# Patient Record
Sex: FEMALE | Race: WHITE | NOT HISPANIC OR LATINO | Employment: OTHER | URBAN - METROPOLITAN AREA
[De-identification: names, ages, dates, MRNs, and addresses within clinical notes are randomized per-mention and may not be internally consistent; named-entity substitution may affect disease eponyms.]

---

## 2017-03-21 RX ORDER — VALSARTAN AND HYDROCHLOROTHIAZIDE 160; 25 MG/1; MG/1
TABLET ORAL
Qty: 90 TABLET | Refills: 0 | Status: SHIPPED | OUTPATIENT
Start: 2017-03-21 | End: 2017-09-01 | Stop reason: SDUPTHER

## 2017-04-11 ENCOUNTER — APPOINTMENT (OUTPATIENT)
Dept: WOMENS IMAGING | Facility: HOSPITAL | Age: 77
End: 2017-04-11

## 2017-04-11 PROCEDURE — 77063 BREAST TOMOSYNTHESIS BI: CPT | Performed by: RADIOLOGY

## 2017-04-11 PROCEDURE — G0202 SCR MAMMO BI INCL CAD: HCPCS | Performed by: RADIOLOGY

## 2017-04-18 RX ORDER — SIMVASTATIN 40 MG
TABLET ORAL
Qty: 90 TABLET | Refills: 1 | Status: SHIPPED | OUTPATIENT
Start: 2017-04-18 | End: 2017-09-01 | Stop reason: SDUPTHER

## 2017-06-29 RX ORDER — VALSARTAN AND HYDROCHLOROTHIAZIDE 160; 25 MG/1; MG/1
TABLET ORAL
Qty: 90 TABLET | Refills: 0 | OUTPATIENT
Start: 2017-06-29

## 2017-07-05 RX ORDER — VALSARTAN AND HYDROCHLOROTHIAZIDE 160; 25 MG/1; MG/1
TABLET ORAL
Qty: 90 TABLET | Refills: 0 | OUTPATIENT
Start: 2017-07-05

## 2017-07-07 ENCOUNTER — TELEPHONE (OUTPATIENT)
Dept: INTERNAL MEDICINE | Facility: CLINIC | Age: 77
End: 2017-07-07

## 2017-07-07 NOTE — TELEPHONE ENCOUNTER
Patient called regarding her Valsartan medication. She will be out of town until 08/14/2017. She had an appointment for the week of 08/21/2017. She said that her Valsartan was denied. She would like to know if she can have this refilled until she gets back. She would like prescription sent to [facility].

## 2017-08-15 LAB
25(OH)D3+25(OH)D2 SERPL-MCNC: 34.8 NG/ML (ref 30–100)
ALBUMIN SERPL-MCNC: 4.1 G/DL (ref 3.5–5.2)
ALBUMIN/GLOB SERPL: 2.1 G/DL
ALP SERPL-CCNC: 57 U/L (ref 39–117)
ALT SERPL-CCNC: 29 U/L (ref 1–33)
AST SERPL-CCNC: 21 U/L (ref 1–32)
BILIRUB SERPL-MCNC: 0.3 MG/DL (ref 0.1–1.2)
BUN SERPL-MCNC: 17 MG/DL (ref 8–23)
BUN/CREAT SERPL: 27.9 (ref 7–25)
CALCIUM SERPL-MCNC: 10.3 MG/DL (ref 8.6–10.5)
CHLORIDE SERPL-SCNC: 108 MMOL/L (ref 98–107)
CHOLEST SERPL-MCNC: 116 MG/DL (ref 100–199)
CK SERPL-CCNC: 264 U/L (ref 20–180)
CO2 SERPL-SCNC: 27 MMOL/L (ref 22–29)
CREAT SERPL-MCNC: 0.61 MG/DL (ref 0.57–1)
ERYTHROCYTE [DISTWIDTH] IN BLOOD BY AUTOMATED COUNT: 12.8 % (ref 11.7–13)
GLOBULIN SER CALC-MCNC: 2 GM/DL
GLUCOSE SERPL-MCNC: 101 MG/DL (ref 65–99)
HCT VFR BLD AUTO: 41.4 % (ref 35.6–45.5)
HDL SERPL-SCNC: 33.7 UMOL/L
HDLC SERPL-MCNC: 44 MG/DL
HGB BLD-MCNC: 12.7 G/DL (ref 11.9–15.5)
LDL SERPL QN: 20.6 NM
LDL SERPL-SCNC: 681 NMOL/L
LDL SMALL SERPL-SCNC: 455 NMOL/L
LDLC SERPL CALC-MCNC: 46 MG/DL (ref 0–99)
LP-IR SCORE SERPL: 59
MCH RBC QN AUTO: 28.7 PG (ref 26.9–32)
MCHC RBC AUTO-ENTMCNC: 30.7 G/DL (ref 32.4–36.3)
MCV RBC AUTO: 93.7 FL (ref 80.5–98.2)
PLATELET # BLD AUTO: 171 10*3/MM3 (ref 140–500)
POTASSIUM SERPL-SCNC: 4.9 MMOL/L (ref 3.5–5.2)
PROT SERPL-MCNC: 6.1 G/DL (ref 6–8.5)
RBC # BLD AUTO: 4.42 10*6/MM3 (ref 3.9–5.2)
SODIUM SERPL-SCNC: 145 MMOL/L (ref 136–145)
TRIGL SERPL-MCNC: 130 MG/DL (ref 0–149)
WBC # BLD AUTO: 3.88 10*3/MM3 (ref 4.5–10.7)

## 2017-09-01 ENCOUNTER — OFFICE VISIT (OUTPATIENT)
Dept: INTERNAL MEDICINE | Facility: CLINIC | Age: 77
End: 2017-09-01

## 2017-09-01 VITALS
DIASTOLIC BLOOD PRESSURE: 72 MMHG | OXYGEN SATURATION: 93 % | WEIGHT: 166 LBS | BODY MASS INDEX: 28.34 KG/M2 | HEART RATE: 82 BPM | SYSTOLIC BLOOD PRESSURE: 122 MMHG | HEIGHT: 64 IN

## 2017-09-01 DIAGNOSIS — G89.29 CHRONIC RIGHT HIP PAIN: ICD-10-CM

## 2017-09-01 DIAGNOSIS — Z00.00 ROUTINE PHYSICAL EXAMINATION: Primary | ICD-10-CM

## 2017-09-01 DIAGNOSIS — E55.9 VITAMIN D DEFICIENCY: Chronic | ICD-10-CM

## 2017-09-01 DIAGNOSIS — Z51.81 THERAPEUTIC DRUG MONITORING: ICD-10-CM

## 2017-09-01 DIAGNOSIS — Z85.820 HISTORY OF MALIGNANT MELANOMA: Chronic | ICD-10-CM

## 2017-09-01 DIAGNOSIS — R31.29 MICROSCOPIC HEMATURIA: Chronic | ICD-10-CM

## 2017-09-01 DIAGNOSIS — I10 BENIGN ESSENTIAL HYPERTENSION: Chronic | ICD-10-CM

## 2017-09-01 DIAGNOSIS — R73.01 IMPAIRED FASTING GLUCOSE: ICD-10-CM

## 2017-09-01 DIAGNOSIS — K21.9 GASTROESOPHAGEAL REFLUX DISEASE WITHOUT ESOPHAGITIS: Chronic | ICD-10-CM

## 2017-09-01 DIAGNOSIS — M81.0 OSTEOPOROSIS: Chronic | ICD-10-CM

## 2017-09-01 DIAGNOSIS — I65.23 ATHEROSCLEROSIS OF BOTH CAROTID ARTERIES: Chronic | ICD-10-CM

## 2017-09-01 DIAGNOSIS — M25.551 CHRONIC RIGHT HIP PAIN: ICD-10-CM

## 2017-09-01 DIAGNOSIS — M51.9 LUMBAR DISC DISEASE: Chronic | ICD-10-CM

## 2017-09-01 DIAGNOSIS — N95.1 MENOPAUSAL STATE: ICD-10-CM

## 2017-09-01 DIAGNOSIS — Z00.00 INITIAL MEDICARE ANNUAL WELLNESS VISIT: ICD-10-CM

## 2017-09-01 DIAGNOSIS — E78.5 HYPERLIPIDEMIA, UNSPECIFIED HYPERLIPIDEMIA TYPE: Chronic | ICD-10-CM

## 2017-09-01 DIAGNOSIS — Z23 NEED FOR PNEUMOCOCCAL VACCINATION: ICD-10-CM

## 2017-09-01 PROCEDURE — G0438 PPPS, INITIAL VISIT: HCPCS | Performed by: INTERNAL MEDICINE

## 2017-09-01 PROCEDURE — 99397 PER PM REEVAL EST PAT 65+ YR: CPT | Performed by: INTERNAL MEDICINE

## 2017-09-01 PROCEDURE — 96160 PT-FOCUSED HLTH RISK ASSMT: CPT | Performed by: INTERNAL MEDICINE

## 2017-09-01 PROCEDURE — 90732 PPSV23 VACC 2 YRS+ SUBQ/IM: CPT | Performed by: INTERNAL MEDICINE

## 2017-09-01 PROCEDURE — G0009 ADMIN PNEUMOCOCCAL VACCINE: HCPCS | Performed by: INTERNAL MEDICINE

## 2017-09-01 RX ORDER — VALSARTAN AND HYDROCHLOROTHIAZIDE 160; 25 MG/1; MG/1
TABLET ORAL
Qty: 90 TABLET | Refills: 3 | Status: SHIPPED | OUTPATIENT
Start: 2017-09-01 | End: 2018-08-16 | Stop reason: SDUPTHER

## 2017-09-01 RX ORDER — SIMVASTATIN 40 MG
TABLET ORAL
Qty: 90 TABLET | Refills: 3
Start: 2017-09-01 | End: 2017-09-06 | Stop reason: SDUPTHER

## 2017-09-01 NOTE — PROGRESS NOTES
QUICK REFERENCE INFORMATION:  The ABCs of the Annual Wellness Visit    Initial Medicare Wellness Visit    HEALTH RISK ASSESSMENT    1940    Recent Hospitalizations:  No hospitalization(s) within the last year..        Current Medical Providers:  No care team member to display        Smoking Status:  History   Smoking Status   • Never Smoker   Smokeless Tobacco   • Never Used       Alcohol Consumption:  History   Alcohol Use No       Depression Screen:   PHQ-2/PHQ-9 Depression Screening 9/1/2017   Little interest or pleasure in doing things 0   Feeling down, depressed, or hopeless 0   Total Score 0       Health Habits and Functional and Cognitive Screening:  Functional & Cognitive Status 9/1/2017   Do you have difficulty preparing food and eating? No   Do you have difficulty bathing yourself? No   Do you have difficulty getting dressed? No   Do you have difficulty using the toilet? No   Do you have difficulty moving around from place to place? No   In the past year have you fallen or experienced a near fall? No   Do you need help using the phone?  No   Are you deaf or do you have serious difficulty hearing?  No   Do you need help with transportation? No   Do you need help shopping? No   Do you need help preparing meals?  No   Do you need help with housework?  No   Do you need help with laundry? No   Do you need help taking your medications? No   Do you need help managing money? No   Do you have difficulty concentrating, remembering or making decisions? No       Health Habits  Current Diet: Well Balanced Diet  Dental Exam: Up to date  Eye Exam: Up to date  Exercise (times per week): 0 times per week  Current Exercise Activities Include: None          Does the patient have evidence of cognitive impairment? No    Asiprin use counseling: Taking ASA appropriately as indicated      Recent Lab Results:    Visual Acuity:  No exam data present    Age-appropriate Screening Schedule:  Refer to the list below for future  screening recommendations based on patient's age, sex and/or medical conditions. Orders for these recommended tests are listed in the plan section. The patient has been provided with a written plan.    Health Maintenance   Topic Date Due   • DXA SCAN  03/26/2017   • INFLUENZA VACCINE  09/01/2017   • LIPID PANEL  08/14/2018   • MAMMOGRAM  04/11/2019   • COLONOSCOPY  07/12/2022   • TDAP/TD VACCINES (2 - Td) 09/01/2027   • PNEUMOCOCCAL VACCINES (65+ LOW/MEDIUM RISK)  Completed   • ZOSTER VACCINE  Completed        Subjective   History of Present Illness    Lachelle Mcclure is a 77 y.o. female who presents for an Annual Wellness Visit.    The following portions of the patient's history were reviewed and updated as appropriate: allergies, current medications, past family history, past medical history, past social history, past surgical history and problem list.    Outpatient Medications Prior to Visit   Medication Sig Dispense Refill   • aspirin (ASPIRIN LOW DOSE) 81 MG tablet Take 1 tablet by mouth daily     • Calcium Carbonate-Vit D-Min (CALCIUM 1200) 2991-3941 MG-UNIT chewable tablet Chew 1 tablet daily     • Cholecalciferol (VITAMIN D) 2000 UNITS capsule Take 1 capsule by mouth daily     • Coenzyme Q10 (COQ10) 400 MG capsule Take 1 capsule by mouth daily  Take with a meal.     • Multiple Vitamin (MULTIVITAMIN) capsule Take 1 capsule by mouth daily     • simvastatin (ZOCOR) 40 MG tablet TAKE 1 TABLET EVERY DAY  FOR  CHOLESTEROL (NEED LABS AND FOLLOW UP APPOINTMENT ASAP) 90 tablet 1   • valsartan-hydrochlorothiazide (DIOVAN-HCT) 160-25 MG per tablet TAKE 1 TABLET EVERY MORNING 90 tablet 0   • alendronate (FOSAMAX) 70 MG tablet 1 by mouth every week as directed 12 tablet 0     No facility-administered medications prior to visit.        Patient Active Problem List   Diagnosis   • Benign essential hypertension   • Carotid artery plaque, 10/06/2016--vascular screen is now normal without carotid disease.  Improved.   •  Gastroesophageal reflux disease without esophagitis   • Hyperlipidemia   • Microscopic hematuria   • Osteoporosis, 03/26/2015--lumbar spine -1.3.  Left femoral neck -2.6.  Right femoral neck -2.5.   • Tinnitus of right ear   • History of malignant melanoma, 2008--right side of face.  1999--lower back.   • Therapeutic drug monitoring   • Vitamin D deficiency   • Lumbar disc disease   • Routine physical examination   • Chronic right hip pain   • Impaired fasting glucose   • Menopausal state       Advance Care Planning:  has an advance directive - a copy has been provided and is in file    Identification of Risk Factors:  Risk factors include: weight , cardiovascular risk and chronic pain.    Review of Systems   Musculoskeletal:        Right hip pain   All other systems reviewed and are negative.      Compared to one year ago, the patient feels her physical health is the same.  Compared to one year ago, the patient feels her mental health is the same.    Objective       Physical Exam    General: Alert and oriented x 3, with appropriate affect; no acute distress.  HEENT: pupils equal, round, and reactive to light; extraocular movements intact; sclera nonicteric; nasal mucosa normal; pharynx normal; tympanic membranes and ear canals normal.  Neck: without JVD, thyromegaly, bruit, or adenopathy.  Lungs: clear to auscultation in all fields.  Heart: auscultation reveals regular rate and rhythm without murmur, rub, gallop, or click.  Abdomen: is soft and nontender, without hepatosplenomegaly, mass or hernia. Normal bowel sounds.  GYN, Digital rectal exam, Breast; deferred to gynecologist.  Extremities: are without clubbing, cyanosis, or edema.  Vascular: no signs of peripheral arterial disease or venous insufficiency/varicosities.  Neurological: intact without focal deficit, including cranial and peripheral nerves.  Station and gait observed to be normal during ingress and egress from the examination area.  Sensation and  "deep tendon reflexes tested if clinically indicated and are normal.  Musculoskeletal: exam is normal, without signs of synovitis, significant degeneration or deformity. Skin examination: without rash or significant lesions.    Vitals:    09/01/17 0813   BP: 122/72   BP Location: Right arm   Patient Position: Sitting   Cuff Size: Adult   Pulse: 82   SpO2: 93%   Weight: 166 lb (75.3 kg)   Height: 63.5\" (161.3 cm)   PainSc: 0-No pain       Body mass index is 28.94 kg/(m^2).  Discussed the patient's BMI with her. The BMI is above average; BMI management plan is completed.    Assessment/Plan   Patient Self-Management and Personalized Health Advice  The patient has been provided with information about: diet, exercise, weight management, prevention of cardiac or vascular disease and the relationship between weight and GERD and preventive services including:   · Bone densitometry screening, Diabetes screening, see lab orders, Exercise counseling provided, Fall Risk assessment done, Glaucoma screening recommended, Pneumococcal vaccine .    Visit Diagnoses:    ICD-10-CM ICD-9-CM   1. Routine physical examination Z00.00 V70.0   2. Initial Medicare annual wellness visit Z00.00 V70.0   3. Hyperlipidemia, unspecified hyperlipidemia type E78.5 272.4   4. Benign essential hypertension I10 401.1   5. Carotid artery plaque, 10/06/2016--vascular screen is now normal without carotid disease.  Improved. I65.23 433.10     433.30   6. Gastroesophageal reflux disease without esophagitis K21.9 530.81   7. Microscopic hematuria R31.29 599.72   8. Osteoporosis, 03/26/2015--lumbar spine -1.3.  Left femoral neck -2.6.  Right femoral neck -2.5. M81.0 733.00   9. History of malignant melanoma, 2008--right side of face.  1999--lower back. Z85.820 V10.82   10. Vitamin D deficiency E55.9 268.9   11. Lumbar disc disease M51.9 722.93   12. Therapeutic drug monitoring Z51.81 V58.83   13. Chronic right hip pain M25.551 719.45    G89.29 338.29   14. " Impaired fasting glucose R73.01 790.21   15. Need for pneumococcal vaccination Z23 V03.82   16. Menopausal state N95.1 627.2       Orders Placed This Encounter   Procedures   • DEXA Bone Density Axial     Order Specific Question:   Reason for Exam:     Answer:   Follow-up osteoporosis   • CBC (No Diff)     Standing Status:   Future     Standing Expiration Date:   9/2/2019   • CK     Standing Status:   Future     Standing Expiration Date:   9/2/2019   • Comprehensive Metabolic Panel     Standing Status:   Future     Standing Expiration Date:   9/2/2019   • Hemoglobin A1c     Standing Status:   Future     Standing Expiration Date:   9/2/2019   • NMR LipoProfile     Standing Status:   Future     Standing Expiration Date:   9/2/2019   • Vitamin D 25 Hydroxy     Standing Status:   Future     Standing Expiration Date:   9/2/2019   • TSH     Standing Status:   Future     Standing Expiration Date:   9/2/2019   • T4, Free     Standing Status:   Future     Standing Expiration Date:   9/2/2019   • T3, Free     Standing Status:   Future     Standing Expiration Date:   9/2/2019   • Urinalysis With / Microscopic If Indicated     Standing Status:   Future     Standing Expiration Date:   9/2/2019       Outpatient Encounter Prescriptions as of 9/1/2017   Medication Sig Dispense Refill   • aspirin (ASPIRIN LOW DOSE) 81 MG tablet Take 1 tablet by mouth daily     • Calcium Carbonate-Vit D-Min (CALCIUM 1200) 7165-4801 MG-UNIT chewable tablet Chew 1 tablet daily     • Cholecalciferol (VITAMIN D) 2000 UNITS capsule Take 1 capsule by mouth daily     • Coenzyme Q10 (COQ10) 400 MG capsule Take 1 capsule by mouth daily  Take with a meal.     • Multiple Vitamin (MULTIVITAMIN) capsule Take 1 capsule by mouth daily     • simvastatin (ZOCOR) 40 MG tablet 1 by mouth daily for cholesterol 90 tablet 3   • valsartan-hydrochlorothiazide (DIOVAN-HCT) 160-25 MG per tablet 1 by mouth every morning for blood pressure 90 tablet 3   • [DISCONTINUED]  simvastatin (ZOCOR) 40 MG tablet TAKE 1 TABLET EVERY DAY  FOR  CHOLESTEROL (NEED LABS AND FOLLOW UP APPOINTMENT ASAP) 90 tablet 1   • [DISCONTINUED] valsartan-hydrochlorothiazide (DIOVAN-HCT) 160-25 MG per tablet TAKE 1 TABLET EVERY MORNING 90 tablet 0   • [DISCONTINUED] alendronate (FOSAMAX) 70 MG tablet 1 by mouth every week as directed 12 tablet 0     Facility-Administered Encounter Medications as of 9/1/2017   Medication Dose Route Frequency Provider Last Rate Last Dose   • pneumococcal polysaccharide 23-valent (PNEUMOVAX-23) vaccine 0.5 mL  0.5 mL Intramuscular During Hospitalization Juan Vega MD           Reviewed use of high risk medication in the elderly: not applicable  Reviewed for potential of harmful drug interactions in the elderly: yes    Follow Up:  No Follow-up on file.     An After Visit Summary and PPPS with all of these plans were given to the patient.

## 2017-09-01 NOTE — PROGRESS NOTES
09/01/2017    Patient Information  Lachelle Mcclure                                                                                          [address]  [facility] [address]      1940  [phone]      Chief Complaint:     Routine physical examination and follow-up lab work.  Initial Medicare wellness visit.  No new acute complaints but continues to have intermittent chronic right hip pain.    History of Present Illness:    Patient with a history of hypertension, carotid artery plaque, esophageal reflux, hyperlipidemia, microscopic hematuria, osteoporosis, history of malignant melanoma, vitamin D deficiency, lumbar disc disease, chronic right hip pain.  She presents today for routine annual physical as well as her initial Medicare wellness visit.  She had lab work prior to this visit in order to monitor her chronic medical issues.  Past medical history reviewed and updated where necessary including health maintenance parameters.  This reveals she needs a DEXA scan, her final pneumococcal vaccination, and the computer indicates she needs an influenza vaccine but it is too early.      The history regarding chronic right hip pain:    09/01/2017--patient has had a one-year history of intermittent episodes of lateral right hip pain that at times is very severe and debilitating.  She also has tenderness over this area that limits her from sleeping on her right side.  Last year I gave her a cortisone injection for trochanteric bursitis and this provided immediate relief but not long lasting.  X-rays were negative for fracture or even degenerative arthritis.  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  Ordered.        Review of Systems   Constitution: Negative.   HENT: Negative.    Eyes: Negative.    Cardiovascular: Negative.    Respiratory: Negative.    Endocrine: Negative.    Hematologic/Lymphatic: Negative.    Skin: Negative.     Musculoskeletal: Negative.         Right hip pain   Gastrointestinal: Negative.    Genitourinary: Negative.    Neurological: Negative.    Psychiatric/Behavioral: Negative.    Allergic/Immunologic: Negative.        Active Problems:    Patient Active Problem List   Diagnosis   • Benign essential hypertension   • Carotid artery plaque, 10/06/2016--vascular screen is now normal without carotid disease.  Improved.   • Gastroesophageal reflux disease without esophagitis   • Hyperlipidemia   • Microscopic hematuria   • Osteoporosis, 03/26/2015--lumbar spine -1.3.  Left femoral neck -2.6.  Right femoral neck -2.5.   • Tinnitus of right ear   • History of malignant melanoma, 2008--right side of face.  1999--lower back.   • Therapeutic drug monitoring   • Vitamin D deficiency   • Lumbar disc disease   • Routine physical examination   • Chronic right hip pain   • Impaired fasting glucose         Past Medical History:   Diagnosis Date   • Benign essential hypertension 4/18/2006 04/18/2006--treatment for hypertension begun.   • Carotid artery plaque, 10/06/2016--vascular screen is now normal without carotid disease.  Improved. 7/30/2010    10/06/2016--vascular screen reveals no evidence of carotid plaque, negative for AAA, negative for PAD.  04/23/2014--vascular screen reveals mild bilateral carotid plaque, negative for AAA, negative for PAD.   12/07/2012--vascular screen reveals mild left-sided carotid plaque, normal right carotid, negative for AAA, negative for PAD.   07/30/2010--vascular screen reveals mild left-sided carotid plaque, normal right carotid, negative for AAA, negative for PAD.   • Chronic right hip pain 9/1/2017 09/01/2017--patient has had a one-year history of intermittent episodes of lateral right hip pain that at times is very severe and debilitating.  She also has tenderness over this area that limits her from sleeping on her right side.  Last year I gave her a cortisone injection for trochanteric  bursitis and this provided immediate relief but not long lasting.  X-rays were negative for fracture or even degenerative arthritis.  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  Ordered.   • Gastroesophageal reflux disease without esophagitis 1/14/2013    04/10/2014--patient presented with right upper quadrant/epigastric burning pain and discomfort that was postprandial. H pylori breath test was negative. Empiric trial of Dexilant 60 mg per day was initiated for esophageal reflux/dyspepsia.   01/14/2013--EGD performed for epigastric and right upper quadrant pain. There was some edematous appearing mucosa in the antrum and body of the stomach which was biopsied. No significant erythema. No ulcerations. Normal appearing duodenum and esophagus. Pathology revealed moderate chronic active gastritis. No intestinal metaplasia. Helicobacter pylori positive. Patient was treated with appropriate antibiotic and PPI therapy.   • History of bone density study 03/26/2015 03/26/2015--DEXA scan reveals average lumbar spine T score -1.3. Left proximal femoral T score -1.6. Left femoral neck T score -2.6. Right proximal femoral T score -1.0. Right femoral neck T score -2.5. Osteoporosis with 5% decrease in bone density since previous study.   12/07/2012--lumbar spine T score -1.4. Total right hip T score of -1.0. Right femoral neck T score -2.5. Total left hip T score   • History of carotid Doppler/vascular screen 10/6/2016    10/06/2016--vascular screen reveals no evidence of carotid plaque, negative for AAA, negative for PAD.  04/23/2014--vascular screen reveals mild bilateral carotid plaque, negative for AAA, negative for PAD.   12/07/2012--vascular screen reveals mild left-sided carotid plaque, normal right carotid, negative for AAA, negative for PAD.   07/30/2010--vascular screen reveals mild left-sided carotid plaque, normal right carotid, negative for AAA,  negative for PAD.   • History of Helicobacter pylori gastritis 01/14/2013    04/10/2014--patient presented with right upper quadrant/epigastric burning pain and discomfort that was postprandial. H pylori breath test was negative. Empiric trial of Dexilant 60 mg per day was initiated for esophageal reflux/dyspepsia.  01/14/2013--EGD performed for epigastric and right upper quadrant pain. There was some edematous appearing mucosa in the antrum and body of the stomach which w   • History of Impacted cerumen 12/18/2015 12/18/2015--patient presents with a four-month history of ringing in her right ear. No hearing loss. Examination reveals a cerumen impaction of the right ear canal. This was removed with irrigation and curettage   • History of malignant melanoma, 2008--right side of face.  1999--lower back. 1/1/2008 2008--melanoma resected from right face.   1999--malignant melanoma resected from lower back.   • History of mammogram 7/22/2015 07/22/2015--negative mammogram.   01/09/2012--mammogram reveals stable areas of asymmetric breast tissue in both breasts that are benign negative. Nothing to suggest malignancy at this time.   • History of pneumococcal vaccination given 12/18/2015 12/18/2015--Prevnar 13 given. Patient will need PPSV 23 in 6-12 months.   2008--patient reports she received PPSV 23. This was just prior to the age of 67.   • History of right lateral epicondylitis 12/7/2009 12/07/2009--patient was evaluated by the orthopedist with a three-month history of right elbow pain. This was related to a fall. Assessment was lateral epicondylitis. She was treated conservatively with ice, oral anti-inflammatories and stretching. Resolved.   • History of Right-sided chest wall pain 05/29/2014 05/29/2014--patient reports that she fell in December of 2013 and struck her right lower anterior chest wall. Since that time she has been having intermittent pain as well as tenderness over the ribs. Chest  x-ray with rib details ordered and revealed fine linear opacities along the periphery of the left lower lobe suggesting pleural or parenchymal scarring with perhaps some retraction. No effusion   • History of routine gynecologic exam yearly    Patient sees a gynecologist.   • History Trochanteric bursitis of right hip 8/12/2016    10/19/2016--patient seen in follow-up and the results of the lumbar spine x-ray reviewed.  Her hip pain is much better and is very tolerable.  I do not feel we need to proceed with any further evaluation at the present time unless her symptoms return.  09/13/2016--x-ray lumbar spine reveals disc space narrowing at L2-L3, L3-L4, L4-L5, and possibly L5-S1.  There is mild anterior listhesis of L4 on L5 measuring 4.5 mm.  No compression deformity, nor other evidence of acute process.  09/13/2016--patient seen in follow-up and reports her symptoms are better but they certainly have not resolved.  She feels the prednisone may have helped.  Review of the symptoms reveals that patient has right lower back/right buttock pain and occasionally this will radiate down into the right thigh anterior laterally.  It is described as a burning, throbbing, and stinging sensation.  It seems to be relieved by standing up.  Prolonged sitting definitely aggravates it.  She also still has tenderness over the right trochanteric bu   • Hyperlipidemia 8/13/2010 08/13/2010--treatment for hyperlipidemia begun.   • Impaired fasting glucose 9/1/2017 09/01/2017--routine physical.  Serum glucose elevated at 101.  Recommend weight loss and decrease carbohydrate intake.   • Lumbar disc disease 10/19/2016    09/13/2016--x-ray lumbar spine reveals disc space narrowing at L2-L3, L3-L4, L4-L5, and possibly L5-S1.  There is mild anterior listhesis of L4 on L5 measuring 4.5 mm.  No compression deformity, nor other evidence of acute process.   • Microscopic hematuria 12/18/2015 12/22/2015--urine cytology negative for  malignant cells.  Red blood cells noted.  Transitional epithelial cells noted.  Urine culture revealed less than 10,000 colony forming units of mixed urogenital laurie.  Repeat urinalysis was negative.  Recommend observation.  12/18/2015--routine urinalysis reveals 3-5 WBCs and 3-5 RBCs. 0 epithelial cells. 0 bacteria. Patient is asymptomatic. Repeat urinalysis, urine cytology, and urine culture sent.   • Osteoporosis, 03/26/2015--lumbar spine -1.3.  Left femoral neck -2.6.  Right femoral neck -2.5. 7/24/2009 04/30/2015--generic Fosamax 70 mg per day initiated.  03/26/2015--DEXA scan reveals average lumbar spine T score -1.3. Left proximal femoral T score -1.6. Left femoral neck T score -2.6. Right proximal femoral T score -1.0. Right femoral neck T score -2.5. Osteoporosis with 5% decrease in bone density since previous study.   12/07/2012--lumbar spine T score -1.4. Total right hip T score of -1.0. Right femoral neck T score -2.5. Total left hip T score -1.3. Left femoral neck T score -2.5. Osteoporosis bilateral femoral necks, osteopenia lumbar spine.   07/24/2009--DEXA scan reveals lumbar spine T score -1.4. Left hip T score -1.2. Osteopenia.   • Tinnitus of right ear 12/18/2015 12/18/2015--patient presents with a four-month history of ringing in her right ear. No hearing loss. Examination reveals a cerumen impaction of the right ear canal. This was removed with irrigation and curettage.   • Vitamin D deficiency 8/12/2016         Past Surgical History:   Procedure Laterality Date   • CHOLECYSTECTOMY  05/17/2013 05/17/2013--laparoscopic cholecystectomy.   • COLONOSCOPY  07/12/2012 07/12/2012--normal colonoscopy to the cecum with an excellent prep.    • COLONOSCOPY  06/20/2003 06/20/2003--normal colonoscopy to the cecum.   • ESOPHAGOSCOPY / EGD  01/14/2013 01/14/2013--EGD performed for epigastric and right upper quadrant pain. There was some edematous appearing mucosa in the antrum and body of the  stomach which was biopsied. No significant erythema. No ulcerations. Normal appearing duodenum and esophagus. Pathology revealed moderate chronic active gastritis. No intestinal metaplasia. Helicobacter pylori positive. Patient was treated with appropriate   • FOOT SURGERY  1992 1992--orthopedic 10 placed in right great toe.   • HYSTERECTOMY  1986 1986--total abdominal hysterectomy.   • SKIN SURGERY  09/14/2009 09/14/2009--excision 1 cm mid scalp cyst. Pathology benign. 2008--melanoma resected from right face. 1999--malignant melanoma resected from lower back.   • TONSILLECTOMY AND ADENOIDECTOMY  1946 1946.         Allergies   Allergen Reactions   • Propoxyphene            Current Outpatient Prescriptions:   •  aspirin (ASPIRIN LOW DOSE) 81 MG tablet, Take 1 tablet by mouth daily, Disp: , Rfl:   •  Calcium Carbonate-Vit D-Min (CALCIUM 1200) 3109-7239 MG-UNIT chewable tablet, Chew 1 tablet daily, Disp: , Rfl:   •  Cholecalciferol (VITAMIN D) 2000 UNITS capsule, Take 1 capsule by mouth daily, Disp: , Rfl:   •  Coenzyme Q10 (COQ10) 400 MG capsule, Take 1 capsule by mouth daily Take with a meal., Disp: , Rfl:   •  Multiple Vitamin (MULTIVITAMIN) capsule, Take 1 capsule by mouth daily, Disp: , Rfl:   •  simvastatin (ZOCOR) 40 MG tablet, 1 by mouth daily for cholesterol, Disp: 90 tablet, Rfl: 3  •  valsartan-hydrochlorothiazide (DIOVAN-HCT) 160-25 MG per tablet, 1 by mouth every morning for blood pressure, Disp: 90 tablet, Rfl: 3      Family History   Problem Relation Age of Onset   • COPD Mother    • Hypertension Mother      Essential   • COPD Father          Social History     Social History   • Marital status:      Spouse name: N/A   • Number of children: N/A   • Years of education: N/A     Occupational History   • Registered Nurse      Social History Main Topics   • Smoking status: Never Smoker   • Smokeless tobacco: Never Used   • Alcohol use No   • Drug use: No   • Sexual activity: Yes      "Partners: Male     Other Topics Concern   • Not on file     Social History Narrative         Vitals:    09/01/17 0813   BP: 122/72   BP Location: Right arm   Patient Position: Sitting   Cuff Size: Adult   Pulse: 82   SpO2: 93%   Weight: 166 lb (75.3 kg)   Height: 63.5\" (161.3 cm)          Physical Exam:    General: Alert and oriented x 3, with appropriate affect; no acute distress.  HEENT: pupils equal, round, and reactive to light; extraocular movements intact; sclera nonicteric; nasal mucosa normal; pharynx normal; tympanic membranes and ear canals normal.  Neck: without JVD, thyromegaly, bruit, or adenopathy.  Lungs: clear to auscultation in all fields.  Heart: auscultation reveals regular rate and rhythm without murmur, rub, gallop, or click.  Abdomen: is soft and nontender, without hepatosplenomegaly, mass or hernia. Normal bowel sounds.  GYN, Digital rectal exam, Breast; deferred to gynecologist.  Extremities: are without clubbing, cyanosis, or edema.  Vascular: no signs of peripheral arterial disease or venous insufficiency/varicosities.  Neurological: intact without focal deficit, including cranial and peripheral nerves.  Station and gait observed to be normal during ingress and egress from the examination area.  Sensation and deep tendon reflexes tested if clinically indicated and are normal.  Musculoskeletal: exam is normal, without signs of synovitis, significant degeneration or deformity. Skin examination: without rash or significant lesions.    Lab/other results:    I reviewed the results of her right hip x-ray and her lumbar spine x-ray taken last year.      NMR is absolutely perfect.  CMP normal except blood sugar slightly elevated at 101.  CBC normal except white count slightly low at 3.88.  Vitamin D normal.  .    Assessment/Plan:     Diagnosis Plan   1. Routine physical examination     2. Initial Medicare annual wellness visit     3. Hyperlipidemia, unspecified hyperlipidemia type  " simvastatin (ZOCOR) 40 MG tablet   4. Benign essential hypertension  valsartan-hydrochlorothiazide (DIOVAN-HCT) 160-25 MG per tablet   5. Carotid artery plaque, 10/06/2016--vascular screen is now normal without carotid disease.  Improved.     6. Gastroesophageal reflux disease without esophagitis     7. Microscopic hematuria     8. Osteoporosis, 03/26/2015--lumbar spine -1.3.  Left femoral neck -2.6.  Right femoral neck -2.5.     9. History of malignant melanoma, 2008--right side of face.  1999--lower back.     10. Vitamin D deficiency     11. Lumbar disc disease     12. Therapeutic drug monitoring     13. Chronic right hip pain     14. Impaired fasting glucose       The initial Medicare wellness visit is documented on a separate note.    Patient presents with essentially normal annual exam except for the following issues: She has hyperlipidemia that is under perfect control with simvastatin.  Her blood pressure is under good control as well.  Carotid artery plaque actually resolved.  We will continue to monitor every 2-3 years.  Reflux has not been much of an issue.  She has osteoporosis but does not tolerate Fosamax.  She needs a repeat DEXA scan that I can review that and make a decision regarding possible other therapeutic.  She has a history of malignant melanoma ×2 and is followed closely by the dermatologist.  Vitamin D is therapeutic.  She has lumbar disc disease but I do not think that is causing her chronic right hip pain.  She has a new diagnosis of very mild impaired fasting glucose.    Plan is as follows: PPSV 23 given.  Prescription for physical therapy for her right hip pain given.  DEXA scan ordered.  Patient can follow-up on the phone for the results unless there is a reason we need to me face-to-face.  Otherwise, patient will follow-up in one year for her annual exam and Medicare wellness visit.          Procedures

## 2017-09-06 DIAGNOSIS — E78.5 HYPERLIPIDEMIA, UNSPECIFIED HYPERLIPIDEMIA TYPE: Chronic | ICD-10-CM

## 2017-09-06 RX ORDER — SIMVASTATIN 40 MG
TABLET ORAL
Qty: 90 TABLET | Refills: 3 | Status: SHIPPED | OUTPATIENT
Start: 2017-09-06 | End: 2018-07-13 | Stop reason: SDUPTHER

## 2017-10-18 ENCOUNTER — HOSPITAL ENCOUNTER (OUTPATIENT)
Dept: BONE DENSITY | Facility: HOSPITAL | Age: 77
Discharge: HOME | End: 2017-10-18
Admitting: INTERNAL MEDICINE

## 2017-10-18 PROCEDURE — 77080 DXA BONE DENSITY AXIAL: CPT

## 2018-07-05 DIAGNOSIS — E78.5 HYPERLIPIDEMIA, UNSPECIFIED HYPERLIPIDEMIA TYPE: Chronic | ICD-10-CM

## 2018-07-05 DIAGNOSIS — R73.01 IMPAIRED FASTING GLUCOSE: ICD-10-CM

## 2018-07-05 DIAGNOSIS — Z00.00 ROUTINE PHYSICAL EXAMINATION: ICD-10-CM

## 2018-07-05 DIAGNOSIS — E55.9 VITAMIN D DEFICIENCY: Chronic | ICD-10-CM

## 2018-07-08 LAB
25(OH)D3+25(OH)D2 SERPL-MCNC: 45 NG/ML (ref 30–100)
ALBUMIN SERPL-MCNC: 4.5 G/DL (ref 3.5–5.2)
ALBUMIN/GLOB SERPL: 2.4 G/DL
ALP SERPL-CCNC: 62 U/L (ref 39–117)
ALT SERPL-CCNC: 21 U/L (ref 1–33)
APPEARANCE UR: CLEAR
AST SERPL-CCNC: 17 U/L (ref 1–32)
BACTERIA #/AREA URNS HPF: NORMAL /HPF
BILIRUB SERPL-MCNC: 0.4 MG/DL (ref 0.1–1.2)
BILIRUB UR QL STRIP: NEGATIVE
BUN SERPL-MCNC: 19 MG/DL (ref 8–23)
BUN/CREAT SERPL: 26 (ref 7–25)
CALCIUM SERPL-MCNC: 9.9 MG/DL (ref 8.6–10.5)
CASTS URNS MICRO: NORMAL
CHLORIDE SERPL-SCNC: 107 MMOL/L (ref 98–107)
CHOLEST SERPL-MCNC: 101 MG/DL (ref 100–199)
CK SERPL-CCNC: 340 U/L (ref 20–180)
CO2 SERPL-SCNC: 31.1 MMOL/L (ref 22–29)
COLOR UR: YELLOW
CREAT SERPL-MCNC: 0.73 MG/DL (ref 0.57–1)
EPI CELLS #/AREA URNS HPF: NORMAL /HPF
ERYTHROCYTE [DISTWIDTH] IN BLOOD BY AUTOMATED COUNT: 12.7 % (ref 11.7–13)
GLOBULIN SER CALC-MCNC: 1.9 GM/DL
GLUCOSE SERPL-MCNC: 94 MG/DL (ref 65–99)
GLUCOSE UR QL: NEGATIVE
HBA1C MFR BLD: 5.3 % (ref 4.8–5.6)
HCT VFR BLD AUTO: 43.6 % (ref 35.6–45.5)
HDL SERPL-SCNC: 36.2 UMOL/L
HDLC SERPL-MCNC: 46 MG/DL
HGB BLD-MCNC: 13.5 G/DL (ref 11.9–15.5)
HGB UR QL STRIP: ABNORMAL
KETONES UR QL STRIP: NEGATIVE
LDL SERPL QN: 20 NM
LDL SERPL-SCNC: 590 NMOL/L
LDL SMALL SERPL-SCNC: 336 NMOL/L
LDLC SERPL CALC-MCNC: 39 MG/DL (ref 0–99)
LEUKOCYTE ESTERASE UR QL STRIP: ABNORMAL
MCH RBC QN AUTO: 28.9 PG (ref 26.9–32)
MCHC RBC AUTO-ENTMCNC: 31 G/DL (ref 32.4–36.3)
MCV RBC AUTO: 93.4 FL (ref 80.5–98.2)
NITRITE UR QL STRIP: NEGATIVE
PH UR STRIP: 6.5 [PH] (ref 5–8)
PLATELET # BLD AUTO: 193 10*3/MM3 (ref 140–500)
POTASSIUM SERPL-SCNC: 4.3 MMOL/L (ref 3.5–5.2)
PROT SERPL-MCNC: 6.4 G/DL (ref 6–8.5)
PROT UR QL STRIP: NEGATIVE
RBC # BLD AUTO: 4.67 10*6/MM3 (ref 3.9–5.2)
RBC #/AREA URNS HPF: NORMAL /HPF
SODIUM SERPL-SCNC: 145 MMOL/L (ref 136–145)
SP GR UR: 1.02 (ref 1–1.03)
T3FREE SERPL-MCNC: 3.3 PG/ML (ref 2–4.4)
T4 FREE SERPL-MCNC: 1.13 NG/DL (ref 0.93–1.7)
TRIGL SERPL-MCNC: 78 MG/DL (ref 0–149)
TSH SERPL DL<=0.005 MIU/L-ACNC: 2.34 MIU/ML (ref 0.27–4.2)
UROBILINOGEN UR STRIP-MCNC: ABNORMAL MG/DL
WBC # BLD AUTO: 5.54 10*3/MM3 (ref 4.5–10.7)
WBC #/AREA URNS HPF: NORMAL /HPF

## 2018-07-12 ENCOUNTER — APPOINTMENT (OUTPATIENT)
Dept: WOMENS IMAGING | Facility: HOSPITAL | Age: 78
End: 2018-07-12

## 2018-07-12 PROCEDURE — 77063 BREAST TOMOSYNTHESIS BI: CPT | Performed by: RADIOLOGY

## 2018-07-12 PROCEDURE — MDREVIEWSP: Performed by: RADIOLOGY

## 2018-07-12 PROCEDURE — 77067 SCR MAMMO BI INCL CAD: CPT | Performed by: RADIOLOGY

## 2018-07-13 ENCOUNTER — OFFICE VISIT (OUTPATIENT)
Dept: INTERNAL MEDICINE | Facility: CLINIC | Age: 78
End: 2018-07-13

## 2018-07-13 VITALS
SYSTOLIC BLOOD PRESSURE: 130 MMHG | OXYGEN SATURATION: 99 % | BODY MASS INDEX: 27.66 KG/M2 | HEART RATE: 84 BPM | DIASTOLIC BLOOD PRESSURE: 70 MMHG | WEIGHT: 162 LBS | HEIGHT: 64 IN

## 2018-07-13 DIAGNOSIS — I10 BENIGN ESSENTIAL HYPERTENSION: Chronic | ICD-10-CM

## 2018-07-13 DIAGNOSIS — K21.9 GASTROESOPHAGEAL REFLUX DISEASE WITHOUT ESOPHAGITIS: Chronic | ICD-10-CM

## 2018-07-13 DIAGNOSIS — M81.0 AGE-RELATED OSTEOPOROSIS WITHOUT CURRENT PATHOLOGICAL FRACTURE: Chronic | ICD-10-CM

## 2018-07-13 DIAGNOSIS — I65.23 ATHEROSCLEROSIS OF BOTH CAROTID ARTERIES: Chronic | ICD-10-CM

## 2018-07-13 DIAGNOSIS — Z51.81 THERAPEUTIC DRUG MONITORING: ICD-10-CM

## 2018-07-13 DIAGNOSIS — E78.5 HYPERLIPIDEMIA, UNSPECIFIED HYPERLIPIDEMIA TYPE: Chronic | ICD-10-CM

## 2018-07-13 DIAGNOSIS — Z00.00 ROUTINE PHYSICAL EXAMINATION: ICD-10-CM

## 2018-07-13 DIAGNOSIS — E55.9 VITAMIN D DEFICIENCY: Chronic | ICD-10-CM

## 2018-07-13 DIAGNOSIS — Z85.820 HISTORY OF MALIGNANT MELANOMA: Chronic | ICD-10-CM

## 2018-07-13 DIAGNOSIS — R73.01 IMPAIRED FASTING GLUCOSE: Primary | Chronic | ICD-10-CM

## 2018-07-13 PROCEDURE — 99214 OFFICE O/P EST MOD 30 MIN: CPT | Performed by: INTERNAL MEDICINE

## 2018-07-13 RX ORDER — SIMVASTATIN 40 MG
TABLET ORAL
Qty: 90 TABLET | Refills: 3
Start: 2018-07-13 | End: 2018-08-16 | Stop reason: SDUPTHER

## 2018-07-13 NOTE — PROGRESS NOTES
07/13/2018    Patient Information  Lachelle Mcclure                                                                                          [address]  [place] [address]      1940  [phone]      Chief Complaint:     Follow-up impaired fasting glucose, hyperlipidemia, hypertension, carotid artery plaque, esophageal reflux, osteoporosis, history of malignant melanoma, new diagnosis of basal cell carcinoma nose, vitamin D deficiency.  No new acute complaints.    History of Present Illness:    Patient with a history of medical problems as outlined in the chief complaint presents today for a follow-up with lab prior.  She currently feels fairly well and has no new acute complaints.  Her life is been somewhat complicated because of the fairly recent passing of her mother in all responsibilities surrounding this.  Her past medical history reviewed and updated where necessary including health maintenance parameters.  This reveals she is up-to-date with the exception of the new shingles vaccination.  I instructed her to check with the local drug store to see if it would be available at a reasonable cost.  If so, I do recommended it.     Review of Systems   Constitution: Negative.   HENT: Negative.    Eyes: Negative.    Cardiovascular: Negative.    Respiratory: Negative.    Endocrine: Negative.    Hematologic/Lymphatic: Negative.    Skin: Negative.    Musculoskeletal: Negative.    Gastrointestinal: Negative.    Genitourinary: Negative.    Neurological: Negative.    Psychiatric/Behavioral: Negative.    Allergic/Immunologic: Negative.        Active Problems:    Patient Active Problem List   Diagnosis   • Benign essential hypertension   • Carotid artery plaque, 10/06/2016--vascular screen is now normal without carotid disease.  Improved.   • Gastroesophageal reflux disease without esophagitis   • Hyperlipidemia   • Microscopic hematuria   • Osteoporosis, 03/26/2015--lumbar spine -1.3.  Left femoral  neck -2.6.  Right femoral neck -2.5.   • Tinnitus of right ear   • History of malignant melanoma, 2008--right side of face.  1999--lower back.   • Therapeutic drug monitoring   • Vitamin D deficiency   • Lumbar disc disease   • Routine physical examination   • Chronic right hip pain   • Impaired fasting glucose   • Menopausal state         Past Medical History:   Diagnosis Date   • Benign essential hypertension 4/18/2006 04/18/2006--treatment for hypertension begun.   • Carotid artery plaque, 10/06/2016--vascular screen is now normal without carotid disease.  Improved. 7/30/2010    10/06/2016--vascular screen reveals no evidence of carotid plaque, negative for AAA, negative for PAD.  04/23/2014--vascular screen reveals mild bilateral carotid plaque, negative for AAA, negative for PAD.   12/07/2012--vascular screen reveals mild left-sided carotid plaque, normal right carotid, negative for AAA, negative for PAD.   07/30/2010--vascular screen reveals mild left-sided carotid plaque, normal right carotid, negative for AAA, negative for PAD.   • Chronic right hip pain 9/1/2017 09/01/2017--patient has had a one-year history of intermittent episodes of lateral right hip pain that at times is very severe and debilitating.  She also has tenderness over this area that limits her from sleeping on her right side.  Last year I gave her a cortisone injection for trochanteric bursitis and this provided immediate relief but not long lasting.  X-rays were negative for fracture or even degenerative arthritis.  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  Ordered.   • Gastroesophageal reflux disease without esophagitis 1/14/2013    04/10/2014--patient presented with right upper quadrant/epigastric burning pain and discomfort that was postprandial. H pylori breath test was negative. Empiric trial of Dexilant 60 mg per day was initiated for esophageal reflux/dyspepsia.    01/14/2013--EGD performed for epigastric and right upper quadrant pain. There was some edematous appearing mucosa in the antrum and body of the stomach which was biopsied. No significant erythema. No ulcerations. Normal appearing duodenum and esophagus. Pathology revealed moderate chronic active gastritis. No intestinal metaplasia. Helicobacter pylori positive. Patient was treated with appropriate antibiotic and PPI therapy.   • History of Helicobacter pylori gastritis 01/14/2013    04/10/2014--patient presented with right upper quadrant/epigastric burning pain and discomfort that was postprandial. H pylori breath test was negative. Empiric trial of Dexilant 60 mg per day was initiated for esophageal reflux/dyspepsia.  01/14/2013--EGD performed for epigastric and right upper quadrant pain. There was some edematous appearing mucosa in the antrum and body of the stomach which w   • History of malignant melanoma, 2008--right side of face.  1999--lower back. 1/1/2008 2008--melanoma resected from right face.   1999--malignant melanoma resected from lower back.   • History of routine gynecologic exam yearly    Patient sees a gynecologist.   • Hyperlipidemia 8/13/2010 08/13/2010--treatment for hyperlipidemia begun.   • Impaired fasting glucose 9/1/2017 09/01/2017--routine physical.  Serum glucose elevated at 101.  Recommend weight loss and decrease carbohydrate intake.   • Lumbar disc disease 10/19/2016    09/13/2016--x-ray lumbar spine reveals disc space narrowing at L2-L3, L3-L4, L4-L5, and possibly L5-S1.  There is mild anterior listhesis of L4 on L5 measuring 4.5 mm.  No compression deformity, nor other evidence of acute process.   • Menopausal state 9/1/2017   • Microscopic hematuria 12/18/2015 12/22/2015--urine cytology negative for malignant cells.  Red blood cells noted.  Transitional epithelial cells noted.  Urine culture revealed less than 10,000 colony forming units of mixed urogenital laurie.  Repeat  urinalysis was negative.  Recommend observation.  12/18/2015--routine urinalysis reveals 3-5 WBCs and 3-5 RBCs. 0 epithelial cells. 0 bacteria. Patient is asymptomatic. Repeat urinalysis, urine cytology, and urine culture sent.   • Osteoporosis, 03/26/2015--lumbar spine -1.3.  Left femoral neck -2.6.  Right femoral neck -2.5. 7/24/2009 04/30/2015--generic Fosamax 70 mg per day initiated.  03/26/2015--DEXA scan reveals average lumbar spine T score -1.3. Left proximal femoral T score -1.6. Left femoral neck T score -2.6. Right proximal femoral T score -1.0. Right femoral neck T score -2.5. Osteoporosis with 5% decrease in bone density since previous study.   12/07/2012--lumbar spine T score -1.4. Total right hip T score of -1.0. Right femoral neck T score -2.5. Total left hip T score -1.3. Left femoral neck T score -2.5. Osteoporosis bilateral femoral necks, osteopenia lumbar spine.   07/24/2009--DEXA scan reveals lumbar spine T score -1.4. Left hip T score -1.2. Osteopenia.   • Tinnitus of right ear 12/18/2015 12/18/2015--patient presents with a four-month history of ringing in her right ear. No hearing loss. Examination reveals a cerumen impaction of the right ear canal. This was removed with irrigation and curettage.   • Vitamin D deficiency 8/12/2016         Past Surgical History:   Procedure Laterality Date   • CHOLECYSTECTOMY  05/17/2013 05/17/2013--laparoscopic cholecystectomy.   • COLONOSCOPY  07/12/2012 07/12/2012--normal colonoscopy to the cecum with an excellent prep.    • COLONOSCOPY  06/20/2003 06/20/2003--normal colonoscopy to the cecum.   • ESOPHAGOSCOPY / EGD  01/14/2013 01/14/2013--EGD performed for epigastric and right upper quadrant pain. There was some edematous appearing mucosa in the antrum and body of the stomach which was biopsied. No significant erythema. No ulcerations. Normal appearing duodenum and esophagus. Pathology revealed moderate chronic active gastritis. No  intestinal metaplasia. Helicobacter pylori positive. Patient was treated with appropriate   • FOOT SURGERY  1992 1992--orthopedic 10 placed in right great toe.   • HYSTERECTOMY  1986 1986--total abdominal hysterectomy.   • SKIN SURGERY  09/14/2009 09/14/2009--excision 1 cm mid scalp cyst. Pathology benign. 2008--melanoma resected from right face. 1999--malignant melanoma resected from lower back.   • TONSILLECTOMY AND ADENOIDECTOMY  1946    1946.         Allergies   Allergen Reactions   • Propoxyphene            Current Outpatient Prescriptions:   •  aspirin (ASPIRIN LOW DOSE) 81 MG tablet, Take 1 tablet by mouth daily, Disp: , Rfl:   •  Calcium Carbonate-Vit D-Min (CALCIUM 1200) 0014-2351 MG-UNIT chewable tablet, Chew 1 tablet daily, Disp: , Rfl:   •  Cholecalciferol (VITAMIN D) 2000 UNITS capsule, Take 1 capsule by mouth daily, Disp: , Rfl:   •  Coenzyme Q10 (COQ10) 400 MG capsule, Take 1 capsule by mouth daily Take with a meal., Disp: , Rfl:   •  simvastatin (ZOCOR) 40 MG tablet, TAKE 1 TABLET EVERY DAY  FOR  CHOLESTEROL (NEED LABS AND FOLLOW UP APPOINTMENT ASAP), Disp: 90 tablet, Rfl: 3  •  valsartan-hydrochlorothiazide (DIOVAN-HCT) 160-25 MG per tablet, 1 by mouth every morning for blood pressure, Disp: 90 tablet, Rfl: 3      Family History   Problem Relation Age of Onset   • COPD Mother    • Hypertension Mother         Essential   • COPD Father          Social History     Social History   • Marital status:      Spouse name: N/A   • Number of children: N/A   • Years of education: N/A     Occupational History   • Registered Nurse      Social History Main Topics   • Smoking status: Never Smoker   • Smokeless tobacco: Never Used   • Alcohol use No   • Drug use: No   • Sexual activity: Yes     Partners: Male     Other Topics Concern   • Not on file     Social History Narrative   • No narrative on file         Vitals:    07/13/18 0926   BP: 130/70   Pulse: 84   SpO2: 99%   Weight: 73.5 kg (162 lb)  "  Height: 161.3 cm (63.5\")          Physical Exam:    General: Alert and oriented x 3.  No acute distress.  Normal affect.  HEENT: Pupils equal, round, reactive to light; extraocular movements intact; sclerae nonicteric; pharynx, ear canals and TMs normal.  Neck: Without JVD, thyromegaly, bruit, or adenopathy.  Lungs: Clear to auscultation in all fields.  Heart: Regular rate and rhythm without murmur, rub, gallop, or click.  Abdomen: Soft, nontender, without hepatosplenomegaly or hernia.  Bowel sounds normal.  : Deferred.  Rectal: Deferred.  Extremities: Without clubbing, cyanosis, edema, or pulse deficit.  Neurologic: Intact without focal deficit.  Normal station and gait observed during ingress and egress from the examination room.  Skin: Without significant lesion.  Musculoskeletal: Unremarkable.      Lab/other results:    NMR is absolutely perfect.  ENT normal.  Urinalysis normal.  CBC normal.  Hemoglobin A1c 5.3.  Thyroid function tests normal.  Vitamin D normal.  CPK slightly elevated at 340.    Assessment/Plan:     Diagnosis Plan   1. Impaired fasting glucose     2. Hyperlipidemia, unspecified hyperlipidemia type     3. Benign essential hypertension     4. Carotid artery plaque, 10/06/2016--vascular screen is now normal without carotid disease.  Improved.     5. Gastroesophageal reflux disease without esophagitis     6. Age-related osteoporosis without current pathological fracture     7. History of malignant melanoma, 2008--right side of face.  1999--lower back.     8. Vitamin D deficiency     9. Therapeutic drug monitoring     10. Routine physical examination       It appears the patient's impaired fasting glucose may have actually resolved.  We will continue to monitor.  Hyperlipidemia is under perfect control which is important given her carotid artery plaque.  Blood pressures well controlled.  Reflux has not been an issue.  Patient does have osteoporosis.  Treated with vitamin D and calcium.  Patient " has a history of malignant melanoma as well as a history of basal cell carcinoma of skin and she is followed closely by her dermatologist.    Plan is as follows: No change in current medical regimen.  I will have patient follow-up in November for her [employer] replacement physical, lab work, and subsequent Medicare wellness visit.        Procedures

## 2018-08-16 DIAGNOSIS — I10 BENIGN ESSENTIAL HYPERTENSION: Chronic | ICD-10-CM

## 2018-08-16 DIAGNOSIS — E78.5 HYPERLIPIDEMIA, UNSPECIFIED HYPERLIPIDEMIA TYPE: Chronic | ICD-10-CM

## 2018-08-17 RX ORDER — VALSARTAN AND HYDROCHLOROTHIAZIDE 160; 25 MG/1; MG/1
TABLET ORAL
Qty: 90 TABLET | Refills: 0 | Status: SHIPPED | OUTPATIENT
Start: 2018-08-17 | End: 2018-08-21

## 2018-08-17 RX ORDER — SIMVASTATIN 40 MG
TABLET ORAL
Qty: 90 TABLET | Refills: 0 | Status: SHIPPED | OUTPATIENT
Start: 2018-08-17 | End: 2019-01-07 | Stop reason: SDUPTHER

## 2018-08-21 DIAGNOSIS — I10 BENIGN ESSENTIAL HYPERTENSION: Primary | Chronic | ICD-10-CM

## 2018-08-21 RX ORDER — IRBESARTAN AND HYDROCHLOROTHIAZIDE 150; 12.5 MG/1; MG/1
TABLET, FILM COATED ORAL
Qty: 30 TABLET | Refills: 3 | Status: SHIPPED | OUTPATIENT
Start: 2018-08-21 | End: 2019-01-07 | Stop reason: HOSPADM

## 2018-12-10 DIAGNOSIS — E78.5 HYPERLIPIDEMIA, UNSPECIFIED HYPERLIPIDEMIA TYPE: Chronic | ICD-10-CM

## 2018-12-10 RX ORDER — SIMVASTATIN 40 MG
TABLET ORAL
Qty: 90 TABLET | Refills: 0 | OUTPATIENT
Start: 2018-12-10

## 2018-12-10 RX ORDER — VALSARTAN AND HYDROCHLOROTHIAZIDE 160; 25 MG/1; MG/1
TABLET ORAL
Qty: 90 TABLET | Refills: 0 | OUTPATIENT
Start: 2018-12-10

## 2019-01-07 DIAGNOSIS — E78.5 HYPERLIPIDEMIA, UNSPECIFIED HYPERLIPIDEMIA TYPE: Chronic | ICD-10-CM

## 2019-01-07 RX ORDER — VALSARTAN AND HYDROCHLOROTHIAZIDE 160; 25 MG/1; MG/1
TABLET ORAL
Qty: 90 TABLET | Refills: 0 | Status: SHIPPED | OUTPATIENT
Start: 2019-01-07 | End: 2019-03-11 | Stop reason: SDUPTHER

## 2019-01-07 RX ORDER — SIMVASTATIN 40 MG
TABLET ORAL
Qty: 90 TABLET | Refills: 0 | Status: SHIPPED | OUTPATIENT
Start: 2019-01-07 | End: 2019-02-12

## 2019-02-01 DIAGNOSIS — M81.0 AGE RELATED OSTEOPOROSIS, UNSPECIFIED PATHOLOGICAL FRACTURE PRESENCE: Chronic | ICD-10-CM

## 2019-02-01 DIAGNOSIS — Z51.81 THERAPEUTIC DRUG MONITORING: ICD-10-CM

## 2019-02-01 DIAGNOSIS — E55.9 VITAMIN D DEFICIENCY: Chronic | ICD-10-CM

## 2019-02-01 DIAGNOSIS — I10 BENIGN ESSENTIAL HYPERTENSION: Primary | Chronic | ICD-10-CM

## 2019-02-01 DIAGNOSIS — E78.5 HYPERLIPIDEMIA, UNSPECIFIED HYPERLIPIDEMIA TYPE: Chronic | ICD-10-CM

## 2019-02-01 DIAGNOSIS — R73.01 IMPAIRED FASTING GLUCOSE: Chronic | ICD-10-CM

## 2019-02-05 LAB
25(OH)D3+25(OH)D2 SERPL-MCNC: 39.9 NG/ML (ref 30–100)
ALBUMIN SERPL-MCNC: 4.3 G/DL (ref 3.5–5.2)
ALBUMIN/GLOB SERPL: 2.4 G/DL
ALP SERPL-CCNC: 66 U/L (ref 39–117)
ALT SERPL-CCNC: 21 U/L (ref 1–33)
AST SERPL-CCNC: 20 U/L (ref 1–32)
BASOPHILS # BLD AUTO: 0.03 10*3/MM3 (ref 0–0.2)
BASOPHILS NFR BLD AUTO: 0.4 % (ref 0–1.5)
BILIRUB SERPL-MCNC: 0.5 MG/DL (ref 0.1–1.2)
BUN SERPL-MCNC: 14 MG/DL (ref 8–23)
BUN/CREAT SERPL: 19.7 (ref 7–25)
CALCIUM SERPL-MCNC: 10.3 MG/DL (ref 8.6–10.5)
CHLORIDE SERPL-SCNC: 102 MMOL/L (ref 98–107)
CHOLEST SERPL-MCNC: 120 MG/DL (ref 100–199)
CK SERPL-CCNC: 242 U/L (ref 20–180)
CO2 SERPL-SCNC: 27.4 MMOL/L (ref 22–29)
CREAT SERPL-MCNC: 0.71 MG/DL (ref 0.57–1)
EOSINOPHIL # BLD AUTO: 0.13 10*3/MM3 (ref 0–0.7)
EOSINOPHIL NFR BLD AUTO: 1.9 % (ref 0.3–6.2)
ERYTHROCYTE [DISTWIDTH] IN BLOOD BY AUTOMATED COUNT: 12.7 % (ref 11.7–13)
GLOBULIN SER CALC-MCNC: 1.8 GM/DL
GLUCOSE SERPL-MCNC: 97 MG/DL (ref 65–99)
HBA1C MFR BLD: 5.39 % (ref 4.8–5.6)
HCT VFR BLD AUTO: 40.9 % (ref 35.6–45.5)
HDL SERPL-SCNC: 36.6 UMOL/L
HDLC SERPL-MCNC: 50 MG/DL
HGB BLD-MCNC: 13 G/DL (ref 11.9–15.5)
IMM GRANULOCYTES # BLD AUTO: 0.01 10*3/MM3 (ref 0–0.03)
IMM GRANULOCYTES NFR BLD AUTO: 0.1 % (ref 0–0.5)
LDL SERPL QN: 20.5 NM
LDL SERPL-SCNC: 757 NMOL/L
LDL SMALL SERPL-SCNC: 401 NMOL/L
LDLC SERPL CALC-MCNC: 57 MG/DL (ref 0–99)
LYMPHOCYTES # BLD AUTO: 1.14 10*3/MM3 (ref 0.9–4.8)
LYMPHOCYTES NFR BLD AUTO: 16.5 % (ref 19.6–45.3)
MCH RBC QN AUTO: 29.5 PG (ref 26.9–32)
MCHC RBC AUTO-ENTMCNC: 31.8 G/DL (ref 32.4–36.3)
MCV RBC AUTO: 93 FL (ref 80.5–98.2)
MONOCYTES # BLD AUTO: 0.77 10*3/MM3 (ref 0.2–1.2)
MONOCYTES NFR BLD AUTO: 11.1 % (ref 5–12)
NEUTROPHILS # BLD AUTO: 4.86 10*3/MM3 (ref 1.9–8.1)
NEUTROPHILS NFR BLD AUTO: 70.1 % (ref 42.7–76)
PLATELET # BLD AUTO: 183 10*3/MM3 (ref 140–500)
POTASSIUM SERPL-SCNC: 4.1 MMOL/L (ref 3.5–5.2)
PROT SERPL-MCNC: 6.1 G/DL (ref 6–8.5)
RBC # BLD AUTO: 4.4 10*6/MM3 (ref 3.9–5.2)
SODIUM SERPL-SCNC: 144 MMOL/L (ref 136–145)
T3FREE SERPL-MCNC: 3.9 PG/ML (ref 2–4.4)
T4 FREE SERPL-MCNC: 1.14 NG/DL (ref 0.93–1.7)
TRIGL SERPL-MCNC: 65 MG/DL (ref 0–149)
TSH SERPL DL<=0.005 MIU/L-ACNC: 3.54 MIU/ML (ref 0.27–4.2)
WBC # BLD AUTO: 6.93 10*3/MM3 (ref 4.5–10.7)

## 2019-02-12 ENCOUNTER — OFFICE VISIT (OUTPATIENT)
Dept: INTERNAL MEDICINE | Facility: CLINIC | Age: 79
End: 2019-02-12

## 2019-02-12 VITALS
SYSTOLIC BLOOD PRESSURE: 124 MMHG | DIASTOLIC BLOOD PRESSURE: 64 MMHG | HEIGHT: 64 IN | WEIGHT: 162.4 LBS | OXYGEN SATURATION: 99 % | HEART RATE: 69 BPM | BODY MASS INDEX: 27.72 KG/M2

## 2019-02-12 DIAGNOSIS — R25.2 NOCTURNAL MUSCLE CRAMPS: ICD-10-CM

## 2019-02-12 DIAGNOSIS — I10 BENIGN ESSENTIAL HYPERTENSION: Chronic | ICD-10-CM

## 2019-02-12 DIAGNOSIS — K21.9 GASTROESOPHAGEAL REFLUX DISEASE WITHOUT ESOPHAGITIS: Chronic | ICD-10-CM

## 2019-02-12 DIAGNOSIS — E55.9 VITAMIN D DEFICIENCY: Chronic | ICD-10-CM

## 2019-02-12 DIAGNOSIS — I65.23 ATHEROSCLEROSIS OF BOTH CAROTID ARTERIES: Chronic | ICD-10-CM

## 2019-02-12 DIAGNOSIS — Z85.820 HISTORY OF MALIGNANT MELANOMA: Chronic | ICD-10-CM

## 2019-02-12 DIAGNOSIS — Z51.81 THERAPEUTIC DRUG MONITORING: ICD-10-CM

## 2019-02-12 DIAGNOSIS — G89.29 CHRONIC RIGHT HIP PAIN: Chronic | ICD-10-CM

## 2019-02-12 DIAGNOSIS — R31.29 MICROSCOPIC HEMATURIA: Chronic | ICD-10-CM

## 2019-02-12 DIAGNOSIS — M25.551 CHRONIC RIGHT HIP PAIN: Chronic | ICD-10-CM

## 2019-02-12 DIAGNOSIS — E78.5 HYPERLIPIDEMIA, UNSPECIFIED HYPERLIPIDEMIA TYPE: Chronic | ICD-10-CM

## 2019-02-12 DIAGNOSIS — R73.01 IMPAIRED FASTING GLUCOSE: Chronic | ICD-10-CM

## 2019-02-12 DIAGNOSIS — M81.0 AGE RELATED OSTEOPOROSIS, UNSPECIFIED PATHOLOGICAL FRACTURE PRESENCE: Chronic | ICD-10-CM

## 2019-02-12 DIAGNOSIS — M51.9 LUMBAR DISC DISEASE: Chronic | ICD-10-CM

## 2019-02-12 DIAGNOSIS — Z00.00 MEDICARE ANNUAL WELLNESS VISIT, SUBSEQUENT: Primary | ICD-10-CM

## 2019-02-12 DIAGNOSIS — Z85.828 HISTORY OF BASAL CELL CARCINOMA OF SKIN: Chronic | ICD-10-CM

## 2019-02-12 DIAGNOSIS — Z00.00 ROUTINE PHYSICAL EXAMINATION: ICD-10-CM

## 2019-02-12 PROCEDURE — 99397 PER PM REEVAL EST PAT 65+ YR: CPT | Performed by: INTERNAL MEDICINE

## 2019-02-12 PROCEDURE — G0439 PPPS, SUBSEQ VISIT: HCPCS | Performed by: INTERNAL MEDICINE

## 2019-02-12 PROCEDURE — 96160 PT-FOCUSED HLTH RISK ASSMT: CPT | Performed by: INTERNAL MEDICINE

## 2019-02-12 RX ORDER — SIMVASTATIN 40 MG
TABLET ORAL
Qty: 90 TABLET | Refills: 3
Start: 2019-02-12 | End: 2019-03-11 | Stop reason: SDUPTHER

## 2019-02-12 NOTE — PROGRESS NOTES
02/12/2019    Patient Information  Lachelle Mcclure                                                                                          [address]  [facility] [address]      1940  [unfilled]  There is no work phone number on file.    Chief Complaint:     Subsequent Medicare wellness visit.  [employer] Medicare replacement annual physical.  Follow-up lab work.  Complaining of nocturnal leg cramps.    History of Present Illness:    Patient with a history of impaired fasting glucose, hyperlipidemia, hypertension, carotid artery plaque, esophageal reflux, microscopic hematuria, osteoporosis, history of melanoma, vitamin D deficiency, lumbar disc disease and chronic right hip pain.  She presents today for her subsequent Medicare wellness visit as well as her [employer] Medicare replacement annual physical.  She also had lab work in order to monitor her chronic medical issues.  She has complaints of nocturnal leg cramps as described below.  Her past medical history reviewed and updated were necessary including health maintenance parameters.  This reveals she is up-to-date or else accounted for.  She is aware of the new shingles vaccine but it is currently on back order.    The history regarding nocturnal leg cramps:    February 12, 2019--patient with history of hyperlipidemia who is on 40 mg of simvastatin presents today with worsening leg cramps.  These cramps are different from her previous leg cramps that she has had ever since a child.  It involves the posterior aspect of the thighs and is quite severe to the point she has to get up and start walking around.  She is taking coenzyme Q 10 but only had a 200 mg dose.  I suggested she increase that to a total of 400 mg/day.  If she still has problems I recommended that she add some magnesium supplementation which she can get over-the-counter.  If that does not work she can try Harjit's Leg Cramp Pills with quinine and if the symptoms totally persist after  that then we should probably sit down and discuss options.    Review of Systems   Constitution: Negative.   HENT: Negative.    Eyes: Negative.    Cardiovascular: Negative.    Respiratory: Negative.    Endocrine: Negative.    Hematologic/Lymphatic: Negative.    Skin: Negative.    Musculoskeletal: Positive for arthritis and back pain.        Nocturnal muscle cramps   Gastrointestinal: Negative.    Genitourinary: Negative.    Neurological: Negative.    Psychiatric/Behavioral: Negative.    Allergic/Immunologic: Negative.        Active Problems:    Patient Active Problem List   Diagnosis   • Benign essential hypertension   • Carotid artery plaque, 10/06/2016--vascular screen is now normal without carotid disease.  Improved.   • Gastroesophageal reflux disease without esophagitis   • Hyperlipidemia   • Microscopic hematuria   • Osteoporosis, 10/18/2017--lumbar spine -0.8.  Left femoral neck -2.4.  Right femoral neck -2.4.  03/26/2015--lumbar spine -1.3.  Left femoral neck -2.6.  Right femoral neck -2.5.   • Tinnitus of right ear   • History of malignant melanoma, 2008--right side of face.  1999--lower back.   • Therapeutic drug monitoring   • Vitamin D deficiency   • Lumbar disc disease   • Routine physical examination   • Chronic right hip pain   • Impaired fasting glucose   • Menopausal state   • Nocturnal muscle cramps   • History of basal cell carcinoma of skin         Past Medical History:   Diagnosis Date   • Benign essential hypertension 4/18/2006 04/18/2006--treatment for hypertension begun.   • Carotid artery plaque, 10/06/2016--vascular screen is now normal without carotid disease.  Improved. 7/30/2010    10/06/2016--vascular screen reveals no evidence of carotid plaque, negative for AAA, negative for PAD.  04/23/2014--vascular screen reveals mild bilateral carotid plaque, negative for AAA, negative for PAD.   12/07/2012--vascular screen reveals mild left-sided carotid plaque, normal right carotid, negative  for AAA, negative for PAD.   07/30/2010--vascular screen reveals mild left-sided carotid plaque, normal right carotid, negative for AAA, negative for PAD.   • Chronic right hip pain 9/1/2017 09/01/2017--patient has had a one-year history of intermittent episodes of lateral right hip pain that at times is very severe and debilitating.  She also has tenderness over this area that limits her from sleeping on her right side.  Last year I gave her a cortisone injection for trochanteric bursitis and this provided immediate relief but not long lasting.  X-rays were negative for fracture or even degenerative arthritis.  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  Ordered.   • Gastroesophageal reflux disease without esophagitis 1/14/2013    04/10/2014--patient presented with right upper quadrant/epigastric burning pain and discomfort that was postprandial. H pylori breath test was negative. Empiric trial of Dexilant 60 mg per day was initiated for esophageal reflux/dyspepsia.   01/14/2013--EGD performed for epigastric and right upper quadrant pain. There was some edematous appearing mucosa in the antrum and body of the stomach which was biopsied. No significant erythema. No ulcerations. Normal appearing duodenum and esophagus. Pathology revealed moderate chronic active gastritis. No intestinal metaplasia. Helicobacter pylori positive. Patient was treated with appropriate antibiotic and PPI therapy.   • History of basal cell carcinoma of skin 2/12/2019 June 2018--Mohs micrographic surgery of basal cell carcinoma on the tip of the nose.   • History of Helicobacter pylori gastritis 01/14/2013    04/10/2014--patient presented with right upper quadrant/epigastric burning pain and discomfort that was postprandial. H pylori breath test was negative. Empiric trial of Dexilant 60 mg per day was initiated for esophageal reflux/dyspepsia.  01/14/2013--EGD performed for  epigastric and right upper quadrant pain. There was some edematous appearing mucosa in the antrum and body of the stomach which w   • History of malignant melanoma, 2008--right side of face.  1999--lower back. 1/1/2008 2008--melanoma resected from right face.   1999--malignant melanoma resected from lower back.   • History of routine gynecologic exam yearly    Patient sees a gynecologist.   • Hyperlipidemia 8/13/2010 08/13/2010--treatment for hyperlipidemia begun.   • Impaired fasting glucose 9/1/2017 09/01/2017--routine physical.  Serum glucose elevated at 101.  Recommend weight loss and decrease carbohydrate intake.   • Lumbar disc disease 10/19/2016    09/13/2016--x-ray lumbar spine reveals disc space narrowing at L2-L3, L3-L4, L4-L5, and possibly L5-S1.  There is mild anterior listhesis of L4 on L5 measuring 4.5 mm.  No compression deformity, nor other evidence of acute process.   • Menopausal state 9/1/2017   • Microscopic hematuria 12/18/2015 12/22/2015--urine cytology negative for malignant cells.  Red blood cells noted.  Transitional epithelial cells noted.  Urine culture revealed less than 10,000 colony forming units of mixed urogenital laurie.  Repeat urinalysis was negative.  Recommend observation.  12/18/2015--routine urinalysis reveals 3-5 WBCs and 3-5 RBCs. 0 epithelial cells. 0 bacteria. Patient is asymptomatic. Repeat urinalysis, urine cytology, and urine culture sent.   • Osteoporosis, 10/18/2017--lumbar spine -0.8.  Left femoral neck -2.4.  Right femoral neck -2.4.  03/26/2015--lumbar spine -1.3.  Left femoral neck -2.6.  Right femoral neck -2.5. 7/24/2009 February 12, 2019--patient seen in follow-up and reports she really did not feel well on Fosamax.  She has been using calcium and vitamin D supplementation.  October 18, 2017--DEXA scan reveals lumbar spine -0.8.  Left femoral neck -2.4.  Right femoral neck -2.4.  04/30/2015--generic Fosamax 70 mg per day initiated.   03/26/2015--DEXA scan reveals average lumbar spine T score -1.3. Left proximal fe   • Tinnitus of right ear 12/18/2015 12/18/2015--patient presents with a four-month history of ringing in her right ear. No hearing loss. Examination reveals a cerumen impaction of the right ear canal. This was removed with irrigation and curettage.   • Vitamin D deficiency 8/12/2016         Past Surgical History:   Procedure Laterality Date   • BASAL CELL CARCINOMA EXCISION  06/2018 June 2018--Mohs micrographic surgery of basal cell carcinoma on the tip of the nose.   • CHOLECYSTECTOMY  05/17/2013 05/17/2013--laparoscopic cholecystectomy.   • COLONOSCOPY  07/12/2012 07/12/2012--normal colonoscopy to the cecum with an excellent prep.    • COLONOSCOPY  06/20/2003 06/20/2003--normal colonoscopy to the cecum.   • ESOPHAGOSCOPY / EGD  01/14/2013 01/14/2013--EGD performed for epigastric and right upper quadrant pain. There was some edematous appearing mucosa in the antrum and body of the stomach which was biopsied. No significant erythema. No ulcerations. Normal appearing duodenum and esophagus. Pathology revealed moderate chronic active gastritis. No intestinal metaplasia. Helicobacter pylori positive. Patient was treated with appropriate   • FOOT SURGERY  1992 1992--orthopedic 10 placed in right great toe.   • HYSTERECTOMY  1986 1986--total abdominal hysterectomy.   • SKIN CANCER EXCISION  09/14/2009 09/14/2009--excision 1 cm mid scalp cyst. Pathology benign. 2008--melanoma resected from right face. 1999--malignant melanoma resected from lower back.   • TONSILLECTOMY AND ADENOIDECTOMY  1946 1946.         Allergies   Allergen Reactions   • Propoxyphene            Current Outpatient Medications:   •  aspirin (ASPIRIN LOW DOSE) 81 MG tablet, Take 1 tablet by mouth daily, Disp: , Rfl:   •  Calcium Carbonate-Vit D-Min (CALCIUM 1200) 3095-0691 MG-UNIT chewable tablet, Chew 1 tablet daily, Disp: , Rfl:   •   "Cholecalciferol (VITAMIN D) 2000 UNITS capsule, Take 1 capsule by mouth daily, Disp: , Rfl:   •  Coenzyme Q10 (COQ10) 400 MG capsule, Take 1 capsule by mouth daily Take with a meal., Disp: , Rfl:   •  simvastatin (ZOCOR) 40 MG tablet, Take one half p.o. daily for high cholesterol, Disp: 90 tablet, Rfl: 3  •  valsartan-hydrochlorothiazide (DIOVAN-HCT) 160-25 MG per tablet, TAKE 1 TABLET EVERY MORNING FOR BLOOD PRESSURE, Disp: 90 tablet, Rfl: 0      Family History   Problem Relation Age of Onset   • COPD Mother    • Hypertension Mother         Essential   • COPD Father          Social History     Socioeconomic History   • Marital status:      Spouse name: Not on file   • Number of children: Not on file   • Years of education: Not on file   • Highest education level: Not on file   Social Needs   • Financial resource strain: Not hard at all   • Food insecurity - worry: Never true   • Food insecurity - inability: Never true   • Transportation needs - medical: No   • Transportation needs - non-medical: No   Occupational History   • Occupation: Registered Nurse   Tobacco Use   • Smoking status: Never Smoker   • Smokeless tobacco: Never Used   Substance and Sexual Activity   • Alcohol use: No   • Drug use: No   • Sexual activity: Yes     Partners: Male   Other Topics Concern   • Not on file   Social History Narrative   • Not on file         Vitals:    02/12/19 0755   BP: 124/64   BP Location: Right arm   Pulse: 69   SpO2: 99%   Weight: 73.7 kg (162 lb 6.4 oz)   Height: 161.3 cm (63.5\")          Physical Exam:    General: Alert and oriented x 3.  No acute distress.  Normal affect.  HEENT: Pupils equal, round, reactive to light; extraocular movements intact; sclerae nonicteric; pharynx, ear canals and TMs normal.  Neck: Without JVD, thyromegaly, bruit, or adenopathy.  Lungs: Clear to auscultation in all fields.  Heart: Regular rate and rhythm without murmur, rub, gallop, or click.  Abdomen: Soft, nontender, without " hepatosplenomegaly or hernia.  Bowel sounds normal.  : Deferred.  Rectal: Deferred.  Extremities: Without clubbing, cyanosis, edema, or pulse deficit.  Neurologic: Intact without focal deficit.  Normal station and gait observed during ingress and egress from the examination room.  Skin: Without significant lesion.  Musculoskeletal: Unremarkable.    Lab/other results:    NMR is absolutely perfect except LDL size is a little low at 20.5.  CMP normal.  CBC essentially normal.  Hemoglobin A1c 5 0.39.  Thyroid function tests are normal.  Vitamin D normal.  CPK slightly elevated at 242.    Assessment/Plan:     Diagnosis Plan   1. Medicare annual wellness visit, subsequent     2. Routine physical examination     3. Impaired fasting glucose     4. Hyperlipidemia, unspecified hyperlipidemia type  simvastatin (ZOCOR) 40 MG tablet   5. Benign essential hypertension     6. Carotid artery plaque, 10/06/2016--vascular screen is now normal without carotid disease.  Improved.     7. Gastroesophageal reflux disease without esophagitis     8. Microscopic hematuria     9. Age related osteoporosis, unspecified pathological fracture presence     10. History of malignant melanoma, 2008--right side of face.  1999--lower back.     11. Vitamin D deficiency     12. Lumbar disc disease     13. Chronic right hip pain     14. Therapeutic drug monitoring     15. Nocturnal muscle cramps     16. History of basal cell carcinoma of skin       The subsequent Medicare wellness visit is documented on a separate note.    Patient presents with essentially normal annual physical except for the following issues: She has extremely mild impaired fasting glucose it does not require medication.  Hyperlipidemia is under excellent control but patient is having worsening nocturnal muscle cramps which may be related.  Her cholesterol profile is quite good and she may get by with a smaller dose.  Blood pressure seems well controlled on valsartan.  Patient has  carotid artery plaque and needs a carotid Doppler study.  Reflux has not been an issue.  Microscopic hematuria appears resolved.  Patient does have osteoporosis that has improved to the point of osteopenia.  She will be due for DEXA scan later this year and at that time we can make a decision regarding Reclast or Prolia based upon that study.  She has a history of malignant melanoma and is followed closely by the dermatologist.  Also last year she had a basal cell removed from the tip of her nose.  Vitamin D is therapeutic.  Lumbar disc disease and chronic right hip pain seems to be fairly stable.  New complaints of nocturnal muscle cramps as described above.  New history of basal cell carcinoma of skin as described above.    Plan is as follows: Patient instructed to start cutting simvastatin in half each day.  I also instructed her to make sure she is taking 400 mg of coenzyme Q 10.  Other measures regarding leg cramps as discussed above in the history of present illness.  I will have patient obtain fasting lab work in about 6-8 weeks and then follow-up after to review the results and her symptoms.  We will also obtain a carotid Doppler study in the meantime and review that as well.        Procedures

## 2019-02-12 NOTE — PROGRESS NOTES
QUICK REFERENCE INFORMATION:  The ABCs of the Annual Wellness Visit    Subsequent Medicare Wellness Visit    HEALTH RISK ASSESSMENT    1940    Recent Hospitalizations:  No hospitalization(s) within the last year..        Current Medical Providers:  Patient Care Team:  Juan Vega MD as PCP - General (Internal Medicine)        Smoking Status:  Social History     Tobacco Use   Smoking Status Never Smoker   Smokeless Tobacco Never Used       Alcohol Consumption:  Social History     Substance and Sexual Activity   Alcohol Use No       Depression Screen:   PHQ-2/PHQ-9 Depression Screening 2/12/2019   Little interest or pleasure in doing things 0   Feeling down, depressed, or hopeless 0   Trouble falling or staying asleep, or sleeping too much 0   Feeling tired or having little energy 0   Poor appetite or overeating 0   Feeling bad about yourself - or that you are a failure or have let yourself or your family down 0   Trouble concentrating on things, such as reading the newspaper or watching television 0   Moving or speaking so slowly that other people could have noticed. Or the opposite - being so fidgety or restless that you have been moving around a lot more than usual 0   Thoughts that you would be better off dead, or of hurting yourself in some way 0   Total Score 0       Health Habits and Functional and Cognitive Screening:  Functional & Cognitive Status 2/12/2019   Do you have difficulty preparing food and eating? No   Do you have difficulty bathing yourself, getting dressed or grooming yourself? No   Do you have difficulty using the toilet? No   Do you have difficulty moving around from place to place? No   Do you have trouble with steps or getting out of a bed or a chair? No   In the past year have you fallen or experienced a near fall? No   Current Diet Well Balanced Diet   Dental Exam Up to date   Eye Exam Up to date   Exercise (times per week) 2 times per week   Current Exercise Activities Include  Cardiovasular Workout on Exercise Equipment   Do you need help using the phone?  No   Are you deaf or do you have serious difficulty hearing?  No   Do you need help with transportation? No   Do you need help shopping? No   Do you need help preparing meals?  No   Do you need help with housework?  No   Do you need help with laundry? No   Do you need help taking your medications? No   Do you need help managing money? No   Do you ever drive or ride in a car without wearing a seat belt? No   Have you felt unusual stress, anger or loneliness in the last month? No   Who do you live with? Spouse   If you need help, do you have trouble finding someone available to you? No   Have you been bothered in the last four weeks by sexual problems? No   Do you have difficulty concentrating, remembering or making decisions? No           Does the patient have evidence of cognitive impairment? No    Aspirin use counseling: Taking ASA appropriately as indicated      Recent Lab Results:  CMP:  Lab Results   Component Value Date    GLU 97 02/04/2019    BUN 14 02/04/2019    CREATININE 0.71 02/04/2019    EGFRIFNONA 80 02/04/2019    EGFRIFAFRI 97 02/04/2019    BCR 19.7 02/04/2019     02/04/2019    K 4.1 02/04/2019    CO2 27.4 02/04/2019    CALCIUM 10.3 02/04/2019    PROTENTOTREF 6.1 02/04/2019    ALBUMIN 4.30 02/04/2019    LABGLOBREF 1.8 02/04/2019    LABIL2 2.4 02/04/2019    BILITOT 0.5 02/04/2019    ALKPHOS 66 02/04/2019    AST 20 02/04/2019    ALT 21 02/04/2019     Lipid Panel:  Lab Results   Component Value Date    TRIG 65 02/04/2019    HDL 45 12/11/2015    VLDL 25 12/11/2015    LDLHDL 1.3 04/30/2015     HbA1c:  Lab Results   Component Value Date    HGBA1C 5.39 02/04/2019       Visual Acuity:  No exam data present    Age-appropriate Screening Schedule:  Refer to the list below for future screening recommendations based on patient's age, sex and/or medical conditions. Orders for these recommended tests are listed in the plan section.  The patient has been provided with a written plan.    Health Maintenance   Topic Date Due   • DXA SCAN  10/18/2019   • LIPID PANEL  02/04/2020   • MAMMOGRAM  07/12/2020   • COLONOSCOPY  07/12/2022   • TDAP/TD VACCINES (2 - Td) 09/01/2027   • INFLUENZA VACCINE  Completed   • PNEUMOCOCCAL VACCINES (65+ LOW/MEDIUM RISK)  Completed   • ZOSTER VACCINE  Discontinued        Subjective   History of Present Illness    Lachelle Mcclure is a 78 y.o. female who presents for an Subsequent Wellness Visit.    The following portions of the patient's history were reviewed and updated as appropriate: allergies, current medications, past family history, past medical history, past social history, past surgical history and problem list.    Outpatient Medications Prior to Visit   Medication Sig Dispense Refill   • aspirin (ASPIRIN LOW DOSE) 81 MG tablet Take 1 tablet by mouth daily     • Calcium Carbonate-Vit D-Min (CALCIUM 1200) 1933-1819 MG-UNIT chewable tablet Chew 1 tablet daily     • Cholecalciferol (VITAMIN D) 2000 UNITS capsule Take 1 capsule by mouth daily     • Coenzyme Q10 (COQ10) 400 MG capsule Take 1 capsule by mouth daily  Take with a meal.     • valsartan-hydrochlorothiazide (DIOVAN-HCT) 160-25 MG per tablet TAKE 1 TABLET EVERY MORNING FOR BLOOD PRESSURE 90 tablet 0   • simvastatin (ZOCOR) 40 MG tablet TAKE 1 TABLET EVERY DAY  FOR  CHOLESTEROL (NEED LABS AND FOLLOW UP APPOINTMENT ASAP) 90 tablet 0     No facility-administered medications prior to visit.        Patient Active Problem List   Diagnosis   • Benign essential hypertension   • Carotid artery plaque, 10/06/2016--vascular screen is now normal without carotid disease.  Improved.   • Gastroesophageal reflux disease without esophagitis   • Hyperlipidemia   • Microscopic hematuria   • Osteoporosis, 10/18/2017--lumbar spine -0.8.  Left femoral neck -2.4.  Right femoral neck -2.4.  03/26/2015--lumbar spine -1.3.  Left femoral neck -2.6.  Right femoral neck -2.5.   • Tinnitus  "of right ear   • History of malignant melanoma, 2008--right side of face.  1999--lower back.   • Therapeutic drug monitoring   • Vitamin D deficiency   • Lumbar disc disease   • Routine physical examination   • Chronic right hip pain   • Impaired fasting glucose   • Menopausal state   • Nocturnal muscle cramps   • History of basal cell carcinoma of skin       Advance Care Planning:  has an advance directive - a copy has been provided and is in file    Identification of Risk Factors:  Risk factors include: weight , cardiovascular risk and increased fall risk.    Review of Systems   Musculoskeletal: Positive for arthralgias and back pain.        Muscle cramps   All other systems reviewed and are negative.      Compared to one year ago, the patient feels her physical health is better.  Compared to one year ago, the patient feels her mental health is the same.    Objective       Physical Exam    General: Alert and oriented x 3.  No acute distress.  Normal affect.  HEENT: Pupils equal, round, reactive to light; extraocular movements intact; sclerae nonicteric; pharynx, ear canals and TMs normal.  Neck: Without JVD, thyromegaly, bruit, or adenopathy.  Lungs: Clear to auscultation in all fields.  Heart: Regular rate and rhythm without murmur, rub, gallop, or click.  Abdomen: Soft, nontender, without hepatosplenomegaly or hernia.  Bowel sounds normal.  : Deferred.  Rectal: Deferred.  Extremities: Without clubbing, cyanosis, edema, or pulse deficit.  Neurologic: Intact without focal deficit.  Normal station and gait observed during ingress and egress from the examination room.  Skin: Without significant lesion.  Musculoskeletal: Unremarkable.    Vitals:    02/12/19 0755   BP: 124/64   BP Location: Right arm   Pulse: 69   SpO2: 99%   Weight: 73.7 kg (162 lb 6.4 oz)   Height: 161.3 cm (63.5\")       Patient's Body mass index is 28.31 kg/m². BMI is above normal parameters. Recommendations include: exercise counseling, " nutrition counseling and referral to primary care.      Assessment/Plan   Patient Self-Management and Personalized Health Advice  The patient has been provided with information about: diet, exercise, weight management, prevention of cardiac or vascular disease and fall prevention and preventive services including:   · Counseling for cardiovascular disease risk reduction, Diabetes screening, see lab orders, Exercise counseling provided, Fall Risk assessment done, Glaucoma screening recommended, Nutrition counseling provided, Zostavax vaccine (Herpes Zoster).    Visit Diagnoses:    ICD-10-CM ICD-9-CM   1. Medicare annual wellness visit, subsequent Z00.00 V70.0   2. Routine physical examination Z00.00 V70.0   3. Impaired fasting glucose R73.01 790.21   4. Hyperlipidemia, unspecified hyperlipidemia type E78.5 272.4   5. Benign essential hypertension I10 401.1   6. Carotid artery plaque, 10/06/2016--vascular screen is now normal without carotid disease.  Improved. I65.23 433.10     433.30   7. Gastroesophageal reflux disease without esophagitis K21.9 530.81   8. Microscopic hematuria R31.29 599.72   9. Age related osteoporosis, unspecified pathological fracture presence M81.0 733.01   10. History of malignant melanoma, 2008--right side of face.  1999--lower back. Z85.820 V10.82   11. Vitamin D deficiency E55.9 268.9   12. Lumbar disc disease M51.9 722.93   13. Chronic right hip pain M25.551 719.45    G89.29 338.29   14. Therapeutic drug monitoring Z51.81 V58.83   15. Nocturnal muscle cramps R25.2 729.82   16. History of basal cell carcinoma of skin Z85.828 V10.83       Orders Placed This Encounter   Procedures   • Comprehensive Metabolic Panel     Standing Status:   Future     Standing Expiration Date:   2/12/2021   • CK     Standing Status:   Future     Standing Expiration Date:   2/12/2021   • NMR LipoProfile     Standing Status:   Future     Standing Expiration Date:   2/12/2021       Outpatient Encounter Medications  as of 2/12/2019   Medication Sig Dispense Refill   • aspirin (ASPIRIN LOW DOSE) 81 MG tablet Take 1 tablet by mouth daily     • Calcium Carbonate-Vit D-Min (CALCIUM 1200) 6028-6512 MG-UNIT chewable tablet Chew 1 tablet daily     • Cholecalciferol (VITAMIN D) 2000 UNITS capsule Take 1 capsule by mouth daily     • Coenzyme Q10 (COQ10) 400 MG capsule Take 1 capsule by mouth daily  Take with a meal.     • simvastatin (ZOCOR) 40 MG tablet Take one half p.o. daily for high cholesterol 90 tablet 3   • valsartan-hydrochlorothiazide (DIOVAN-HCT) 160-25 MG per tablet TAKE 1 TABLET EVERY MORNING FOR BLOOD PRESSURE 90 tablet 0   • [DISCONTINUED] simvastatin (ZOCOR) 40 MG tablet TAKE 1 TABLET EVERY DAY  FOR  CHOLESTEROL (NEED LABS AND FOLLOW UP APPOINTMENT ASAP) 90 tablet 0     No facility-administered encounter medications on file as of 2/12/2019.        Reviewed use of high risk medication in the elderly: yes  Reviewed for potential of harmful drug interactions in the elderly: yes    Follow Up:  Return in about 6 weeks (around 3/26/2019) for Next scheduled follow up with lab prior.     An After Visit Summary and PPPS with all of these plans were given to the patient.

## 2019-03-01 ENCOUNTER — TRANSCRIBE ORDERS (OUTPATIENT)
Dept: ADMINISTRATIVE | Facility: HOSPITAL | Age: 79
End: 2019-03-01

## 2019-03-01 DIAGNOSIS — Z13.9 VISIT FOR SCREENING: Primary | ICD-10-CM

## 2019-03-11 DIAGNOSIS — E78.5 HYPERLIPIDEMIA, UNSPECIFIED HYPERLIPIDEMIA TYPE: Chronic | ICD-10-CM

## 2019-03-11 RX ORDER — SIMVASTATIN 40 MG
TABLET ORAL
Qty: 90 TABLET | Refills: 0 | Status: SHIPPED | OUTPATIENT
Start: 2019-03-11 | End: 2019-07-18 | Stop reason: SDUPTHER

## 2019-03-11 RX ORDER — VALSARTAN AND HYDROCHLOROTHIAZIDE 160; 25 MG/1; MG/1
TABLET ORAL
Qty: 90 TABLET | Refills: 0 | Status: SHIPPED | OUTPATIENT
Start: 2019-03-11 | End: 2019-07-18 | Stop reason: SDUPTHER

## 2019-04-03 ENCOUNTER — HOSPITAL ENCOUNTER (OUTPATIENT)
Dept: CARDIOLOGY | Facility: HOSPITAL | Age: 79
Discharge: HOME | End: 2019-04-03
Admitting: INTERNAL MEDICINE

## 2019-04-03 VITALS
HEART RATE: 66 BPM | WEIGHT: 161 LBS | HEIGHT: 64 IN | SYSTOLIC BLOOD PRESSURE: 128 MMHG | DIASTOLIC BLOOD PRESSURE: 70 MMHG | BODY MASS INDEX: 27.49 KG/M2

## 2019-04-03 DIAGNOSIS — Z13.9 VISIT FOR SCREENING: ICD-10-CM

## 2019-04-03 LAB
BH CV ECHO MEAS - DIST AO DIAM: 1.32 CM
BH CV VAS BP LEFT ARM: NORMAL MMHG
BH CV VAS BP RIGHT ARM: NORMAL MMHG
BH CV XLRA MEAS - MID AO DIAM: 1.46 CM
BH CV XLRA MEAS - PAD LEFT ABI DP: 1.21
BH CV XLRA MEAS - PAD LEFT ABI PT: 1.25
BH CV XLRA MEAS - PAD LEFT ARM: 128 MMHG
BH CV XLRA MEAS - PAD LEFT LEG DP: 156 MMHG
BH CV XLRA MEAS - PAD LEFT LEG PT: 160 MMHG
BH CV XLRA MEAS - PAD RIGHT ABI DP: 1.2
BH CV XLRA MEAS - PAD RIGHT ABI PT: 1.21
BH CV XLRA MEAS - PAD RIGHT ARM: 128 MMHG
BH CV XLRA MEAS - PAD RIGHT LEG DP: 154 MMHG
BH CV XLRA MEAS - PAD RIGHT LEG PT: 156 MMHG
BH CV XLRA MEAS - PROX AO DIAM: 1.7 CM
BH CV XLRA MEAS LEFT ICA/CCA RATIO: 1.44
BH CV XLRA MEAS LEFT MID CCA PSV: NORMAL CM/SEC
BH CV XLRA MEAS LEFT MID ICA PSV: NORMAL CM/SEC
BH CV XLRA MEAS LEFT PROX ECA PSV: NORMAL CM/SEC
BH CV XLRA MEAS RIGHT ICA/CCA RATIO: 1.36
BH CV XLRA MEAS RIGHT MID CCA PSV: NORMAL CM/SEC
BH CV XLRA MEAS RIGHT MID ICA PSV: NORMAL CM/SEC
BH CV XLRA MEAS RIGHT PROX ECA PSV: NORMAL CM/SEC

## 2019-04-03 PROCEDURE — 93799 UNLISTED CV SVC/PROCEDURE: CPT

## 2019-04-09 ENCOUNTER — OFFICE VISIT (OUTPATIENT)
Dept: INTERNAL MEDICINE | Facility: CLINIC | Age: 79
End: 2019-04-09

## 2019-04-09 VITALS
BODY MASS INDEX: 27.86 KG/M2 | DIASTOLIC BLOOD PRESSURE: 64 MMHG | WEIGHT: 163.2 LBS | HEART RATE: 68 BPM | SYSTOLIC BLOOD PRESSURE: 118 MMHG | OXYGEN SATURATION: 98 % | HEIGHT: 64 IN

## 2019-04-09 DIAGNOSIS — I10 BENIGN ESSENTIAL HYPERTENSION: Chronic | ICD-10-CM

## 2019-04-09 DIAGNOSIS — I65.23 ATHEROSCLEROSIS OF BOTH CAROTID ARTERIES: Chronic | ICD-10-CM

## 2019-04-09 DIAGNOSIS — R25.2 NOCTURNAL MUSCLE CRAMPS: Primary | ICD-10-CM

## 2019-04-09 DIAGNOSIS — M81.0 AGE RELATED OSTEOPOROSIS, UNSPECIFIED PATHOLOGICAL FRACTURE PRESENCE: Chronic | ICD-10-CM

## 2019-04-09 DIAGNOSIS — M67.431 GANGLION CYST OF VOLAR ASPECT OF RIGHT WRIST: ICD-10-CM

## 2019-04-09 DIAGNOSIS — R73.01 IMPAIRED FASTING GLUCOSE: Chronic | ICD-10-CM

## 2019-04-09 DIAGNOSIS — Z51.81 THERAPEUTIC DRUG MONITORING: ICD-10-CM

## 2019-04-09 DIAGNOSIS — E78.5 HYPERLIPIDEMIA, UNSPECIFIED HYPERLIPIDEMIA TYPE: Chronic | ICD-10-CM

## 2019-04-09 DIAGNOSIS — E55.9 VITAMIN D DEFICIENCY: Chronic | ICD-10-CM

## 2019-04-09 PROCEDURE — 99214 OFFICE O/P EST MOD 30 MIN: CPT | Performed by: INTERNAL MEDICINE

## 2019-04-09 NOTE — PROGRESS NOTES
04/09/2019    Patient Information  Lachelle ESTES Kami                                                                                          [address]  [facility] [address]      1940  [unfilled]  There is no work phone number on file.    Chief Complaint:     Follow-up nocturnal muscle cramps and possible statin intolerance, follow-up hyperlipidemia and recent medication adjustment, follow-up carotid artery plaque and recent vascular screening, follow-up impaired fasting glucose, hypertension, osteoporosis, vitamin D deficiency.  No new acute complaints.    History of Present Illness:    Patient with a history of medical problems as outlined in the chief complaint presents to follow-up on several issues as noted above.  She has had problems with nocturnal leg cramps and this will be described below.  Patient also has mild bilateral carotid artery plaque and had a recent vascular screen that we will review.  We also reduced her simvastatin due to the muscle cramps and we need to reassess her lab work on the lower dose of 20 mg/day.  She also has hypertension which we will assess today along with her impaired fasting glucose.    The history regarding nocturnal muscle cramps and possible statin intolerance:    April 9, 2019--patient seen in follow-up but she did not have any blood work.  However, she did not reduce the simvastatin down to half a pill per day.  Instead, she increased the coenzyme Q  mg/day and she reports her nocturnal muscle cramps have resolved.  Therefore, we will keep her at the 40 mg a day of simvastatin.    February 12, 2019--patient with history of hyperlipidemia who is on 40 mg of simvastatin presents today with worsening leg cramps.  These cramps are different from her previous leg cramps that she has had ever since a child.  It involves the posterior aspect of the thighs and is quite severe to the point she has to get up and start walking around.  She is taking  coenzyme Q 10 but only had a 200 mg dose.  I suggested she increase that to a total of 400 mg/day.  If she still has problems I recommended that she add some magnesium supplementation which she can get over-the-counter.  If that does not work she can try Harjit's Leg Cramp Pills with quinine and if the symptoms totally persist after that then we should probably sit down and discuss options.    Review of Systems   Constitution: Negative.   HENT: Negative.    Eyes: Negative.    Cardiovascular: Negative.    Respiratory: Negative.    Endocrine: Negative.    Hematologic/Lymphatic: Negative.    Skin: Negative.    Musculoskeletal: Negative.    Gastrointestinal: Negative.    Genitourinary: Negative.    Neurological: Negative.    Psychiatric/Behavioral: Negative.    Allergic/Immunologic: Negative.        Active Problems:    Patient Active Problem List   Diagnosis   • Benign essential hypertension   • Carotid artery plaque, 4/3/2019--mild bilateral.  10/06/2016--vascular screen is now normal without carotid disease.  Improved.   • Gastroesophageal reflux disease without esophagitis   • Hyperlipidemia   • Microscopic hematuria   • Osteoporosis, 10/18/2017--lumbar spine -0.8.  Left femoral neck -2.4.  Right femoral neck -2.4.  03/26/2015--lumbar spine -1.3.  Left femoral neck -2.6.  Right femoral neck -2.5.   • Tinnitus of right ear   • History of malignant melanoma, 2008--right side of face.  1999--lower back.   • Therapeutic drug monitoring   • Vitamin D deficiency   • Lumbar disc disease   • Routine physical examination   • Chronic right hip pain   • Impaired fasting glucose   • Menopausal state   • Nocturnal muscle cramps   • History of basal cell carcinoma of skin         Past Medical History:   Diagnosis Date   • Benign essential hypertension 4/18/2006 04/18/2006--treatment for hypertension begun.   • Carotid artery plaque, 10/06/2016--vascular screen is now normal without carotid disease.  Improved. 7/30/2010     10/06/2016--vascular screen reveals no evidence of carotid plaque, negative for AAA, negative for PAD.  04/23/2014--vascular screen reveals mild bilateral carotid plaque, negative for AAA, negative for PAD.   12/07/2012--vascular screen reveals mild left-sided carotid plaque, normal right carotid, negative for AAA, negative for PAD.   07/30/2010--vascular screen reveals mild left-sided carotid plaque, normal right carotid, negative for AAA, negative for PAD.   • Chronic right hip pain 9/1/2017 09/01/2017--patient has had a one-year history of intermittent episodes of lateral right hip pain that at times is very severe and debilitating.  She also has tenderness over this area that limits her from sleeping on her right side.  Last year I gave her a cortisone injection for trochanteric bursitis and this provided immediate relief but not long lasting.  X-rays were negative for fracture or even degenerative arthritis.  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  Ordered.   • Gastroesophageal reflux disease without esophagitis 1/14/2013    04/10/2014--patient presented with right upper quadrant/epigastric burning pain and discomfort that was postprandial. H pylori breath test was negative. Empiric trial of Dexilant 60 mg per day was initiated for esophageal reflux/dyspepsia.   01/14/2013--EGD performed for epigastric and right upper quadrant pain. There was some edematous appearing mucosa in the antrum and body of the stomach which was biopsied. No significant erythema. No ulcerations. Normal appearing duodenum and esophagus. Pathology revealed moderate chronic active gastritis. No intestinal metaplasia. Helicobacter pylori positive. Patient was treated with appropriate antibiotic and PPI therapy.   • History of basal cell carcinoma of skin 2/12/2019 June 2018--Mohs micrographic surgery of basal cell carcinoma on the tip of the nose.   • History of  Helicobacter pylori gastritis 01/14/2013    04/10/2014--patient presented with right upper quadrant/epigastric burning pain and discomfort that was postprandial. H pylori breath test was negative. Empiric trial of Dexilant 60 mg per day was initiated for esophageal reflux/dyspepsia.  01/14/2013--EGD performed for epigastric and right upper quadrant pain. There was some edematous appearing mucosa in the antrum and body of the stomach which w   • History of malignant melanoma, 2008--right side of face.  1999--lower back. 1/1/2008 2008--melanoma resected from right face.   1999--malignant melanoma resected from lower back.   • History of routine gynecologic exam yearly    Patient sees a gynecologist.   • Hyperlipidemia 8/13/2010 08/13/2010--treatment for hyperlipidemia begun.   • Impaired fasting glucose 9/1/2017 09/01/2017--routine physical.  Serum glucose elevated at 101.  Recommend weight loss and decrease carbohydrate intake.   • Lumbar disc disease 10/19/2016    09/13/2016--x-ray lumbar spine reveals disc space narrowing at L2-L3, L3-L4, L4-L5, and possibly L5-S1.  There is mild anterior listhesis of L4 on L5 measuring 4.5 mm.  No compression deformity, nor other evidence of acute process.   • Menopausal state 9/1/2017   • Microscopic hematuria 12/18/2015 12/22/2015--urine cytology negative for malignant cells.  Red blood cells noted.  Transitional epithelial cells noted.  Urine culture revealed less than 10,000 colony forming units of mixed urogenital laurie.  Repeat urinalysis was negative.  Recommend observation.  12/18/2015--routine urinalysis reveals 3-5 WBCs and 3-5 RBCs. 0 epithelial cells. 0 bacteria. Patient is asymptomatic. Repeat urinalysis, urine cytology, and urine culture sent.   • Osteoporosis, 10/18/2017--lumbar spine -0.8.  Left femoral neck -2.4.  Right femoral neck -2.4.  03/26/2015--lumbar spine -1.3.  Left femoral neck -2.6.  Right femoral neck -2.5. 7/24/2009 February 12,  2019--patient seen in follow-up and reports she really did not feel well on Fosamax.  She has been using calcium and vitamin D supplementation.  October 18, 2017--DEXA scan reveals lumbar spine -0.8.  Left femoral neck -2.4.  Right femoral neck -2.4.  04/30/2015--generic Fosamax 70 mg per day initiated.  03/26/2015--DEXA scan reveals average lumbar spine T score -1.3. Left proximal fe   • Tinnitus of right ear 12/18/2015 12/18/2015--patient presents with a four-month history of ringing in her right ear. No hearing loss. Examination reveals a cerumen impaction of the right ear canal. This was removed with irrigation and curettage.   • Vitamin D deficiency 8/12/2016         Past Surgical History:   Procedure Laterality Date   • BASAL CELL CARCINOMA EXCISION  06/2018 June 2018--Mohs micrographic surgery of basal cell carcinoma on the tip of the nose.   • CHOLECYSTECTOMY  05/17/2013 05/17/2013--laparoscopic cholecystectomy.   • COLONOSCOPY  07/12/2012 07/12/2012--normal colonoscopy to the cecum with an excellent prep.    • COLONOSCOPY  06/20/2003 06/20/2003--normal colonoscopy to the cecum.   • ESOPHAGOSCOPY / EGD  01/14/2013 01/14/2013--EGD performed for epigastric and right upper quadrant pain. There was some edematous appearing mucosa in the antrum and body of the stomach which was biopsied. No significant erythema. No ulcerations. Normal appearing duodenum and esophagus. Pathology revealed moderate chronic active gastritis. No intestinal metaplasia. Helicobacter pylori positive. Patient was treated with appropriate   • FOOT SURGERY  1992 1992--orthopedic 10 placed in right great toe.   • HYSTERECTOMY  1986 1986--total abdominal hysterectomy.   • SKIN CANCER EXCISION  09/14/2009 09/14/2009--excision 1 cm mid scalp cyst. Pathology benign. 2008--melanoma resected from right face. 1999--malignant melanoma resected from lower back.   • TONSILLECTOMY AND ADENOIDECTOMY  1946 1946.         No  Known Allergies        Current Outpatient Medications:   •  aspirin (ASPIRIN LOW DOSE) 81 MG tablet, Take 1 tablet by mouth daily, Disp: , Rfl:   •  Calcium Carbonate-Vit D-Min (CALCIUM 1200) 5777-5259 MG-UNIT chewable tablet, Chew 1 tablet daily, Disp: , Rfl:   •  Cholecalciferol (VITAMIN D) 2000 UNITS capsule, Take 1 capsule by mouth daily, Disp: , Rfl:   •  Coenzyme Q10 (COQ10) 400 MG capsule, Take 1 capsule by mouth daily Take with a meal., Disp: , Rfl:   •  simvastatin (ZOCOR) 40 MG tablet, TAKE 1 TABLET EVERY DAY  FOR  CHOLESTEROL (NEED LABS AND FOLLOW UP APPOINTMENT ASAP), Disp: 90 tablet, Rfl: 0  •  valsartan-hydrochlorothiazide (DIOVAN-HCT) 160-25 MG per tablet, TAKE 1 TABLET EVERY MORNING FOR BLOOD PRESSURE, Disp: 90 tablet, Rfl: 0      Family History   Problem Relation Age of Onset   • COPD Mother    • Hypertension Mother         Essential   • COPD Father    • Hypertension Father          Social History     Socioeconomic History   • Marital status:      Spouse name: Not on file   • Number of children: Not on file   • Years of education: Not on file   • Highest education level: Bachelor's degree (e.g., BA, AB, BS)   Occupational History   • Occupation: Registered Nurse   Social Needs   • Financial resource strain: Not hard at all   • Food insecurity:     Worry: Never true     Inability: Never true   • Transportation needs:     Medical: No     Non-medical: No   Tobacco Use   • Smoking status: Never Smoker   • Smokeless tobacco: Never Used   Substance and Sexual Activity   • Alcohol use: No   • Drug use: No   • Sexual activity: Yes     Partners: Male   Lifestyle   • Physical activity:     Days per week: 2 days     Minutes per session: 60 min   • Stress: Not at all   Relationships   • Social connections:     Talks on phone: Patient refused     Gets together: Patient refused     Attends Orthodox service: Patient refused     Active member of club or organization: Patient refused     Attends meetings of  "clubs or organizations: Patient refused     Relationship status: Patient refused         Vitals:    04/09/19 0728   BP: 118/64   BP Location: Right arm   Pulse: 68   SpO2: 98%   Weight: 74 kg (163 lb 3.2 oz)   Height: 161.3 cm (63.5\")          Physical Exam:    General: Alert and oriented x 3.  No acute distress.  Normal affect.  HEENT: Pupils equal, round, reactive to light; extraocular movements intact; sclerae nonicteric; pharynx, ear canals and TMs normal.  Neck: Without JVD, thyromegaly, bruit, or adenopathy.  Lungs: Clear to auscultation in all fields.  Heart: Regular rate and rhythm without murmur, rub, gallop, or click.  Abdomen: Soft, nontender, without hepatosplenomegaly or hernia.  Bowel sounds normal.  : Deferred.  Rectal: Deferred.  Extremities: Without clubbing, cyanosis, edema, or pulse deficit.  Neurologic: Intact without focal deficit.  Normal station and gait observed during ingress and egress from the examination room.  Skin: Without significant lesion.  Musculoskeletal: Unremarkable.    Lab/other results:    I reviewed the vascular screen which revealed mild bilateral carotid plaque, negative for AAA, negative for PAD.    Assessment/Plan:     Diagnosis Plan   1. Nocturnal muscle cramps     2. Hyperlipidemia, unspecified hyperlipidemia type     3. Carotid artery plaque, 4/3/2019--mild bilateral.  10/06/2016--vascular screen is now normal without carotid disease.  Improved.     4. Impaired fasting glucose     5. Benign essential hypertension     6. Age related osteoporosis, unspecified pathological fracture presence     7. Vitamin D deficiency     8. Therapeutic drug monitoring       Patient's nocturnal muscle cramps have resolved after increasing coenzyme Q 10 without decreasing her simvastatin.  Her cholesterol previously was under excellent control and I see no reason to repeat lab work.  She has very mild carotid artery plaque that shows no significant change from previous.  Her impaired " fasting glucose has been mild and stable.  Her blood pressure seems well controlled.  Patient does have osteoporosis and needs a repeat DEXA scan after October 18, 2019.  Her vitamin D has been therapeutic.    Plan is as follows: No change in current medical regimen.  Patient will stay on 40 mg of simvastatin.  She will also continue her valsartan /25, 1 p.o. daily as well as stay on the coenzyme Q 10 400 mg/day with food.  She is on low-dose aspirin.  I will schedule a DEXA scan to be performed after October 18, 2019.  We will schedule lab, follow-up, subsequent Medicare wellness visit and [employer] Medicare replacement physical after February 12, 2020.    Addendum: April 9, 2019--patient presents with a 3-month history of an enlarging nodule on her volar surface of the right wrist.  It is becoming symptomatic.  Examination confirms an obvious ganglion cyst.  Hand referral given.      Procedures

## 2019-07-18 DIAGNOSIS — E78.5 HYPERLIPIDEMIA, UNSPECIFIED HYPERLIPIDEMIA TYPE: Chronic | ICD-10-CM

## 2019-07-18 RX ORDER — SIMVASTATIN 40 MG
TABLET ORAL
Qty: 90 TABLET | Refills: 0 | Status: SHIPPED | OUTPATIENT
Start: 2019-07-18 | End: 2019-09-24 | Stop reason: SDUPTHER

## 2019-07-18 RX ORDER — VALSARTAN AND HYDROCHLOROTHIAZIDE 160; 25 MG/1; MG/1
TABLET ORAL
Qty: 90 TABLET | Refills: 0 | Status: SHIPPED | OUTPATIENT
Start: 2019-07-18 | End: 2019-09-24 | Stop reason: SDUPTHER

## 2019-08-21 ENCOUNTER — TELEPHONE (OUTPATIENT)
Dept: INTERNAL MEDICINE | Facility: CLINIC | Age: 79
End: 2019-08-21

## 2019-08-21 ENCOUNTER — APPOINTMENT (OUTPATIENT)
Dept: WOMENS IMAGING | Facility: HOSPITAL | Age: 79
End: 2019-08-21

## 2019-08-21 DIAGNOSIS — R92.8 ABNORMAL MAMMOGRAM OF RIGHT BREAST: Primary | ICD-10-CM

## 2019-08-21 PROCEDURE — 77067 SCR MAMMO BI INCL CAD: CPT | Performed by: RADIOLOGY

## 2019-08-21 PROCEDURE — 77063 BREAST TOMOSYNTHESIS BI: CPT | Performed by: RADIOLOGY

## 2019-08-21 PROCEDURE — MDREVIEWSP: Performed by: RADIOLOGY

## 2019-08-21 PROCEDURE — 76641 ULTRASOUND BREAST COMPLETE: CPT | Performed by: RADIOLOGY

## 2019-08-21 PROCEDURE — G0279 TOMOSYNTHESIS, MAMMO: HCPCS | Performed by: RADIOLOGY

## 2019-08-21 PROCEDURE — 77065 DX MAMMO INCL CAD UNI: CPT | Performed by: RADIOLOGY

## 2019-09-24 DIAGNOSIS — E78.5 HYPERLIPIDEMIA, UNSPECIFIED HYPERLIPIDEMIA TYPE: Chronic | ICD-10-CM

## 2019-09-25 RX ORDER — VALSARTAN AND HYDROCHLOROTHIAZIDE 160; 25 MG/1; MG/1
TABLET ORAL
Qty: 90 TABLET | Refills: 0 | Status: SHIPPED | OUTPATIENT
Start: 2019-09-25 | End: 2020-03-13

## 2019-09-25 RX ORDER — SIMVASTATIN 40 MG
TABLET ORAL
Qty: 90 TABLET | Refills: 0 | Status: SHIPPED | OUTPATIENT
Start: 2019-09-25 | End: 2020-03-13

## 2019-11-05 ENCOUNTER — HOSPITAL ENCOUNTER (OUTPATIENT)
Dept: BONE DENSITY | Facility: HOSPITAL | Age: 79
Discharge: HOME | End: 2019-11-05
Admitting: INTERNAL MEDICINE

## 2019-11-05 PROCEDURE — 77080 DXA BONE DENSITY AXIAL: CPT

## 2020-02-12 ENCOUNTER — RESULTS ENCOUNTER (OUTPATIENT)
Dept: INTERNAL MEDICINE | Facility: CLINIC | Age: 80
End: 2020-02-12

## 2020-02-12 DIAGNOSIS — E78.5 HYPERLIPIDEMIA, UNSPECIFIED HYPERLIPIDEMIA TYPE: Chronic | ICD-10-CM

## 2020-02-12 DIAGNOSIS — E55.9 VITAMIN D DEFICIENCY: Chronic | ICD-10-CM

## 2020-02-12 DIAGNOSIS — R73.01 IMPAIRED FASTING GLUCOSE: Chronic | ICD-10-CM

## 2020-02-12 DIAGNOSIS — Z51.81 THERAPEUTIC DRUG MONITORING: ICD-10-CM

## 2020-02-14 LAB
25(OH)D3+25(OH)D2 SERPL-MCNC: 39.4 NG/ML (ref 30–100)
ALBUMIN SERPL-MCNC: 4.3 G/DL (ref 3.5–5.2)
ALBUMIN/GLOB SERPL: 2 G/DL
ALP SERPL-CCNC: 57 U/L (ref 39–117)
ALT SERPL-CCNC: 17 U/L (ref 1–33)
AST SERPL-CCNC: 20 U/L (ref 1–32)
BILIRUB SERPL-MCNC: 0.5 MG/DL (ref 0.2–1.2)
BUN SERPL-MCNC: 18 MG/DL (ref 8–23)
BUN/CREAT SERPL: 23.7 (ref 7–25)
CALCIUM SERPL-MCNC: 10.4 MG/DL (ref 8.6–10.5)
CHLORIDE SERPL-SCNC: 105 MMOL/L (ref 98–107)
CHOLEST SERPL-MCNC: 124 MG/DL (ref 100–199)
CK SERPL-CCNC: 154 U/L (ref 20–180)
CO2 SERPL-SCNC: 28.9 MMOL/L (ref 22–29)
CREAT SERPL-MCNC: 0.76 MG/DL (ref 0.57–1)
ERYTHROCYTE [DISTWIDTH] IN BLOOD BY AUTOMATED COUNT: 12.3 % (ref 12.3–15.4)
GLOBULIN SER CALC-MCNC: 2.1 GM/DL
GLUCOSE SERPL-MCNC: 90 MG/DL (ref 65–99)
HBA1C MFR BLD: 5.7 % (ref 4.8–5.6)
HCT VFR BLD AUTO: 41.7 % (ref 34–46.6)
HDL SERPL-SCNC: 40 UMOL/L
HDLC SERPL-MCNC: 52 MG/DL
HGB BLD-MCNC: 13.5 G/DL (ref 12–15.9)
LDL SERPL QN: 20.1 NM
LDL SERPL-SCNC: 717 NMOL/L
LDL SMALL SERPL-SCNC: 387 NMOL/L
LDLC SERPL CALC-MCNC: 55 MG/DL (ref 0–99)
MCH RBC QN AUTO: 29.1 PG (ref 26.6–33)
MCHC RBC AUTO-ENTMCNC: 32.4 G/DL (ref 31.5–35.7)
MCV RBC AUTO: 89.9 FL (ref 79–97)
PLATELET # BLD AUTO: 172 10*3/MM3 (ref 140–450)
POTASSIUM SERPL-SCNC: 4.2 MMOL/L (ref 3.5–5.2)
PROT SERPL-MCNC: 6.4 G/DL (ref 6–8.5)
RBC # BLD AUTO: 4.64 10*6/MM3 (ref 3.77–5.28)
SODIUM SERPL-SCNC: 144 MMOL/L (ref 136–145)
T3FREE SERPL-MCNC: 3.7 PG/ML (ref 2–4.4)
T4 FREE SERPL-MCNC: 1.13 NG/DL (ref 0.93–1.7)
TRIGL SERPL-MCNC: 87 MG/DL (ref 0–149)
TSH SERPL DL<=0.005 MIU/L-ACNC: 4.41 UIU/ML (ref 0.27–4.2)
WBC # BLD AUTO: 4 10*3/MM3 (ref 3.4–10.8)

## 2020-02-21 ENCOUNTER — OFFICE VISIT (OUTPATIENT)
Dept: INTERNAL MEDICINE | Facility: CLINIC | Age: 80
End: 2020-02-21

## 2020-02-21 VITALS
SYSTOLIC BLOOD PRESSURE: 132 MMHG | HEIGHT: 64 IN | BODY MASS INDEX: 27.14 KG/M2 | WEIGHT: 159 LBS | OXYGEN SATURATION: 98 % | DIASTOLIC BLOOD PRESSURE: 62 MMHG | HEART RATE: 71 BPM

## 2020-02-21 DIAGNOSIS — E55.9 VITAMIN D DEFICIENCY: Chronic | ICD-10-CM

## 2020-02-21 DIAGNOSIS — Z78.9 INTOLERANCE OF ORAL BISPHOSPHONATE THERAPY: ICD-10-CM

## 2020-02-21 DIAGNOSIS — Z85.828 HISTORY OF BASAL CELL CARCINOMA OF SKIN: Chronic | ICD-10-CM

## 2020-02-21 DIAGNOSIS — Z85.820 HISTORY OF MALIGNANT MELANOMA: Chronic | ICD-10-CM

## 2020-02-21 DIAGNOSIS — N95.1 MENOPAUSAL STATE: Chronic | ICD-10-CM

## 2020-02-21 DIAGNOSIS — I65.23 ATHEROSCLEROSIS OF BOTH CAROTID ARTERIES: Chronic | ICD-10-CM

## 2020-02-21 DIAGNOSIS — Z00.00 MEDICARE ANNUAL WELLNESS VISIT, SUBSEQUENT: Primary | ICD-10-CM

## 2020-02-21 DIAGNOSIS — R31.29 MICROSCOPIC HEMATURIA: Chronic | ICD-10-CM

## 2020-02-21 DIAGNOSIS — K21.9 GASTROESOPHAGEAL REFLUX DISEASE WITHOUT ESOPHAGITIS: Chronic | ICD-10-CM

## 2020-02-21 DIAGNOSIS — I10 BENIGN ESSENTIAL HYPERTENSION: Chronic | ICD-10-CM

## 2020-02-21 DIAGNOSIS — E78.2 MIXED HYPERLIPIDEMIA: Chronic | ICD-10-CM

## 2020-02-21 DIAGNOSIS — R92.8 ABNORMAL MAMMOGRAM OF RIGHT BREAST: ICD-10-CM

## 2020-02-21 DIAGNOSIS — R73.01 IMPAIRED FASTING GLUCOSE: Chronic | ICD-10-CM

## 2020-02-21 DIAGNOSIS — M81.0 AGE-RELATED OSTEOPOROSIS WITHOUT CURRENT PATHOLOGICAL FRACTURE: Chronic | ICD-10-CM

## 2020-02-21 DIAGNOSIS — E03.8 SUBCLINICAL HYPOTHYROIDISM: ICD-10-CM

## 2020-02-21 DIAGNOSIS — Z51.81 THERAPEUTIC DRUG MONITORING: ICD-10-CM

## 2020-02-21 PROBLEM — R25.2 NOCTURNAL MUSCLE CRAMPS: Status: RESOLVED | Noted: 2019-02-12 | Resolved: 2020-02-21

## 2020-02-21 PROBLEM — M67.431 GANGLION CYST OF VOLAR ASPECT OF RIGHT WRIST: Status: RESOLVED | Noted: 2019-04-09 | Resolved: 2020-02-21

## 2020-02-21 PROCEDURE — 99214 OFFICE O/P EST MOD 30 MIN: CPT | Performed by: INTERNAL MEDICINE

## 2020-02-21 PROCEDURE — G0439 PPPS, SUBSEQ VISIT: HCPCS | Performed by: INTERNAL MEDICINE

## 2020-02-21 PROCEDURE — 96160 PT-FOCUSED HLTH RISK ASSMT: CPT | Performed by: INTERNAL MEDICINE

## 2020-02-21 NOTE — PROGRESS NOTES
The ABCs of the Annual Wellness Visit  Subsequent Medicare Wellness Visit    No chief complaint on file.      Subjective   History of Present Illness:  Lachelle Mcclure is a 79 y.o. female who presents for a Subsequent Medicare Wellness Visit.    HEALTH RISK ASSESSMENT    Recent Hospitalizations:  No hospitalization(s) within the last year.    Current Medical Providers:  Patient Care Team:  Juan Vega MD as PCP - General (Internal Medicine)    Smoking Status:  Social History     Tobacco Use   Smoking Status Never Smoker   Smokeless Tobacco Never Used       Alcohol Consumption:  Social History     Substance and Sexual Activity   Alcohol Use No       Depression Screen:   PHQ-2/PHQ-9 Depression Screening 2/21/2020   Little interest or pleasure in doing things 0   Feeling down, depressed, or hopeless 0   Trouble falling or staying asleep, or sleeping too much -   Feeling tired or having little energy -   Poor appetite or overeating -   Feeling bad about yourself - or that you are a failure or have let yourself or your family down -   Trouble concentrating on things, such as reading the newspaper or watching television -   Moving or speaking so slowly that other people could have noticed. Or the opposite - being so fidgety or restless that you have been moving around a lot more than usual -   Thoughts that you would be better off dead, or of hurting yourself in some way -   Total Score 0       Fall Risk Screen:  STEADI Fall Risk Assessment was completed, and patient is at LOW risk for falls.Assessment completed on:2/21/2020    Health Habits and Functional and Cognitive Screening:  Functional & Cognitive Status 2/21/2020   Do you have difficulty preparing food and eating? No   Do you have difficulty bathing yourself, getting dressed or grooming yourself? No   Do you have difficulty using the toilet? No   Do you have difficulty moving around from place to place? No   Do you have trouble with steps or getting out of a  bed or a chair? No   Current Diet Well Balanced Diet   Dental Exam Up to date   Eye Exam Up to date   Exercise (times per week) 2 times per week   Current Exercise Activities Include Cardiovasular Workout on Exercise Equipment   Do you need help using the phone?  No   Are you deaf or do you have serious difficulty hearing?  No   Do you need help with transportation? No   Do you need help shopping? No   Do you need help preparing meals?  No   Do you need help with housework?  No   Do you need help with laundry? No   Do you need help taking your medications? No   Do you need help managing money? No   Do you ever drive or ride in a car without wearing a seat belt? No   Have you felt unusual stress, anger or loneliness in the last month? No   Who do you live with? Spouse   If you need help, do you have trouble finding someone available to you? No   Have you been bothered in the last four weeks by sexual problems? No   Do you have difficulty concentrating, remembering or making decisions? No         Does the patient have evidence of cognitive impairment? No    Asprin use counseling:Taking ASA appropriately as indicated    Age-appropriate Screening Schedule:  Refer to the list below for future screening recommendations based on patient's age, sex and/or medical conditions. Orders for these recommended tests are listed in the plan section. The patient has been provided with a written plan.    Health Maintenance   Topic Date Due   • LIPID PANEL  02/13/2021   • MAMMOGRAM  08/21/2021   • DXA SCAN  11/05/2021   • COLONOSCOPY  07/12/2022   • TDAP/TD VACCINES (2 - Td) 09/01/2027   • INFLUENZA VACCINE  Completed   • ZOSTER VACCINE  Discontinued          The following portions of the patient's history were reviewed and updated as appropriate: allergies, current medications, past family history, past medical history, past social history, past surgical history and problem list.    Outpatient Medications Prior to Visit   Medication  Sig Dispense Refill   • aspirin (ASPIRIN LOW DOSE) 81 MG tablet Take 1 tablet by mouth daily     • Calcium Carbonate-Vit D-Min (CALCIUM 1200) 9196-5335 MG-UNIT chewable tablet Chew 1 tablet daily     • Cholecalciferol (VITAMIN D) 2000 UNITS capsule Take 1 capsule by mouth daily     • Coenzyme Q10 (COQ10) 400 MG capsule Take 1 capsule by mouth daily  Take with a meal.     • simvastatin (ZOCOR) 40 MG tablet TAKE 1 TABLET EVERY DAY  FOR  CHOLESTEROL (NEED LABS AND FOLLOW UP APPOINTMENT ASAP) 90 tablet 0   • valsartan-hydrochlorothiazide (DIOVAN-HCT) 160-25 MG per tablet TAKE 1 TABLET EVERY MORNING FOR BLOOD PRESSURE 90 tablet 0     No facility-administered medications prior to visit.        Patient Active Problem List   Diagnosis   • Benign essential hypertension   • Carotid artery plaque, 4/3/2019--mild bilateral.  10/06/2016--vascular screen is now normal without carotid disease.  Improved.   • Gastroesophageal reflux disease without esophagitis   • Hyperlipidemia   • Microscopic hematuria   • Osteoporosis, 11/5/2019--lumbar spine -0.3.  Left femoral neck -2.7.  Right femoral neck -2.4.   • Tinnitus of right ear   • History of malignant melanoma, 2008--right side of face.  1999--lower back.   • Therapeutic drug monitoring   • Vitamin D deficiency   • Lumbar disc disease   • Routine physical examination   • Chronic right hip pain   • Impaired fasting glucose   • Menopausal state   • History of basal cell carcinoma of skin   • Abnormal mammogram of right breast   • Subclinical hypothyroidism   • Intolerance of oral bisphosphonate therapy       Advanced Care Planning:  Patient has an advance directive, copy requested.    Review of Systems   Constitutional: Negative.    HENT: Negative.    Eyes: Negative.    Respiratory: Negative.    Cardiovascular: Negative.    Gastrointestinal: Negative.    Endocrine: Negative.    Genitourinary: Negative.    Musculoskeletal: Negative.    Skin: Negative.    Allergic/Immunologic:  "Negative.    Neurological:        Complaining of numbness/paresthesias involving the right lateral thigh consistent with meralgia paresthetica.  Very intermittent.   Hematological: Negative.    Psychiatric/Behavioral: Negative.        Compared to one year ago, the patient feels her physical health is the same.  Compared to one year ago, the patient feels her mental health is the same.    Reviewed chart for potential of high risk medication in the elderly: yes  Reviewed chart for potential of harmful drug interactions in the elderly:yes    Objective         Vitals:    02/21/20 0905   BP: 132/62   BP Location: Left arm   Pulse: 71   SpO2: 98%   Weight: 72.1 kg (159 lb)   Height: 161.3 cm (63.5\")       Body mass index is 27.72 kg/m².  Discussed the patient's BMI with her. The BMI is below average; no BMI management plan is appropriate.    Physical Exam  General: Alert and oriented x 3.  No acute distress.  Normal affect.  Slightly overweight.  HEENT: Pupils equal, round, reactive to light; extraocular movements intact; sclerae nonicteric; pharynx, ear canals and TMs normal.  Neck: Without JVD, thyromegaly, bruit, or adenopathy.  Lungs: Clear to auscultation in all fields.  Heart: Regular rate and rhythm without murmur, rub, gallop, or click.  Abdomen: Soft, nontender, without hepatosplenomegaly or hernia.  Bowel sounds normal.  : Deferred.  Rectal: Deferred.  Extremities: Without clubbing, cyanosis, edema, or pulse deficit.  Neurologic: Intact without focal deficit.  Normal station and gait observed during ingress and egress from the examination room.  Skin: Without significant lesion.  Musculoskeletal: Unremarkable.    Lab Results   Component Value Date    GLU 90 02/13/2020    CHLPL 124 02/13/2020    TRIG 87 02/13/2020    HGBA1C 5.70 (H) 02/13/2020        Assessment/Plan   Medicare Risks and Personalized Health Plan  CMS Preventative Services Quick Reference  Breast Cancer/Mammogram Screening  Cardiovascular " risk  Diabetic Lab Screening   Osteoprorosis Risk    The above risks/problems have been discussed with the patient.  Pertinent information has been shared with the patient in the After Visit Summary.  Follow up plans and orders are seen below in the Assessment/Plan Section.    Diagnoses and all orders for this visit:    1. Medicare annual wellness visit, subsequent (Primary)    2. Impaired fasting glucose  -     Comprehensive Metabolic Panel; Future  -     Hemoglobin A1c; Future    3. Hyperlipidemia  -     CK; Future  -     Comprehensive Metabolic Panel; Future  -     NMR LipoProfile; Future    4. Benign essential hypertension    5. Carotid artery plaque, 4/3/2019--mild bilateral.  10/06/2016--vascular screen is now normal without carotid disease.  Improved.    6. Gastroesophageal reflux disease without esophagitis    7. Microscopic hematuria  -     CBC (No Diff); Future    8. History of malignant melanoma, 2008--right side of face.  1999--lower back.    9. Vitamin D deficiency  -     Vitamin D 25 Hydroxy; Future    10. History of basal cell carcinoma of skin    11. Abnormal mammogram of right breast    12. Osteoporosis, 11/5/2019--lumbar spine -0.3.  Left femoral neck -2.7.  Right femoral neck -2.4.  -     denosumab (PROLIA) 60 MG/ML solution prefilled syringe syringe; Inject 1 mL, 60 mg, subcutaneously every 6 months until further notice.  Dispense: 1.8 mL; Refill: 6    13. Menopausal state  -     denosumab (PROLIA) 60 MG/ML solution prefilled syringe syringe; Inject 1 mL, 60 mg, subcutaneously every 6 months until further notice.  Dispense: 1.8 mL; Refill: 6    14. Subclinical hypothyroidism  -     TSH  -     T4, Free  -     T3, Free  -     Thyroid Antibodies  -     US Thyroid  -     TSH; Future  -     T4, Free; Future  -     T3, Free; Future    15. Intolerance of oral bisphosphonate therapy  -     denosumab (PROLIA) 60 MG/ML solution prefilled syringe syringe; Inject 1 mL, 60 mg, subcutaneously every 6 months  until further notice.  Dispense: 1.8 mL; Refill: 6    16. Therapeutic drug monitoring  -     CBC (No Diff); Future      Follow Up:  Return in about 6 months (around 8/21/2020) for Next scheduled follow up with lab prior.     An After Visit Summary and PPPS were given to the patient.

## 2020-02-21 NOTE — PROGRESS NOTES
02/21/2020    Patient Information  Lachelle Mcclure                                                                                          [address]  [facility] [address]      1940  [unfilled]  There is no work phone number on file.    Chief Complaint:     Subsequent Medicare wellness visit.  Follow-up lab work in order to monitor chronic medical issues.    History of Present Illness:    Patient with a history of impaired fasting glucose, hyperlipidemia, hypertension, carotid artery plaque, esophageal reflux, microscopic hematuria, history of malignant melanoma, vitamin D deficiency, history of basal cell carcinoma of skin, recent abnormal right breast mammogram, osteoporosis and menopausal state.  She presents today for subsequent Medicare wellness visit.  She also had lab work in order to monitor her chronic medical issues.  Her past medical history reviewed and updated were necessary including health maintenance parameters.  This reveals she is up-to-date or else accounted for after today's visit.    Review of Systems   Constitution: Negative.   HENT: Negative.    Eyes: Negative.    Cardiovascular: Negative.    Respiratory: Negative.    Endocrine: Negative.    Hematologic/Lymphatic: Negative.    Skin: Negative.    Musculoskeletal: Negative.    Gastrointestinal: Negative.    Genitourinary: Negative.    Neurological: Negative.    Psychiatric/Behavioral: Negative.    Allergic/Immunologic: Negative.        Active Problems:    Patient Active Problem List   Diagnosis   • Benign essential hypertension   • Carotid artery plaque, 4/3/2019--mild bilateral.  10/06/2016--vascular screen is now normal without carotid disease.  Improved.   • Gastroesophageal reflux disease without esophagitis   • Hyperlipidemia   • Microscopic hematuria   • Osteoporosis, 11/5/2019--lumbar spine -0.3.  Left femoral neck -2.7.  Right femoral neck -2.4.   • Tinnitus of right ear   • History of malignant melanoma,  2008--right side of face.  1999--lower back.   • Therapeutic drug monitoring   • Vitamin D deficiency   • Lumbar disc disease   • Routine physical examination   • Chronic right hip pain   • Impaired fasting glucose   • Menopausal state   • History of basal cell carcinoma of skin   • Abnormal mammogram of right breast   • Subclinical hypothyroidism   • Intolerance of oral bisphosphonate therapy         Past Medical History:   Diagnosis Date   • Benign essential hypertension 4/18/2006 04/18/2006--treatment for hypertension begun.   • Carotid artery plaque, 10/06/2016--vascular screen is now normal without carotid disease.  Improved. 7/30/2010    10/06/2016--vascular screen reveals no evidence of carotid plaque, negative for AAA, negative for PAD.  04/23/2014--vascular screen reveals mild bilateral carotid plaque, negative for AAA, negative for PAD.   12/07/2012--vascular screen reveals mild left-sided carotid plaque, normal right carotid, negative for AAA, negative for PAD.   07/30/2010--vascular screen reveals mild left-sided carotid plaque, normal right carotid, negative for AAA, negative for PAD.   • Chronic right hip pain 9/1/2017 09/01/2017--patient has had a one-year history of intermittent episodes of lateral right hip pain that at times is very severe and debilitating.  She also has tenderness over this area that limits her from sleeping on her right side.  Last year I gave her a cortisone injection for trochanteric bursitis and this provided immediate relief but not long lasting.  X-rays were negative for fracture or even degenerative arthritis.  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  Ordered.   • Gastroesophageal reflux disease without esophagitis 1/14/2013    04/10/2014--patient presented with right upper quadrant/epigastric burning pain and discomfort that was postprandial. H pylori breath test was negative. Empiric trial of Dexilant  60 mg per day was initiated for esophageal reflux/dyspepsia.   01/14/2013--EGD performed for epigastric and right upper quadrant pain. There was some edematous appearing mucosa in the antrum and body of the stomach which was biopsied. No significant erythema. No ulcerations. Normal appearing duodenum and esophagus. Pathology revealed moderate chronic active gastritis. No intestinal metaplasia. Helicobacter pylori positive. Patient was treated with appropriate antibiotic and PPI therapy.   • History of basal cell carcinoma of skin 2/12/2019 June 2018--Mohs micrographic surgery of basal cell carcinoma on the tip of the nose.   • History of Helicobacter pylori gastritis 01/14/2013    04/10/2014--patient presented with right upper quadrant/epigastric burning pain and discomfort that was postprandial. H pylori breath test was negative. Empiric trial of Dexilant 60 mg per day was initiated for esophageal reflux/dyspepsia.  01/14/2013--EGD performed for epigastric and right upper quadrant pain. There was some edematous appearing mucosa in the antrum and body of the stomach which w   • History of malignant melanoma, 2008--right side of face.  1999--lower back. 1/1/2008 2008--melanoma resected from right face.   1999--malignant melanoma resected from lower back.   • History of routine gynecologic exam yearly    Patient sees a gynecologist.   • Hyperlipidemia 8/13/2010 08/13/2010--treatment for hyperlipidemia begun.   • Impaired fasting glucose 9/1/2017 09/01/2017--routine physical.  Serum glucose elevated at 101.  Recommend weight loss and decrease carbohydrate intake.   • Lumbar disc disease 10/19/2016    09/13/2016--x-ray lumbar spine reveals disc space narrowing at L2-L3, L3-L4, L4-L5, and possibly L5-S1.  There is mild anterior listhesis of L4 on L5 measuring 4.5 mm.  No compression deformity, nor other evidence of acute process.   • Menopausal state 9/1/2017   • Microscopic hematuria 12/18/2015     12/22/2015--urine cytology negative for malignant cells.  Red blood cells noted.  Transitional epithelial cells noted.  Urine culture revealed less than 10,000 colony forming units of mixed urogenital laurie.  Repeat urinalysis was negative.  Recommend observation.  12/18/2015--routine urinalysis reveals 3-5 WBCs and 3-5 RBCs. 0 epithelial cells. 0 bacteria. Patient is asymptomatic. Repeat urinalysis, urine cytology, and urine culture sent.   • Osteoporosis, 11/5/2019--lumbar spine -0.3.  Left femoral neck -2.7.  Right femoral neck -2.4. 7/24/2009 November 5, 2019--DEXA scan reveals lumbar spine T score -0.3 representing a 5% increase.  Left femoral neck T score -2.7 representing a 5% decrease.  Right femoral neck T score -2.4 which is unchanged.  February 12, 2019--patient seen in follow-up and reports she really did not feel well on Fosamax.  She has been using calcium and vitamin D supplementation.  October 18, 2017--DEXA scan reveals jesse   • Tinnitus of right ear 12/18/2015 12/18/2015--patient presents with a four-month history of ringing in her right ear. No hearing loss. Examination reveals a cerumen impaction of the right ear canal. This was removed with irrigation and curettage.   • Vitamin D deficiency 8/12/2016         Past Surgical History:   Procedure Laterality Date   • BASAL CELL CARCINOMA EXCISION  06/2018 June 2018--Mohs micrographic surgery of basal cell carcinoma on the tip of the nose.   • CHOLECYSTECTOMY  05/17/2013 05/17/2013--laparoscopic cholecystectomy.   • COLONOSCOPY  07/12/2012 07/12/2012--normal colonoscopy to the cecum with an excellent prep.    • COLONOSCOPY  06/20/2003 06/20/2003--normal colonoscopy to the cecum.   • ESOPHAGOSCOPY / EGD  01/14/2013 01/14/2013--EGD performed for epigastric and right upper quadrant pain. There was some edematous appearing mucosa in the antrum and body of the stomach which was biopsied. No significant erythema. No ulcerations. Normal  appearing duodenum and esophagus. Pathology revealed moderate chronic active gastritis. No intestinal metaplasia. Helicobacter pylori positive. Patient was treated with appropriate   • FOOT SURGERY  1992 1992--orthopedic 10 placed in right great toe.   • HYSTERECTOMY  1986 1986--total abdominal hysterectomy.   • SKIN CANCER EXCISION  09/14/2009 09/14/2009--excision 1 cm mid scalp cyst. Pathology benign. 2008--melanoma resected from right face. 1999--malignant melanoma resected from lower back.   • TONSILLECTOMY AND ADENOIDECTOMY  1946 1946.         No Known Allergies        Current Outpatient Medications:   •  aspirin (ASPIRIN LOW DOSE) 81 MG tablet, Take 1 tablet by mouth daily, Disp: , Rfl:   •  Calcium Carbonate-Vit D-Min (CALCIUM 1200) 0698-1837 MG-UNIT chewable tablet, Chew 1 tablet daily, Disp: , Rfl:   •  Cholecalciferol (VITAMIN D) 2000 UNITS capsule, Take 1 capsule by mouth daily, Disp: , Rfl:   •  Coenzyme Q10 (COQ10) 400 MG capsule, Take 1 capsule by mouth daily Take with a meal., Disp: , Rfl:   •  simvastatin (ZOCOR) 40 MG tablet, TAKE 1 TABLET EVERY DAY  FOR  CHOLESTEROL (NEED LABS AND FOLLOW UP APPOINTMENT ASAP), Disp: 90 tablet, Rfl: 0  •  valsartan-hydrochlorothiazide (DIOVAN-HCT) 160-25 MG per tablet, TAKE 1 TABLET EVERY MORNING FOR BLOOD PRESSURE, Disp: 90 tablet, Rfl: 0  •  denosumab (PROLIA) 60 MG/ML solution prefilled syringe syringe, Inject 1 mL, 60 mg, subcutaneously every 6 months until further notice., Disp: 1.8 mL, Rfl: 6      Family History   Problem Relation Age of Onset   • COPD Mother    • Hypertension Mother         Essential   • COPD Father    • Hypertension Father          Social History     Socioeconomic History   • Marital status:      Spouse name: Not on file   • Number of children: Not on file   • Years of education: Not on file   • Highest education level: Bachelor's degree (e.g., BA, AB, BS)   Occupational History   • Occupation: Registered Nurse   Social  "Needs   • Financial resource strain: Not hard at all   • Food insecurity:     Worry: Never true     Inability: Never true   • Transportation needs:     Medical: No     Non-medical: No   Tobacco Use   • Smoking status: Never Smoker   • Smokeless tobacco: Never Used   Substance and Sexual Activity   • Alcohol use: No   • Drug use: No   • Sexual activity: Yes     Partners: Male   Lifestyle   • Physical activity:     Days per week: 2 days     Minutes per session: 60 min   • Stress: Not at all   Relationships   • Social connections:     Talks on phone: Patient refused     Gets together: Patient refused     Attends Shinto service: Patient refused     Active member of club or organization: Patient refused     Attends meetings of clubs or organizations: Patient refused     Relationship status: Patient refused         Vitals:    02/21/20 0905   BP: 132/62   BP Location: Left arm   Pulse: 71   SpO2: 98%   Weight: 72.1 kg (159 lb)   Height: 161.3 cm (63.5\")        Body mass index is 27.72 kg/m².      Physical Exam:    General: Alert and oriented x 3.  No acute distress.  Normal affect.  Slightly overweight.  HEENT: Pupils equal, round, reactive to light; extraocular movements intact; sclerae nonicteric; pharynx, ear canals and TMs normal.  Neck: Without JVD, thyromegaly, bruit, or adenopathy.  Lungs: Clear to auscultation in all fields.  Heart: Regular rate and rhythm without murmur, rub, gallop, or click.  Abdomen: Soft, nontender, without hepatosplenomegaly or hernia.  Bowel sounds normal.  : Deferred.  Rectal: Deferred.  Extremities: Without clubbing, cyanosis, edema, or pulse deficit.  Neurologic: Intact without focal deficit.  Normal station and gait observed during ingress and egress from the examination room.  Skin: Without significant lesion.  Musculoskeletal: Unremarkable.    Lab/other results:    I reviewed the results of her DEXA scan that was obtained in November of last year.    NMR is absolutely perfect.  " CMP normal.  CBC normal.  Hemoglobin A1c 5.7.  TSH slightly elevated at 4.41.  Vitamin D normal.  CPK normal.    Assessment/Plan:     Diagnosis Plan   1. Medicare annual wellness visit, subsequent     2. Impaired fasting glucose  Comprehensive Metabolic Panel    Hemoglobin A1c   3. Hyperlipidemia  CK    Comprehensive Metabolic Panel    NMR LipoProfile   4. Benign essential hypertension     5. Carotid artery plaque, 4/3/2019--mild bilateral.  10/06/2016--vascular screen is now normal without carotid disease.  Improved.     6. Gastroesophageal reflux disease without esophagitis     7. Microscopic hematuria  CBC (No Diff)   8. History of malignant melanoma, 2008--right side of face.  1999--lower back.     9. Vitamin D deficiency  Vitamin D 25 Hydroxy   10. History of basal cell carcinoma of skin     11. Abnormal mammogram of right breast     12. Osteoporosis, 11/5/2019--lumbar spine -0.3.  Left femoral neck -2.7.  Right femoral neck -2.4.  denosumab (PROLIA) 60 MG/ML solution prefilled syringe syringe   13. Menopausal state  denosumab (PROLIA) 60 MG/ML solution prefilled syringe syringe   14. Subclinical hypothyroidism  TSH    T4, Free    T3, Free    Thyroid Antibodies    US Thyroid    TSH    T4, Free    T3, Free   15. Intolerance of oral bisphosphonate therapy  denosumab (PROLIA) 60 MG/ML solution prefilled syringe syringe   16. Therapeutic drug monitoring  CBC (No Diff)     The subsequent Medicare wellness visit is documented on separate note.    Patient has mild impaired fasting glucose that does not require medication.  Hyperlipidemia is under excellent control.  Blood pressure is well controlled.  Patient has carotid artery plaque which is mild and she is up-to-date on her carotid Doppler study.  Reflux has not been an issue.  Her microscopic hematuria has been thoroughly evaluated in the past.  Patient has a history of skin cancers including malignant melanoma and basal cell carcinoma and she is followed by the  dermatologist.  Vitamin D is normal which is particularly important given her osteoporosis.  She is up-to-date on her DEXA scan.  Patient had to discontinue Reclast because she did not feel right on it.  There appears to be possible subclinical hypothyroidism that needs further evaluation in the future.  Also patient is intolerant of oral bisphosphonate therapy and I think she would be a good candidate for Prolia.  We will order this.    Plan is as follows: Repeat thyroid function test.  Thyroid antibodies.  Thyroid ultrasound ordered.  Patient will follow-up on the phone for the results and possible further instructions.  Prolia ordered.  Otherwise, I will have her follow-up in 6 months with lab prior or follow-up as needed.  Also encourage patient to get the repeat diagnostic mammogram and ultrasound that was previously ordered.  Patient indicates that the studies are ordered for next Wednesday.    Procedures

## 2020-02-24 LAB
T3FREE SERPL-MCNC: 3.6 PG/ML (ref 2–4.4)
T4 FREE SERPL-MCNC: 1.29 NG/DL (ref 0.93–1.7)
THYROGLOB AB SERPL-ACNC: <1 IU/ML (ref 0–0.9)
THYROPEROXIDASE AB SERPL-ACNC: <6 IU/ML (ref 0–34)
TSH SERPL DL<=0.005 MIU/L-ACNC: 2.89 UIU/ML (ref 0.27–4.2)

## 2020-02-25 ENCOUNTER — HOSPITAL ENCOUNTER (OUTPATIENT)
Dept: ULTRASOUND IMAGING | Facility: HOSPITAL | Age: 80
Discharge: HOME | End: 2020-02-25
Admitting: INTERNAL MEDICINE

## 2020-02-25 PROCEDURE — 76536 US EXAM OF HEAD AND NECK: CPT

## 2020-02-26 ENCOUNTER — APPOINTMENT (OUTPATIENT)
Dept: WOMENS IMAGING | Facility: HOSPITAL | Age: 80
End: 2020-02-26

## 2020-02-26 PROCEDURE — G0279 TOMOSYNTHESIS, MAMMO: HCPCS | Performed by: RADIOLOGY

## 2020-02-26 PROCEDURE — 76641 ULTRASOUND BREAST COMPLETE: CPT | Performed by: RADIOLOGY

## 2020-02-26 PROCEDURE — 77065 DX MAMMO INCL CAD UNI: CPT | Performed by: RADIOLOGY

## 2020-03-12 DIAGNOSIS — E78.5 HYPERLIPIDEMIA, UNSPECIFIED HYPERLIPIDEMIA TYPE: Chronic | ICD-10-CM

## 2020-03-13 RX ORDER — SIMVASTATIN 40 MG
TABLET ORAL
Qty: 90 TABLET | Refills: 1 | Status: SHIPPED | OUTPATIENT
Start: 2020-03-13 | End: 2020-07-20

## 2020-03-13 RX ORDER — VALSARTAN AND HYDROCHLOROTHIAZIDE 160; 25 MG/1; MG/1
TABLET ORAL
Qty: 90 TABLET | Refills: 1 | Status: SHIPPED | OUTPATIENT
Start: 2020-03-13 | End: 2020-07-20

## 2020-06-30 ENCOUNTER — TELEPHONE (OUTPATIENT)
Dept: INTERNAL MEDICINE | Facility: CLINIC | Age: 80
End: 2020-06-30

## 2020-06-30 NOTE — TELEPHONE ENCOUNTER
PT CALLED STATING DR. KO HAD REFERRED HER TO THE HOSPITAL FOR AN INJECTION FOR HER OSTEOPOROSIS. THE HOSPITAL WAS SUPPOSED TO CALL AND SCHEDULE, BUT THEY NEVER DID. THIS VISIT WAS BACK IN February.   PT HAS BEEN OUT OF TOWN, BUT WOULD LIKE TO HAVE THAT SCHEDULED NOW.    PLEASE ADVISE.    CALLBACK NUMBER: [phone]

## 2020-07-15 ENCOUNTER — TELEPHONE (OUTPATIENT)
Dept: INTERNAL MEDICINE | Facility: CLINIC | Age: 80
End: 2020-07-15

## 2020-07-15 NOTE — TELEPHONE ENCOUNTER
PATIENT CAME IN 2/21.    PATIENT WAS TOLD SHE WOULD BE GETTING A PROLIA SHOT AT THE HOSPITAL.    PATIENT WAS TOLD THIS WAS ORDERED AND PATIENT STILL HAS NOT BEEN CALLED TO SCHEDULE THIS SHOT AND IS UNSURE WHAT SHE SHOULD BE DOING    PLEASE CALL AND ADVISE     [phone]

## 2020-07-16 NOTE — TELEPHONE ENCOUNTER
Spoke with pt, prolia order was not put in. Per note it was going to be ordered. Will check with Dr. Vega

## 2020-07-17 DIAGNOSIS — E78.5 HYPERLIPIDEMIA, UNSPECIFIED HYPERLIPIDEMIA TYPE: Chronic | ICD-10-CM

## 2020-07-20 DIAGNOSIS — M81.0 AGE-RELATED OSTEOPOROSIS WITHOUT CURRENT PATHOLOGICAL FRACTURE: Primary | Chronic | ICD-10-CM

## 2020-07-20 DIAGNOSIS — N95.1 MENOPAUSAL STATE: Chronic | ICD-10-CM

## 2020-07-20 DIAGNOSIS — Z78.9 INTOLERANCE OF ORAL BISPHOSPHONATE THERAPY: ICD-10-CM

## 2020-07-20 RX ORDER — SIMVASTATIN 40 MG
TABLET ORAL
Qty: 90 TABLET | Refills: 1 | Status: SHIPPED | OUTPATIENT
Start: 2020-07-20 | End: 2021-05-19

## 2020-07-20 RX ORDER — VALSARTAN AND HYDROCHLOROTHIAZIDE 160; 25 MG/1; MG/1
TABLET ORAL
Qty: 90 TABLET | Refills: 1 | Status: SHIPPED | OUTPATIENT
Start: 2020-07-20 | End: 2021-05-19

## 2020-07-22 DIAGNOSIS — M81.0 AGE-RELATED OSTEOPOROSIS WITHOUT CURRENT PATHOLOGICAL FRACTURE: Primary | Chronic | ICD-10-CM

## 2020-07-23 ENCOUNTER — TELEPHONE (OUTPATIENT)
Dept: INTERNAL MEDICINE | Facility: CLINIC | Age: 80
End: 2020-07-23

## 2020-07-23 DIAGNOSIS — M81.0 AGE-RELATED OSTEOPOROSIS WITHOUT CURRENT PATHOLOGICAL FRACTURE: Primary | Chronic | ICD-10-CM

## 2020-07-23 DIAGNOSIS — I10 BENIGN ESSENTIAL HYPERTENSION: Primary | Chronic | ICD-10-CM

## 2020-07-23 DIAGNOSIS — Z51.81 THERAPEUTIC DRUG MONITORING: ICD-10-CM

## 2020-07-23 DIAGNOSIS — M81.0 AGE-RELATED OSTEOPOROSIS WITHOUT CURRENT PATHOLOGICAL FRACTURE: Chronic | ICD-10-CM

## 2020-07-23 DIAGNOSIS — E03.8 SUBCLINICAL HYPOTHYROIDISM: ICD-10-CM

## 2020-07-23 NOTE — TELEPHONE ENCOUNTER
PATIENT WAS REFERRED TO GET THE PROLIA INJECTION, PATIENT IS NEEDING A BMP LAB WORK PRIOR TO THE INJECTION.    PLEASE CALL PATIENT WHEN THE ORDERS ARE SENT IN [phone]

## 2020-07-27 DIAGNOSIS — Z51.81 THERAPEUTIC DRUG MONITORING: Primary | ICD-10-CM

## 2020-07-30 ENCOUNTER — INFUSION (OUTPATIENT)
Dept: ONCOLOGY | Facility: HOSPITAL | Age: 80
End: 2020-07-30

## 2020-07-30 ENCOUNTER — LAB (OUTPATIENT)
Dept: OTHER | Facility: HOSPITAL | Age: 80
End: 2020-07-30

## 2020-07-30 VITALS
BODY MASS INDEX: 25.78 KG/M2 | SYSTOLIC BLOOD PRESSURE: 110 MMHG | HEART RATE: 73 BPM | WEIGHT: 151 LBS | RESPIRATION RATE: 18 BRPM | HEIGHT: 64 IN | TEMPERATURE: 98.3 F | DIASTOLIC BLOOD PRESSURE: 73 MMHG | OXYGEN SATURATION: 99 %

## 2020-07-30 DIAGNOSIS — M81.0 AGE-RELATED OSTEOPOROSIS WITHOUT CURRENT PATHOLOGICAL FRACTURE: Primary | ICD-10-CM

## 2020-07-30 LAB
ALBUMIN SERPL-MCNC: 4.7 G/DL (ref 3.5–5.2)
ALBUMIN/GLOB SERPL: 2.1 G/DL
ALP SERPL-CCNC: 62 U/L (ref 39–117)
ALT SERPL W P-5'-P-CCNC: 20 U/L (ref 1–33)
ANION GAP SERPL CALCULATED.3IONS-SCNC: 10.8 MMOL/L (ref 5–15)
AST SERPL-CCNC: 22 U/L (ref 1–32)
BILIRUB SERPL-MCNC: 0.4 MG/DL (ref 0–1.2)
BUN SERPL-MCNC: 20 MG/DL (ref 8–23)
BUN/CREAT SERPL: 27.4 (ref 7–25)
CALCIUM SPEC-SCNC: 10.8 MG/DL (ref 8.6–10.5)
CHLORIDE SERPL-SCNC: 104 MMOL/L (ref 98–107)
CO2 SERPL-SCNC: 28.2 MMOL/L (ref 22–29)
CREAT SERPL-MCNC: 0.73 MG/DL (ref 0.57–1)
GFR SERPL CREATININE-BSD FRML MDRD: 77 ML/MIN/1.73
GLOBULIN UR ELPH-MCNC: 2.2 GM/DL
GLUCOSE SERPL-MCNC: 96 MG/DL (ref 65–99)
MAGNESIUM SERPL-MCNC: 2.3 MG/DL (ref 1.6–2.4)
POTASSIUM SERPL-SCNC: 4 MMOL/L (ref 3.5–5.2)
PROT SERPL-MCNC: 6.9 G/DL (ref 6–8.5)
SODIUM SERPL-SCNC: 143 MMOL/L (ref 136–145)

## 2020-07-30 PROCEDURE — 80053 COMPREHEN METABOLIC PANEL: CPT | Performed by: INTERNAL MEDICINE

## 2020-07-30 PROCEDURE — 36415 COLL VENOUS BLD VENIPUNCTURE: CPT | Performed by: INTERNAL MEDICINE

## 2020-07-30 PROCEDURE — 25010000002 DENOSUMAB 60 MG/ML SOLUTION PREFILLED SYRINGE: Performed by: INTERNAL MEDICINE

## 2020-07-30 PROCEDURE — 83735 ASSAY OF MAGNESIUM: CPT | Performed by: INTERNAL MEDICINE

## 2020-07-30 PROCEDURE — 96372 THER/PROPH/DIAG INJ SC/IM: CPT

## 2020-07-30 RX ADMIN — DENOSUMAB 60 MG: 60 INJECTION SUBCUTANEOUS at 15:48

## 2020-07-30 NOTE — PROGRESS NOTES
Arrived  for prolia injection. Indication and side effects reviewed. Denies recent dental work. Labs and medications verified. Prolia administered in right arm without incidence. Instructed to call prescribing MD for any concerns or questions and instructed on how to schedule future appts.  Pt vu and discharged ambulatory.

## 2020-08-31 ENCOUNTER — TELEPHONE (OUTPATIENT)
Dept: INTERNAL MEDICINE | Facility: CLINIC | Age: 80
End: 2020-08-31

## 2020-09-01 ENCOUNTER — APPOINTMENT (OUTPATIENT)
Dept: WOMENS IMAGING | Facility: HOSPITAL | Age: 80
End: 2020-09-01

## 2020-09-01 PROCEDURE — 77063 BREAST TOMOSYNTHESIS BI: CPT | Performed by: RADIOLOGY

## 2020-09-01 PROCEDURE — 77067 SCR MAMMO BI INCL CAD: CPT | Performed by: RADIOLOGY

## 2020-09-14 ENCOUNTER — OFFICE VISIT (OUTPATIENT)
Dept: INTERNAL MEDICINE | Facility: CLINIC | Age: 80
End: 2020-09-14

## 2020-09-14 ENCOUNTER — LAB (OUTPATIENT)
Dept: LAB | Facility: HOSPITAL | Age: 80
End: 2020-09-14

## 2020-09-14 VITALS
BODY MASS INDEX: 26.46 KG/M2 | HEART RATE: 67 BPM | HEIGHT: 64 IN | DIASTOLIC BLOOD PRESSURE: 72 MMHG | OXYGEN SATURATION: 99 % | SYSTOLIC BLOOD PRESSURE: 134 MMHG | WEIGHT: 155 LBS

## 2020-09-14 DIAGNOSIS — R73.01 IMPAIRED FASTING GLUCOSE: Chronic | ICD-10-CM

## 2020-09-14 DIAGNOSIS — E55.9 VITAMIN D DEFICIENCY: Chronic | ICD-10-CM

## 2020-09-14 DIAGNOSIS — R31.29 MICROSCOPIC HEMATURIA: Chronic | ICD-10-CM

## 2020-09-14 DIAGNOSIS — E03.8 SUBCLINICAL HYPOTHYROIDISM: ICD-10-CM

## 2020-09-14 DIAGNOSIS — E03.8 SUBCLINICAL HYPOTHYROIDISM: Primary | ICD-10-CM

## 2020-09-14 DIAGNOSIS — E78.2 MIXED HYPERLIPIDEMIA: Chronic | ICD-10-CM

## 2020-09-14 DIAGNOSIS — Z51.81 THERAPEUTIC DRUG MONITORING: ICD-10-CM

## 2020-09-14 DIAGNOSIS — I10 BENIGN ESSENTIAL HYPERTENSION: Chronic | ICD-10-CM

## 2020-09-14 PROBLEM — Z78.9 INTOLERANCE OF ORAL BISPHOSPHONATE THERAPY: Chronic | Status: ACTIVE | Noted: 2020-02-21

## 2020-09-14 PROBLEM — R92.8 ABNORMAL MAMMOGRAM OF RIGHT BREAST: Status: RESOLVED | Noted: 2019-08-21 | Resolved: 2020-09-14

## 2020-09-14 LAB
T3FREE SERPL-MCNC: 2.92 PG/ML (ref 2–4.4)
T4 FREE SERPL-MCNC: 1.08 NG/DL (ref 0.93–1.7)
TSH SERPL DL<=0.05 MIU/L-ACNC: 1.93 UIU/ML (ref 0.27–4.2)

## 2020-09-14 PROCEDURE — 99213 OFFICE O/P EST LOW 20 MIN: CPT | Performed by: INTERNAL MEDICINE

## 2020-09-14 PROCEDURE — 84443 ASSAY THYROID STIM HORMONE: CPT | Performed by: INTERNAL MEDICINE

## 2020-09-14 PROCEDURE — 84481 FREE ASSAY (FT-3): CPT | Performed by: INTERNAL MEDICINE

## 2020-09-14 PROCEDURE — 36415 COLL VENOUS BLD VENIPUNCTURE: CPT | Performed by: INTERNAL MEDICINE

## 2020-09-14 PROCEDURE — 84439 ASSAY OF FREE THYROXINE: CPT | Performed by: INTERNAL MEDICINE

## 2020-09-14 NOTE — PROGRESS NOTES
09/14/2020    Patient Information  Lachelle ESTES Kami                                                                                          [address]  [facility] [address]      1940  [unfilled]  There is no work phone number on file.    Chief Complaint:     Follow-up possible subclinical hypothyroidism and blood pressure.    History of Present Illness:    Patient with a possible history of subclinical hypothyroidism, history of hypertension, impaired fasting glucose, hyperlipidemia, vitamin D deficiency, microscopic hematuria.  She presents today to follow-up on possible subclinical hypothyroidism and we will also assess her blood pressure.  She is feeling fairly well.  Her past medical history reviewed and updated were necessary including health maintenance parameters.  This reveals she is currently up-to-date or else accounted for except for influenza vaccine which she and her  gets at an outside facility.    Review of Systems   Constitution: Negative.   HENT: Negative.    Eyes: Negative.    Cardiovascular: Negative.    Respiratory: Negative.    Endocrine: Negative.    Hematologic/Lymphatic: Negative.    Skin: Negative.    Musculoskeletal: Negative.    Gastrointestinal: Negative.    Genitourinary: Negative.    Neurological: Negative.    Psychiatric/Behavioral: Negative.    Allergic/Immunologic: Negative.        Active Problems:    Patient Active Problem List   Diagnosis   • Benign essential hypertension   • Carotid artery plaque, 4/3/2019--mild bilateral.  10/06/2016--vascular screen is now normal without carotid disease.  Improved.   • Gastroesophageal reflux disease without esophagitis   • Hyperlipidemia   • Microscopic hematuria   • Osteoporosis, 11/5/2019--lumbar spine -0.3.  Left femoral neck -2.7.  Right femoral neck -2.4.   • Tinnitus of right ear   • History of malignant melanoma, 2008--right side of face.  1999--lower back.   • Therapeutic drug monitoring   • Vitamin D  deficiency   • Lumbar disc disease   • Routine physical examination   • Chronic right hip pain   • Impaired fasting glucose   • Menopausal state   • History of basal cell carcinoma of skin   • Subclinical hypothyroidism   • Intolerance of oral bisphosphonate therapy         Past Medical History:   Diagnosis Date   • Benign essential hypertension 4/18/2006 04/18/2006--treatment for hypertension begun.   • Carotid artery plaque, 10/06/2016--vascular screen is now normal without carotid disease.  Improved. 7/30/2010    10/06/2016--vascular screen reveals no evidence of carotid plaque, negative for AAA, negative for PAD.  04/23/2014--vascular screen reveals mild bilateral carotid plaque, negative for AAA, negative for PAD.   12/07/2012--vascular screen reveals mild left-sided carotid plaque, normal right carotid, negative for AAA, negative for PAD.   07/30/2010--vascular screen reveals mild left-sided carotid plaque, normal right carotid, negative for AAA, negative for PAD.   • Chronic right hip pain 9/1/2017 09/01/2017--patient has had a one-year history of intermittent episodes of lateral right hip pain that at times is very severe and debilitating.  She also has tenderness over this area that limits her from sleeping on her right side.  Last year I gave her a cortisone injection for trochanteric bursitis and this provided immediate relief but not long lasting.  X-rays were negative for fracture or even degenerative arthritis.  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  Ordered.   • Gastroesophageal reflux disease without esophagitis 1/14/2013    04/10/2014--patient presented with right upper quadrant/epigastric burning pain and discomfort that was postprandial. H pylori breath test was negative. Empiric trial of Dexilant 60 mg per day was initiated for esophageal reflux/dyspepsia.   01/14/2013--EGD performed for epigastric and right upper quadrant  pain. There was some edematous appearing mucosa in the antrum and body of the stomach which was biopsied. No significant erythema. No ulcerations. Normal appearing duodenum and esophagus. Pathology revealed moderate chronic active gastritis. No intestinal metaplasia. Helicobacter pylori positive. Patient was treated with appropriate antibiotic and PPI therapy.   • History of basal cell carcinoma of skin 2/12/2019 June 2018--Mohs micrographic surgery of basal cell carcinoma on the tip of the nose.   • History of Helicobacter pylori gastritis 01/14/2013    04/10/2014--patient presented with right upper quadrant/epigastric burning pain and discomfort that was postprandial. H pylori breath test was negative. Empiric trial of Dexilant 60 mg per day was initiated for esophageal reflux/dyspepsia.  01/14/2013--EGD performed for epigastric and right upper quadrant pain. There was some edematous appearing mucosa in the antrum and body of the stomach which w   • History of malignant melanoma, 2008--right side of face.  1999--lower back. 1/1/2008 2008--melanoma resected from right face.   1999--malignant melanoma resected from lower back.   • History of routine gynecologic exam yearly    Patient sees a gynecologist.   • Hyperlipidemia 8/13/2010 08/13/2010--treatment for hyperlipidemia begun.   • Impaired fasting glucose 9/1/2017 09/01/2017--routine physical.  Serum glucose elevated at 101.  Recommend weight loss and decrease carbohydrate intake.   • Intolerance of oral bisphosphonate therapy 2/21/2020   • Lumbar disc disease 10/19/2016    09/13/2016--x-ray lumbar spine reveals disc space narrowing at L2-L3, L3-L4, L4-L5, and possibly L5-S1.  There is mild anterior listhesis of L4 on L5 measuring 4.5 mm.  No compression deformity, nor other evidence of acute process.   • Menopausal state 9/1/2017   • Microscopic hematuria 12/18/2015 12/22/2015--urine cytology negative for malignant cells.  Red blood cells noted.   Transitional epithelial cells noted.  Urine culture revealed less than 10,000 colony forming units of mixed urogenital laurie.  Repeat urinalysis was negative.  Recommend observation.  12/18/2015--routine urinalysis reveals 3-5 WBCs and 3-5 RBCs. 0 epithelial cells. 0 bacteria. Patient is asymptomatic. Repeat urinalysis, urine cytology, and urine culture sent.   • Osteoporosis, 11/5/2019--lumbar spine -0.3.  Left femoral neck -2.7.  Right femoral neck -2.4. 7/24/2009 November 5, 2019--DEXA scan reveals lumbar spine T score -0.3 representing a 5% increase.  Left femoral neck T score -2.7 representing a 5% decrease.  Right femoral neck T score -2.4 which is unchanged.  February 12, 2019--patient seen in follow-up and reports she really did not feel well on Fosamax.  She has been using calcium and vitamin D supplementation.  October 18, 2017--DEXA scan reveals jesse   • Tinnitus of right ear 12/18/2015 12/18/2015--patient presents with a four-month history of ringing in her right ear. No hearing loss. Examination reveals a cerumen impaction of the right ear canal. This was removed with irrigation and curettage.   • Vitamin D deficiency 8/12/2016         Past Surgical History:   Procedure Laterality Date   • BASAL CELL CARCINOMA EXCISION  06/2018 June 2018--Mohs micrographic surgery of basal cell carcinoma on the tip of the nose.   • CHOLECYSTECTOMY  05/17/2013 05/17/2013--laparoscopic cholecystectomy.   • COLONOSCOPY  07/12/2012 07/12/2012--normal colonoscopy to the cecum with an excellent prep.    • COLONOSCOPY  06/20/2003 06/20/2003--normal colonoscopy to the cecum.   • ESOPHAGOSCOPY / EGD  01/14/2013 01/14/2013--EGD performed for epigastric and right upper quadrant pain. There was some edematous appearing mucosa in the antrum and body of the stomach which was biopsied. No significant erythema. No ulcerations. Normal appearing duodenum and esophagus. Pathology revealed moderate chronic active  gastritis. No intestinal metaplasia. Helicobacter pylori positive. Patient was treated with appropriate   • FOOT SURGERY  1992 1992--orthopedic 10 placed in right great toe.   • HYSTERECTOMY  1986 1986--total abdominal hysterectomy.   • SKIN CANCER EXCISION  09/14/2009 09/14/2009--excision 1 cm mid scalp cyst. Pathology benign. 2008--melanoma resected from right face. 1999--malignant melanoma resected from lower back.   • TONSILLECTOMY AND ADENOIDECTOMY  1946 1946.         No Known Allergies        Current Outpatient Medications:   •  aspirin (ASPIRIN LOW DOSE) 81 MG tablet, Take 1 tablet by mouth daily, Disp: , Rfl:   •  Calcium Carbonate-Vit D-Min (CALCIUM 1200) 5998-3725 MG-UNIT chewable tablet, Chew 1 tablet daily, Disp: , Rfl:   •  Cholecalciferol (VITAMIN D) 2000 UNITS capsule, Take 1 capsule by mouth daily, Disp: , Rfl:   •  Coenzyme Q10 (COQ10) 400 MG capsule, Take 1 capsule by mouth daily Take with a meal., Disp: , Rfl:   •  simvastatin (ZOCOR) 40 MG tablet, TAKE 1 TABLET EVERY DAY FOR CHOLESTEROL (NEED LABS AND FOLLOW UP APPOINTMENT ASAP), Disp: 90 tablet, Rfl: 1  •  valsartan-hydrochlorothiazide (DIOVAN-HCT) 160-25 MG per tablet, TAKE 1 TABLET EVERY MORNING FOR BLOOD PRESSURE, Disp: 90 tablet, Rfl: 1      Family History   Problem Relation Age of Onset   • COPD Mother    • Hypertension Mother         Essential   • COPD Father    • Hypertension Father          Social History     Socioeconomic History   • Marital status:      Spouse name: Not on file   • Number of children: Not on file   • Years of education: Not on file   • Highest education level: Bachelor's degree (e.g., BA, AB, BS)   Occupational History   • Occupation: Registered Nurse   Social Needs   • Financial resource strain: Not hard at all   • Food insecurity     Worry: Never true     Inability: Never true   • Transportation needs     Medical: No     Non-medical: No   Tobacco Use   • Smoking status: Never Smoker   • Smokeless  "tobacco: Never Used   Substance and Sexual Activity   • Alcohol use: No   • Drug use: No   • Sexual activity: Yes     Partners: Male   Lifestyle   • Physical activity     Days per week: 2 days     Minutes per session: 60 min   • Stress: Not at all   Relationships   • Social connections     Talks on phone: Patient refused     Gets together: Patient refused     Attends Alevism service: Patient refused     Active member of club or organization: Patient refused     Attends meetings of clubs or organizations: Patient refused     Relationship status: Patient refused         Vitals:    09/14/20 1344   BP: 134/72   BP Location: Left arm   Pulse: 67   SpO2: 99%   Weight: 70.3 kg (155 lb)   Height: 161.3 cm (63.5\")        Body mass index is 27.02 kg/m².      Physical Exam:    General: Alert and oriented x 3.  No acute distress.  Normal affect.  HEENT: Pupils equal, round, reactive to light; extraocular movements intact; sclerae nonicteric; pharynx, ear canals and TMs normal.  Neck: Without JVD, thyromegaly, bruit, or adenopathy.  Lungs: Clear to auscultation in all fields.  Heart: Regular rate and rhythm without murmur, rub, gallop, or click.  Abdomen: Soft, nontender, without hepatosplenomegaly or hernia.  Bowel sounds normal.  : Deferred.  Rectal: Deferred.  Extremities: Without clubbing, cyanosis, edema, or pulse deficit.  Neurologic: Intact without focal deficit.  Normal station and gait observed during ingress and egress from the examination room.  Skin: Without significant lesion.  Musculoskeletal: Unremarkable.    Lab/other results:      Assessment/Plan:     Diagnosis Plan   1. Subclinical hypothyroidism  TSH    T4, Free    T3, Free    TSH    T4, Free    T3, Free   2. Benign essential hypertension     3. Impaired fasting glucose  Comprehensive Metabolic Panel    Hemoglobin A1c   4. Vitamin D deficiency  Vitamin D 25 Hydroxy   5. Hyperlipidemia  CK    Comprehensive Metabolic Panel    NMR LipoProfile   6. Microscopic " hematuria     7. Therapeutic drug monitoring  CBC (No Diff)     I am not really sure patient has hypothyroidism or not.  She had 1 repeat thyroid function tests which was normal.  We will repeat again today.  Her blood pressure seems to be well controlled and we do not need to make any changes.    Plan is as follows: Recheck thyroid function test today.  Patient will follow-up on the phone for the results and possible further instructions.  Otherwise, I will have her follow-up after February 21, 2021 with lab prior and this will also be her subsequent Medicare wellness visit.        Procedures

## 2020-12-04 ENCOUNTER — OFFICE VISIT (OUTPATIENT)
Dept: INTERNAL MEDICINE | Facility: CLINIC | Age: 80
End: 2020-12-04

## 2020-12-04 ENCOUNTER — HOSPITAL ENCOUNTER (OUTPATIENT)
Dept: GENERAL RADIOLOGY | Facility: HOSPITAL | Age: 80
Discharge: HOME | End: 2020-12-04
Admitting: INTERNAL MEDICINE

## 2020-12-04 VITALS
SYSTOLIC BLOOD PRESSURE: 154 MMHG | HEART RATE: 72 BPM | BODY MASS INDEX: 25.57 KG/M2 | OXYGEN SATURATION: 98 % | HEIGHT: 64 IN | WEIGHT: 149.8 LBS | DIASTOLIC BLOOD PRESSURE: 70 MMHG

## 2020-12-04 DIAGNOSIS — F43.21 GRIEF REACTION: ICD-10-CM

## 2020-12-04 DIAGNOSIS — M54.41 ACUTE RIGHT-SIDED LOW BACK PAIN WITH RIGHT-SIDED SCIATICA: Primary | ICD-10-CM

## 2020-12-04 DIAGNOSIS — M51.9 LUMBAR DISC DISEASE: Chronic | ICD-10-CM

## 2020-12-04 DIAGNOSIS — Z11.59 NEED FOR HEPATITIS C SCREENING TEST: ICD-10-CM

## 2020-12-04 PROBLEM — F43.20 GRIEF REACTION: Status: ACTIVE | Noted: 2020-12-04

## 2020-12-04 PROCEDURE — 72110 X-RAY EXAM L-2 SPINE 4/>VWS: CPT

## 2020-12-04 PROCEDURE — 99214 OFFICE O/P EST MOD 30 MIN: CPT | Performed by: INTERNAL MEDICINE

## 2020-12-04 RX ORDER — PREDNISONE 10 MG/1
TABLET ORAL
Qty: 56 TABLET | Refills: 0 | Status: SHIPPED | OUTPATIENT
Start: 2020-12-04 | End: 2021-03-22

## 2020-12-04 NOTE — PROGRESS NOTES
12/04/2020    Patient Information  Lachelle Mcclure                                                                                          [address] [facility] [address]      1940  [unfilled]  There is no work phone number on file.    Chief Complaint:     Complaining of new right lower back pain with radiation into the buttock and down the right leg posteriorly.    History of Present Illness:    Patient with a history of chronic right hip pain, hypertension, carotid artery plaque, esophageal reflux, hyperlipidemia, osteoporosis, history of malignant melanoma, lumbar disc disease, impaired fasting glucose.  She presents today with complaints of right lower back pain with radiation in the right buttock and down the right leg posterior laterally to the knee.    Review of Systems   Constitution: Negative.   HENT: Negative.    Eyes: Negative.    Cardiovascular: Negative.    Respiratory: Negative.    Endocrine: Negative.    Hematologic/Lymphatic: Negative.    Skin: Negative.    Musculoskeletal: Positive for arthritis and back pain.   Gastrointestinal: Negative.    Genitourinary: Negative.    Neurological: Negative.  Negative for numbness and paresthesias.   Psychiatric/Behavioral: Negative.    Allergic/Immunologic: Negative.        Active Problems:    Patient Active Problem List   Diagnosis   • Benign essential hypertension   • Carotid artery plaque, 4/3/2019--mild bilateral.  10/06/2016--vascular screen is now normal without carotid disease.  Improved.   • Gastroesophageal reflux disease without esophagitis   • Hyperlipidemia   • Microscopic hematuria   • Osteoporosis, 11/5/2019--lumbar spine -0.3.  Left femoral neck -2.7.  Right femoral neck -2.4.   • Tinnitus of right ear   • History of malignant melanoma, 2008--right side of face.  1999--lower back.   • Therapeutic drug monitoring   • Vitamin D deficiency   • Lumbar disc disease   • Routine physical examination   • Chronic right hip pain   •  Impaired fasting glucose   • Menopausal state   • History of basal cell carcinoma of skin   • Subclinical hypothyroidism   • Intolerance of oral bisphosphonate therapy   • Acute right-sided low back pain with right-sided sciatica   • Grief reaction         Past Medical History:   Diagnosis Date   • Benign essential hypertension 4/18/2006 04/18/2006--treatment for hypertension begun.   • Carotid artery plaque, 10/06/2016--vascular screen is now normal without carotid disease.  Improved. 7/30/2010    10/06/2016--vascular screen reveals no evidence of carotid plaque, negative for AAA, negative for PAD.  04/23/2014--vascular screen reveals mild bilateral carotid plaque, negative for AAA, negative for PAD.   12/07/2012--vascular screen reveals mild left-sided carotid plaque, normal right carotid, negative for AAA, negative for PAD.   07/30/2010--vascular screen reveals mild left-sided carotid plaque, normal right carotid, negative for AAA, negative for PAD.   • Chronic right hip pain 9/1/2017 09/01/2017--patient has had a one-year history of intermittent episodes of lateral right hip pain that at times is very severe and debilitating.  She also has tenderness over this area that limits her from sleeping on her right side.  Last year I gave her a cortisone injection for trochanteric bursitis and this provided immediate relief but not long lasting.  X-rays were negative for fracture or even degenerative arthritis.  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  Ordered.   • Gastroesophageal reflux disease without esophagitis 1/14/2013    04/10/2014--patient presented with right upper quadrant/epigastric burning pain and discomfort that was postprandial. H pylori breath test was negative. Empiric trial of Dexilant 60 mg per day was initiated for esophageal reflux/dyspepsia.   01/14/2013--EGD performed for epigastric and right upper quadrant pain. There was  some edematous appearing mucosa in the antrum and body of the stomach which was biopsied. No significant erythema. No ulcerations. Normal appearing duodenum and esophagus. Pathology revealed moderate chronic active gastritis. No intestinal metaplasia. Helicobacter pylori positive. Patient was treated with appropriate antibiotic and PPI therapy.   • History of basal cell carcinoma of skin 2/12/2019 June 2018--Mohs micrographic surgery of basal cell carcinoma on the tip of the nose.   • History of Helicobacter pylori gastritis 01/14/2013    04/10/2014--patient presented with right upper quadrant/epigastric burning pain and discomfort that was postprandial. H pylori breath test was negative. Empiric trial of Dexilant 60 mg per day was initiated for esophageal reflux/dyspepsia.  01/14/2013--EGD performed for epigastric and right upper quadrant pain. There was some edematous appearing mucosa in the antrum and body of the stomach which w   • History of malignant melanoma, 2008--right side of face.  1999--lower back. 1/1/2008 2008--melanoma resected from right face.   1999--malignant melanoma resected from lower back.   • History of routine gynecologic exam yearly    Patient sees a gynecologist.   • Hyperlipidemia 8/13/2010 08/13/2010--treatment for hyperlipidemia begun.   • Impaired fasting glucose 9/1/2017 09/01/2017--routine physical.  Serum glucose elevated at 101.  Recommend weight loss and decrease carbohydrate intake.   • Intolerance of oral bisphosphonate therapy 2/21/2020   • Lumbar disc disease 10/19/2016    09/13/2016--x-ray lumbar spine reveals disc space narrowing at L2-L3, L3-L4, L4-L5, and possibly L5-S1.  There is mild anterior listhesis of L4 on L5 measuring 4.5 mm.  No compression deformity, nor other evidence of acute process.   • Menopausal state 9/1/2017   • Microscopic hematuria 12/18/2015 12/22/2015--urine cytology negative for malignant cells.  Red blood cells noted.  Transitional  epithelial cells noted.  Urine culture revealed less than 10,000 colony forming units of mixed urogenital laurie.  Repeat urinalysis was negative.  Recommend observation.  12/18/2015--routine urinalysis reveals 3-5 WBCs and 3-5 RBCs. 0 epithelial cells. 0 bacteria. Patient is asymptomatic. Repeat urinalysis, urine cytology, and urine culture sent.   • Osteoporosis, 11/5/2019--lumbar spine -0.3.  Left femoral neck -2.7.  Right femoral neck -2.4. 7/24/2009 November 5, 2019--DEXA scan reveals lumbar spine T score -0.3 representing a 5% increase.  Left femoral neck T score -2.7 representing a 5% decrease.  Right femoral neck T score -2.4 which is unchanged.  February 12, 2019--patient seen in follow-up and reports she really did not feel well on Fosamax.  She has been using calcium and vitamin D supplementation.  October 18, 2017--DEXA scan reveals jesse   • Tinnitus of right ear 12/18/2015 12/18/2015--patient presents with a four-month history of ringing in her right ear. No hearing loss. Examination reveals a cerumen impaction of the right ear canal. This was removed with irrigation and curettage.   • Vitamin D deficiency 8/12/2016         Past Surgical History:   Procedure Laterality Date   • BASAL CELL CARCINOMA EXCISION  06/2018 June 2018--Mohs micrographic surgery of basal cell carcinoma on the tip of the nose.   • CHOLECYSTECTOMY  05/17/2013 05/17/2013--laparoscopic cholecystectomy.   • COLONOSCOPY  07/12/2012 07/12/2012--normal colonoscopy to the cecum with an excellent prep.    • COLONOSCOPY  06/20/2003 06/20/2003--normal colonoscopy to the cecum.   • ESOPHAGOSCOPY / EGD  01/14/2013 01/14/2013--EGD performed for epigastric and right upper quadrant pain. There was some edematous appearing mucosa in the antrum and body of the stomach which was biopsied. No significant erythema. No ulcerations. Normal appearing duodenum and esophagus. Pathology revealed moderate chronic active gastritis. No  intestinal metaplasia. Helicobacter pylori positive. Patient was treated with appropriate   • FOOT SURGERY  1992 1992--orthopedic 10 placed in right great toe.   • HYSTERECTOMY  1986 1986--total abdominal hysterectomy.   • SKIN CANCER EXCISION  09/14/2009 09/14/2009--excision 1 cm mid scalp cyst. Pathology benign. 2008--melanoma resected from right face. 1999--malignant melanoma resected from lower back.   • TONSILLECTOMY AND ADENOIDECTOMY  1946 1946.         No Known Allergies        Current Outpatient Medications:   •  aspirin (ASPIRIN LOW DOSE) 81 MG tablet, Take 1 tablet by mouth daily, Disp: , Rfl:   •  Calcium Carbonate-Vit D-Min (CALCIUM 1200) 2146-8524 MG-UNIT chewable tablet, Chew 1 tablet daily, Disp: , Rfl:   •  Cholecalciferol (VITAMIN D) 2000 UNITS capsule, Take 1 capsule by mouth daily, Disp: , Rfl:   •  Coenzyme Q10 (COQ10) 400 MG capsule, Take 1 capsule by mouth daily Take with a meal., Disp: , Rfl:   •  simvastatin (ZOCOR) 40 MG tablet, TAKE 1 TABLET EVERY DAY FOR CHOLESTEROL (NEED LABS AND FOLLOW UP APPOINTMENT ASAP), Disp: 90 tablet, Rfl: 1  •  valsartan-hydrochlorothiazide (DIOVAN-HCT) 160-25 MG per tablet, TAKE 1 TABLET EVERY MORNING FOR BLOOD PRESSURE, Disp: 90 tablet, Rfl: 1  •  predniSONE (DELTASONE) 10 MG tablet, 5 by mouth daily 7 days, then 4 daily 2 days, 3 daily 2 days, 2 daily 2 days, 1 daily 2 days, one half daily 2 days and discontinue., Disp: 56 tablet, Rfl: 0      Family History   Problem Relation Age of Onset   • COPD Mother    • Hypertension Mother         Essential   • COPD Father    • Hypertension Father          Social History     Socioeconomic History   • Marital status:      Spouse name: Not on file   • Number of children: Not on file   • Years of education: Not on file   • Highest education level: Bachelor's degree (e.g., BA, AB, BS)   Occupational History   • Occupation: Registered Nurse   Social Needs   • Financial resource strain: Not hard at all   •  "Food insecurity     Worry: Never true     Inability: Never true   • Transportation needs     Medical: No     Non-medical: No   Tobacco Use   • Smoking status: Never Smoker   • Smokeless tobacco: Never Used   Substance and Sexual Activity   • Alcohol use: No   • Drug use: No   • Sexual activity: Yes     Partners: Male   Lifestyle   • Physical activity     Days per week: 2 days     Minutes per session: 60 min   • Stress: Not at all   Relationships   • Social connections     Talks on phone: Patient refused     Gets together: Patient refused     Attends Taoist service: Patient refused     Active member of club or organization: Patient refused     Attends meetings of clubs or organizations: Patient refused     Relationship status: Patient refused         Vitals:    12/04/20 1058   BP: 154/70   BP Location: Left arm   Pulse: 72   SpO2: 98%   Weight: 67.9 kg (149 lb 12.8 oz)   Height: 161.3 cm (63.5\")        Body mass index is 26.12 kg/m².      Physical Exam:    General: Alert and oriented x 3.  No acute distress.  Normal affect.  HEENT: Pupils equal, round, reactive to light; extraocular movements intact; sclerae nonicteric; pharynx, ear canals and TMs normal.  Neck: Without JVD, thyromegaly, bruit, or adenopathy.  Lungs: Clear to auscultation in all fields.  Heart: Regular rate and rhythm without murmur, rub, gallop, or click.  Abdomen: Soft, nontender, without hepatosplenomegaly or hernia.  Bowel sounds normal.  : Deferred.  Rectal: Deferred.  Extremities: Without clubbing, cyanosis, edema, or pulse deficit.  Neurologic: Intact without focal deficit.  Normal station and gait observed during ingress and egress from the examination room.  Skin: Without significant lesion.  Musculoskeletal: Straight leg positive on the right.    Lab/other results:      Assessment/Plan:     Diagnosis Plan   1. Acute right-sided low back pain with right-sided sciatica  predniSONE (DELTASONE) 10 MG tablet    XR Spine Lumbar Complete 4+VW "   2. Need for hepatitis C screening test  Hepatitis C Antibody   3. Lumbar disc disease  XR Spine Lumbar Complete 4+VW   4. Grief reaction       December 4, 2020--patient with history of lumbar degenerative disc disease presents with approximately 6 to 8-week history of nontraumatic right sided lower back pain which radiates into the right hip and occasionally shoots down the right leg posteriorly to the level of the knee.  The pain is particularly bad at night and early morning when she first gets up but interestingly it gets better towards the end of the day.  The pain in the leg is occasionally just shooting in nature.  On exam her straight leg raising on the right is questionably positive.  Back exam not particularly revealing.      Also patient is having grief reaction to the unexpected loss of her son who is in his 40s.  I would not go into detail here but we spent some time discussing the situation and providing support.    Plan is as follows: X-ray lumbar spine.  Patient will follow-up on the phone for the results and possible further instructions.  Prednisone 50 mg p.o. daily x7 days, taper and discontinue.  Patient instructed to contact me if this does not resolve her symptoms.  She may need further evaluation including MRI.            Procedures

## 2020-12-28 ENCOUNTER — TELEPHONE (OUTPATIENT)
Dept: INTERNAL MEDICINE | Facility: CLINIC | Age: 80
End: 2020-12-28

## 2020-12-28 DIAGNOSIS — M54.41 ACUTE RIGHT-SIDED LOW BACK PAIN WITH RIGHT-SIDED SCIATICA: Primary | ICD-10-CM

## 2020-12-28 NOTE — TELEPHONE ENCOUNTER
Patient was prescribed prednisone for pain in her buttocks. Finished taking it 12/21. The pain is almost gone, some discomfort in the morning, and is almost gone in the afternoon.   Has managed to walk in a way that makes the discomfort get a little better, experiencing some discomfort behind knees.  Was wondering if will benefit with getting some physical therapy sessions.    Please advise    [phone]

## 2021-01-05 ENCOUNTER — TREATMENT (OUTPATIENT)
Dept: PHYSICAL THERAPY | Facility: CLINIC | Age: 81
End: 2021-01-05

## 2021-01-05 DIAGNOSIS — M54.41 ACUTE BILATERAL LOW BACK PAIN WITH RIGHT-SIDED SCIATICA: Primary | ICD-10-CM

## 2021-01-05 PROCEDURE — 97110 THERAPEUTIC EXERCISES: CPT | Performed by: PHYSICAL THERAPIST

## 2021-01-05 PROCEDURE — 97161 PT EVAL LOW COMPLEX 20 MIN: CPT | Performed by: PHYSICAL THERAPIST

## 2021-01-05 PROCEDURE — 97035 APP MDLTY 1+ULTRASOUND EA 15: CPT | Performed by: PHYSICAL THERAPIST

## 2021-01-05 PROCEDURE — 97140 MANUAL THERAPY 1/> REGIONS: CPT | Performed by: PHYSICAL THERAPIST

## 2021-01-05 NOTE — PROGRESS NOTES
Low back painPhysical Therapy Initial Evaluation and Plan of Care    Patient: Lachelle Mcclure   : 1940  Diagnosis/ICD-10 Code:  Acute bilateral low back pain with right-sided sciatica [M54.41]  Referring practitioner: Juan Vega MD    Subjective Evaluation    History of Present Illness  Mechanism of injury: Couple of years ago after  going hiking developed some bursitis in right hip.  In 2020 drove to [place] which required considerable sitting then developed right buttocks pain.  The pain progressed from aching to sharp shooting.  Now is having some anterior right hip/groin pain.  Mornings are worse that afternoons  Saw PMD 4 weeks ago - prescribed prednisone, some relief but not enough, ordered PT. Did some xrays - DDD  Walks for exercise, cuts grass, yard work, silver sneakers   Son recently  after a tragic bicycle accident  Takes Tylenol or Advil prn      Patient Occupation: Retired - nurse Pain  Current pain ratin  At worst pain ratin  Location: Right lumbosacral junction, shoots into right anterior hip/groin  Quality: dull ache, radiating, knife-like, sharp, pressure and tight  Relieving factors: change in position, medications and heat  Aggravating factors: squatting, lifting and sleeping (lifting right leg to get into bed)  Progression: no change    Social Support  Lives in: multiple-level home  Lives with: spouse    Diagnostic Tests  X-ray: abnormal (DDD lumbar spine)    Treatments  Previous treatment: medication  Current treatment: medication and physical therapy  Patient Goals  Patient goal: Be able to walk without pain, be able to help with family duties after birth of grandchild           Objective        Special Questions  Patient is experiencing disturbed sleep.     Additional Special Questions  Negative responses to lumbar special questions      Postural Observations  Seated posture: fair  Standing posture: good    Additional Postural Observation Details  Left iliac crest and  PSIS elevated compared to the right  Left ASIS lower than right  Works with symmetrical but short stride length     Palpation     Right   Hypertonic in the lumbar paraspinals and quadratus lumborum. Tenderness of the lumbar paraspinals.     Tenderness     Lumbar Spine  Tenderness in the right transverse process.     Right Hip   Tenderness in the PSIS.     Neurological Testing     Sensation     Lumbar   Left   Intact: light touch    Right   Intact: light touch    Active Range of Motion     Lumbar   Flexion: 50 degrees with pain  Extension: 50 degrees with pain  Left lateral flexion: 75 degrees   Right lateral flexion: 50 degrees with pain  Left rotation: 50 degrees   Right rotation: 50 degrees     Additional Active Range of Motion Details  Measurements listed as taken in percentages      Strength/Myotome Testing     Left Hip   Planes of Motion   Flexion: 4    Right Hip   Planes of Motion   Flexion: 4  Abduction: 4+    Left Knee   Flexion: 5    Right Knee   Extension: 4+    Left Ankle/Foot   Dorsiflexion: 5  Plantar flexion: 4+  Great toe extension: 5    Right Ankle/Foot   Dorsiflexion: 5  Plantar flexion: 5  Great toe extension: 5    Tests     Lumbar     Left   Negative passive SLR.     Right   Negative passive SLR.     Right Pelvic Girdle/Sacrum   Positive: sacral spring.           Assessment & Plan     Assessment  Impairments: abnormal gait, abnormal muscle tone, abnormal or restricted ROM, activity intolerance, impaired balance, impaired physical strength, lacks appropriate home exercise program, pain with function and weight-bearing intolerance  Assessment details: 80 y.o. female with chronic right lumbosacral pain with history of right hip bursitis presents with: 1. Intermittent right lumbosacral and hip pain, 2. Decreased spinal and hip AROM,  3. Pelvic asymmetry, 4. No signs of abnormal neural involvement, 5. Decreased tolerance for WB and many normal ADL's  Prognosis: good  Functional Limitations: carrying  objects, lifting, walking, pulling, standing and stooping  Goals  Plan Goals: Short Term Goals: 2 weeks  Patient will be able to tolerate initial exercises  Patient will have pain <5/10  Patient will be able to sleep without pain interruption  Patient will be able to transfer sit to stand without increased symptoms    Long Term Goals: 6 weeks  Patient will be independent in performing home exercise program.  Patient will have functional pain free spinal AROM  Patient will be able to stand and walk for >20 minutes without increased symptoms  Patient will be able to perform light housework activities without increased symptoms.    Plan  Therapy options: will be seen for skilled physical therapy services  Planned modality interventions: ultrasound, TENS and thermotherapy (hydrocollator packs)  Planned therapy interventions: manual therapy, joint mobilization, home exercise program, strengthening, stretching and therapeutic activities  Frequency: 2x week  Duration in visits: 12  Duration in weeks: 6  Treatment plan discussed with: patient        Manual Therapy:    10     mins  71151;  Therapeutic Exercise:    20     mins  05864;     Neuromuscular Kiana:    0    mins  06673;    Therapeutic Activity:     5     mins  89144;   Anatomy review, rehab process    Evaluation Time:     20  mins  Timed Treatment:   43   mins   Total Treatment:     70   mins    PT SIGNATURE: Madelaine Meléndez, PT   DATE TREATMENT INITIATED: 1/5/2021    Initial Certification  Certification Period: 4/5/2021  I certify that the therapy services are furnished while this patient is under my care.  The services outlined above are required by this patient, and will be reviewed every 90 days.     PHYSICIAN: Juan Vega MD      DATE:     Please sign and return via fax to [phone].. Thank you, [facility].

## 2021-01-08 ENCOUNTER — TREATMENT (OUTPATIENT)
Dept: PHYSICAL THERAPY | Facility: CLINIC | Age: 81
End: 2021-01-08

## 2021-01-08 DIAGNOSIS — M54.41 ACUTE BILATERAL LOW BACK PAIN WITH RIGHT-SIDED SCIATICA: Primary | ICD-10-CM

## 2021-01-08 PROCEDURE — 97140 MANUAL THERAPY 1/> REGIONS: CPT | Performed by: PHYSICAL THERAPIST

## 2021-01-08 PROCEDURE — 97110 THERAPEUTIC EXERCISES: CPT | Performed by: PHYSICAL THERAPIST

## 2021-01-08 NOTE — PROGRESS NOTES
Physical Therapy Daily Progress Note        VISIT#: 2      Lachelle Mcclure reports: Pt reports it's not really back pain but in her R buttock, groin, and posterior lateral knee. Stated it does not hurt all of the time. Stated she did not sleep well last night because she could not get her r leg comfortable. It gets better the more she moves in the  morning.   Current Pain Level:    0/10; Worst:   7-8/10; Best:  0/10  Location Of Pain: Buttock, groin and posterior knee on R  Response to Previous Session: Good. No issues  Functional Deficits/Irritating Factors: First thing in the morning, ambulation, intermittent with activities, reaching up  Progression: No change  Compliance with HEP Reported: Yes    Objective   Presents: Normal   Increased sets/reps of:  none   Increased resistance on:  none  Added to Program:  HS stretch, BKFOs, Ab Brace, Bridges, STM       See Exercise, Manual, and Modality Logs for complete treatment.     Patient Education: Pt was educated on exercise biomechanical correctness, intensity, and speed.    Assessment:  Pt B HS are tight but responded well to contract/relax technique. Cues needed for Ab Bracing - pt able to maintain TA hold on L better than with R . Pt tight in R hip ERs. Tender in R QL and B paraspinals. L buttock tighter than R or perhaps pt R buttocks has some atrophy. Pt tender on R GT.  Pt will continue to benefit from skilled PT interventions to address current functional deficits and impairments.       Plan: Progress to/Continue with current program. Wall Slides for trunk extension        Manual Therapy:    15     mins  94127;  Therapeutic Exercise:    40     mins  03561;     Neuromuscular Kiana:    0    mins  62822;    Therapeutic Activity:     0     mins  09975;     Electrical Stimulation: __0____ mins 50738  Ultrasound:   0 mins 21415  Work Conditionin mins 12844  Iontophoresis: 0 mins 82868  Timed Treatment:   55   mins   Total Treatment:     55   mins    Jessica AVILA  [place] \[facility]\""  [place] License # [id number]  Physical Therapist Assistant

## 2021-01-12 ENCOUNTER — TREATMENT (OUTPATIENT)
Dept: PHYSICAL THERAPY | Facility: CLINIC | Age: 81
End: 2021-01-12

## 2021-01-12 DIAGNOSIS — M54.41 ACUTE BILATERAL LOW BACK PAIN WITH RIGHT-SIDED SCIATICA: Primary | ICD-10-CM

## 2021-01-12 PROCEDURE — 97140 MANUAL THERAPY 1/> REGIONS: CPT | Performed by: PHYSICAL THERAPIST

## 2021-01-12 PROCEDURE — 97035 APP MDLTY 1+ULTRASOUND EA 15: CPT | Performed by: PHYSICAL THERAPIST

## 2021-01-12 PROCEDURE — 97110 THERAPEUTIC EXERCISES: CPT | Performed by: PHYSICAL THERAPIST

## 2021-01-12 NOTE — PROGRESS NOTES
Physical Therapy Daily Progress Note    VISIT#: 3    Subjective   Lachelle Mcclure reports: that she has had a few very good days lately and has slept well.  However, today she had had increased pain upon awakening today with increased pain when the right foot hit the ground.  No longer sleeping on the right side.        Objective   Presents with slightly asymmetrical pelvis with right hemipelvis posteriorly rotated compared to the left    See Exercise, Manual, and Modality Logs for complete treatment.     Patient Education:  Access Code: [email or web address]   URL: [email or web address]   Date: 01/12/2021   Prepared by: Madelaine Meléndez     Exercises  Supine Hip External Rotation Stretch - 3 reps - 20 hold - 2x daily - 7x weekly  Supine Hamstring Stretch - 3 reps - 20 hold - 2x daily - 7x weekly  Hooklying Sequential Leg March and Lower - 10 reps - 2 sets - 1x daily - 7x weekly  Bent Knee Fallouts - 10 reps - 2 sets - 1x daily - 7x weekly    Assessment/Plan  Required some cueing for proper exercise technique yet compliant with program.  Less asymmetry noted in pelvis today compared to last session.   Relief with manual therapy.    Progress strengthening /stabilization /functional activity           Manual Therapy:    17     mins  07430;  Therapeutic Exercise:    30     mins  36715;     Neuromuscular Kiana:    0    mins  04540;    Therapeutic Activity:     0     mins  72735;     Dry Needling                  0_  mins    Timed Treatment:   55   mins   Total Treatment:     60   mins    Madelaine Meléndez, PT  [place] License # [id number]  Physical Therapist

## 2021-01-15 ENCOUNTER — TREATMENT (OUTPATIENT)
Dept: PHYSICAL THERAPY | Facility: CLINIC | Age: 81
End: 2021-01-15

## 2021-01-15 DIAGNOSIS — M54.41 ACUTE BILATERAL LOW BACK PAIN WITH RIGHT-SIDED SCIATICA: Primary | ICD-10-CM

## 2021-01-15 PROCEDURE — 97110 THERAPEUTIC EXERCISES: CPT | Performed by: PHYSICAL THERAPIST

## 2021-01-15 PROCEDURE — 97140 MANUAL THERAPY 1/> REGIONS: CPT | Performed by: PHYSICAL THERAPIST

## 2021-01-15 PROCEDURE — 97530 THERAPEUTIC ACTIVITIES: CPT | Performed by: PHYSICAL THERAPIST

## 2021-01-15 PROCEDURE — 97035 APP MDLTY 1+ULTRASOUND EA 15: CPT | Performed by: PHYSICAL THERAPIST

## 2021-01-15 NOTE — PROGRESS NOTES
Physical Therapy Daily Progress Note        VISIT#: 4      Lachelle Mcclure reports: She has never had anything like this. She had a good afternoon yesterday but after she got in bed she had pain in her groin, dwayne with shifting around to get comfortable. This morning her R lateral HS was tight and she put heat on it and then later her R buttocks. Her pain hops around a lot. Stated it is frustrating. She gets shooting pain at times in her buttock and her leg during ambulation. She is pretty good in the afternoons. She reports she tends to walk in a flexed position and when she straightens up she has pain but it goes away. Stated she read about her bone drug, Prolia,, that she gets an injection for every 6 mo., and it can cause the same symptoms she is having. Pt also gave details of her son's recent death and how at the time she had to do a lot of sitting which she thinks may have had an effect on her back.   Current Pain Level:    0/10; Worst:   6-7/10; Best:  0/10  Location Of Pain: R buttocks, groin and thigh  Response to Previous Session: Good. No issues  Functional Deficits/Irritating Factors: bed mobility, walking  Progression: Some improvement  Compliance with HEP Reported: Yes    Objective   Presents: Normal ambulation  Increased sets/reps of:  none   Increased resistance on:  none  Added to Program: none        See Exercise, Manual, and Modality Logs for complete treatment.     Patient Education: Pt was educated on exercise biomechanical correctness, intensity, and speed. Education on log rolling. Practiced getting in/out of plinth like she would getting into/out of bed. Educating on sitting posture and using support behind her back. Education on osteoporosis and supportive vitamins and wt bearing exercises.     Assessment: Pt with a lot of concerns today about her back and how frustrating it is with her inconsistent symptoms. A lot of time spent in discussion/education over her concerns. Noted during manual  therapy, pt is very tender at her R GT where piriformis inserts. Pt also had bursitis there in the past, which my also be an issue. Pt does have B limitations in hip mobility especially her L hip in ER. L buttocks did not seem as tight today. Pt will continue to benefit from skilled PT interventions to address current functional deficits and impairments.       Plan: Progress to/Continue with current program. Return to evaluating therapist. Assess response to today's session        Manual Therapy:    15     mins  24115;  Therapeutic Exercise:    20     mins  68368;     Neuromuscular Kiana:    0    mins  63919;    Therapeutic Activity:     10     mins  03867;     Electrical Stimulation: __0____ mins 20085  Ultrasound:   10 mins 49840  Work Conditionin mins 32592  Iontophoresis: 0 mins 85261  Timed Treatment:   45   mins   Total Treatment:     55   mins    Jessica Beckwith PTA  [place] License # [id number]  Physical Therapist Assistant

## 2021-01-20 ENCOUNTER — TREATMENT (OUTPATIENT)
Dept: PHYSICAL THERAPY | Facility: CLINIC | Age: 81
End: 2021-01-20

## 2021-01-20 DIAGNOSIS — M54.41 ACUTE BILATERAL LOW BACK PAIN WITH RIGHT-SIDED SCIATICA: Primary | ICD-10-CM

## 2021-01-20 PROCEDURE — 97035 APP MDLTY 1+ULTRASOUND EA 15: CPT | Performed by: PHYSICAL THERAPIST

## 2021-01-20 PROCEDURE — 97140 MANUAL THERAPY 1/> REGIONS: CPT | Performed by: PHYSICAL THERAPIST

## 2021-01-20 PROCEDURE — 97112 NEUROMUSCULAR REEDUCATION: CPT | Performed by: PHYSICAL THERAPIST

## 2021-01-20 PROCEDURE — 97110 THERAPEUTIC EXERCISES: CPT | Performed by: PHYSICAL THERAPIST

## 2021-01-20 NOTE — PROGRESS NOTES
Physical Therapy Daily Progress Note        VISIT#: 5      Lachelle Mcclure reports: She was here Friday and she was much better this past weekend. She is still having some tightness and pain in her R buttock and R lateral HS but none in her groin. Stated she feels like her L hip is tighter than her R.   Current Pain Level:    No pain, just tightness in R buttocks and B hips and R lateral, posterior knee    Response to Previous Session: Good. No issues.   Functional Deficits/Irritating Factors: bed mobility, walking  Progression: Improved  Compliance with HEP Reported: Yes    Objective   Presents: normal ambulation  Increased sets/reps of:   none  Increased resistance on:  BKFO, Bridges,  Added to Program:  Ab Brace w/ partial knee extension, Side stepping            See Exercise, Manual, and Modality Logs for complete treatment.     Patient Education: Pt was educated on exercise biomechanical correctness, intensity, and speed.     Assessment:  Pt is demonstrating good progress with reports of little to no pain now and increased function. She continues to have tightness in B hips and will benefit from hip mobilization. L hip is especially tight in ER when trying to perform that stretch. Did not note any trigger points or significant difference in tissue mobility during STM on either side.  Pt will continue to benefit from skilled PT interventions to address current functional deficits and impairments.       Plan: Progress to/Continue with current program. Mobilization of L hip too.         Manual Therapy:    15     mins  86743;  Therapeutic Exercise:    25     mins  18961;     Neuromuscular Kiana:    10    mins  55452;    Therapeutic Activity:     0     mins  41178;     Electrical Stimulation: __0____ mins 90293  Ultrasound:   10 mins 91779  Work Conditionin mins 18659  Iontophoresis: 0 mins 30076  Timed Treatment:   50   mins   Total Treatment:     60   mins    Jessica Beckwith PTA  [place] License # [id number]  Physical  Therapist Assistant

## 2021-01-22 ENCOUNTER — TREATMENT (OUTPATIENT)
Dept: PHYSICAL THERAPY | Facility: CLINIC | Age: 81
End: 2021-01-22

## 2021-01-22 DIAGNOSIS — M54.41 ACUTE BILATERAL LOW BACK PAIN WITH RIGHT-SIDED SCIATICA: Primary | ICD-10-CM

## 2021-01-22 PROCEDURE — 97110 THERAPEUTIC EXERCISES: CPT | Performed by: PHYSICAL THERAPIST

## 2021-01-22 PROCEDURE — 97112 NEUROMUSCULAR REEDUCATION: CPT | Performed by: PHYSICAL THERAPIST

## 2021-01-22 PROCEDURE — 97140 MANUAL THERAPY 1/> REGIONS: CPT | Performed by: PHYSICAL THERAPIST

## 2021-01-22 NOTE — PROGRESS NOTES
Physical Therapy Daily Progress Note        VISIT#: 6      Lachelle Mcclure reports: She was sore after last session but doing ok today. She no longer has pain in her groin or buttocks but woke up this am with some pain in her R hip in the area she had bursitis. No pain currently. Stated she has balance issues and pt questioned about using arch supports in her shoes - if that would help her.   Location Of Pain: R Hip  Response to Previous Session: Some mm soreness  Functional Deficits/Irritating Factors: ambulation, bed mobility  Progression: Improving  Compliance with HEP Reported: Yes    Objective   Presents: normal ambulation, forward head, kyphosis,   Increased sets/reps of:  none   Increased resistance on:  none  Added to Program: L hip mobilization, L hip ER contract/relax, Standing HS stretch       See Exercise, Manual, and Modality Logs for complete treatment.     Patient Education: Pt was educated on exercise biomechanical correctness, intensity, and speed. PTA observed patient's feet/arch in both non wt bearing and wt bearing and noted no issues. Pt educated her balance issues may be stemming from her neuropathy or possibly any vision, hearing issues. Discussed definition of proprioception     Assessment:  Pt is progressing nicely. She continues to report decreased pain in groin and buttock and improved bed mobility and functioning. She continues with B hip tightness and weakness but has improved there also. Noted during sidestepping, pt was slightly circumduction her leg as a compensatory pattern. Able to correct when made aware. Pt will continue to benefit from skilled PT interventions to address current functional deficits and impairments.       Plan: Progress to/Continue with current program. Add sit<>stands, single leg marching        Manual Therapy:    20     mins  32379;  Therapeutic Exercise:      25   mins  37265;     Neuromuscular Kiana:    10    mins  95477;    Therapeutic Activity:     0     mins   91862;     Electrical Stimulation: __0____ mins 41671  Ultrasound:   0 mins 92245  Work Conditionin mins 52572  Iontophoresis: 0 mins 90522  Timed Treatment:   55   mins   Total Treatment:     60   mins    MARJAN Salcedo License # [id number]  Physical Therapist Assistant

## 2021-01-26 ENCOUNTER — TREATMENT (OUTPATIENT)
Dept: PHYSICAL THERAPY | Facility: CLINIC | Age: 81
End: 2021-01-26

## 2021-01-26 DIAGNOSIS — M54.41 ACUTE BILATERAL LOW BACK PAIN WITH RIGHT-SIDED SCIATICA: Primary | ICD-10-CM

## 2021-01-26 PROCEDURE — 97035 APP MDLTY 1+ULTRASOUND EA 15: CPT | Performed by: PHYSICAL THERAPIST

## 2021-01-26 PROCEDURE — 97140 MANUAL THERAPY 1/> REGIONS: CPT | Performed by: PHYSICAL THERAPIST

## 2021-01-26 PROCEDURE — 97110 THERAPEUTIC EXERCISES: CPT | Performed by: PHYSICAL THERAPIST

## 2021-01-26 NOTE — PROGRESS NOTES
Physical Therapy Daily Progress Note    VISIT#: 7    Subjective   Lachelle Mcclure reports: that she had four full days of pain free activity about 10 days ago.  States that she had a follow up appt last Wednesday and noted some muscle soreness after than.  Notes that she is having some right groin pain and mild buttocks pain.  Pain persists with side stepping.      Objective   Left iliac crest and PSIS elevated compared to the right    Tenderness right hip flexor & Right SI joint    See Exercise, Manual, and Modality Logs for complete treatment.     Patient Education: new HEP    Assessment/Plan  Presents with pelvic asymmetry which was corrected with manual therapy.   Needs to be able to stabilize the pelvis to prevent further issues.    Progress strengthening /stabilization /functional activity           Manual Therapy:    12     mins  06124;  Therapeutic Exercise:    15/35     mins  38992;     Neuromuscular Kiana:    0    mins  40278;    Therapeutic Activity:     0     mins  41535;     Dry Needling                  0_  mins    Timed Treatment:   35   mins   Total Treatment:     60   mins    Madelaine Meléndez, PT  [place] License # [id number]  Physical Therapist

## 2021-01-28 ENCOUNTER — TREATMENT (OUTPATIENT)
Dept: PHYSICAL THERAPY | Facility: CLINIC | Age: 81
End: 2021-01-28

## 2021-01-28 DIAGNOSIS — M54.41 ACUTE BILATERAL LOW BACK PAIN WITH RIGHT-SIDED SCIATICA: Primary | ICD-10-CM

## 2021-01-28 PROCEDURE — 97035 APP MDLTY 1+ULTRASOUND EA 15: CPT | Performed by: PHYSICAL THERAPIST

## 2021-01-28 PROCEDURE — 97140 MANUAL THERAPY 1/> REGIONS: CPT | Performed by: PHYSICAL THERAPIST

## 2021-01-28 PROCEDURE — 97110 THERAPEUTIC EXERCISES: CPT | Performed by: PHYSICAL THERAPIST

## 2021-01-28 NOTE — PROGRESS NOTES
Physical Therapy Daily Progress Note    VISIT#: 8    Subjective   Lachelle Mcclure reports: that she had a bad day yesterday when she was up walking at the grocery store and felt like her right hip was unstable.  Had groin and buttocks pain.  Today much better.      Objective   Presents with symmetrical gait pattern    Levft iliac crest and PSIS elevated compared to the right    See Exercise, Manual, and Modality Logs for complete treatment.     Patient Education: reason for mobilization     Assessment/Plan  Pelvic assymmetry upon presentation but relieved with MET.  Feels more stable now yet needs added core/pelvic stability exercises to prevent reoccurrence.     Progress strengthening /stabilization /functional activity           Manual Therapy:    10     mins  90037;  Therapeutic Exercise:    20     mins  46005;     Neuromuscular Kiana:    0    mins  14403;    Therapeutic Activity:     0     mins  67030;     Dry Needling                  0_  mins    Timed Treatment:   38   mins   Total Treatment:     50   mins    Madelaine Meléndez, PT  [place] License # [id number]  Physical Therapist

## 2021-02-02 ENCOUNTER — TREATMENT (OUTPATIENT)
Dept: PHYSICAL THERAPY | Facility: CLINIC | Age: 81
End: 2021-02-02

## 2021-02-02 DIAGNOSIS — M54.41 ACUTE BILATERAL LOW BACK PAIN WITH RIGHT-SIDED SCIATICA: Primary | ICD-10-CM

## 2021-02-02 PROCEDURE — 97140 MANUAL THERAPY 1/> REGIONS: CPT | Performed by: PHYSICAL THERAPIST

## 2021-02-02 PROCEDURE — 97110 THERAPEUTIC EXERCISES: CPT | Performed by: PHYSICAL THERAPIST

## 2021-02-02 NOTE — PROGRESS NOTES
Physical Therapy Daily Progress Note    VISIT#: 9    Subjective   Lachelle Mcclure reports: that she felt much better over the past few days but did have some pain on Sunday.  She notes that she sat for about 4 hours the day before.  States that today she has a constant throb in the right buttocks.      Objective   Presents with right PSIS, Iliac crest elevated compared to the left    Tenderness in right SI region and medial hip rotators.     See Exercise, Manual, and Modality Logs for complete treatment.     Patient Education:    Assessment/Plan  Patient with some pelvic asymmetry today.  Resolved with manual therapy.  Tenderness in piriformis muscle greatly decreased from initial status. Still would benefit from additonal core stability training    Progress strengthening /stabilization /functional activity           Manual Therapy:    12     mins  53931;  Therapeutic Exercise:    25/30     mins  36568;     Neuromuscular Kiana:    0    mins  20006;    Therapeutic Activity:     0     mins  81372;     Dry Needling                  0_  mins    Timed Treatment:   45   mins   Total Treatment:     60   mins    Madelaine Meléndez, PT  [place] License # [id number]  Physical Therapist

## 2021-02-05 ENCOUNTER — TREATMENT (OUTPATIENT)
Dept: PHYSICAL THERAPY | Facility: CLINIC | Age: 81
End: 2021-02-05

## 2021-02-05 DIAGNOSIS — M54.41 ACUTE BILATERAL LOW BACK PAIN WITH RIGHT-SIDED SCIATICA: Primary | ICD-10-CM

## 2021-02-05 PROCEDURE — 97140 MANUAL THERAPY 1/> REGIONS: CPT | Performed by: PHYSICAL THERAPIST

## 2021-02-05 PROCEDURE — 97110 THERAPEUTIC EXERCISES: CPT | Performed by: PHYSICAL THERAPIST

## 2021-02-05 PROCEDURE — 97112 NEUROMUSCULAR REEDUCATION: CPT | Performed by: PHYSICAL THERAPIST

## 2021-02-05 NOTE — PROGRESS NOTES
Physical Therapy Daily Progress Note        VISIT#: 10      Lachelle Mcclure reports: When she woke up this morning, her low francisco was aching. She took tylenol and it is better now. She is still having buttock and R groin pain but much less frequently. She is still having some pain behind her R knee but it is very infrequent now. Overall, she has improved a lot.    Current Pain Level:    0/10; Worst:   5-6/10; Best:  0/10  Location Of Pain: R groin, R buttock, posterolateral R knee  Response to Previous Session: No issues  Functional Deficits/Irritating Factors: ambulation, intermittent with various movement  Progression: Improving  Compliance with HEP Reported: Yes    Objective   Presents: Normal ambulation, forward head, kyphosis  Increased sets/reps of:  Bridges, BKFOs,    Increased resistance on: Diagonal stepping  Added to Program: Hip adduction w/ball,   Dead bugs  Gave HEP: Access Code: [email or web address]   URL: [email or web address]   Date: 2021   Prepared by: Jessica Beckwith     Exercises  Dead Bug with Swiss Ball - 10 reps - 3 sets - 1x daily - 7x weekly      See Exercise, Manual, and Modality Logs for complete treatment.     Patient Education: Pt was educated on exercise biomechanical correctness, intensity, and speed.     Assessment:  Pt is making progress towards her goals. She continues to have pain in her buttocks and groin but with much  Less frequently. Subjectively, she has had a lot of improvement since starting PT. Added Dead Bugs and Hip adduction for core and hip stabilization.  Pt will continue to benefit from skilled PT interventions to address current functional deficits and impairments.       Plan: Progress to/Continue with current program. Hip stabilization        Manual Therapy:    15     mins  56098;  Ultrasound:   0 mins 70982  Electrical Stimulation: __0____ mins 71732  Iontophoresis: 0 mins 88252  Traction: 0 mins  27301  Work Conditionin mins 51657  Therapeutic Exercise:    35     mins   91835;     Neuromuscular Kiana:    10    mins  79998;    Therapeutic Activity:     0     mins  49915;     Timed Treatment:   60   mins   Total Treatment:     60   mins    Jessica Beckwith, MARJAN  [place] License # [id number]  Physical Therapist Assistant

## 2021-02-09 ENCOUNTER — TREATMENT (OUTPATIENT)
Dept: PHYSICAL THERAPY | Facility: CLINIC | Age: 81
End: 2021-02-09

## 2021-02-09 DIAGNOSIS — M54.41 ACUTE BILATERAL LOW BACK PAIN WITH RIGHT-SIDED SCIATICA: Primary | ICD-10-CM

## 2021-02-09 PROCEDURE — 97112 NEUROMUSCULAR REEDUCATION: CPT | Performed by: PHYSICAL THERAPIST

## 2021-02-09 PROCEDURE — 97110 THERAPEUTIC EXERCISES: CPT | Performed by: PHYSICAL THERAPIST

## 2021-02-09 NOTE — PROGRESS NOTES
Physical Therapy Daily Progress Note    VISIT#: 11    Subjective   Lachelle Mcclure reports: that she had posterior lateral knee pain today.  Notes that she is still having some buttocks pain.  Reports improvement in general but not enough.  Reports that she wants to be able to be active and is not able to do so at present.       Objective   Tenderness in right piriformis    See Exercise, Manual, and Modality Logs for complete treatment.     Patient Education: Dry Needling    Assessment/Plan  Relief of posterior hip pain with needling trial today.  Still with some anterior discomfort.  If relief from Needling significant will do more sites next visit for a full treatment.    Progress strengthening /stabilization /functional activity  Assess effects of Needling trial         Manual Therapy:    0     mins  17982;  Therapeutic Exercise:    30/45     mins  06927;     Neuromuscular Kiana:    10    mins  58664;    Therapeutic Activity:     0     mins  91212;     Dry Needling                  5_  mins    Timed Treatment:   45   mins   Total Treatment:     60   mins    Madelaine Meléndez, PT  [place] License # [id number]  Physical Therapist

## 2021-02-16 ENCOUNTER — TREATMENT (OUTPATIENT)
Dept: PHYSICAL THERAPY | Facility: CLINIC | Age: 81
End: 2021-02-16

## 2021-02-16 DIAGNOSIS — M54.41 ACUTE BILATERAL LOW BACK PAIN WITH RIGHT-SIDED SCIATICA: Primary | ICD-10-CM

## 2021-02-16 PROCEDURE — 97110 THERAPEUTIC EXERCISES: CPT | Performed by: PHYSICAL THERAPIST

## 2021-02-16 PROCEDURE — 97140 MANUAL THERAPY 1/> REGIONS: CPT | Performed by: PHYSICAL THERAPIST

## 2021-02-16 NOTE — PROGRESS NOTES
Physical Therapy Daily Progress Note    VISIT#: 12    Subjective   Lachelle Mcclure reports: that she has had a great decrease in right buttocks pain.  Today she notes that she has quite a bit of pain in the posterior/lateral aspect of the right knee. Notes some discomfort in the lateral thigh as well.  Has some left lateral thigh burning with prolonged standing.  States that the right groin pain has decreased in frequency.        Objective   Spinal flexion 75% with tightness at end range, extension 50% with stiffness    Tenderness to palpation right piriformis, ITB, iliopsoas    See Exercise, Manual, and Modality Logs for complete treatment.     Patient Education: Needling effects    Assessment/Plan  Patient with decreased pain and abnormal muscle tone after last session which included Dry Needling trial.  Added Iliopsoas and additional ITB spots today.  Less tender to palpation afterwards    Progress strengthening /stabilization /functional activity           Manual Therapy:    18     mins  71079;  Therapeutic Exercise:    25     mins  95684;     Neuromuscular Kiana:    0    mins  91123;    Therapeutic Activity:     0     mins  69982;     Dry Needling                  5_  mins    Timed Treatment:   48   mins   Total Treatment:     60   mins    Madelaine Meléndez, PT  [place] License # [id number]  Physical Therapist

## 2021-02-22 ENCOUNTER — TREATMENT (OUTPATIENT)
Dept: PHYSICAL THERAPY | Facility: CLINIC | Age: 81
End: 2021-02-22

## 2021-02-22 DIAGNOSIS — M25.551 RIGHT HIP PAIN: ICD-10-CM

## 2021-02-22 DIAGNOSIS — M54.41 ACUTE BILATERAL LOW BACK PAIN WITH RIGHT-SIDED SCIATICA: Primary | ICD-10-CM

## 2021-02-22 PROCEDURE — 97110 THERAPEUTIC EXERCISES: CPT | Performed by: PHYSICAL THERAPIST

## 2021-02-22 NOTE — PROGRESS NOTES
Reassessment  Patient: Lachelle Mcclure   : 1940    Date of Initial Visit: Type: THERAPY  Noted: 2021  Today's Date: 2021  Patient seen for 13 sessions    Treatment has included: therapeutic exercise, neuromuscular re-education, manual therapy, electrical stimulation, ultrasound, dry needling and moist heat    Subjective   Lachelle states that her hip pain continues to decrease.  She states that the buttocks pain has nearly resolved and the anterior hip pain has decreased.  Her lateral knee feels softer now.  States that she still has some lateral hip pain.  Has occasional groin pain.  Notes that she has been on her feet all weekend as she had company.  Pain now varies from 0-5/10 but the sharp pain is only instantaneous now.    Objective   Spinal AROM - full in all planes except for extension which is limited to 75% by pain  Hip AROM - full in all planes except IR which is limited by 25%Strength - 5/5 in LE's  Sensation - intact  Palpation - tenderness in piriformis and iliopsoas muscles  Special tests - negative for any abnormal neural involvement   Back Index Score of 8     Activity tolerances - she is able to sit and stand and walk for prolonged periods of time without increased symptoms.  She is able to walk through the grocery store without increased pain.  She still does require about an hour to loosen up in the morning and has occasional sharp pains with lifting the right leg.    Assessment/Plan  Patient has demonstrated moderate improvement since the initiation of therapy.  The pain has decreased in intensity and frquency.  The motion has increased.  The activity tolerances have increased.  I feel that the patient would benefit from a few additional session of therapy to all for her hiking and other outdoor activities. All STG have been met.  Initial LTG met.  Adding as new goal - Patinet will be able to climb up/down inclines and steps without increased symptoms.      PT Signature: Madelaine Meléndez,  PT        Manual Therapy:    0     mins  24934;  Therapeutic Exercise:    40     mins  29130;     Neuromuscular Kiana:    0    mins  77328;    Therapeutic Activity:     0     mins  68010;     Dry Needling                   5     mins    Timed Treatment:   45   mins   Total Treatment:     55   mins

## 2021-02-25 ENCOUNTER — TREATMENT (OUTPATIENT)
Dept: PHYSICAL THERAPY | Facility: CLINIC | Age: 81
End: 2021-02-25

## 2021-02-25 DIAGNOSIS — M54.41 ACUTE BILATERAL LOW BACK PAIN WITH RIGHT-SIDED SCIATICA: Primary | ICD-10-CM

## 2021-02-25 DIAGNOSIS — M25.551 RIGHT HIP PAIN: ICD-10-CM

## 2021-02-25 PROCEDURE — 97140 MANUAL THERAPY 1/> REGIONS: CPT | Performed by: PHYSICAL THERAPIST

## 2021-02-25 PROCEDURE — 97110 THERAPEUTIC EXERCISES: CPT | Performed by: PHYSICAL THERAPIST

## 2021-02-25 NOTE — PROGRESS NOTES
Physical Therapy Daily Progress Note    VISIT#: 14    Subjective   Lachelle Mcclure reports: tht she has been abl eto stand for much longer periods of time without pain over the past week.  States that she still did have some pain in the right buttocks and posterior lateral knee after standing for quite some time.  Minimal anterior hip pain.       Objective   Presents with a normal gait pattern    Tenderness in ITB and distal lateral hamstring    See Exercise, Manual, and Modality Logs for complete treatment.     Patient Education: new adductor stretch    Assessment/Plan  Considerable improvement in activity tolerance as she has been able to stand and walk for much longer periods of time without increased symptoms now.  Was able to walk for 20 minutes in neighborhood without pain.     Progress strengthening /stabilization /functional activity           Manual Therapy:    18     mins  21381;  Therapeutic Exercise:    25/45     mins  24651;     Neuromuscular Kiana:    0    mins  68561;    Therapeutic Activity:     0     mins  17838;     Dry Needling                  0_  mins    Timed Treatment:   43   mins   Total Treatment:     65   mins    Madelaine Meléndez, PT  [place] License # [id number]  Physical Therapist

## 2021-03-02 ENCOUNTER — TREATMENT (OUTPATIENT)
Dept: PHYSICAL THERAPY | Facility: CLINIC | Age: 81
End: 2021-03-02

## 2021-03-02 DIAGNOSIS — M54.41 ACUTE BILATERAL LOW BACK PAIN WITH RIGHT-SIDED SCIATICA: Primary | ICD-10-CM

## 2021-03-02 DIAGNOSIS — M25.551 RIGHT HIP PAIN: ICD-10-CM

## 2021-03-02 PROCEDURE — 97035 APP MDLTY 1+ULTRASOUND EA 15: CPT | Performed by: PHYSICAL THERAPIST

## 2021-03-02 PROCEDURE — 97530 THERAPEUTIC ACTIVITIES: CPT | Performed by: PHYSICAL THERAPIST

## 2021-03-02 PROCEDURE — 97140 MANUAL THERAPY 1/> REGIONS: CPT | Performed by: PHYSICAL THERAPIST

## 2021-03-02 PROCEDURE — G0283 ELEC STIM OTHER THAN WOUND: HCPCS | Performed by: PHYSICAL THERAPIST

## 2021-03-02 NOTE — PROGRESS NOTES
Physical Therapy Daily Progress Note    VISIT#: 15    Subjective   Lachelle Mcclure reports: that Saturday,   Sunday and part of Monday she noted some swelling in the right lateral hip.  No groin pain and no posterior knee pain. Had to sleep on the couch.  Yesterday was better as was this morning.  Now has some tenderness in distal biceps femoris tendons and ITB      Objective   Level pelvis    No tenderness in SI but has some tenderness in the piriformis and greater trochanteric region, some biceps femoris tenderness    Increased tissue temperature on the lateral hip/greater trochanteric area    See Exercise, Manual, and Modality Logs for complete treatment.     No exercise today due to increased heat in lateral ip    Patient Education: need to stretch hip    Assessment/Plan  Patient with tenderness and increased tissue temperature around the greater trochanter and proximal ITB. Tenderness in distal biceps femoris area.    Progress strengthening /stabilization /functional activity  Resume exercise next visit         Manual Therapy:    20     mins  61347;  Therapeutic Exercise:    0     mins  51837;     Neuromuscular Kiana:    0    mins  27188;    Therapeutic Activity:     10     mins  78361;  Discussed positioning at home, HEP, walking  program   Dry Needling                  0_  mins    Timed Treatment:   38   mins   Total Treatment:     60   mins    Madelaine Meléndez, PT  [place] License # [id number]  Physical Therapist

## 2021-03-08 ENCOUNTER — TREATMENT (OUTPATIENT)
Dept: PHYSICAL THERAPY | Facility: CLINIC | Age: 81
End: 2021-03-08

## 2021-03-08 ENCOUNTER — TRANSCRIBE ORDERS (OUTPATIENT)
Dept: PHYSICAL THERAPY | Facility: CLINIC | Age: 81
End: 2021-03-08

## 2021-03-08 DIAGNOSIS — S63.522D: Primary | ICD-10-CM

## 2021-03-08 DIAGNOSIS — M54.41 ACUTE BILATERAL LOW BACK PAIN WITH RIGHT-SIDED SCIATICA: Primary | ICD-10-CM

## 2021-03-08 DIAGNOSIS — M25.551 RIGHT HIP PAIN: ICD-10-CM

## 2021-03-08 PROCEDURE — 97140 MANUAL THERAPY 1/> REGIONS: CPT | Performed by: PHYSICAL THERAPIST

## 2021-03-08 PROCEDURE — 97110 THERAPEUTIC EXERCISES: CPT | Performed by: PHYSICAL THERAPIST

## 2021-03-08 PROCEDURE — 97035 APP MDLTY 1+ULTRASOUND EA 15: CPT | Performed by: PHYSICAL THERAPIST

## 2021-03-08 NOTE — PROGRESS NOTES
Physical Therapy Daily Progress Note - discharge summary    VISIT#: 16    Subjective   Lachelle Mcclure reports: that she has very minimal groin pain but the frequency has greatly decreased as has the intensity.  Notes that she has had less lateral knee pain.  Notes that she is still having lateral hip pain but it is not as severe as it was last week.  States that she was able to walk over a mile three days in a row.  Thinks that she  Is probably good to go now without therapy.    Objective   Spinal flexion 100%, extension 50% with some pain mid range  LE strength - 5/5 except for right hip flexion which is slightly limited by pain  LE sensation - intact  Palpation - slight tenderness in right piriformis and greater trochanteric bursa area    See Exercise, Manual, and Modality Logs for complete treatment.     Patient Education: If symptoms get worse contact MD to assess for bursitis.  Cont HEP    Assessment/Plan  Less noted abnormal tissue temperature over right greater trochanter today compared to last session.  Able to walk for over a mile three days in a row over the weekend which she has not been able to do at all.  Independent in all exercises.  All goals met so will DC    Progress strengthening /stabilization /functional activity           Manual Therapy:    15     mins  25939;  Therapeutic Exercise:    25/35     mins  68605;     Neuromuscular Kiana:    0    mins  93975;    Therapeutic Activity:     5     mins  55541;     Dry Needling                  5_  mins    Timed Treatment:   58   mins   Total Treatment:     70   mins    Madelaine Meléndez PT  [place] License # [id number]  Physical Therapist

## 2021-03-09 DIAGNOSIS — Z23 IMMUNIZATION DUE: ICD-10-CM

## 2021-03-22 ENCOUNTER — OFFICE VISIT (OUTPATIENT)
Dept: INTERNAL MEDICINE | Facility: CLINIC | Age: 81
End: 2021-03-22

## 2021-03-22 VITALS
WEIGHT: 158.2 LBS | SYSTOLIC BLOOD PRESSURE: 116 MMHG | BODY MASS INDEX: 27.01 KG/M2 | HEIGHT: 64 IN | DIASTOLIC BLOOD PRESSURE: 58 MMHG | HEART RATE: 70 BPM | OXYGEN SATURATION: 99 %

## 2021-03-22 DIAGNOSIS — M81.0 AGE-RELATED OSTEOPOROSIS WITHOUT CURRENT PATHOLOGICAL FRACTURE: Chronic | ICD-10-CM

## 2021-03-22 DIAGNOSIS — M54.41 ACUTE RIGHT-SIDED LOW BACK PAIN WITH RIGHT-SIDED SCIATICA: ICD-10-CM

## 2021-03-22 DIAGNOSIS — M25.551 CHRONIC RIGHT HIP PAIN: Primary | Chronic | ICD-10-CM

## 2021-03-22 DIAGNOSIS — G89.29 CHRONIC RIGHT HIP PAIN: Primary | Chronic | ICD-10-CM

## 2021-03-22 PROCEDURE — 99213 OFFICE O/P EST LOW 20 MIN: CPT | Performed by: INTERNAL MEDICINE

## 2021-03-22 NOTE — PROGRESS NOTES
03/22/2021    Patient Information  Lachelle Mcclure                                                                                          [address]  [facility] [address]      1940  [unfilled]  There is no work phone number on file.    Chief Complaint:     Complaining of right hip pain and tenderness.    History of Present Illness:    Patient with a history of chronic right hip pain related to trochanteric bursitis and perhaps arthritis that is complicated by right-sided sciatica as well.  She presents with worsening right hip pain that is located laterally and is associated with tenderness and warmth in this area.  No known injury.  She also has some pain that radiates down the posterior lateral aspect of the right leg to the level of the knee.  Seems to be aggravated by walking and movement more than anything else.  We put her on a round of prednisone which helped but only temporarily.  I sent her for physical therapy but this helped but also only temporarily.    Review of Systems   Constitutional: Negative.   HENT: Negative.    Eyes: Negative.    Cardiovascular: Negative.    Respiratory: Negative.    Endocrine: Negative.    Hematologic/Lymphatic: Negative.    Skin: Negative.    Musculoskeletal: Positive for arthritis and joint pain.   Gastrointestinal: Negative.    Genitourinary: Negative.    Neurological: Negative.    Psychiatric/Behavioral: Negative.    Allergic/Immunologic: Negative.        Active Problems:    Patient Active Problem List   Diagnosis   • Benign essential hypertension   • Carotid artery plaque, 4/3/2019--mild bilateral.  10/06/2016--vascular screen is now normal without carotid disease.  Improved.   • Gastroesophageal reflux disease without esophagitis   • Hyperlipidemia   • Microscopic hematuria   • Osteoporosis, 11/5/2019--lumbar spine -0.3.  Left femoral neck -2.7.  Right femoral neck -2.4.   • Tinnitus of right ear   • History of malignant melanoma, 2008--right side of  face.  1999--lower back.   • Therapeutic drug monitoring   • Vitamin D deficiency   • Lumbar disc disease   • Routine physical examination   • Chronic right hip pain   • Impaired fasting glucose   • Menopausal state   • History of basal cell carcinoma of skin   • Subclinical hypothyroidism   • Intolerance of oral bisphosphonate therapy   • Acute right-sided low back pain with right-sided sciatica   • Grief reaction         Past Medical History:   Diagnosis Date   • Arthritis     lumbar DDD   • Benign essential hypertension 4/18/2006 04/18/2006--treatment for hypertension begun.   • Carotid artery plaque, 10/06/2016--vascular screen is now normal without carotid disease.  Improved. 7/30/2010    10/06/2016--vascular screen reveals no evidence of carotid plaque, negative for AAA, negative for PAD.  04/23/2014--vascular screen reveals mild bilateral carotid plaque, negative for AAA, negative for PAD.   12/07/2012--vascular screen reveals mild left-sided carotid plaque, normal right carotid, negative for AAA, negative for PAD.   07/30/2010--vascular screen reveals mild left-sided carotid plaque, normal right carotid, negative for AAA, negative for PAD.   • Chronic right hip pain 9/1/2017 09/01/2017--patient has had a one-year history of intermittent episodes of lateral right hip pain that at times is very severe and debilitating.  She also has tenderness over this area that limits her from sleeping on her right side.  Last year I gave her a cortisone injection for trochanteric bursitis and this provided immediate relief but not long lasting.  X-rays were negative for fracture or even degenerative arthritis.  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  Ordered.   • Gastroesophageal reflux disease without esophagitis 1/14/2013    04/10/2014--patient presented with right upper quadrant/epigastric burning pain and discomfort that was postprandial. H pylori  breath test was negative. Empiric trial of Dexilant 60 mg per day was initiated for esophageal reflux/dyspepsia.   01/14/2013--EGD performed for epigastric and right upper quadrant pain. There was some edematous appearing mucosa in the antrum and body of the stomach which was biopsied. No significant erythema. No ulcerations. Normal appearing duodenum and esophagus. Pathology revealed moderate chronic active gastritis. No intestinal metaplasia. Helicobacter pylori positive. Patient was treated with appropriate antibiotic and PPI therapy.   • History of basal cell carcinoma of skin 2/12/2019 June 2018--Mohs micrographic surgery of basal cell carcinoma on the tip of the nose.   • History of Helicobacter pylori gastritis 01/14/2013    04/10/2014--patient presented with right upper quadrant/epigastric burning pain and discomfort that was postprandial. H pylori breath test was negative. Empiric trial of Dexilant 60 mg per day was initiated for esophageal reflux/dyspepsia.  01/14/2013--EGD performed for epigastric and right upper quadrant pain. There was some edematous appearing mucosa in the antrum and body of the stomach which w   • History of malignant melanoma, 2008--right side of face.  1999--lower back. 1/1/2008 2008--melanoma resected from right face.   1999--malignant melanoma resected from lower back.   • History of routine gynecologic exam yearly    Patient sees a gynecologist.   • Hyperlipidemia 8/13/2010 08/13/2010--treatment for hyperlipidemia begun.   • Impaired fasting glucose 9/1/2017 09/01/2017--routine physical.  Serum glucose elevated at 101.  Recommend weight loss and decrease carbohydrate intake.   • Intolerance of oral bisphosphonate therapy 2/21/2020   • Lumbar disc disease 10/19/2016    09/13/2016--x-ray lumbar spine reveals disc space narrowing at L2-L3, L3-L4, L4-L5, and possibly L5-S1.  There is mild anterior listhesis of L4 on L5 measuring 4.5 mm.  No compression deformity, nor  other evidence of acute process.   • Menopausal state 9/1/2017   • Microscopic hematuria 12/18/2015 12/22/2015--urine cytology negative for malignant cells.  Red blood cells noted.  Transitional epithelial cells noted.  Urine culture revealed less than 10,000 colony forming units of mixed urogenital laurie.  Repeat urinalysis was negative.  Recommend observation.  12/18/2015--routine urinalysis reveals 3-5 WBCs and 3-5 RBCs. 0 epithelial cells. 0 bacteria. Patient is asymptomatic. Repeat urinalysis, urine cytology, and urine culture sent.   • Osteoporosis, 11/5/2019--lumbar spine -0.3.  Left femoral neck -2.7.  Right femoral neck -2.4. 7/24/2009 November 5, 2019--DEXA scan reveals lumbar spine T score -0.3 representing a 5% increase.  Left femoral neck T score -2.7 representing a 5% decrease.  Right femoral neck T score -2.4 which is unchanged.  February 12, 2019--patient seen in follow-up and reports she really did not feel well on Fosamax.  She has been using calcium and vitamin D supplementation.  October 18, 2017--DEXA scan reveals jesse   • Tinnitus of right ear 12/18/2015 12/18/2015--patient presents with a four-month history of ringing in her right ear. No hearing loss. Examination reveals a cerumen impaction of the right ear canal. This was removed with irrigation and curettage.   • Vitamin D deficiency 8/12/2016         Past Surgical History:   Procedure Laterality Date   • BASAL CELL CARCINOMA EXCISION  06/2018 June 2018--Mohs micrographic surgery of basal cell carcinoma on the tip of the nose.   • CHOLECYSTECTOMY  05/17/2013 05/17/2013--laparoscopic cholecystectomy.   • COLONOSCOPY  07/12/2012 07/12/2012--normal colonoscopy to the cecum with an excellent prep.    • COLONOSCOPY  06/20/2003 06/20/2003--normal colonoscopy to the cecum.   • ESOPHAGOSCOPY / EGD  01/14/2013 01/14/2013--EGD performed for epigastric and right upper quadrant pain. There was some edematous appearing mucosa in  the antrum and body of the stomach which was biopsied. No significant erythema. No ulcerations. Normal appearing duodenum and esophagus. Pathology revealed moderate chronic active gastritis. No intestinal metaplasia. Helicobacter pylori positive. Patient was treated with appropriate   • FOOT SURGERY  1992 1992--orthopedic 10 placed in right great toe.   • HYSTERECTOMY  1986 1986--total abdominal hysterectomy.   • SKIN CANCER EXCISION  09/14/2009 09/14/2009--excision 1 cm mid scalp cyst. Pathology benign. 2008--melanoma resected from right face. 1999--malignant melanoma resected from lower back.   • TONSILLECTOMY AND ADENOIDECTOMY  1946 1946.         No Known Allergies        Current Outpatient Medications:   •  aspirin (ASPIRIN LOW DOSE) 81 MG tablet, Take 1 tablet by mouth Daily As Needed., Disp: , Rfl:   •  Calcium Carbonate-Vit D-Min (CALCIUM 1200) 7165-6266 MG-UNIT chewable tablet, Chew 1 tablet daily, Disp: , Rfl:   •  Cholecalciferol (VITAMIN D) 2000 UNITS capsule, Take 1 capsule by mouth daily, Disp: , Rfl:   •  Coenzyme Q10 (COQ10) 400 MG capsule, Take 1 capsule by mouth daily Take with a meal., Disp: , Rfl:   •  simvastatin (ZOCOR) 40 MG tablet, TAKE 1 TABLET EVERY DAY FOR CHOLESTEROL (NEED LABS AND FOLLOW UP APPOINTMENT ASAP), Disp: 90 tablet, Rfl: 1  •  valsartan-hydrochlorothiazide (DIOVAN-HCT) 160-25 MG per tablet, TAKE 1 TABLET EVERY MORNING FOR BLOOD PRESSURE, Disp: 90 tablet, Rfl: 1      Family History   Problem Relation Age of Onset   • COPD Mother    • Hypertension Mother         Essential   • COPD Father    • Hypertension Father          Social History     Socioeconomic History   • Marital status:      Spouse name: Not on file   • Number of children: Not on file   • Years of education: Not on file   • Highest education level: Bachelor's degree (e.g., BA, AB, BS)   Tobacco Use   • Smoking status: Never Smoker   • Smokeless tobacco: Never Used   Vaping Use   • Vaping Use: Never  "used   Substance and Sexual Activity   • Alcohol use: No   • Drug use: No   • Sexual activity: Yes     Partners: Male         Vitals:    03/22/21 1122   BP: 116/58   BP Location: Right arm   Patient Position: Sitting   Cuff Size: Adult   Pulse: 70   SpO2: 99%   Weight: 71.8 kg (158 lb 3.2 oz)   Height: 161.3 cm (63.5\")        Body mass index is 27.58 kg/m².      Physical Exam:    General: Alert and oriented x 3.  No acute distress.  Normal affect.  HEENT: Pupils equal, round, reactive to light; extraocular movements intact; sclerae nonicteric; pharynx, ear canals and TMs normal.  Neck: Without JVD, thyromegaly, bruit, or adenopathy.  Lungs: Clear to auscultation in all fields.  Heart: Regular rate and rhythm without murmur, rub, gallop, or click.  Abdomen: Soft, nontender, without hepatosplenomegaly or hernia.  Bowel sounds normal.  : Deferred.  Rectal: Deferred.  Extremities: Without clubbing, cyanosis, edema, or pulse deficit.  Neurologic: Intact without focal deficit.  Normal station and gait observed during ingress and egress from the examination room.  Skin: Without significant lesion.  Musculoskeletal: Unremarkable.    Lab/other results:      Assessment/Plan:     Diagnosis Plan   1. Chronic right hip pain     2. Acute right-sided low back pain with right-sided sciatica       I think patient likely has a combination of 2 different problems going on here.  She has very localized tenderness over the right trochanteric region I think she has trochanteric bursitis.  However, some of her symptoms are consistent with right-sided sciatica.  At any rate, she needs orthopedic evaluation.    Plan is as follows: Orthopedic referral given as soon as possible.  I will not order any specific medications as she has failed what we have done thus far.  I suspect that her shot of cortisone in the hip will be helpful but she will likely need further evaluation of her lumbar spine including MRI.  I will leave that up to the " orthopedist.  Also patient has osteoporosis and is due a Prolia injection but I want to put that on hold for now.  We can discuss it at her next visit next month.        Procedures

## 2021-03-26 ENCOUNTER — LAB (OUTPATIENT)
Dept: LAB | Facility: HOSPITAL | Age: 81
End: 2021-03-26

## 2021-03-26 DIAGNOSIS — R73.01 IMPAIRED FASTING GLUCOSE: Chronic | ICD-10-CM

## 2021-03-26 DIAGNOSIS — Z51.81 THERAPEUTIC DRUG MONITORING: ICD-10-CM

## 2021-03-26 DIAGNOSIS — E78.2 MIXED HYPERLIPIDEMIA: Chronic | ICD-10-CM

## 2021-03-26 DIAGNOSIS — E03.8 SUBCLINICAL HYPOTHYROIDISM: ICD-10-CM

## 2021-03-26 DIAGNOSIS — E55.9 VITAMIN D DEFICIENCY: Chronic | ICD-10-CM

## 2021-03-26 DIAGNOSIS — Z11.59 NEED FOR HEPATITIS C SCREENING TEST: ICD-10-CM

## 2021-03-26 LAB
25(OH)D3 SERPL-MCNC: 42.7 NG/ML (ref 30–100)
ALBUMIN SERPL-MCNC: 4.4 G/DL (ref 3.5–5.2)
ALBUMIN/GLOB SERPL: 2.1 G/DL
ALP SERPL-CCNC: 42 U/L (ref 39–117)
ALT SERPL W P-5'-P-CCNC: 17 U/L (ref 1–33)
ANION GAP SERPL CALCULATED.3IONS-SCNC: 7.2 MMOL/L (ref 5–15)
AST SERPL-CCNC: 19 U/L (ref 1–32)
BILIRUB SERPL-MCNC: 0.4 MG/DL (ref 0–1.2)
BUN SERPL-MCNC: 17 MG/DL (ref 8–23)
BUN/CREAT SERPL: 22.7 (ref 7–25)
CALCIUM SPEC-SCNC: 10.9 MG/DL (ref 8.6–10.5)
CHLORIDE SERPL-SCNC: 107 MMOL/L (ref 98–107)
CK SERPL-CCNC: 101 U/L (ref 20–180)
CO2 SERPL-SCNC: 28.8 MMOL/L (ref 22–29)
CREAT SERPL-MCNC: 0.75 MG/DL (ref 0.57–1)
DEPRECATED RDW RBC AUTO: 38.9 FL (ref 37–54)
ERYTHROCYTE [DISTWIDTH] IN BLOOD BY AUTOMATED COUNT: 11.7 % (ref 12.3–15.4)
GFR SERPL CREATININE-BSD FRML MDRD: 75 ML/MIN/1.73
GLOBULIN UR ELPH-MCNC: 2.1 GM/DL
GLUCOSE SERPL-MCNC: 92 MG/DL (ref 65–99)
HBA1C MFR BLD: 5.39 % (ref 4.8–5.6)
HCT VFR BLD AUTO: 41.6 % (ref 34–46.6)
HCV AB SER DONR QL: NORMAL
HGB BLD-MCNC: 13.4 G/DL (ref 12–15.9)
MCH RBC QN AUTO: 29.5 PG (ref 26.6–33)
MCHC RBC AUTO-ENTMCNC: 32.2 G/DL (ref 31.5–35.7)
MCV RBC AUTO: 91.6 FL (ref 79–97)
PLATELET # BLD AUTO: 180 10*3/MM3 (ref 140–450)
PMV BLD AUTO: 9.7 FL (ref 6–12)
POTASSIUM SERPL-SCNC: 4.2 MMOL/L (ref 3.5–5.2)
PROT SERPL-MCNC: 6.5 G/DL (ref 6–8.5)
RBC # BLD AUTO: 4.54 10*6/MM3 (ref 3.77–5.28)
SODIUM SERPL-SCNC: 143 MMOL/L (ref 136–145)
T3FREE SERPL-MCNC: 3.48 PG/ML (ref 2–4.4)
T4 FREE SERPL-MCNC: 1.25 NG/DL (ref 0.93–1.7)
TSH SERPL DL<=0.05 MIU/L-ACNC: 3.74 UIU/ML (ref 0.27–4.2)
WBC # BLD AUTO: 4.11 10*3/MM3 (ref 3.4–10.8)

## 2021-03-26 PROCEDURE — 84439 ASSAY OF FREE THYROXINE: CPT

## 2021-03-26 PROCEDURE — 80053 COMPREHEN METABOLIC PANEL: CPT | Performed by: INTERNAL MEDICINE

## 2021-03-26 PROCEDURE — 83036 HEMOGLOBIN GLYCOSYLATED A1C: CPT

## 2021-03-26 PROCEDURE — 85027 COMPLETE CBC AUTOMATED: CPT

## 2021-03-26 PROCEDURE — 36415 COLL VENOUS BLD VENIPUNCTURE: CPT | Performed by: INTERNAL MEDICINE

## 2021-03-26 PROCEDURE — 84443 ASSAY THYROID STIM HORMONE: CPT | Performed by: INTERNAL MEDICINE

## 2021-03-26 PROCEDURE — 82306 VITAMIN D 25 HYDROXY: CPT

## 2021-03-26 PROCEDURE — 84481 FREE ASSAY (FT-3): CPT | Performed by: INTERNAL MEDICINE

## 2021-03-26 PROCEDURE — 83704 LIPOPROTEIN BLD QUAN PART: CPT

## 2021-03-26 PROCEDURE — 80061 LIPID PANEL: CPT

## 2021-03-26 PROCEDURE — 86803 HEPATITIS C AB TEST: CPT

## 2021-03-26 PROCEDURE — 82550 ASSAY OF CK (CPK): CPT

## 2021-03-28 LAB
CHOLEST SERPL-MCNC: 118 MG/DL (ref 100–199)
HDL SERPL-SCNC: 35.5 UMOL/L
HDLC SERPL-MCNC: 57 MG/DL
LDL SERPL QN: 20.6 NM
LDL SERPL-SCNC: 628 NMOL/L
LDL SMALL SERPL-SCNC: 284 NMOL/L
LDLC SERPL CALC-MCNC: 49 MG/DL (ref 0–99)
TRIGL SERPL-MCNC: 49 MG/DL (ref 0–149)

## 2021-04-02 ENCOUNTER — OFFICE VISIT (OUTPATIENT)
Dept: INTERNAL MEDICINE | Facility: CLINIC | Age: 81
End: 2021-04-02

## 2021-04-02 VITALS
BODY MASS INDEX: 26.8 KG/M2 | OXYGEN SATURATION: 98 % | HEART RATE: 77 BPM | WEIGHT: 157 LBS | SYSTOLIC BLOOD PRESSURE: 134 MMHG | HEIGHT: 64 IN | DIASTOLIC BLOOD PRESSURE: 66 MMHG

## 2021-04-02 DIAGNOSIS — Z78.9 INTOLERANCE OF ORAL BISPHOSPHONATE THERAPY: Chronic | ICD-10-CM

## 2021-04-02 DIAGNOSIS — M51.9 LUMBAR DISC DISEASE: Chronic | ICD-10-CM

## 2021-04-02 DIAGNOSIS — G89.29 CHRONIC RIGHT HIP PAIN: Chronic | ICD-10-CM

## 2021-04-02 DIAGNOSIS — Z85.820 HISTORY OF MALIGNANT MELANOMA: Chronic | ICD-10-CM

## 2021-04-02 DIAGNOSIS — M25.551 CHRONIC RIGHT HIP PAIN: Chronic | ICD-10-CM

## 2021-04-02 DIAGNOSIS — Z51.81 THERAPEUTIC DRUG MONITORING: ICD-10-CM

## 2021-04-02 DIAGNOSIS — E03.8 SUBCLINICAL HYPOTHYROIDISM: ICD-10-CM

## 2021-04-02 DIAGNOSIS — R31.29 MICROSCOPIC HEMATURIA: Chronic | ICD-10-CM

## 2021-04-02 DIAGNOSIS — N95.1 MENOPAUSAL STATE: Chronic | ICD-10-CM

## 2021-04-02 DIAGNOSIS — R73.01 IMPAIRED FASTING GLUCOSE: Chronic | ICD-10-CM

## 2021-04-02 DIAGNOSIS — M81.0 AGE-RELATED OSTEOPOROSIS WITHOUT CURRENT PATHOLOGICAL FRACTURE: Chronic | ICD-10-CM

## 2021-04-02 DIAGNOSIS — E55.9 VITAMIN D DEFICIENCY: Chronic | ICD-10-CM

## 2021-04-02 DIAGNOSIS — E78.2 MIXED HYPERLIPIDEMIA: Chronic | ICD-10-CM

## 2021-04-02 DIAGNOSIS — I65.23 ATHEROSCLEROSIS OF BOTH CAROTID ARTERIES: Chronic | ICD-10-CM

## 2021-04-02 DIAGNOSIS — K21.9 GASTROESOPHAGEAL REFLUX DISEASE WITHOUT ESOPHAGITIS: Chronic | ICD-10-CM

## 2021-04-02 DIAGNOSIS — Z00.00 ENCOUNTER FOR SUBSEQUENT ANNUAL WELLNESS VISIT (AWV) IN MEDICARE PATIENT: Primary | ICD-10-CM

## 2021-04-02 DIAGNOSIS — I10 BENIGN ESSENTIAL HYPERTENSION: Chronic | ICD-10-CM

## 2021-04-02 DIAGNOSIS — M54.41 ACUTE RIGHT-SIDED LOW BACK PAIN WITH RIGHT-SIDED SCIATICA: ICD-10-CM

## 2021-04-02 PROCEDURE — 99214 OFFICE O/P EST MOD 30 MIN: CPT | Performed by: INTERNAL MEDICINE

## 2021-04-02 PROCEDURE — 1125F AMNT PAIN NOTED PAIN PRSNT: CPT | Performed by: INTERNAL MEDICINE

## 2021-04-02 PROCEDURE — 1170F FXNL STATUS ASSESSED: CPT | Performed by: INTERNAL MEDICINE

## 2021-04-02 PROCEDURE — G0439 PPPS, SUBSEQ VISIT: HCPCS | Performed by: INTERNAL MEDICINE

## 2021-04-02 PROCEDURE — 96160 PT-FOCUSED HLTH RISK ASSMT: CPT | Performed by: INTERNAL MEDICINE

## 2021-04-02 PROCEDURE — 1159F MED LIST DOCD IN RCRD: CPT | Performed by: INTERNAL MEDICINE

## 2021-04-02 NOTE — PROGRESS NOTES
The ABCs of the Annual Wellness Visit  Subsequent Medicare Wellness Visit    No chief complaint on file.      Subjective   History of Present Illness:  Lachelle Mcclure is a 80 y.o. female who presents for a Subsequent Medicare Wellness Visit.    HEALTH RISK ASSESSMENT    Recent Hospitalizations:  No hospitalization(s) within the last year.    Current Medical Providers:  Patient Care Team:  Juan Vega MD as PCP - General (Internal Medicine)    Smoking Status:  Social History     Tobacco Use   Smoking Status Never Smoker   Smokeless Tobacco Never Used       Alcohol Consumption:  Social History     Substance and Sexual Activity   Alcohol Use No       Depression Screen:   PHQ-2/PHQ-9 Depression Screening 4/2/2021   Little interest or pleasure in doing things 0   Feeling down, depressed, or hopeless 0   Trouble falling or staying asleep, or sleeping too much -   Feeling tired or having little energy -   Poor appetite or overeating -   Feeling bad about yourself - or that you are a failure or have let yourself or your family down -   Trouble concentrating on things, such as reading the newspaper or watching television -   Moving or speaking so slowly that other people could have noticed. Or the opposite - being so fidgety or restless that you have been moving around a lot more than usual -   Thoughts that you would be better off dead, or of hurting yourself in some way -   Total Score 0       Fall Risk Screen:  STEADI Fall Risk Assessment was completed, and patient is at LOW risk for falls.Assessment completed on:3/22/2021    Health Habits and Functional and Cognitive Screening:  Functional & Cognitive Status 4/2/2021   Do you have difficulty preparing food and eating? No   Do you have difficulty bathing yourself, getting dressed or grooming yourself? No   Do you have difficulty using the toilet? No   Do you have difficulty moving around from place to place? No   Do you have trouble with steps or getting out of a bed  or a chair? No   Current Diet Well Balanced Diet   Dental Exam Up to date   Eye Exam Up to date   Exercise (times per week) 2 times per week   Current Exercise Activities Include Housecleaning   Do you need help using the phone?  No   Are you deaf or do you have serious difficulty hearing?  No   Do you need help with transportation? No   Do you need help shopping? No   Do you need help preparing meals?  No   Do you need help with housework?  No   Do you need help with laundry? No   Do you need help taking your medications? No   Do you need help managing money? No   Do you ever drive or ride in a car without wearing a seat belt? No   Have you felt unusual stress, anger or loneliness in the last month? No   Who do you live with? Spouse   If you need help, do you have trouble finding someone available to you? No   Have you been bothered in the last four weeks by sexual problems? No   Do you have difficulty concentrating, remembering or making decisions? No         Does the patient have evidence of cognitive impairment? No    Asprin use counseling:Taking ASA appropriately as indicated    Age-appropriate Screening Schedule:  Refer to the list below for future screening recommendations based on patient's age, sex and/or medical conditions. Orders for these recommended tests are listed in the plan section. The patient has been provided with a written plan.    Health Maintenance   Topic Date Due   • INFLUENZA VACCINE  08/01/2021   • DXA SCAN  11/05/2021   • LIPID PANEL  03/26/2022   • COLONOSCOPY  07/12/2022   • MAMMOGRAM  09/01/2022   • TDAP/TD VACCINES (2 - Td) 09/01/2027   • ZOSTER VACCINE  Discontinued          The following portions of the patient's history were reviewed and updated as appropriate: allergies, current medications, past family history, past medical history, past social history, past surgical history and problem list.    Outpatient Medications Prior to Visit   Medication Sig Dispense Refill   • aspirin  (ASPIRIN LOW DOSE) 81 MG tablet Take 1 tablet by mouth Daily As Needed.     • Calcium Carbonate-Vit D-Min (CALCIUM 1200) 2404-6499 MG-UNIT chewable tablet Chew 1 tablet daily     • Cholecalciferol (VITAMIN D) 2000 UNITS capsule Take 1 capsule by mouth daily     • Coenzyme Q10 (COQ10) 400 MG capsule Take 1 capsule by mouth Daily.     • simvastatin (ZOCOR) 40 MG tablet TAKE 1 TABLET EVERY DAY FOR CHOLESTEROL (NEED LABS AND FOLLOW UP APPOINTMENT ASAP) 90 tablet 1   • valsartan-hydrochlorothiazide (DIOVAN-HCT) 160-25 MG per tablet TAKE 1 TABLET EVERY MORNING FOR BLOOD PRESSURE 90 tablet 1     No facility-administered medications prior to visit.       Patient Active Problem List   Diagnosis   • Benign essential hypertension   • Carotid artery plaque, 4/3/2019--mild bilateral.  10/06/2016--vascular screen is now normal without carotid disease.  Improved.   • Gastroesophageal reflux disease without esophagitis   • Hyperlipidemia   • Microscopic hematuria   • Osteoporosis, 11/5/2019--lumbar spine -0.3.  Left femoral neck -2.7.  Right femoral neck -2.4.   • Tinnitus of right ear   • History of malignant melanoma, 2008--right side of face.  1999--lower back.   • Therapeutic drug monitoring   • Vitamin D deficiency   • Lumbar disc disease   • Chronic right hip pain   • Impaired fasting glucose   • Menopausal state   • History of basal cell carcinoma of skin   • Subclinical hypothyroidism   • Intolerance of oral bisphosphonate therapy   • Acute right-sided low back pain with right-sided sciatica   • Grief reaction       Advanced Care Planning:  ACP discussion was held with the patient during this visit. Patient has an advance directive in EMR which is still valid.     Review of Systems   Constitutional: Negative.    HENT: Negative.    Eyes: Negative.    Respiratory: Negative.    Cardiovascular: Negative.    Gastrointestinal: Negative.    Endocrine: Negative.    Musculoskeletal: Positive for arthralgias and back pain.  "  Allergic/Immunologic: Negative.    Neurological: Negative.    Psychiatric/Behavioral: Negative.        Compared to one year ago, the patient feels her physical health is worse.  Compared to one year ago, the patient feels her mental health is worse.    Reviewed chart for potential of high risk medication in the elderly: yes  Reviewed chart for potential of harmful drug interactions in the elderly:yes    Objective         Vitals:    04/02/21 0918   BP: 134/66   BP Location: Right arm   Pulse: 77   SpO2: 98%   Weight: 71.2 kg (157 lb)   Height: 161.3 cm (63.5\")       Body mass index is 27.37 kg/m².  Discussed the patient's BMI with her. The BMI is above average; BMI management plan is completed.    Physical Exam    General: Alert and oriented x 3.  No acute distress.  Normal affect.  HEENT: Pupils equal, round, reactive to light; extraocular movements intact; sclerae nonicteric; pharynx, ear canals and TMs normal.  Neck: Without JVD, thyromegaly, bruit, or adenopathy.  Lungs: Clear to auscultation in all fields.  Heart: Regular rate and rhythm without murmur, rub, gallop, or click.  Abdomen: Soft, nontender, without hepatosplenomegaly or hernia.  Bowel sounds normal.  : Deferred.  Rectal: Deferred.  Extremities: Without clubbing, cyanosis, edema, or pulse deficit.  Neurologic: Intact without focal deficit.  Normal station and gait observed during ingress and egress from the examination room.  Skin: Without significant lesion.  Musculoskeletal: Unremarkable.    Lab Results   Component Value Date    CHLPL 118 03/26/2021    TRIG 49 03/26/2021    HGBA1C 5.39 03/26/2021        Assessment/Plan   Medicare Risks and Personalized Health Plan  CMS Preventative Services Quick Reference  Advance Directive Discussion  Cardiovascular risk  Chronic Pain   Diabetic Lab Screening   Obesity/Overweight   Osteoprorosis Risk    The above risks/problems have been discussed with the patient.  Pertinent information has been shared with " the patient in the After Visit Summary.  Follow up plans and orders are seen below in the Assessment/Plan Section.    Diagnoses and all orders for this visit:    1. Encounter for subsequent annual wellness visit (AWV) in Medicare patient (Primary)    2. Impaired fasting glucose  -     Comprehensive Metabolic Panel; Future  -     Hemoglobin A1c; Future    3. Hyperlipidemia  -     CK; Future  -     Comprehensive Metabolic Panel; Future  -     NMR LipoProfile; Future    4. Benign essential hypertension    5. Carotid artery plaque, 4/3/2019--mild bilateral.  10/06/2016--vascular screen is now normal without carotid disease.  Improved.    6. Gastroesophageal reflux disease without esophagitis    7. Microscopic hematuria  -     Urinalysis With Microscopic If Indicated (No Culture) - Urine, Clean Catch; Future    8. Osteoporosis, 11/5/2019--lumbar spine -0.3.  Left femoral neck -2.7.  Right femoral neck -2.4.    9. History of malignant melanoma, 2008--right side of face.  1999--lower back.    10. Vitamin D deficiency  -     Vitamin D 25 Hydroxy; Future    11. Lumbar disc disease    12. Acute right-sided low back pain with right-sided sciatica    13. Chronic right hip pain    14. Menopausal state    15. Intolerance of oral bisphosphonate therapy    16. Subclinical hypothyroidism  -     TSH; Future  -     T4, Free; Future  -     T3, Free; Future    17. Therapeutic drug monitoring  -     CBC (No Diff); Future      Follow Up:  Return in about 6 months (around 10/2/2021) for Next scheduled follow up with lab prior.     An After Visit Summary and PPPS were given to the patient.

## 2021-04-02 NOTE — PROGRESS NOTES
04/02/2021    Patient Information  Lachelle Mcclure                                                                                          [address]  [facility] [address]      1940  [unfilled]  There is no work phone number on file.    Chief Complaint:     Subsequent Medicare wellness visit.  Follow-up lab work in order to monitor chronic medical issues listed in history of present illness.  Complaining of continued arthritic pain and back pain.    History of Present Illness:    Patient with a history of impaired fasting glucose, hyperlipidemia, hypertension, carotid artery plaque, esophageal reflux, microscopic hematuria, osteoporosis, history of malignant melanoma, vitamin D deficiency, lumbar disc disease and recent acute right-sided lower back pain with right-sided sciatica, chronic right hip pain, menopausal state, intolerance of bisphosphonate therapy, subclinical hypothyroidism.  She presents today for a subsequent Medicare wellness visit.  She also had lab work in order to monitor her chronic medical issues.  Her past medical history reviewed and updated were necessary including health maintenance parameters.  This reveals she will be up-to-date or else accounted for after today's visit.  Patient is having some problems with back and right hip pain which she has had previously and is not a new complaint.  See below for details.    Review of Systems   Constitutional: Negative.   HENT: Negative.    Eyes: Negative.    Cardiovascular: Negative.    Respiratory: Negative.    Endocrine: Negative.    Hematologic/Lymphatic: Negative.    Skin: Negative.    Musculoskeletal: Positive for arthritis, back pain and joint pain.   Gastrointestinal: Negative.    Genitourinary: Negative.    Neurological: Negative.    Psychiatric/Behavioral: Negative.    Allergic/Immunologic: Negative.        Active Problems:    Patient Active Problem List   Diagnosis   • Benign essential hypertension   • Carotid artery  plaque, 4/3/2019--mild bilateral.  10/06/2016--vascular screen is now normal without carotid disease.  Improved.   • Gastroesophageal reflux disease without esophagitis   • Hyperlipidemia   • Microscopic hematuria   • Osteoporosis, 11/5/2019--lumbar spine -0.3.  Left femoral neck -2.7.  Right femoral neck -2.4.   • Tinnitus of right ear   • History of malignant melanoma, 2008--right side of face.  1999--lower back.   • Therapeutic drug monitoring   • Vitamin D deficiency   • Lumbar disc disease   • Chronic right hip pain   • Impaired fasting glucose   • Menopausal state   • History of basal cell carcinoma of skin   • Subclinical hypothyroidism   • Intolerance of oral bisphosphonate therapy   • Acute right-sided low back pain with right-sided sciatica   • Grief reaction         Past Medical History:   Diagnosis Date   • Benign essential hypertension 4/18/2006 04/18/2006--treatment for hypertension begun.   • Carotid artery plaque, 10/06/2016--vascular screen is now normal without carotid disease.  Improved. 7/30/2010    10/06/2016--vascular screen reveals no evidence of carotid plaque, negative for AAA, negative for PAD.  04/23/2014--vascular screen reveals mild bilateral carotid plaque, negative for AAA, negative for PAD.   12/07/2012--vascular screen reveals mild left-sided carotid plaque, normal right carotid, negative for AAA, negative for PAD.   07/30/2010--vascular screen reveals mild left-sided carotid plaque, normal right carotid, negative for AAA, negative for PAD.   • Chronic right hip pain 9/1/2017 09/01/2017--patient has had a one-year history of intermittent episodes of lateral right hip pain that at times is very severe and debilitating.  She also has tenderness over this area that limits her from sleeping on her right side.  Last year I gave her a cortisone injection for trochanteric bursitis and this provided immediate relief but not long lasting.  X-rays were negative for fracture or even  degenerative arthritis.  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  Ordered.   • Gastroesophageal reflux disease without esophagitis 1/14/2013    04/10/2014--patient presented with right upper quadrant/epigastric burning pain and discomfort that was postprandial. H pylori breath test was negative. Empiric trial of Dexilant 60 mg per day was initiated for esophageal reflux/dyspepsia.   01/14/2013--EGD performed for epigastric and right upper quadrant pain. There was some edematous appearing mucosa in the antrum and body of the stomach which was biopsied. No significant erythema. No ulcerations. Normal appearing duodenum and esophagus. Pathology revealed moderate chronic active gastritis. No intestinal metaplasia. Helicobacter pylori positive. Patient was treated with appropriate antibiotic and PPI therapy.   • History of basal cell carcinoma of skin 2/12/2019 June 2018--Mohs micrographic surgery of basal cell carcinoma on the tip of the nose.   • History of Helicobacter pylori gastritis 01/14/2013    04/10/2014--patient presented with right upper quadrant/epigastric burning pain and discomfort that was postprandial. H pylori breath test was negative. Empiric trial of Dexilant 60 mg per day was initiated for esophageal reflux/dyspepsia.  01/14/2013--EGD performed for epigastric and right upper quadrant pain. There was some edematous appearing mucosa in the antrum and body of the stomach which w   • History of malignant melanoma, 2008--right side of face.  1999--lower back. 1/1/2008 2008--melanoma resected from right face.   1999--malignant melanoma resected from lower back.   • History of routine gynecologic exam yearly    Patient sees a gynecologist.   • Hyperlipidemia 8/13/2010 08/13/2010--treatment for hyperlipidemia begun.   • Impaired fasting glucose 9/1/2017 09/01/2017--routine physical.  Serum glucose elevated at 101.  Recommend weight  loss and decrease carbohydrate intake.   • Intolerance of oral bisphosphonate therapy 2/21/2020   • Lumbar disc disease 10/19/2016    09/13/2016--x-ray lumbar spine reveals disc space narrowing at L2-L3, L3-L4, L4-L5, and possibly L5-S1.  There is mild anterior listhesis of L4 on L5 measuring 4.5 mm.  No compression deformity, nor other evidence of acute process.   • Menopausal state 9/1/2017   • Microscopic hematuria 12/18/2015 12/22/2015--urine cytology negative for malignant cells.  Red blood cells noted.  Transitional epithelial cells noted.  Urine culture revealed less than 10,000 colony forming units of mixed urogenital laurie.  Repeat urinalysis was negative.  Recommend observation.  12/18/2015--routine urinalysis reveals 3-5 WBCs and 3-5 RBCs. 0 epithelial cells. 0 bacteria. Patient is asymptomatic. Repeat urinalysis, urine cytology, and urine culture sent.   • Osteoporosis, 11/5/2019--lumbar spine -0.3.  Left femoral neck -2.7.  Right femoral neck -2.4. 7/24/2009 November 5, 2019--DEXA scan reveals lumbar spine T score -0.3 representing a 5% increase.  Left femoral neck T score -2.7 representing a 5% decrease.  Right femoral neck T score -2.4 which is unchanged.  February 12, 2019--patient seen in follow-up and reports she really did not feel well on Fosamax.  She has been using calcium and vitamin D supplementation.  October 18, 2017--DEXA scan reveals jesse   • Tinnitus of right ear 12/18/2015 12/18/2015--patient presents with a four-month history of ringing in her right ear. No hearing loss. Examination reveals a cerumen impaction of the right ear canal. This was removed with irrigation and curettage.   • Vitamin D deficiency 8/12/2016         Past Surgical History:   Procedure Laterality Date   • BASAL CELL CARCINOMA EXCISION  06/2018 June 2018--Mohs micrographic surgery of basal cell carcinoma on the tip of the nose.   • CHOLECYSTECTOMY  05/17/2013 05/17/2013--laparoscopic cholecystectomy.    • COLONOSCOPY  07/12/2012 07/12/2012--normal colonoscopy to the cecum with an excellent prep.    • COLONOSCOPY  06/20/2003 06/20/2003--normal colonoscopy to the cecum.   • ESOPHAGOSCOPY / EGD  01/14/2013 01/14/2013--EGD performed for epigastric and right upper quadrant pain. There was some edematous appearing mucosa in the antrum and body of the stomach which was biopsied. No significant erythema. No ulcerations. Normal appearing duodenum and esophagus. Pathology revealed moderate chronic active gastritis. No intestinal metaplasia. Helicobacter pylori positive. Patient was treated with appropriate   • FOOT SURGERY  1992 1992--orthopedic 10 placed in right great toe.   • HYSTERECTOMY  1986 1986--total abdominal hysterectomy.   • SKIN CANCER EXCISION  09/14/2009 09/14/2009--excision 1 cm mid scalp cyst. Pathology benign. 2008--melanoma resected from right face. 1999--malignant melanoma resected from lower back.   • TONSILLECTOMY AND ADENOIDECTOMY  1946 1946.         No Known Allergies        Current Outpatient Medications:   •  aspirin (ASPIRIN LOW DOSE) 81 MG tablet, Take 1 tablet by mouth Daily As Needed., Disp: , Rfl:   •  Calcium Carbonate-Vit D-Min (CALCIUM 1200) 7223-8225 MG-UNIT chewable tablet, Chew 1 tablet daily, Disp: , Rfl:   •  Cholecalciferol (VITAMIN D) 2000 UNITS capsule, Take 1 capsule by mouth daily, Disp: , Rfl:   •  Coenzyme Q10 (COQ10) 400 MG capsule, Take 1 capsule by mouth Daily., Disp: , Rfl:   •  simvastatin (ZOCOR) 40 MG tablet, TAKE 1 TABLET EVERY DAY FOR CHOLESTEROL (NEED LABS AND FOLLOW UP APPOINTMENT ASAP), Disp: 90 tablet, Rfl: 1  •  valsartan-hydrochlorothiazide (DIOVAN-HCT) 160-25 MG per tablet, TAKE 1 TABLET EVERY MORNING FOR BLOOD PRESSURE, Disp: 90 tablet, Rfl: 1      Family History   Problem Relation Age of Onset   • COPD Mother    • Hypertension Mother         Essential   • COPD Father    • Hypertension Father          Social History     Socioeconomic  "History   • Marital status:      Spouse name: Not on file   • Number of children: Not on file   • Years of education: Not on file   • Highest education level: Bachelor's degree (e.g., BA, AB, BS)   Tobacco Use   • Smoking status: Never Smoker   • Smokeless tobacco: Never Used   Vaping Use   • Vaping Use: Never used   Substance and Sexual Activity   • Alcohol use: No   • Drug use: No   • Sexual activity: Yes     Partners: Male         Vitals:    04/02/21 0918   BP: 134/66   BP Location: Right arm   Pulse: 77   SpO2: 98%   Weight: 71.2 kg (157 lb)   Height: 161.3 cm (63.5\")        Body mass index is 27.37 kg/m².      Physical Exam:    General: Alert and oriented x 3.  No acute distress.  Normal affect.  HEENT: Pupils equal, round, reactive to light; extraocular movements intact; sclerae nonicteric; pharynx, ear canals and TMs normal.  Neck: Without JVD, thyromegaly, bruit, or adenopathy.  Lungs: Clear to auscultation in all fields.  Heart: Regular rate and rhythm without murmur, rub, gallop, or click.  Abdomen: Soft, nontender, without hepatosplenomegaly or hernia.  Bowel sounds normal.  : Deferred.  Rectal: Deferred.  Extremities: Without clubbing, cyanosis, edema, or pulse deficit.  Neurologic: Intact without focal deficit.  Normal station and gait observed during ingress and egress from the examination room.  Skin: Without significant lesion.  Musculoskeletal: Unremarkable.    Lab/other results:    NMR is absolutely perfect.  Total cholesterol 118.  LDL particle #628.  HDL particle #35.5.  CMP normal except calcium slightly elevated at 10.9.  Hemoglobin A1c 5.39.  CBC normal.  CPK normal.  Thyroid function test normal.  Vitamin D normal at 42.7.  Hepatitis C antibody nonreactive.    Assessment/Plan:     Diagnosis Plan   1. Encounter for subsequent annual wellness visit (AWV) in Medicare patient     2. Impaired fasting glucose  Comprehensive Metabolic Panel    Hemoglobin A1c   3. Hyperlipidemia  CK    " Comprehensive Metabolic Panel    NMR LipoProfile   4. Benign essential hypertension     5. Carotid artery plaque, 4/3/2019--mild bilateral.  10/06/2016--vascular screen is now normal without carotid disease.  Improved.     6. Gastroesophageal reflux disease without esophagitis     7. Microscopic hematuria  Urinalysis With Microscopic If Indicated (No Culture) - Urine, Clean Catch   8. Osteoporosis, 11/5/2019--lumbar spine -0.3.  Left femoral neck -2.7.  Right femoral neck -2.4.     9. History of malignant melanoma, 2008--right side of face.  1999--lower back.     10. Vitamin D deficiency  Vitamin D 25 Hydroxy   11. Lumbar disc disease     12. Acute right-sided low back pain with right-sided sciatica     13. Chronic right hip pain     14. Menopausal state     15. Intolerance of oral bisphosphonate therapy     16. Subclinical hypothyroidism  TSH    T4, Free    T3, Free   17. Therapeutic drug monitoring  CBC (No Diff)     The subsequent Medicare wellness visit is documented on separate note.    Patient has extremely mild impaired fasting glucose which may have resolved.  We will continue to monitor.  Hyperlipidemia is under excellent control.  This is important given her carotid artery plaque.  Patient will be due for a carotid Doppler study here in the near future.  She does not need her DEXA scan until November of this year and I will wait to do the carotid Doppler study so she can have them both done at the same time.  Esophageal reflux currently not an issue and patient is not on PPI therapy.  Microscopic hematuria has been thoroughly evaluated in the past and work-up was negative.  She has osteoporosis and cannot tolerate oral bisphosphonate.  She does receive infusion treatment per therapy plan.  She has a history of malignant melanoma back in 2008.  She is followed by the dermatologist.  Vitamin D in normal range.  Patient continues to have back and hip pain and I think she would benefit from being evaluated by  an orthopedist.  Subclinical hypothyroidism is not apparent but we will continue to monitor for a while longer.    Plan is as follows: Patient has already been referred to orthopedist.  She received a cortisone injection in the right hip and this was helpful.  However, she is continued to have lumbar radicular symptoms and was referred to pain management and she has an appointment next week.  No changes in current medical regimen.  Patient will follow-up in 6 months with lab prior or follow-up as needed.    Procedures

## 2021-05-18 DIAGNOSIS — E78.5 HYPERLIPIDEMIA, UNSPECIFIED HYPERLIPIDEMIA TYPE: Chronic | ICD-10-CM

## 2021-05-19 RX ORDER — SIMVASTATIN 40 MG
TABLET ORAL
Qty: 90 TABLET | Refills: 1 | Status: SHIPPED | OUTPATIENT
Start: 2021-05-19 | End: 2021-10-13

## 2021-05-19 RX ORDER — VALSARTAN AND HYDROCHLOROTHIAZIDE 160; 25 MG/1; MG/1
TABLET ORAL
Qty: 90 TABLET | Refills: 1 | Status: SHIPPED | OUTPATIENT
Start: 2021-05-19 | End: 2021-10-13

## 2021-09-27 ENCOUNTER — APPOINTMENT (OUTPATIENT)
Dept: WOMENS IMAGING | Facility: HOSPITAL | Age: 81
End: 2021-09-27

## 2021-09-27 PROCEDURE — 77063 BREAST TOMOSYNTHESIS BI: CPT | Performed by: RADIOLOGY

## 2021-09-27 PROCEDURE — 77067 SCR MAMMO BI INCL CAD: CPT | Performed by: RADIOLOGY

## 2021-10-13 DIAGNOSIS — I10 BENIGN ESSENTIAL HYPERTENSION: Primary | ICD-10-CM

## 2021-10-13 DIAGNOSIS — E78.5 HYPERLIPIDEMIA, UNSPECIFIED HYPERLIPIDEMIA TYPE: Chronic | ICD-10-CM

## 2021-10-13 DIAGNOSIS — E78.2 MIXED HYPERLIPIDEMIA: ICD-10-CM

## 2021-10-13 RX ORDER — VALSARTAN AND HYDROCHLOROTHIAZIDE 160; 25 MG/1; MG/1
TABLET ORAL
Qty: 90 TABLET | Refills: 0 | Status: SHIPPED | OUTPATIENT
Start: 2021-10-13 | End: 2021-12-20

## 2021-10-13 RX ORDER — VALSARTAN AND HYDROCHLOROTHIAZIDE 160; 25 MG/1; MG/1
TABLET ORAL
Qty: 30 TABLET | Refills: 0 | Status: SHIPPED | OUTPATIENT
Start: 2021-10-13 | End: 2021-10-13

## 2021-10-13 RX ORDER — SIMVASTATIN 40 MG
TABLET ORAL
Qty: 30 TABLET | Refills: 0 | Status: SHIPPED | OUTPATIENT
Start: 2021-10-13 | End: 2021-10-13

## 2021-10-13 RX ORDER — SIMVASTATIN 40 MG
TABLET ORAL
Qty: 90 TABLET | Refills: 0 | Status: SHIPPED | OUTPATIENT
Start: 2021-10-13 | End: 2021-12-20

## 2021-11-12 ENCOUNTER — LAB (OUTPATIENT)
Dept: LAB | Facility: HOSPITAL | Age: 81
End: 2021-11-12

## 2021-11-12 DIAGNOSIS — E03.8 SUBCLINICAL HYPOTHYROIDISM: ICD-10-CM

## 2021-11-12 DIAGNOSIS — R73.01 IMPAIRED FASTING GLUCOSE: Chronic | ICD-10-CM

## 2021-11-12 DIAGNOSIS — E78.2 MIXED HYPERLIPIDEMIA: Chronic | ICD-10-CM

## 2021-11-12 DIAGNOSIS — R31.29 MICROSCOPIC HEMATURIA: ICD-10-CM

## 2021-11-12 DIAGNOSIS — E55.9 VITAMIN D DEFICIENCY: Chronic | ICD-10-CM

## 2021-11-12 DIAGNOSIS — Z51.81 THERAPEUTIC DRUG MONITORING: ICD-10-CM

## 2021-11-12 LAB
25(OH)D3 SERPL-MCNC: 37.6 NG/ML (ref 30–100)
ALBUMIN SERPL-MCNC: 4.4 G/DL (ref 3.5–5.2)
ALBUMIN/GLOB SERPL: 2.1 G/DL
ALP SERPL-CCNC: 57 U/L (ref 39–117)
ALT SERPL W P-5'-P-CCNC: 19 U/L (ref 1–33)
ANION GAP SERPL CALCULATED.3IONS-SCNC: 7.6 MMOL/L (ref 5–15)
AST SERPL-CCNC: 20 U/L (ref 1–32)
BACTERIA UR QL AUTO: NORMAL /HPF
BILIRUB SERPL-MCNC: 0.3 MG/DL (ref 0–1.2)
BILIRUB UR QL STRIP: NEGATIVE
BUN SERPL-MCNC: 16 MG/DL (ref 8–23)
BUN/CREAT SERPL: 23.5 (ref 7–25)
CALCIUM SPEC-SCNC: 10.3 MG/DL (ref 8.6–10.5)
CHLORIDE SERPL-SCNC: 107 MMOL/L (ref 98–107)
CK SERPL-CCNC: 105 U/L (ref 20–180)
CLARITY UR: CLEAR
CO2 SERPL-SCNC: 28.4 MMOL/L (ref 22–29)
COD CRY URNS QL: NORMAL /HPF
COLOR UR: YELLOW
CREAT SERPL-MCNC: 0.68 MG/DL (ref 0.57–1)
DEPRECATED RDW RBC AUTO: 38.2 FL (ref 37–54)
ERYTHROCYTE [DISTWIDTH] IN BLOOD BY AUTOMATED COUNT: 11.9 % (ref 12.3–15.4)
GFR SERPL CREATININE-BSD FRML MDRD: 83 ML/MIN/1.73
GLOBULIN UR ELPH-MCNC: 2.1 GM/DL
GLUCOSE SERPL-MCNC: 97 MG/DL (ref 65–99)
GLUCOSE UR STRIP-MCNC: NEGATIVE MG/DL
HBA1C MFR BLD: 5.44 % (ref 4.8–5.6)
HCT VFR BLD AUTO: 40.6 % (ref 34–46.6)
HGB BLD-MCNC: 13.6 G/DL (ref 12–15.9)
HGB UR QL STRIP.AUTO: ABNORMAL
HYALINE CASTS UR QL AUTO: NORMAL /LPF
KETONES UR QL STRIP: NEGATIVE
LEUKOCYTE ESTERASE UR QL STRIP.AUTO: ABNORMAL
MCH RBC QN AUTO: 29.6 PG (ref 26.6–33)
MCHC RBC AUTO-ENTMCNC: 33.5 G/DL (ref 31.5–35.7)
MCV RBC AUTO: 88.3 FL (ref 79–97)
NITRITE UR QL STRIP: NEGATIVE
PH UR STRIP.AUTO: <=5 [PH] (ref 5–8)
PLATELET # BLD AUTO: 188 10*3/MM3 (ref 140–450)
PMV BLD AUTO: 10.1 FL (ref 6–12)
POTASSIUM SERPL-SCNC: 4.3 MMOL/L (ref 3.5–5.2)
PROT SERPL-MCNC: 6.5 G/DL (ref 6–8.5)
PROT UR QL STRIP: NEGATIVE
RBC # BLD AUTO: 4.6 10*6/MM3 (ref 3.77–5.28)
RBC # UR: NORMAL /HPF
REF LAB TEST METHOD: NORMAL
SODIUM SERPL-SCNC: 143 MMOL/L (ref 136–145)
SP GR UR STRIP: 1.02 (ref 1–1.03)
SQUAMOUS #/AREA URNS HPF: NORMAL /HPF
T3FREE SERPL-MCNC: 3.38 PG/ML (ref 2–4.4)
T4 FREE SERPL-MCNC: 1.09 NG/DL (ref 0.93–1.7)
TSH SERPL DL<=0.05 MIU/L-ACNC: 2.88 UIU/ML (ref 0.27–4.2)
UROBILINOGEN UR QL STRIP: ABNORMAL
WBC # BLD AUTO: 4.81 10*3/MM3 (ref 3.4–10.8)
WBC UR QL AUTO: NORMAL /HPF

## 2021-11-12 PROCEDURE — 82550 ASSAY OF CK (CPK): CPT

## 2021-11-12 PROCEDURE — 82306 VITAMIN D 25 HYDROXY: CPT

## 2021-11-12 PROCEDURE — 85027 COMPLETE CBC AUTOMATED: CPT

## 2021-11-12 PROCEDURE — 80053 COMPREHEN METABOLIC PANEL: CPT

## 2021-11-12 PROCEDURE — 84481 FREE ASSAY (FT-3): CPT

## 2021-11-12 PROCEDURE — 36415 COLL VENOUS BLD VENIPUNCTURE: CPT

## 2021-11-12 PROCEDURE — 83704 LIPOPROTEIN BLD QUAN PART: CPT

## 2021-11-12 PROCEDURE — 84443 ASSAY THYROID STIM HORMONE: CPT

## 2021-11-12 PROCEDURE — 80061 LIPID PANEL: CPT

## 2021-11-12 PROCEDURE — 84439 ASSAY OF FREE THYROXINE: CPT

## 2021-11-12 PROCEDURE — 83036 HEMOGLOBIN GLYCOSYLATED A1C: CPT

## 2021-11-12 PROCEDURE — 81001 URINALYSIS AUTO W/SCOPE: CPT

## 2021-11-14 LAB
CHOLEST SERPL-MCNC: 130 MG/DL (ref 100–199)
HDL SERPL-SCNC: 36.6 UMOL/L
HDLC SERPL-MCNC: 55 MG/DL
LDL SERPL QN: 21.2 NM
LDL SERPL-SCNC: 735 NMOL/L
LDL SMALL SERPL-SCNC: 368 NMOL/L
LDLC SERPL CALC-MCNC: 60 MG/DL (ref 0–99)
TRIGL SERPL-MCNC: 76 MG/DL (ref 0–149)

## 2021-11-15 DIAGNOSIS — N39.0 ACUTE LOWER URINARY TRACT INFECTION: Primary | ICD-10-CM

## 2021-11-19 ENCOUNTER — OFFICE VISIT (OUTPATIENT)
Dept: INTERNAL MEDICINE | Facility: CLINIC | Age: 81
End: 2021-11-19

## 2021-11-19 VITALS
HEIGHT: 63 IN | BODY MASS INDEX: 28.17 KG/M2 | HEART RATE: 75 BPM | WEIGHT: 159 LBS | DIASTOLIC BLOOD PRESSURE: 70 MMHG | RESPIRATION RATE: 17 BRPM | SYSTOLIC BLOOD PRESSURE: 126 MMHG | OXYGEN SATURATION: 99 %

## 2021-11-19 DIAGNOSIS — N95.1 MENOPAUSAL STATE: Chronic | ICD-10-CM

## 2021-11-19 DIAGNOSIS — E55.9 VITAMIN D DEFICIENCY: Chronic | ICD-10-CM

## 2021-11-19 DIAGNOSIS — E78.2 MIXED HYPERLIPIDEMIA: Chronic | ICD-10-CM

## 2021-11-19 DIAGNOSIS — M16.0 PRIMARY OSTEOARTHRITIS OF BOTH HIPS: Chronic | ICD-10-CM

## 2021-11-19 DIAGNOSIS — R31.29 MICROSCOPIC HEMATURIA: Chronic | ICD-10-CM

## 2021-11-19 DIAGNOSIS — M81.0 AGE-RELATED OSTEOPOROSIS WITHOUT CURRENT PATHOLOGICAL FRACTURE: Chronic | ICD-10-CM

## 2021-11-19 DIAGNOSIS — M51.9 LUMBAR DISC DISEASE: Chronic | ICD-10-CM

## 2021-11-19 DIAGNOSIS — I65.23 ATHEROSCLEROSIS OF BOTH CAROTID ARTERIES: Chronic | ICD-10-CM

## 2021-11-19 DIAGNOSIS — K21.9 GASTROESOPHAGEAL REFLUX DISEASE WITHOUT ESOPHAGITIS: Chronic | ICD-10-CM

## 2021-11-19 DIAGNOSIS — Z85.828 HISTORY OF BASAL CELL CARCINOMA OF SKIN: Chronic | ICD-10-CM

## 2021-11-19 DIAGNOSIS — Z51.81 THERAPEUTIC DRUG MONITORING: ICD-10-CM

## 2021-11-19 DIAGNOSIS — Z78.9 INTOLERANCE OF ORAL BISPHOSPHONATE THERAPY: Chronic | ICD-10-CM

## 2021-11-19 DIAGNOSIS — I10 BENIGN ESSENTIAL HYPERTENSION: Chronic | ICD-10-CM

## 2021-11-19 DIAGNOSIS — Z85.820 HISTORY OF MALIGNANT MELANOMA: Chronic | ICD-10-CM

## 2021-11-19 DIAGNOSIS — R73.01 IMPAIRED FASTING GLUCOSE: Primary | Chronic | ICD-10-CM

## 2021-11-19 DIAGNOSIS — E03.8 SUBCLINICAL HYPOTHYROIDISM: Chronic | ICD-10-CM

## 2021-11-19 PROBLEM — F43.20 GRIEF REACTION: Status: RESOLVED | Noted: 2020-12-04 | Resolved: 2021-11-19

## 2021-11-19 PROBLEM — F43.21 GRIEF REACTION: Status: RESOLVED | Noted: 2020-12-04 | Resolved: 2021-11-19

## 2021-11-19 PROBLEM — M54.41 ACUTE RIGHT-SIDED LOW BACK PAIN WITH RIGHT-SIDED SCIATICA: Status: RESOLVED | Noted: 2020-12-04 | Resolved: 2021-11-19

## 2021-11-19 PROCEDURE — 99214 OFFICE O/P EST MOD 30 MIN: CPT | Performed by: INTERNAL MEDICINE

## 2021-11-19 RX ORDER — ZOLEDRONIC ACID 5 MG/100ML
5 INJECTION, SOLUTION INTRAVENOUS ONCE
Qty: 100 ML | Refills: 0 | Status: SHIPPED | OUTPATIENT
Start: 2021-11-19 | End: 2021-12-30

## 2021-11-19 NOTE — PROGRESS NOTES
11/19/2021    Patient Information  Lachelle Mcclure                                                                                          [address]  [facility] [address]      1940  [unfilled]  There is no work phone number on file.    Chief Complaint:     Follow-up lab work in order to monitor chronic medical issues listed in history of present illness.  No new acute complaints.    History of Present Illness:    Patient with a history of hyperlipidemia, impaired fasting glucose, hypertension, subclinical hypothyroidism, carotid artery plaque, esophageal reflux, microscopic hematuria, osteoporosis, vitamin D deficiency, intolerance of oral bisphosphonate therapy, lumbar disc disease, history of malignant melanoma and basal cell carcinoma of the skin.  She presents today for follow-up with lab prior in order to monitor chronic medical issues.  Her past medical history reviewed and updated were necessary including health maintenance parameters.  This reveals she is up-to-date or else accounted for although she does need a DEXA scan and a carotid Doppler study.  See below.    Review of Systems   Constitutional: Negative.   HENT: Negative.    Eyes: Negative.    Cardiovascular: Negative.    Respiratory: Negative.    Endocrine: Negative.    Hematologic/Lymphatic: Negative.    Skin: Negative.    Musculoskeletal: Positive for arthritis and back pain.   Gastrointestinal: Negative.    Genitourinary: Negative.    Neurological: Negative.    Psychiatric/Behavioral: Negative.    Allergic/Immunologic: Negative.        Active Problems:    Patient Active Problem List   Diagnosis   • Benign essential hypertension   • Carotid artery plaque, 4/3/2019--mild bilateral.  10/06/2016--vascular screen is now normal without carotid disease.  Improved.   • Gastroesophageal reflux disease without esophagitis   • Hyperlipidemia   • Microscopic hematuria   • Osteoporosis, 11/5/2019--lumbar spine -0.3.  Left femoral neck  -2.7.  Right femoral neck -2.4.   • Tinnitus of right ear   • History of malignant melanoma, 2008--right side of face.  1999--lower back.   • Therapeutic drug monitoring   • Vitamin D deficiency   • Lumbar disc disease   • Chronic right hip pain   • Impaired fasting glucose   • Menopausal state   • History of basal cell carcinoma of skin   • Subclinical hypothyroidism   • Intolerance of oral bisphosphonate therapy   • Primary osteoarthritis of both hips         Past Medical History:   Diagnosis Date   • Benign essential hypertension 4/18/2006 04/18/2006--treatment for hypertension begun.   • Carotid artery plaque, 10/06/2016--vascular screen is now normal without carotid disease.  Improved. 7/30/2010    10/06/2016--vascular screen reveals no evidence of carotid plaque, negative for AAA, negative for PAD.  04/23/2014--vascular screen reveals mild bilateral carotid plaque, negative for AAA, negative for PAD.   12/07/2012--vascular screen reveals mild left-sided carotid plaque, normal right carotid, negative for AAA, negative for PAD.   07/30/2010--vascular screen reveals mild left-sided carotid plaque, normal right carotid, negative for AAA, negative for PAD.   • Chronic right hip pain 9/1/2017 09/01/2017--patient has had a one-year history of intermittent episodes of lateral right hip pain that at times is very severe and debilitating.  She also has tenderness over this area that limits her from sleeping on her right side.  Last year I gave her a cortisone injection for trochanteric bursitis and this provided immediate relief but not long lasting.  X-rays were negative for fracture or even degenerative arthritis.  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  Ordered.   • Gastroesophageal reflux disease without esophagitis 1/14/2013    04/10/2014--patient presented with right upper quadrant/epigastric burning pain and discomfort that was postprandial.  H pylori breath test was negative. Empiric trial of Dexilant 60 mg per day was initiated for esophageal reflux/dyspepsia.   01/14/2013--EGD performed for epigastric and right upper quadrant pain. There was some edematous appearing mucosa in the antrum and body of the stomach which was biopsied. No significant erythema. No ulcerations. Normal appearing duodenum and esophagus. Pathology revealed moderate chronic active gastritis. No intestinal metaplasia. Helicobacter pylori positive. Patient was treated with appropriate antibiotic and PPI therapy.   • History of basal cell carcinoma of skin 2/12/2019 June 2018--Mohs micrographic surgery of basal cell carcinoma on the tip of the nose.   • History of Helicobacter pylori gastritis 01/14/2013    04/10/2014--patient presented with right upper quadrant/epigastric burning pain and discomfort that was postprandial. H pylori breath test was negative. Empiric trial of Dexilant 60 mg per day was initiated for esophageal reflux/dyspepsia.  01/14/2013--EGD performed for epigastric and right upper quadrant pain. There was some edematous appearing mucosa in the antrum and body of the stomach which w   • History of malignant melanoma, 2008--right side of face.  1999--lower back. 1/1/2008 2008--melanoma resected from right face.   1999--malignant melanoma resected from lower back.   • History of routine gynecologic exam yearly    Patient sees a gynecologist.   • Hyperlipidemia 8/13/2010 08/13/2010--treatment for hyperlipidemia begun.   • Impaired fasting glucose 9/1/2017 09/01/2017--routine physical.  Serum glucose elevated at 101.  Recommend weight loss and decrease carbohydrate intake.   • Intolerance of oral bisphosphonate therapy 2/21/2020   • Lumbar disc disease 10/19/2016    09/13/2016--x-ray lumbar spine reveals disc space narrowing at L2-L3, L3-L4, L4-L5, and possibly L5-S1.  There is mild anterior listhesis of L4 on L5 measuring 4.5 mm.  No compression deformity,  nor other evidence of acute process.   • Menopausal state 9/1/2017   • Microscopic hematuria 12/18/2015 12/22/2015--urine cytology negative for malignant cells.  Red blood cells noted.  Transitional epithelial cells noted.  Urine culture revealed less than 10,000 colony forming units of mixed urogenital laurie.  Repeat urinalysis was negative.  Recommend observation.  12/18/2015--routine urinalysis reveals 3-5 WBCs and 3-5 RBCs. 0 epithelial cells. 0 bacteria. Patient is asymptomatic. Repeat urinalysis, urine cytology, and urine culture sent.   • Osteoporosis, 11/5/2019--lumbar spine -0.3.  Left femoral neck -2.7.  Right femoral neck -2.4. 7/24/2009 November 5, 2019--DEXA scan reveals lumbar spine T score -0.3 representing a 5% increase.  Left femoral neck T score -2.7 representing a 5% decrease.  Right femoral neck T score -2.4 which is unchanged.  February 12, 2019--patient seen in follow-up and reports she really did not feel well on Fosamax.  She has been using calcium and vitamin D supplementation.  October 18, 2017--DEXA scan reveals jesse   • Primary osteoarthritis of both hips 11/19/2021 November 19, 2021-9 being evaluated and treated by the orthopedist.  Patient may need bilateral hip replacements at some point in the future.   • Subclinical hypothyroidism 2/21/2020 February 21, 2020--TSH slightly elevated at 4.41.  Free T3 and free T4 are normal.  Close observation.   • Tinnitus of right ear 12/18/2015 12/18/2015--patient presents with a four-month history of ringing in her right ear. No hearing loss. Examination reveals a cerumen impaction of the right ear canal. This was removed with irrigation and curettage.   • Vitamin D deficiency 8/12/2016         Past Surgical History:   Procedure Laterality Date   • BASAL CELL CARCINOMA EXCISION  06/2018 June 2018--Mohs micrographic surgery of basal cell carcinoma on the tip of the nose.   • CHOLECYSTECTOMY  05/17/2013 05/17/2013--laparoscopic  cholecystectomy.   • COLONOSCOPY  07/12/2012 07/12/2012--normal colonoscopy to the cecum with an excellent prep.    • COLONOSCOPY  06/20/2003 06/20/2003--normal colonoscopy to the cecum.   • ESOPHAGOSCOPY / EGD  01/14/2013 01/14/2013--EGD performed for epigastric and right upper quadrant pain. There was some edematous appearing mucosa in the antrum and body of the stomach which was biopsied. No significant erythema. No ulcerations. Normal appearing duodenum and esophagus. Pathology revealed moderate chronic active gastritis. No intestinal metaplasia. Helicobacter pylori positive. Patient was treated with appropriate   • FOOT SURGERY  1992 1992--orthopedic 10 placed in right great toe.   • HYSTERECTOMY  1986 1986--total abdominal hysterectomy.   • SKIN CANCER EXCISION  09/14/2009 09/14/2009--excision 1 cm mid scalp cyst. Pathology benign. 2008--melanoma resected from right face. 1999--malignant melanoma resected from lower back.   • TONSILLECTOMY AND ADENOIDECTOMY  1946 1946.         No Known Allergies        Current Outpatient Medications:   •  aspirin (ASPIRIN LOW DOSE) 81 MG tablet, Take 1 tablet by mouth Daily As Needed., Disp: , Rfl:   •  Calcium Carbonate-Vit D-Min (CALCIUM 1200) 3469-1007 MG-UNIT chewable tablet, Chew 1 tablet daily, Disp: , Rfl:   •  Cholecalciferol (VITAMIN D) 2000 UNITS capsule, Take 1 capsule by mouth daily, Disp: , Rfl:   •  Coenzyme Q10 (COQ10) 400 MG capsule, Take 1 capsule by mouth Daily., Disp: , Rfl:   •  simvastatin (ZOCOR) 40 MG tablet, Take 1 p.o. daily for high cholesterol, Disp: 90 tablet, Rfl: 0  •  valsartan-hydrochlorothiazide (DIOVAN-HCT) 160-25 MG per tablet, ForTake 1 p.o. daily for blood pressure, Disp: 90 tablet, Rfl: 0  •  zoledronic acid (RECLAST) 5 MG/100ML solution infusion, Infuse 100 mL into a venous catheter 1 (One) Time for 1 dose., Disp: 100 mL, Rfl: 0      Family History   Problem Relation Age of Onset   • COPD Mother    • Hypertension  "Mother         Essential   • COPD Father    • Hypertension Father          Social History     Socioeconomic History   • Marital status:    • Highest education level: Bachelor's degree (e.g., BA, AB, BS)   Tobacco Use   • Smoking status: Never Smoker   • Smokeless tobacco: Never Used   Vaping Use   • Vaping Use: Never used   Substance and Sexual Activity   • Alcohol use: No   • Drug use: No   • Sexual activity: Yes     Partners: Male         Vitals:    11/19/21 1059   BP: 126/70   Pulse: 75   Resp: 17   SpO2: 99%   Weight: 72.1 kg (159 lb)   Height: 160 cm (63\")        Body mass index is 28.17 kg/m².      Physical Exam:    General: Alert and oriented x 3.  No acute distress.  Mildly overweight.  Normal affect.  HEENT: Pupils equal, round, reactive to light; extraocular movements intact; sclerae nonicteric; pharynx, ear canals and TMs normal.  Neck: Without JVD, thyromegaly, bruit, or adenopathy.  Lungs: Clear to auscultation in all fields.  Heart: Regular rate and rhythm without murmur, rub, gallop, or click.  Abdomen: Soft, nontender, without hepatosplenomegaly or hernia.  Bowel sounds normal.  : Deferred.  Rectal: Deferred.  Extremities: Without clubbing, cyanosis, edema, or pulse deficit.  Neurologic: Intact without focal deficit.  Normal station and gait observed during ingress and egress from the examination room.  Skin: Without significant lesion.  Musculoskeletal: Unremarkable.    Lab/other results:    CBC is normal.  CMP normal.  Hemoglobin A1c normal at 5.44.  NMR is perfect with a total cholesterol of 130.  LDL particle #735.  Small LDL particle #368.  HDL particle #36.6.  Vitamin D normal at 37.6.  Urinalysis reveals questionable trace blood and small leukocytes.  However microscopic exam was normal.  There were some small calcium oxalate crystals noted.  Thyroid function test normal.    Assessment/Plan:     Diagnosis Plan   1. Impaired fasting glucose  Hemoglobin A1c   2. Hyperlipidemia  CK    " Comprehensive Metabolic Panel    NMR LipoProfile   3. Benign essential hypertension     4. Subclinical hypothyroidism  TSH    T4, Free    T3, Free   5. Carotid artery plaque, 4/3/2019--mild bilateral.  10/06/2016--vascular screen is now normal without carotid disease.  Improved.  Duplex Carotid Ultrasound CAR   6. Gastroesophageal reflux disease without esophagitis     7. Microscopic hematuria  Urinalysis With Microscopic If Indicated (No Culture) - Urine, Clean Catch   8. Osteoporosis, 11/5/2019--lumbar spine -0.3.  Left femoral neck -2.7.  Right femoral neck -2.4.  DEXA Bone Density Axial    zoledronic acid (RECLAST) 5 MG/100ML solution infusion   9. Vitamin D deficiency  Vitamin D 25 Hydroxy   10. Intolerance of oral bisphosphonate therapy  zoledronic acid (RECLAST) 5 MG/100ML solution infusion   11. Menopausal state  DEXA Bone Density Axial    zoledronic acid (RECLAST) 5 MG/100ML solution infusion   12. Lumbar disc disease     13. History of malignant melanoma, 2008--right side of face.  1999--lower back.     14. History of basal cell carcinoma of skin     15. Therapeutic drug monitoring  CBC (No Diff)   16. Primary osteoarthritis of both hips       It appears that patient's impaired fasting glucose may have resolved but we will continue to monitor.  Hyperlipidemia is under perfect control.  Her blood pressure is well controlled.  There is no evidence of subclinical hypothyroidism on today's lab but we will continue to monitor a while longer.  Esophageal reflux currently not an issue and patient is not taking PPI therapy.  Microscopic hematuria is not evident on today's urinalysis.  This has been evaluated in the past.  She has osteoporosis and needs a repeat DEXA scan.  She is oral bisphosphonate intolerant.  She also needs a carotid Doppler study to assess her carotid artery plaque.  She has lumbar disc disease and back pain which comes and goes.  She also has a history of skin cancer including malignant  melanoma and I encouraged her to follow-up closely with her dermatologist.    Plan is as follows: I have encouraged her to have her COVID-19 booster shot.  DEXA scan and carotid Doppler study ordered.  It is okay for patient to wait till after the first of the year.  Reclast ordered.  She should work on low carbohydrate diet, exercise, and weight loss.  I will have her follow-up after April 2, 2022 with lab prior and this will also be her subsequent Medicare wellness visit.  Otherwise she will follow-up as needed.      Procedures

## 2021-11-23 ENCOUNTER — TRANSCRIBE ORDERS (OUTPATIENT)
Dept: ADMINISTRATIVE | Facility: HOSPITAL | Age: 81
End: 2021-11-23

## 2021-11-23 ENCOUNTER — TELEPHONE (OUTPATIENT)
Dept: INTERNAL MEDICINE | Facility: CLINIC | Age: 81
End: 2021-11-23

## 2021-11-23 DIAGNOSIS — Z13.6 ENCOUNTER FOR SCREENING FOR VASCULAR DISEASE: Primary | ICD-10-CM

## 2021-11-30 ENCOUNTER — TELEPHONE (OUTPATIENT)
Dept: INTERNAL MEDICINE | Facility: CLINIC | Age: 81
End: 2021-11-30

## 2021-11-30 NOTE — TELEPHONE ENCOUNTER
Caller: Lachelle Mcclure    Relationship to patient: Self    Best call back number: [phone]    Patient is needing: PT IS CALLING IN REGARDS TO THE RECLAST INFUSION FOR HER OSTEOPETROSIS THAT WAS DISCUSSED ON 11/19. SHE STATED SHE HASNT HEARD FROM ANYONE IN REGARDS TO SCHEDULING

## 2021-12-02 ENCOUNTER — HOSPITAL ENCOUNTER (OUTPATIENT)
Dept: INFUSION THERAPY | Facility: HOSPITAL | Age: 81
Discharge: HOME | End: 2021-12-02
Admitting: INTERNAL MEDICINE

## 2021-12-02 ENCOUNTER — TRANSCRIBE ORDERS (OUTPATIENT)
Dept: ADMINISTRATIVE | Facility: HOSPITAL | Age: 81
End: 2021-12-02

## 2021-12-02 VITALS
OXYGEN SATURATION: 96 % | SYSTOLIC BLOOD PRESSURE: 118 MMHG | DIASTOLIC BLOOD PRESSURE: 69 MMHG | HEART RATE: 67 BPM | RESPIRATION RATE: 20 BRPM | TEMPERATURE: 97.3 F

## 2021-12-02 DIAGNOSIS — U07.1 CLINICAL DIAGNOSIS OF SEVERE ACUTE RESPIRATORY SYNDROME CORONAVIRUS 2 (SARS-COV-2) DISEASE: Primary | ICD-10-CM

## 2021-12-02 DIAGNOSIS — U07.1 COVID-19: Primary | ICD-10-CM

## 2021-12-02 PROCEDURE — M0243 CASIRIVI AND IMDEVI INFUSION: HCPCS | Performed by: INTERNAL MEDICINE

## 2021-12-02 PROCEDURE — 96365 THER/PROPH/DIAG IV INF INIT: CPT | Performed by: NURSE PRACTITIONER

## 2021-12-02 PROCEDURE — 25010000002 INJECTION, CASIRIVIMAB AND IMDEVIMAB, 1200 MG: Performed by: INTERNAL MEDICINE

## 2021-12-02 RX ORDER — DIPHENHYDRAMINE HYDROCHLORIDE 50 MG/ML
50 INJECTION INTRAMUSCULAR; INTRAVENOUS ONCE AS NEEDED
Status: DISCONTINUED | OUTPATIENT
Start: 2021-12-02 | End: 2021-12-04 | Stop reason: HOSPADM

## 2021-12-02 RX ORDER — SODIUM CHLORIDE 9 MG/ML
30 INJECTION, SOLUTION INTRAVENOUS ONCE
Status: CANCELLED | OUTPATIENT
Start: 2021-12-02

## 2021-12-02 RX ORDER — DIPHENHYDRAMINE HCL 25 MG
50 CAPSULE ORAL ONCE AS NEEDED
Status: DISCONTINUED | OUTPATIENT
Start: 2021-12-02 | End: 2021-12-04 | Stop reason: HOSPADM

## 2021-12-02 RX ORDER — METHYLPREDNISOLONE SODIUM SUCCINATE 125 MG/2ML
125 INJECTION, POWDER, LYOPHILIZED, FOR SOLUTION INTRAMUSCULAR; INTRAVENOUS AS NEEDED
Status: CANCELLED | OUTPATIENT
Start: 2021-12-02

## 2021-12-02 RX ORDER — DIPHENHYDRAMINE HYDROCHLORIDE 50 MG/ML
50 INJECTION INTRAMUSCULAR; INTRAVENOUS ONCE AS NEEDED
Status: CANCELLED | OUTPATIENT
Start: 2021-12-02

## 2021-12-02 RX ORDER — EPINEPHRINE 1 MG/ML
0.3 INJECTION, SOLUTION, CONCENTRATE INTRAVENOUS AS NEEDED
Status: CANCELLED | OUTPATIENT
Start: 2021-12-02

## 2021-12-02 RX ORDER — METHYLPREDNISOLONE SODIUM SUCCINATE 125 MG/2ML
125 INJECTION, POWDER, LYOPHILIZED, FOR SOLUTION INTRAMUSCULAR; INTRAVENOUS AS NEEDED
Status: DISCONTINUED | OUTPATIENT
Start: 2021-12-02 | End: 2021-12-04 | Stop reason: HOSPADM

## 2021-12-02 RX ORDER — DIPHENHYDRAMINE HCL 25 MG
50 CAPSULE ORAL ONCE AS NEEDED
Status: CANCELLED | OUTPATIENT
Start: 2021-12-02

## 2021-12-02 RX ORDER — SODIUM CHLORIDE 9 MG/ML
30 INJECTION, SOLUTION INTRAVENOUS ONCE
Status: COMPLETED | OUTPATIENT
Start: 2021-12-02 | End: 2021-12-02

## 2021-12-02 RX ORDER — DIPHENHYDRAMINE HCL 50 MG
50 CAPSULE ORAL ONCE AS NEEDED
Status: CANCELLED | OUTPATIENT
Start: 2021-12-02

## 2021-12-02 RX ADMIN — SODIUM CHLORIDE 30 ML: 9 INJECTION, SOLUTION INTRAVENOUS at 18:55

## 2021-12-02 RX ADMIN — IMDEVIMAB: 1332 INJECTION, SOLUTION, CONCENTRATE INTRAVENOUS at 18:54

## 2021-12-02 NOTE — PROGRESS NOTES
Patient provided with Fact Sheet for Patients, Parents and Caregivers Emergency Use Authorization (EUA) of Casirivimab / Imdevimab for Coronavirus Disease 2019 (COVID-19) form.    Reviewed and patient verbalized understanding.  Appropriate PPE worn during the care of the patient.  Advised patient not to receive Covid vaccine for 90 days.       2043  Tolerated infusion without complaints.  DC'd per WC with  who also received a monoclonal AB infusion tonight.

## 2021-12-03 DIAGNOSIS — M81.0 AGE-RELATED OSTEOPOROSIS WITHOUT CURRENT PATHOLOGICAL FRACTURE: Primary | ICD-10-CM

## 2021-12-03 RX ORDER — SODIUM CHLORIDE 9 MG/ML
250 INJECTION, SOLUTION INTRAVENOUS ONCE
Status: CANCELLED | OUTPATIENT
Start: 2021-12-06

## 2021-12-03 NOTE — TELEPHONE ENCOUNTER
PATIENT IS CALLING IN SHE STATES THAT SHE SPOKE TO SOMEONE OVER AT THE HOSPITAL AND SHE ASKED ABOUT HER RECLAST THAT SHE WAS SUPPOSED TO GET AND SHE JUST GOT A CALL FROM THEM AND THEY STILL HAVE NO ORDER FOR THE RECLAST. SHE IS WANTING TO GET STATUS ON THIS.    PLEASE ADVISE    CALLBACK NUMBER IS  [phone]

## 2021-12-08 ENCOUNTER — TELEPHONE (OUTPATIENT)
Dept: INTERNAL MEDICINE | Facility: CLINIC | Age: 81
End: 2021-12-08

## 2021-12-08 NOTE — TELEPHONE ENCOUNTER
PATIENT CALLED STATING THAT SHE RECEIVED THE INFUSION TREATMENT FOR COVID ON 12/02/21. SHE WAS CONTACTED BY [facility] REGARDING A RECLAST ON 12/30/21, BUT WANTED TO CHECK WITH DR. KO AND MAKE SURE THAT IT ISN'T TOO SOON AFTER THE COVID INFUSION TO HAVE THE RECLAST DONE.    PLEASE ADVISE  [phone]

## 2021-12-20 DIAGNOSIS — I10 BENIGN ESSENTIAL HYPERTENSION: ICD-10-CM

## 2021-12-20 DIAGNOSIS — E78.2 MIXED HYPERLIPIDEMIA: ICD-10-CM

## 2021-12-20 RX ORDER — VALSARTAN AND HYDROCHLOROTHIAZIDE 160; 25 MG/1; MG/1
TABLET ORAL
Qty: 30 TABLET | Refills: 0 | Status: SHIPPED | OUTPATIENT
Start: 2021-12-20 | End: 2022-01-24

## 2021-12-20 RX ORDER — SIMVASTATIN 40 MG
TABLET ORAL
Qty: 30 TABLET | Refills: 0 | Status: SHIPPED | OUTPATIENT
Start: 2021-12-20 | End: 2022-01-24

## 2021-12-22 ENCOUNTER — LAB (OUTPATIENT)
Dept: LAB | Facility: HOSPITAL | Age: 81
End: 2021-12-22

## 2021-12-22 DIAGNOSIS — E03.8 SUBCLINICAL HYPOTHYROIDISM: Chronic | ICD-10-CM

## 2021-12-22 DIAGNOSIS — R31.29 MICROSCOPIC HEMATURIA: ICD-10-CM

## 2021-12-22 DIAGNOSIS — E55.9 VITAMIN D DEFICIENCY: Chronic | ICD-10-CM

## 2021-12-22 DIAGNOSIS — Z51.81 THERAPEUTIC DRUG MONITORING: ICD-10-CM

## 2021-12-22 DIAGNOSIS — E78.2 MIXED HYPERLIPIDEMIA: Chronic | ICD-10-CM

## 2021-12-22 DIAGNOSIS — M81.0 AGE-RELATED OSTEOPOROSIS WITHOUT CURRENT PATHOLOGICAL FRACTURE: ICD-10-CM

## 2021-12-22 DIAGNOSIS — R73.01 IMPAIRED FASTING GLUCOSE: Chronic | ICD-10-CM

## 2021-12-22 LAB
25(OH)D3 SERPL-MCNC: 42.7 NG/ML (ref 30–100)
ALBUMIN SERPL-MCNC: 4.3 G/DL (ref 3.5–5.2)
ALBUMIN/GLOB SERPL: 1.9 G/DL
ALP SERPL-CCNC: 69 U/L (ref 39–117)
ALT SERPL W P-5'-P-CCNC: 22 U/L (ref 1–33)
ANION GAP SERPL CALCULATED.3IONS-SCNC: 6.8 MMOL/L (ref 5–15)
AST SERPL-CCNC: 23 U/L (ref 1–32)
BACTERIA UR QL AUTO: NORMAL /HPF
BASOPHILS # BLD AUTO: 0.04 10*3/MM3 (ref 0–0.2)
BASOPHILS NFR BLD AUTO: 0.9 % (ref 0–1.5)
BILIRUB SERPL-MCNC: 0.4 MG/DL (ref 0–1.2)
BILIRUB UR QL STRIP: NEGATIVE
BUN SERPL-MCNC: 12 MG/DL (ref 8–23)
BUN/CREAT SERPL: 19.7 (ref 7–25)
CALCIUM SPEC-SCNC: 10.5 MG/DL (ref 8.6–10.5)
CHLORIDE SERPL-SCNC: 110 MMOL/L (ref 98–107)
CK SERPL-CCNC: 223 U/L (ref 20–180)
CLARITY UR: CLEAR
CO2 SERPL-SCNC: 27.2 MMOL/L (ref 22–29)
COLOR UR: YELLOW
CREAT SERPL-MCNC: 0.61 MG/DL (ref 0.57–1)
DEPRECATED RDW RBC AUTO: 40.3 FL (ref 37–54)
EOSINOPHIL # BLD AUTO: 0.13 10*3/MM3 (ref 0–0.4)
EOSINOPHIL NFR BLD AUTO: 2.9 % (ref 0.3–6.2)
ERYTHROCYTE [DISTWIDTH] IN BLOOD BY AUTOMATED COUNT: 12.2 % (ref 12.3–15.4)
GFR SERPL CREATININE-BSD FRML MDRD: 95 ML/MIN/1.73
GLOBULIN UR ELPH-MCNC: 2.3 GM/DL
GLUCOSE SERPL-MCNC: 85 MG/DL (ref 65–99)
GLUCOSE UR STRIP-MCNC: NEGATIVE MG/DL
HBA1C MFR BLD: 5.5 % (ref 4.8–5.6)
HCT VFR BLD AUTO: 40.8 % (ref 34–46.6)
HGB BLD-MCNC: 13 G/DL (ref 12–15.9)
HGB UR QL STRIP.AUTO: NEGATIVE
HYALINE CASTS UR QL AUTO: NORMAL /LPF
IMM GRANULOCYTES # BLD AUTO: 0.01 10*3/MM3 (ref 0–0.05)
IMM GRANULOCYTES NFR BLD AUTO: 0.2 % (ref 0–0.5)
KETONES UR QL STRIP: NEGATIVE
LEUKOCYTE ESTERASE UR QL STRIP.AUTO: ABNORMAL
LYMPHOCYTES # BLD AUTO: 0.88 10*3/MM3 (ref 0.7–3.1)
LYMPHOCYTES NFR BLD AUTO: 19.6 % (ref 19.6–45.3)
MAGNESIUM SERPL-MCNC: 2.3 MG/DL (ref 1.6–2.4)
MCH RBC QN AUTO: 29.1 PG (ref 26.6–33)
MCHC RBC AUTO-ENTMCNC: 31.9 G/DL (ref 31.5–35.7)
MCV RBC AUTO: 91.5 FL (ref 79–97)
MONOCYTES # BLD AUTO: 0.4 10*3/MM3 (ref 0.1–0.9)
MONOCYTES NFR BLD AUTO: 8.9 % (ref 5–12)
NEUTROPHILS NFR BLD AUTO: 3.03 10*3/MM3 (ref 1.7–7)
NEUTROPHILS NFR BLD AUTO: 67.5 % (ref 42.7–76)
NITRITE UR QL STRIP: NEGATIVE
NRBC BLD AUTO-RTO: 0 /100 WBC (ref 0–0.2)
PH UR STRIP.AUTO: 6.5 [PH] (ref 5–8)
PHOSPHATE SERPL-MCNC: 3.3 MG/DL (ref 2.5–4.5)
PLATELET # BLD AUTO: 211 10*3/MM3 (ref 140–450)
PMV BLD AUTO: 10.5 FL (ref 6–12)
POTASSIUM SERPL-SCNC: 4.1 MMOL/L (ref 3.5–5.2)
PROT SERPL-MCNC: 6.6 G/DL (ref 6–8.5)
PROT UR QL STRIP: NEGATIVE
RBC # BLD AUTO: 4.46 10*6/MM3 (ref 3.77–5.28)
RBC # UR STRIP: NORMAL /HPF
REF LAB TEST METHOD: NORMAL
SODIUM SERPL-SCNC: 144 MMOL/L (ref 136–145)
SP GR UR STRIP: 1.01 (ref 1–1.03)
SQUAMOUS #/AREA URNS HPF: NORMAL /HPF
T3FREE SERPL-MCNC: 3.34 PG/ML (ref 2–4.4)
T4 FREE SERPL-MCNC: 1.09 NG/DL (ref 0.93–1.7)
TSH SERPL DL<=0.05 MIU/L-ACNC: 1.43 UIU/ML (ref 0.27–4.2)
UROBILINOGEN UR QL STRIP: ABNORMAL
WBC # UR STRIP: NORMAL /HPF
WBC NRBC COR # BLD: 4.49 10*3/MM3 (ref 3.4–10.8)

## 2021-12-22 PROCEDURE — 80053 COMPREHEN METABOLIC PANEL: CPT

## 2021-12-22 PROCEDURE — 83704 LIPOPROTEIN BLD QUAN PART: CPT

## 2021-12-22 PROCEDURE — 83735 ASSAY OF MAGNESIUM: CPT

## 2021-12-22 PROCEDURE — 84443 ASSAY THYROID STIM HORMONE: CPT

## 2021-12-22 PROCEDURE — 82550 ASSAY OF CK (CPK): CPT

## 2021-12-22 PROCEDURE — 84439 ASSAY OF FREE THYROXINE: CPT

## 2021-12-22 PROCEDURE — 84100 ASSAY OF PHOSPHORUS: CPT

## 2021-12-22 PROCEDURE — 36415 COLL VENOUS BLD VENIPUNCTURE: CPT

## 2021-12-22 PROCEDURE — 83036 HEMOGLOBIN GLYCOSYLATED A1C: CPT

## 2021-12-22 PROCEDURE — 84481 FREE ASSAY (FT-3): CPT

## 2021-12-22 PROCEDURE — 82306 VITAMIN D 25 HYDROXY: CPT

## 2021-12-22 PROCEDURE — 81001 URINALYSIS AUTO W/SCOPE: CPT

## 2021-12-22 PROCEDURE — 85025 COMPLETE CBC W/AUTO DIFF WBC: CPT

## 2021-12-22 PROCEDURE — 80061 LIPID PANEL: CPT

## 2021-12-24 LAB
CHOLEST SERPL-MCNC: 138 MG/DL (ref 100–199)
HDL SERPL-SCNC: 34.1 UMOL/L
HDLC SERPL-MCNC: 59 MG/DL
LDL SERPL QN: 20.6 NM
LDL SERPL-SCNC: 823 NMOL/L
LDL SMALL SERPL-SCNC: 458 NMOL/L
LDLC SERPL CALC-MCNC: 64 MG/DL (ref 0–99)
TRIGL SERPL-MCNC: 73 MG/DL (ref 0–149)

## 2021-12-30 ENCOUNTER — HOSPITAL ENCOUNTER (OUTPATIENT)
Dept: INFUSION THERAPY | Facility: HOSPITAL | Age: 81
Discharge: HOME | End: 2021-12-30
Admitting: INTERNAL MEDICINE

## 2021-12-30 VITALS
HEART RATE: 75 BPM | WEIGHT: 151 LBS | RESPIRATION RATE: 20 BRPM | BODY MASS INDEX: 26.75 KG/M2 | OXYGEN SATURATION: 98 % | DIASTOLIC BLOOD PRESSURE: 73 MMHG | SYSTOLIC BLOOD PRESSURE: 142 MMHG | TEMPERATURE: 96.8 F

## 2021-12-30 DIAGNOSIS — M81.0 AGE-RELATED OSTEOPOROSIS WITHOUT CURRENT PATHOLOGICAL FRACTURE: Primary | ICD-10-CM

## 2021-12-30 PROCEDURE — 96365 THER/PROPH/DIAG IV INF INIT: CPT

## 2021-12-30 PROCEDURE — 25010000002 ZOLEDRONIC ACID 5 MG/100ML SOLUTION: Performed by: INTERNAL MEDICINE

## 2021-12-30 RX ORDER — ZOLEDRONIC ACID 5 MG/100ML
5 INJECTION, SOLUTION INTRAVENOUS ONCE
OUTPATIENT
Start: 2022-12-06

## 2021-12-30 RX ORDER — SODIUM CHLORIDE 9 MG/ML
250 INJECTION, SOLUTION INTRAVENOUS ONCE
OUTPATIENT
Start: 2022-12-06

## 2021-12-30 RX ORDER — ZOLEDRONIC ACID 5 MG/100ML
5 INJECTION, SOLUTION INTRAVENOUS ONCE
Status: COMPLETED | OUTPATIENT
Start: 2021-12-30 | End: 2021-12-30

## 2021-12-30 RX ADMIN — ZOLEDRONIC ACID 5 MG: 0.05 INJECTION, SOLUTION INTRAVENOUS at 14:12

## 2022-01-12 ENCOUNTER — TELEPHONE (OUTPATIENT)
Dept: INTERNAL MEDICINE | Facility: CLINIC | Age: 82
End: 2022-01-12

## 2022-01-12 ENCOUNTER — HOSPITAL ENCOUNTER (OUTPATIENT)
Dept: CARDIOLOGY | Facility: HOSPITAL | Age: 82
Discharge: HOME | End: 2022-01-12
Admitting: INTERNAL MEDICINE

## 2022-01-12 ENCOUNTER — OFFICE VISIT (OUTPATIENT)
Dept: INTERNAL MEDICINE | Facility: CLINIC | Age: 82
End: 2022-01-12

## 2022-01-12 VITALS
BODY MASS INDEX: 27.82 KG/M2 | SYSTOLIC BLOOD PRESSURE: 126 MMHG | HEART RATE: 74 BPM | DIASTOLIC BLOOD PRESSURE: 68 MMHG | HEIGHT: 63 IN | OXYGEN SATURATION: 99 % | WEIGHT: 157 LBS | RESPIRATION RATE: 17 BRPM

## 2022-01-12 DIAGNOSIS — M79.605 PAIN AND SWELLING OF LOWER EXTREMITY, LEFT: Primary | ICD-10-CM

## 2022-01-12 DIAGNOSIS — M79.89 PAIN AND SWELLING OF LOWER EXTREMITY, LEFT: Primary | ICD-10-CM

## 2022-01-12 DIAGNOSIS — Z86.16 HISTORY OF COVID-19: Chronic | ICD-10-CM

## 2022-01-12 DIAGNOSIS — I80.02 THROMBOPHLEBITIS OF SUPERFICIAL VEINS OF LEFT LOWER EXTREMITY: ICD-10-CM

## 2022-01-12 PROBLEM — M25.472 LEFT ANKLE SWELLING: Status: ACTIVE | Noted: 2022-01-12

## 2022-01-12 LAB
BH CV LOWER VASCULAR LEFT COMMON FEMORAL AUGMENT: NORMAL
BH CV LOWER VASCULAR LEFT COMMON FEMORAL COMPETENT: NORMAL
BH CV LOWER VASCULAR LEFT COMMON FEMORAL COMPRESS: NORMAL
BH CV LOWER VASCULAR LEFT COMMON FEMORAL PHASIC: NORMAL
BH CV LOWER VASCULAR LEFT COMMON FEMORAL SPONT: NORMAL
BH CV LOWER VASCULAR LEFT DISTAL FEMORAL COMPRESS: NORMAL
BH CV LOWER VASCULAR LEFT GASTRONEMIUS COMPRESS: NORMAL
BH CV LOWER VASCULAR LEFT GREATER SAPH AK COMPRESS: NORMAL
BH CV LOWER VASCULAR LEFT GREATER SAPH BK COMPRESS: NORMAL
BH CV LOWER VASCULAR LEFT LESSER SAPH COMPRESS: NORMAL
BH CV LOWER VASCULAR LEFT MID FEMORAL AUGMENT: NORMAL
BH CV LOWER VASCULAR LEFT MID FEMORAL COMPETENT: NORMAL
BH CV LOWER VASCULAR LEFT MID FEMORAL COMPRESS: NORMAL
BH CV LOWER VASCULAR LEFT MID FEMORAL PHASIC: NORMAL
BH CV LOWER VASCULAR LEFT MID FEMORAL SPONT: NORMAL
BH CV LOWER VASCULAR LEFT PERONEAL COMPRESS: NORMAL
BH CV LOWER VASCULAR LEFT POPLITEAL AUGMENT: NORMAL
BH CV LOWER VASCULAR LEFT POPLITEAL COMPETENT: NORMAL
BH CV LOWER VASCULAR LEFT POPLITEAL COMPRESS: NORMAL
BH CV LOWER VASCULAR LEFT POPLITEAL PHASIC: NORMAL
BH CV LOWER VASCULAR LEFT POPLITEAL SPONT: NORMAL
BH CV LOWER VASCULAR LEFT POSTERIOR TIBIAL COMPRESS: NORMAL
BH CV LOWER VASCULAR LEFT PROFUNDA FEMORAL COMPRESS: NORMAL
BH CV LOWER VASCULAR LEFT PROXIMAL FEMORAL COMPRESS: NORMAL
BH CV LOWER VASCULAR LEFT SAPHENOFEMORAL JUNCTION COMPRESS: NORMAL
BH CV LOWER VASCULAR RIGHT COMMON FEMORAL AUGMENT: NORMAL
BH CV LOWER VASCULAR RIGHT COMMON FEMORAL COMPETENT: NORMAL
BH CV LOWER VASCULAR RIGHT COMMON FEMORAL COMPRESS: NORMAL
BH CV LOWER VASCULAR RIGHT COMMON FEMORAL PHASIC: NORMAL
BH CV LOWER VASCULAR RIGHT COMMON FEMORAL SPONT: NORMAL

## 2022-01-12 PROCEDURE — 93971 EXTREMITY STUDY: CPT

## 2022-01-12 PROCEDURE — 99214 OFFICE O/P EST MOD 30 MIN: CPT | Performed by: INTERNAL MEDICINE

## 2022-01-12 RX ORDER — ASPIRIN 325 MG
TABLET, DELAYED RELEASE (ENTERIC COATED) ORAL
Start: 2022-01-12 | End: 2022-03-07

## 2022-01-12 NOTE — TELEPHONE ENCOUNTER
PATIENT IS CALLING IN REGARDING THE RESULT OF HER ULTRA SOUND THAT WAS DONE TODAY.  SHE IS WANTING TO KNOW IF SHE SHOULD CONTINUE WITH THE ASPRIN, AND SHOULD SHE STAY OFF HER FEET UNTIL SHE GETS THE RESULTS OF THIS TEST.  PLEASE ADVISE THE PATIENT AS TO WHAT SHE SHOULD BE DOING.   SHE ALSO WOULD LIKE TO KNOW IF SOMEONE CAN CALL RADIOLOGY TO GET A VERBAL REPORT SINCE THEY DID TRY TO CALL EARLIER  TO GIVE REPORT      [phone]

## 2022-01-12 NOTE — PROGRESS NOTES
Juan Vega MD front office and I spoke they will relay prelim findings of negative venous exam done today.

## 2022-01-12 NOTE — PROGRESS NOTES
01/12/2022    Patient Information  Lachelle Mcclure                                                                                          [address]  [facility] [address]      1940  [unfilled]  There is no work phone number on file.    Chief Complaint:     Complaining of swelling and tenderness of the left leg.  Follow-up recent COVID-19 infection.    History of Present Illness:     Patient with a history of hypertension, carotid artery plaque, esophageal reflux, hyperlipidemia, osteoporosis, vitamin D deficiency, history of melanoma, lumbar disc disease, impaired fasting glucose, subclinical hypothyroidism.  She also has a recent history of COVID infection which we will review.  Her past medical history reviewed and updated were necessary including health maintenance parameters.  This reveals she is currently up-to-date or else accounted for.    The history regarding swelling, tenderness, and pain of left leg:    January 12, 2022--patient reports a 2-week history of left lower extremity swelling which is just above the ankle and sometimes extends all the way down into the ankle and is associated with redness and tenderness. It tends to get worse towards the end of the day. She has had no shortness of breath or chest pain and there is been no significant swelling involving the upper part of either leg. On exam she has erythema that is located anteriorly just above the ankle on the left and this is somewhat laterally located. She has significant varicose veins involving the entire left lower extremity. There is tenderness to palpation over this area and I think, but not totally certain, that there is a palpable cord. She has superficial thrombophlebitis at a minimum but I also need to rule out DVT.  Plan is as follows: Stat Doppler venous study left lower extremity to rule out DVT.  If the result returns just a superficial thrombophlebitis I have recommended that she take aspirin 325 mg, 2 p.o.  4 times daily for no more than the next week.  She also needs to elevate her leg is much as possible and apply a heating pad.  I will have her follow-up in about 8 to 10 days to reassess, sooner if anything changes.    The history regarding recent COVID-19 infection:    January 12, 2022--patient reports she had tested positive for COVID approximately December 1, 2021. Her symptoms consisted of marked fatigue, very mild fever, nonproductive cough but no change in taste or smell. She had a poor appetite. Patient received the bam infusion and currently she is totally symptom-free. No residual. Patient had received the Pfizer COVID-vaccine x2 prior to the onset of symptoms. She has not had her booster and she is aware she has to wait 3 months after receiving the bam infusion.    Review of Systems   Constitutional: Negative.   HENT: Negative.    Eyes: Negative.    Cardiovascular: Positive for leg swelling.   Respiratory: Negative.    Endocrine: Negative.    Hematologic/Lymphatic: Negative.    Skin: Negative.    Musculoskeletal: Negative.    Gastrointestinal: Negative.    Genitourinary: Negative.    Neurological: Negative.    Psychiatric/Behavioral: Negative.    Allergic/Immunologic: Negative.        Active Problems:    Patient Active Problem List   Diagnosis   • Benign essential hypertension   • Carotid artery plaque, 4/3/2019--mild bilateral.  10/06/2016--vascular screen is now normal without carotid disease.  Improved.   • Gastroesophageal reflux disease without esophagitis   • Hyperlipidemia   • Microscopic hematuria   • Osteoporosis, 11/5/2019--lumbar spine -0.3.  Left femoral neck -2.7.  Right femoral neck -2.4.   • Tinnitus of right ear   • History of malignant melanoma, 2008--right side of face.  1999--lower back.   • Therapeutic drug monitoring   • Vitamin D deficiency   • Lumbar disc disease   • Chronic right hip pain   • Impaired fasting glucose   • Menopausal state   • History of basal cell carcinoma of skin    • Subclinical hypothyroidism   • Intolerance of oral bisphosphonate therapy   • Primary osteoarthritis of both hips   • History of COVID-19   • Pain and swelling of lower extremity, left   • Thrombophlebitis of superficial veins of left lower extremity         Past Medical History:   Diagnosis Date   • Benign essential hypertension 4/18/2006 04/18/2006--treatment for hypertension begun.   • Carotid artery plaque, 10/06/2016--vascular screen is now normal without carotid disease.  Improved. 7/30/2010    10/06/2016--vascular screen reveals no evidence of carotid plaque, negative for AAA, negative for PAD.  04/23/2014--vascular screen reveals mild bilateral carotid plaque, negative for AAA, negative for PAD.   12/07/2012--vascular screen reveals mild left-sided carotid plaque, normal right carotid, negative for AAA, negative for PAD.   07/30/2010--vascular screen reveals mild left-sided carotid plaque, normal right carotid, negative for AAA, negative for PAD.   • Chronic right hip pain 9/1/2017 09/01/2017--patient has had a one-year history of intermittent episodes of lateral right hip pain that at times is very severe and debilitating.  She also has tenderness over this area that limits her from sleeping on her right side.  Last year I gave her a cortisone injection for trochanteric bursitis and this provided immediate relief but not long lasting.  X-rays were negative for fracture or even degenerative arthritis.  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  Ordered.   • Gastroesophageal reflux disease without esophagitis 1/14/2013    04/10/2014--patient presented with right upper quadrant/epigastric burning pain and discomfort that was postprandial. H pylori breath test was negative. Empiric trial of Dexilant 60 mg per day was initiated for esophageal reflux/dyspepsia.   01/14/2013--EGD performed for epigastric and right upper quadrant pain. There  was some edematous appearing mucosa in the antrum and body of the stomach which was biopsied. No significant erythema. No ulcerations. Normal appearing duodenum and esophagus. Pathology revealed moderate chronic active gastritis. No intestinal metaplasia. Helicobacter pylori positive. Patient was treated with appropriate antibiotic and PPI therapy.   • History of basal cell carcinoma of skin 2/12/2019 June 2018--Mohs micrographic surgery of basal cell carcinoma on the tip of the nose.   • History of Helicobacter pylori gastritis 01/14/2013    04/10/2014--patient presented with right upper quadrant/epigastric burning pain and discomfort that was postprandial. H pylori breath test was negative. Empiric trial of Dexilant 60 mg per day was initiated for esophageal reflux/dyspepsia.  01/14/2013--EGD performed for epigastric and right upper quadrant pain. There was some edematous appearing mucosa in the antrum and body of the stomach which w   • History of malignant melanoma, 2008--right side of face.  1999--lower back. 1/1/2008 2008--melanoma resected from right face.   1999--malignant melanoma resected from lower back.   • History of routine gynecologic exam yearly    Patient sees a gynecologist.   • Hyperlipidemia 8/13/2010 08/13/2010--treatment for hyperlipidemia begun.   • Impaired fasting glucose 9/1/2017 09/01/2017--routine physical.  Serum glucose elevated at 101.  Recommend weight loss and decrease carbohydrate intake.   • Intolerance of oral bisphosphonate therapy 2/21/2020   • Lumbar disc disease 10/19/2016    09/13/2016--x-ray lumbar spine reveals disc space narrowing at L2-L3, L3-L4, L4-L5, and possibly L5-S1.  There is mild anterior listhesis of L4 on L5 measuring 4.5 mm.  No compression deformity, nor other evidence of acute process.   • Menopausal state 9/1/2017   • Microscopic hematuria 12/18/2015 12/22/2015--urine cytology negative for malignant cells.  Red blood cells noted.  Transitional  epithelial cells noted.  Urine culture revealed less than 10,000 colony forming units of mixed urogenital laurie.  Repeat urinalysis was negative.  Recommend observation.  12/18/2015--routine urinalysis reveals 3-5 WBCs and 3-5 RBCs. 0 epithelial cells. 0 bacteria. Patient is asymptomatic. Repeat urinalysis, urine cytology, and urine culture sent.   • Osteoporosis, 11/5/2019--lumbar spine -0.3.  Left femoral neck -2.7.  Right femoral neck -2.4. 7/24/2009 November 5, 2019--DEXA scan reveals lumbar spine T score -0.3 representing a 5% increase.  Left femoral neck T score -2.7 representing a 5% decrease.  Right femoral neck T score -2.4 which is unchanged.  February 12, 2019--patient seen in follow-up and reports she really did not feel well on Fosamax.  She has been using calcium and vitamin D supplementation.  October 18, 2017--DEXA scan reveals jesse   • Primary osteoarthritis of both hips 11/19/2021 November 19, 2021-9 being evaluated and treated by the orthopedist.  Patient may need bilateral hip replacements at some point in the future.   • Subclinical hypothyroidism 2/21/2020 February 21, 2020--TSH slightly elevated at 4.41.  Free T3 and free T4 are normal.  Close observation.   • Tinnitus of right ear 12/18/2015 12/18/2015--patient presents with a four-month history of ringing in her right ear. No hearing loss. Examination reveals a cerumen impaction of the right ear canal. This was removed with irrigation and curettage.   • Vitamin D deficiency 8/12/2016         Past Surgical History:   Procedure Laterality Date   • BASAL CELL CARCINOMA EXCISION  06/2018 June 2018--Mohs micrographic surgery of basal cell carcinoma on the tip of the nose.   • CHOLECYSTECTOMY  05/17/2013 05/17/2013--laparoscopic cholecystectomy.   • COLONOSCOPY  07/12/2012 07/12/2012--normal colonoscopy to the cecum with an excellent prep.    • COLONOSCOPY  06/20/2003 06/20/2003--normal colonoscopy to the cecum.   •  ESOPHAGOSCOPY / EGD  01/14/2013 01/14/2013--EGD performed for epigastric and right upper quadrant pain. There was some edematous appearing mucosa in the antrum and body of the stomach which was biopsied. No significant erythema. No ulcerations. Normal appearing duodenum and esophagus. Pathology revealed moderate chronic active gastritis. No intestinal metaplasia. Helicobacter pylori positive. Patient was treated with appropriate   • FOOT SURGERY  1992 1992--orthopedic 10 placed in right great toe.   • HYSTERECTOMY  1986 1986--total abdominal hysterectomy.   • SKIN CANCER EXCISION  09/14/2009 09/14/2009--excision 1 cm mid scalp cyst. Pathology benign. 2008--melanoma resected from right face. 1999--malignant melanoma resected from lower back.   • TONSILLECTOMY AND ADENOIDECTOMY  1946 1946.         No Known Allergies        Current Outpatient Medications:   •  aspirin (ASPIRIN LOW DOSE) 81 MG tablet, Take 1 tablet by mouth Daily As Needed., Disp: , Rfl:   •  Calcium Carbonate-Vit D-Min (CALCIUM 1200) 4681-6751 MG-UNIT chewable tablet, Chew 1 tablet daily, Disp: , Rfl:   •  Cholecalciferol (VITAMIN D) 2000 UNITS capsule, Take 1 capsule by mouth daily, Disp: , Rfl:   •  Coenzyme Q10 (COQ10) 400 MG capsule, Take 1 capsule by mouth Daily., Disp: , Rfl:   •  simvastatin (ZOCOR) 40 MG tablet, TAKE 1 TABLET EVERY DAY FOR HIGH CHOLESTEROL (NEED MD APPOINTMENT), Disp: 30 tablet, Rfl: 0  •  valsartan-hydrochlorothiazide (DIOVAN-HCT) 160-25 MG per tablet, TAKE 1 TABLET EVERY DAY FOR BLOOD PRESSURE, Disp: 30 tablet, Rfl: 0  •  zoledronic acid (RECLAST) 5 MG/100ML solution infusion, Infuse 100 mL into a venous catheter 1 (One) Time for 1 dose., Disp: 100 mL, Rfl: 0      Family History   Problem Relation Age of Onset   • COPD Mother    • Hypertension Mother         Essential   • COPD Father    • Hypertension Father          Social History     Socioeconomic History   • Marital status:    • Highest education  "level: Bachelor's degree (e.g., BA, AB, BS)   Tobacco Use   • Smoking status: Never Smoker   • Smokeless tobacco: Never Used   Vaping Use   • Vaping Use: Never used   Substance and Sexual Activity   • Alcohol use: No   • Drug use: No   • Sexual activity: Yes     Partners: Male         Vitals:    01/12/22 0926   BP: 126/68   Pulse: 74   Resp: 17   SpO2: 99%   Weight: 71.2 kg (157 lb)   Height: 160 cm (63\")        Body mass index is 27.81 kg/m².      Physical Exam:    General: Alert and oriented x 3.  No acute distress.  Normal affect.  HEENT: Pupils equal, round, reactive to light; extraocular movements intact; sclerae nonicteric; pharynx, ear canals and TMs normal.  Neck: Without JVD, thyromegaly, bruit, or adenopathy.  Lungs: Clear to auscultation in all fields.  Heart: Regular rate and rhythm without murmur, rub, gallop, or click.  Abdomen: Soft, nontender, without hepatosplenomegaly or hernia.  Bowel sounds normal.  : Deferred.  Rectal: Deferred.  Extremities: Without clubbing, cyanosis,  or pulse deficit.  There is only very mild edema involving the left leg.  She has significant superficial varicose veins and there is an area of erythema above the ankle somewhat laterally and this area is tender.  I think there may be a palpable cord but I am not certain.  Her calf is not definitely tender.  Neurologic: Intact without focal deficit.  Normal station and gait observed during ingress and egress from the examination room.  Skin: Without significant lesion.  Musculoskeletal: Unremarkable.    Lab/other results:      Assessment/Plan:     Diagnosis Plan   1. Pain and swelling of lower extremity, left  Duplex Venous Lower Extremity - Left CAR   2. Thrombophlebitis of superficial veins of left lower extremity  Duplex Venous Lower Extremity - Left CAR   3. History of COVID-19       January 12, 2022--patient reports a 2-week history of left lower extremity swelling which is just above the ankle and sometimes extends all " the way down into the ankle and is associated with redness and tenderness. It tends to get worse towards the end of the day. She has had no shortness of breath or chest pain and there is been no significant swelling involving the upper part of either leg. On exam she has erythema that is located anteriorly just above the ankle on the left and this is somewhat laterally located. She has significant varicose veins involving the entire left lower extremity. There is tenderness to palpation over this area and I think, but not totally certain, that there is a palpable cord. She has superficial thrombophlebitis at a minimum but I also need to rule out DVT.      Plan is as follows: Stat Doppler venous study left lower extremity to rule out DVT.  If the result returns just a superficial thrombophlebitis I have recommended that she take aspirin 325 mg, 2 p.o. 4 times daily for no more than the next week.  She also needs to elevate her leg is much as possible and apply a heating pad.  I will have her follow-up in about 8 to 10 days to reassess, sooner if anything changes.    January 12, 2022--patient reports she had tested positive for COVID approximately December 1, 2021. Her symptoms consisted of marked fatigue, very mild fever, nonproductive cough but no change in taste or smell. She had a poor appetite. Patient received the bam infusion and currently she is totally symptom-free. No residual. Patient had received the Pfizer COVID-vaccine x2 prior to the onset of symptoms. She has not had her booster and she is aware she has to wait 3 months after receiving the bam infusion.          Procedures        Answers for HPI/ROS submitted by the patient on 1/10/2022  Please describe your symptoms.: Redness above Lt ankle ....swelling around ankle  Have you had these symptoms before?: No  How long have you been having these symptoms?: Greater than 2 weeks  What is the primary reason for your visit?: Other

## 2022-01-14 NOTE — TELEPHONE ENCOUNTER
The ultrasound was negative for DVT.  She has inflammation of the superficial veins and I want her to stay on the aspirin as we directed and keep follow-up next week.  Make sure she has a follow-up appointment next week.

## 2022-01-24 DIAGNOSIS — E78.2 MIXED HYPERLIPIDEMIA: ICD-10-CM

## 2022-01-24 DIAGNOSIS — I10 BENIGN ESSENTIAL HYPERTENSION: ICD-10-CM

## 2022-01-24 RX ORDER — VALSARTAN AND HYDROCHLOROTHIAZIDE 160; 25 MG/1; MG/1
TABLET ORAL
Qty: 90 TABLET | Refills: 2 | Status: SHIPPED | OUTPATIENT
Start: 2022-01-24

## 2022-01-24 RX ORDER — SIMVASTATIN 40 MG
TABLET ORAL
Qty: 90 TABLET | Refills: 2 | Status: SHIPPED | OUTPATIENT
Start: 2022-01-24

## 2022-02-01 ENCOUNTER — TELEPHONE (OUTPATIENT)
Dept: INTERNAL MEDICINE | Facility: CLINIC | Age: 82
End: 2022-02-01

## 2022-02-01 NOTE — TELEPHONE ENCOUNTER
Caller: Lachelle Mcclure    Relationship: Self    Best call back number: [phone]    What was the call regarding: PATIENT CANCELLED FOLLOW UP APPT WITH DR. KO, TO SEE FAMILY AND WILL BE GONE UNTIL UP IN February, BUT THE PATIENT WANTED TO LET DR. KO KNOW THAT HER ANKLE IS DOING MUCH BETTER. THE PATIENT STATES SHE HAS NO PAIN OR SWELLING, DOES HAVE SOME SLIGHT DISCOLORATION, BUT NO HEAT.    Do you require a callback: NO

## 2022-02-18 ENCOUNTER — TELEPHONE (OUTPATIENT)
Dept: INTERNAL MEDICINE | Facility: CLINIC | Age: 82
End: 2022-02-18

## 2022-02-21 ENCOUNTER — TELEPHONE (OUTPATIENT)
Dept: INTERNAL MEDICINE | Facility: CLINIC | Age: 82
End: 2022-02-21

## 2022-02-21 NOTE — TELEPHONE ENCOUNTER
Caller: Lachelle Mcclure    Relationship: Self    Best call back number:     What is the best time to reach you:     Who are you requesting to speak with (clinical staff, provider,  specific staff member):     Do you know the name of the person who called:     What was the call regarding: PATIENT IS CALLING IN STATING THAT HER LEFT ANKLE IS STILL RED AND SLIGHTLY SWOLLEN, TENDER TO THE TOUCH.  SHE HAS HAD SEVERAL APPOINTMENTS SCHEDULED WITH DR KO BUT THEY WERE CANCELLED DUE TOT HE DR BEING ILL.  THE FIRST APPOINTMENT THAT I FOUND FOR HER WAS NOT UNTIL MARCH 16, 2022 AND SHE WANTS TO BE CALLED WITH INSTRUCTIONS ON WHAT SHE NEEDS TO DO FOR HER ANKLE IN THE MEANTIME.  SHE DID NOT SCHEDULE THE APPOINTMENT FOR MARCH. PATIENT SAYS THAT SHE CALLED IN LAST WEEK REGARDING THIS AND HAS NOT BEEN CALLED BACK.      Do you require a callback: YES

## 2022-03-07 ENCOUNTER — OFFICE VISIT (OUTPATIENT)
Dept: INTERNAL MEDICINE | Facility: CLINIC | Age: 82
End: 2022-03-07

## 2022-03-07 ENCOUNTER — TELEPHONE (OUTPATIENT)
Dept: INTERNAL MEDICINE | Facility: CLINIC | Age: 82
End: 2022-03-07

## 2022-03-07 VITALS
HEART RATE: 69 BPM | HEIGHT: 63 IN | OXYGEN SATURATION: 99 % | DIASTOLIC BLOOD PRESSURE: 70 MMHG | SYSTOLIC BLOOD PRESSURE: 122 MMHG | WEIGHT: 152.36 LBS | BODY MASS INDEX: 27 KG/M2 | RESPIRATION RATE: 17 BRPM

## 2022-03-07 DIAGNOSIS — M79.89 PAIN AND SWELLING OF LOWER EXTREMITY, LEFT: ICD-10-CM

## 2022-03-07 DIAGNOSIS — I80.02 THROMBOPHLEBITIS OF SUPERFICIAL VEINS OF LEFT LOWER EXTREMITY: Primary | ICD-10-CM

## 2022-03-07 DIAGNOSIS — I83.812 VARICOSE VEINS OF LEG WITH PAIN, LEFT: ICD-10-CM

## 2022-03-07 DIAGNOSIS — M79.605 PAIN AND SWELLING OF LOWER EXTREMITY, LEFT: ICD-10-CM

## 2022-03-07 PROBLEM — Z86.72 HISTORY OF THROMBOPHLEBITIS: Status: RESOLVED | Noted: 2022-01-12 | Resolved: 2022-03-07

## 2022-03-07 PROBLEM — Z86.72 HISTORY OF THROMBOPHLEBITIS: Status: ACTIVE | Noted: 2022-01-12

## 2022-03-07 PROCEDURE — 99214 OFFICE O/P EST MOD 30 MIN: CPT | Performed by: INTERNAL MEDICINE

## 2022-03-07 RX ORDER — CEPHALEXIN 500 MG/1
CAPSULE ORAL
Qty: 21 CAPSULE | Refills: 0 | Status: SHIPPED | OUTPATIENT
Start: 2022-03-07 | End: 2022-04-19

## 2022-03-07 RX ORDER — ASPIRIN 325 MG
TABLET, DELAYED RELEASE (ENTERIC COATED) ORAL
Start: 2022-03-07 | End: 2022-04-19

## 2022-03-07 NOTE — PROGRESS NOTES
03/07/2022    Patient Information  Lachelle Mcclure                                                                                          [address]  [facility] [address]      1940  [unfilled]  There is no work phone number on file.    Chief Complaint:     Complaining of continued concerns left lower extremity and varicose veins.    History of Present Illness:    March 7, 2022--patient reports that she is still having some problems and concerns regarding her left lower extremity superficial thrombophlebitis.  She has an area on the left lower extremity that is laterally and a little anteriorly and located above the ankle that is discolored and little tender.  The area is approximately 2 inches in diameter.  Has had no fever, chills, or other systemic signs or symptoms.  On exam there is discoloration and very mild erythema.  There is little tenderness.  There appears to be a small palpable cord that is tender.  Plan is as follows: I will have patient go back on aspirin 325 mg 1 p.o. 3 times daily.  Empiric Keflex 500 mg p.o. 3 times daily x1 week.  Vascular surgery referral to assess the varicose veins and thrombophlebitis.    January 12, 2022--patient reports a 2-week history of left lower extremity swelling which is just above the ankle and sometimes extends all the way down into the ankle and is associated with redness and tenderness. It tends to get worse towards the end of the day. She has had no shortness of breath or chest pain and there is been no significant swelling involving the upper part of either leg. On exam she has erythema that is located anteriorly just above the ankle on the left and this is somewhat laterally located. She has significant varicose veins involving the entire left lower extremity. There is tenderness to palpation over this area and I think, but not totally certain, that there is a palpable cord. She has superficial thrombophlebitis at a minimum but I also need  to rule out DVT.  Plan is as follows: Stat Doppler venous study left lower extremity to rule out DVT.  If the result returns just a superficial thrombophlebitis I have recommended that she take aspirin 325 mg, 2 p.o. 4 times daily for no more than the next week.  She also needs to elevate her leg is much as possible and apply a heating pad.  I will have her follow-up in about 8 to 10 days to reassess, sooner if anything changes.    Review of Systems   Constitutional: Negative.   HENT: Negative.    Eyes: Negative.    Cardiovascular: Negative.    Respiratory: Negative.    Endocrine: Negative.    Hematologic/Lymphatic: Negative.    Skin: Negative.    Musculoskeletal: Negative.    Gastrointestinal: Negative.    Genitourinary: Negative.    Neurological: Negative.    Psychiatric/Behavioral: Negative.    Allergic/Immunologic: Negative.        Active Problems:    Patient Active Problem List   Diagnosis   • Benign essential hypertension   • Carotid artery plaque, 4/3/2019--mild bilateral.  10/06/2016--vascular screen is now normal without carotid disease.  Improved.   • Gastroesophageal reflux disease without esophagitis   • Hyperlipidemia   • Microscopic hematuria   • Osteoporosis, 11/5/2019--lumbar spine -0.3.  Left femoral neck -2.7.  Right femoral neck -2.4.   • Tinnitus of right ear   • History of malignant melanoma, 2008--right side of face.  1999--lower back.   • Therapeutic drug monitoring   • Vitamin D deficiency   • Lumbar disc disease   • Chronic right hip pain   • Impaired fasting glucose   • Menopausal state   • History of basal cell carcinoma of skin   • Subclinical hypothyroidism   • Intolerance of oral bisphosphonate therapy   • Primary osteoarthritis of both hips   • History of COVID-19   • Pain and swelling of lower extremity, left   • Thrombophlebitis of superficial veins of left lower extremity   • Varicose veins of leg with pain, left         Past Medical History:   Diagnosis Date   • Benign essential  hypertension 4/18/2006 04/18/2006--treatment for hypertension begun.   • Carotid artery plaque, 10/06/2016--vascular screen is now normal without carotid disease.  Improved. 7/30/2010    10/06/2016--vascular screen reveals no evidence of carotid plaque, negative for AAA, negative for PAD.  04/23/2014--vascular screen reveals mild bilateral carotid plaque, negative for AAA, negative for PAD.   12/07/2012--vascular screen reveals mild left-sided carotid plaque, normal right carotid, negative for AAA, negative for PAD.   07/30/2010--vascular screen reveals mild left-sided carotid plaque, normal right carotid, negative for AAA, negative for PAD.   • Chronic right hip pain 9/1/2017 09/01/2017--patient has had a one-year history of intermittent episodes of lateral right hip pain that at times is very severe and debilitating.  She also has tenderness over this area that limits her from sleeping on her right side.  Last year I gave her a cortisone injection for trochanteric bursitis and this provided immediate relief but not long lasting.  X-rays were negative for fracture or even degenerative arthritis.  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  Ordered.   • Gastroesophageal reflux disease without esophagitis 1/14/2013    04/10/2014--patient presented with right upper quadrant/epigastric burning pain and discomfort that was postprandial. H pylori breath test was negative. Empiric trial of Dexilant 60 mg per day was initiated for esophageal reflux/dyspepsia.   01/14/2013--EGD performed for epigastric and right upper quadrant pain. There was some edematous appearing mucosa in the antrum and body of the stomach which was biopsied. No significant erythema. No ulcerations. Normal appearing duodenum and esophagus. Pathology revealed moderate chronic active gastritis. No intestinal metaplasia. Helicobacter pylori positive. Patient was treated with appropriate  antibiotic and PPI therapy.   • History of basal cell carcinoma of skin 2/12/2019 June 2018--Mohs micrographic surgery of basal cell carcinoma on the tip of the nose.   • History of Helicobacter pylori gastritis 01/14/2013    04/10/2014--patient presented with right upper quadrant/epigastric burning pain and discomfort that was postprandial. H pylori breath test was negative. Empiric trial of Dexilant 60 mg per day was initiated for esophageal reflux/dyspepsia.  01/14/2013--EGD performed for epigastric and right upper quadrant pain. There was some edematous appearing mucosa in the antrum and body of the stomach which w   • History of malignant melanoma, 2008--right side of face.  1999--lower back. 1/1/2008 2008--melanoma resected from right face.   1999--malignant melanoma resected from lower back.   • History of routine gynecologic exam yearly    Patient sees a gynecologist.   • History of thrombophlebitis, superficial, left lower extremity. 1/12/2022 January 12, 2022--patient reports a 2-week history of left lower extremity swelling which is just above the ankle and sometimes extends all the way down into the ankle and is associated with redness and tenderness. It tends to get worse towards the end of the day. She has had no shortness of breath or chest pain and there is been no significant swelling involving the upper part of either leg. On e   • Hyperlipidemia 8/13/2010 08/13/2010--treatment for hyperlipidemia begun.   • Impaired fasting glucose 9/1/2017 09/01/2017--routine physical.  Serum glucose elevated at 101.  Recommend weight loss and decrease carbohydrate intake.   • Intolerance of oral bisphosphonate therapy 2/21/2020   • Lumbar disc disease 10/19/2016    09/13/2016--x-ray lumbar spine reveals disc space narrowing at L2-L3, L3-L4, L4-L5, and possibly L5-S1.  There is mild anterior listhesis of L4 on L5 measuring 4.5 mm.  No compression deformity, nor other evidence of acute process.   •  Menopausal state 9/1/2017   • Microscopic hematuria 12/18/2015 12/22/2015--urine cytology negative for malignant cells.  Red blood cells noted.  Transitional epithelial cells noted.  Urine culture revealed less than 10,000 colony forming units of mixed urogenital laurie.  Repeat urinalysis was negative.  Recommend observation.  12/18/2015--routine urinalysis reveals 3-5 WBCs and 3-5 RBCs. 0 epithelial cells. 0 bacteria. Patient is asymptomatic. Repeat urinalysis, urine cytology, and urine culture sent.   • Osteoporosis, 11/5/2019--lumbar spine -0.3.  Left femoral neck -2.7.  Right femoral neck -2.4. 7/24/2009 November 5, 2019--DEXA scan reveals lumbar spine T score -0.3 representing a 5% increase.  Left femoral neck T score -2.7 representing a 5% decrease.  Right femoral neck T score -2.4 which is unchanged.  February 12, 2019--patient seen in follow-up and reports she really did not feel well on Fosamax.  She has been using calcium and vitamin D supplementation.  October 18, 2017--DEXA scan reveals jesse   • Primary osteoarthritis of both hips 11/19/2021 November 19, 2021-9 being evaluated and treated by the orthopedist.  Patient may need bilateral hip replacements at some point in the future.   • Subclinical hypothyroidism 2/21/2020 February 21, 2020--TSH slightly elevated at 4.41.  Free T3 and free T4 are normal.  Close observation.   • Tinnitus of right ear 12/18/2015 12/18/2015--patient presents with a four-month history of ringing in her right ear. No hearing loss. Examination reveals a cerumen impaction of the right ear canal. This was removed with irrigation and curettage.   • Vitamin D deficiency 8/12/2016         Past Surgical History:   Procedure Laterality Date   • BASAL CELL CARCINOMA EXCISION  06/2018 June 2018--Mohs micrographic surgery of basal cell carcinoma on the tip of the nose.   • CHOLECYSTECTOMY  05/17/2013 05/17/2013--laparoscopic cholecystectomy.   • COLONOSCOPY  07/12/2012     07/12/2012--normal colonoscopy to the cecum with an excellent prep.    • COLONOSCOPY  06/20/2003 06/20/2003--normal colonoscopy to the cecum.   • ESOPHAGOSCOPY / EGD  01/14/2013 01/14/2013--EGD performed for epigastric and right upper quadrant pain. There was some edematous appearing mucosa in the antrum and body of the stomach which was biopsied. No significant erythema. No ulcerations. Normal appearing duodenum and esophagus. Pathology revealed moderate chronic active gastritis. No intestinal metaplasia. Helicobacter pylori positive. Patient was treated with appropriate   • FOOT SURGERY  1992 1992--orthopedic 10 placed in right great toe.   • HYSTERECTOMY  1986 1986--total abdominal hysterectomy.   • SKIN CANCER EXCISION  09/14/2009 09/14/2009--excision 1 cm mid scalp cyst. Pathology benign. 2008--melanoma resected from right face. 1999--malignant melanoma resected from lower back.   • TONSILLECTOMY AND ADENOIDECTOMY  1946 1946.         No Known Allergies        Current Outpatient Medications:   •  aspirin  MG tablet, Take 1 p.o. 3 times daily until further direction., Disp: , Rfl:   •  Calcium Carbonate-Vit D-Min (CALCIUM 1200) 8501-4025 MG-UNIT chewable tablet, Chew 1 tablet daily, Disp: , Rfl:   •  Cholecalciferol (VITAMIN D) 2000 UNITS capsule, Take 1 capsule by mouth daily, Disp: , Rfl:   •  Coenzyme Q10 (COQ10) 400 MG capsule, Take 1 capsule by mouth Daily., Disp: , Rfl:   •  simvastatin (ZOCOR) 40 MG tablet, TAKE 1 TABLET EVERY DAY FOR HIGH CHOLESTEROL (NEED MD APPOINTMENT), Disp: 90 tablet, Rfl: 2  •  valsartan-hydrochlorothiazide (DIOVAN-HCT) 160-25 MG per tablet, TAKE 1 TABLET EVERY DAY FOR BLOOD PRESSURE, Disp: 90 tablet, Rfl: 2  •  cephalexin (Keflex) 500 MG capsule, Take 1 p.o. 3 times daily until gone, Disp: 21 capsule, Rfl: 0  •  zoledronic acid (RECLAST) 5 MG/100ML solution infusion, Infuse 100 mL into a venous catheter 1 (One) Time for 1 dose., Disp: 100 mL, Rfl:  "0      Family History   Problem Relation Age of Onset   • COPD Mother    • Hypertension Mother         Essential   • COPD Father    • Hypertension Father          Social History     Socioeconomic History   • Marital status:    • Highest education level: Bachelor's degree (e.g., BA, AB, BS)   Tobacco Use   • Smoking status: Never Smoker   • Smokeless tobacco: Never Used   Vaping Use   • Vaping Use: Never used   Substance and Sexual Activity   • Alcohol use: No   • Drug use: No   • Sexual activity: Yes     Partners: Male         Vitals:    03/07/22 0736   BP: 122/70   Pulse: 69   Resp: 17   SpO2: 99%   Weight: 69.1 kg (152 lb 5.8 oz)   Height: 160 cm (63\")        Body mass index is 26.99 kg/m².      Physical Exam:    General: Alert and oriented x 3.  No acute distress.  Normal affect.  HEENT: Pupils equal, round, reactive to light; extraocular movements intact; sclerae nonicteric; pharynx, ear canals and TMs normal.  Neck: Without JVD, thyromegaly, bruit, or adenopathy.  Lungs: Clear to auscultation in all fields.  Heart: Regular rate and rhythm without murmur, rub, gallop, or click.  Abdomen: Soft, nontender, without hepatosplenomegaly or hernia.  Bowel sounds normal.  : Deferred.  Rectal: Deferred.  Extremities: Without clubbing, cyanosis, edema, or pulse deficit.  There are bilateral varicosities, left greater than right.  Approximately 2 inch area of slight erythema and tenderness with a palpable cord left lower extremity anterior laterally above the ankle.  Neurologic: Intact without focal deficit.  Normal station and gait observed during ingress and egress from the examination room.  Skin: Without significant lesion.  Musculoskeletal: Unremarkable.    Lab/other results:      Assessment/Plan:     Diagnosis Plan   1. Thrombophlebitis of superficial veins of left lower extremity  cephalexin (Keflex) 500 MG capsule    aspirin  MG tablet    Ambulatory Referral to Vascular Surgery   2. Pain and swelling " of lower extremity, left  cephalexin (Keflex) 500 MG capsule    aspirin  MG tablet    Ambulatory Referral to Vascular Surgery   3. Varicose veins of leg with pain, left  aspirin  MG tablet    Ambulatory Referral to Vascular Surgery       March 7, 2022--patient reports that she is still having some problems and concerns regarding her left lower extremity superficial thrombophlebitis.  She has an area on the left lower extremity that is laterally and a little anteriorly and located above the ankle that is discolored and little tender.  The area is approximately 2 inches in diameter.  Has had no fever, chills, or other systemic signs or symptoms.  On exam there is discoloration and very mild erythema.  There is little tenderness.  There appears to be a small palpable cord that is tender.  Plan is as follows: I will have patient go back on aspirin 325 mg 1 p.o. 3 times daily.  Empiric Keflex 500 mg p.o. 3 times daily x1 week.  Vascular surgery referral to assess the varicose veins and thrombophlebitis.    January 12, 2022--patient reports a 2-week history of left lower extremity swelling which is just above the ankle and sometimes extends all the way down into the ankle and is associated with redness and tenderness. It tends to get worse towards the end of the day. She has had no shortness of breath or chest pain and there is been no significant swelling involving the upper part of either leg. On exam she has erythema that is located anteriorly just above the ankle on the left and this is somewhat laterally located. She has significant varicose veins involving the entire left lower extremity. There is tenderness to palpation over this area and I think, but not totally certain, that there is a palpable cord. She has superficial thrombophlebitis at a minimum but I also need to rule out DVT.  Plan is as follows: Stat Doppler venous study left lower extremity to rule out DVT.  If the result returns just a  superficial thrombophlebitis I have recommended that she take aspirin 325 mg, 2 p.o. 4 times daily for no more than the next week.  She also needs to elevate her leg is much as possible and apply a heating pad.  I will have her follow-up in about 8 to 10 days to reassess, sooner if anything changes.          Procedures

## 2022-03-07 NOTE — TELEPHONE ENCOUNTER
Caller: Lachelle Mcclure    Relationship: Self    Best call back number: [phone]    What is the best time to reach you: ANYTIME    Who are you requesting to speak with (clinical staff, provider,  specific staff member): DR. KO    What was the call regarding: PATIENT WANTED TO LET DR. KO KNOW THAT SHE WAS ABLE TO GET SCHEDULED WITH DR. STORM BUT IS NOT ABLE TO GET IN UNTIL 04/07/2022.     PATIENT WANTED TO LET DR. KO KNOW IN CASE HE THOUGHT THAT WAS TOO FAR OUT FOR HER TO BE SEEN.     PLEASE ADVISE.

## 2022-03-07 NOTE — TELEPHONE ENCOUNTER
That date will be fine.  It is only a month away.  In the meantime if there is any additional concerns she can see me.

## 2022-03-23 ENCOUNTER — HOSPITAL ENCOUNTER (OUTPATIENT)
Dept: BONE DENSITY | Facility: HOSPITAL | Age: 82
Discharge: HOME | End: 2022-03-23
Admitting: INTERNAL MEDICINE

## 2022-03-23 PROCEDURE — 77080 DXA BONE DENSITY AXIAL: CPT

## 2022-04-12 ENCOUNTER — LAB (OUTPATIENT)
Dept: LAB | Facility: HOSPITAL | Age: 82
End: 2022-04-12

## 2022-04-12 DIAGNOSIS — M81.0 AGE-RELATED OSTEOPOROSIS WITHOUT CURRENT PATHOLOGICAL FRACTURE: ICD-10-CM

## 2022-04-12 LAB
ALBUMIN SERPL-MCNC: 4.2 G/DL (ref 3.5–5.2)
ALBUMIN/GLOB SERPL: 2.3 G/DL
ALP SERPL-CCNC: 47 U/L (ref 39–117)
ALT SERPL W P-5'-P-CCNC: 19 U/L (ref 1–33)
ANION GAP SERPL CALCULATED.3IONS-SCNC: 7.8 MMOL/L (ref 5–15)
AST SERPL-CCNC: 19 U/L (ref 1–32)
BASOPHILS # BLD AUTO: 0.03 10*3/MM3 (ref 0–0.2)
BASOPHILS NFR BLD AUTO: 0.5 % (ref 0–1.5)
BILIRUB SERPL-MCNC: 0.4 MG/DL (ref 0–1.2)
BUN SERPL-MCNC: 17 MG/DL (ref 8–23)
BUN/CREAT SERPL: 25.4 (ref 7–25)
CALCIUM SPEC-SCNC: 10.2 MG/DL (ref 8.6–10.5)
CHLORIDE SERPL-SCNC: 107 MMOL/L (ref 98–107)
CO2 SERPL-SCNC: 27.2 MMOL/L (ref 22–29)
CREAT SERPL-MCNC: 0.67 MG/DL (ref 0.57–1)
DEPRECATED RDW RBC AUTO: 39.8 FL (ref 37–54)
EGFRCR SERPLBLD CKD-EPI 2021: 89 ML/MIN/1.73
EOSINOPHIL # BLD AUTO: 0.12 10*3/MM3 (ref 0–0.4)
EOSINOPHIL NFR BLD AUTO: 2.1 % (ref 0.3–6.2)
ERYTHROCYTE [DISTWIDTH] IN BLOOD BY AUTOMATED COUNT: 12 % (ref 12.3–15.4)
GLOBULIN UR ELPH-MCNC: 1.8 GM/DL
GLUCOSE SERPL-MCNC: 96 MG/DL (ref 65–99)
HCT VFR BLD AUTO: 41.3 % (ref 34–46.6)
HGB BLD-MCNC: 13.2 G/DL (ref 12–15.9)
IMM GRANULOCYTES # BLD AUTO: 0.01 10*3/MM3 (ref 0–0.05)
IMM GRANULOCYTES NFR BLD AUTO: 0.2 % (ref 0–0.5)
LYMPHOCYTES # BLD AUTO: 1.07 10*3/MM3 (ref 0.7–3.1)
LYMPHOCYTES NFR BLD AUTO: 18.5 % (ref 19.6–45.3)
MAGNESIUM SERPL-MCNC: 2.1 MG/DL (ref 1.6–2.4)
MCH RBC QN AUTO: 29.1 PG (ref 26.6–33)
MCHC RBC AUTO-ENTMCNC: 32 G/DL (ref 31.5–35.7)
MCV RBC AUTO: 91.2 FL (ref 79–97)
MONOCYTES # BLD AUTO: 0.51 10*3/MM3 (ref 0.1–0.9)
MONOCYTES NFR BLD AUTO: 8.8 % (ref 5–12)
NEUTROPHILS NFR BLD AUTO: 4.04 10*3/MM3 (ref 1.7–7)
NEUTROPHILS NFR BLD AUTO: 69.9 % (ref 42.7–76)
NRBC BLD AUTO-RTO: 0 /100 WBC (ref 0–0.2)
PHOSPHATE SERPL-MCNC: 3.1 MG/DL (ref 2.5–4.5)
PLATELET # BLD AUTO: 177 10*3/MM3 (ref 140–450)
PMV BLD AUTO: 9.6 FL (ref 6–12)
POTASSIUM SERPL-SCNC: 4.5 MMOL/L (ref 3.5–5.2)
PROT SERPL-MCNC: 6 G/DL (ref 6–8.5)
RBC # BLD AUTO: 4.53 10*6/MM3 (ref 3.77–5.28)
SODIUM SERPL-SCNC: 142 MMOL/L (ref 136–145)
WBC NRBC COR # BLD: 5.78 10*3/MM3 (ref 3.4–10.8)

## 2022-04-12 PROCEDURE — 36415 COLL VENOUS BLD VENIPUNCTURE: CPT

## 2022-04-12 PROCEDURE — 84100 ASSAY OF PHOSPHORUS: CPT

## 2022-04-12 PROCEDURE — 83735 ASSAY OF MAGNESIUM: CPT

## 2022-04-12 PROCEDURE — 85025 COMPLETE CBC W/AUTO DIFF WBC: CPT

## 2022-04-12 PROCEDURE — 80053 COMPREHEN METABOLIC PANEL: CPT

## 2022-04-19 ENCOUNTER — OFFICE VISIT (OUTPATIENT)
Dept: INTERNAL MEDICINE | Facility: CLINIC | Age: 82
End: 2022-04-19

## 2022-04-19 VITALS
RESPIRATION RATE: 18 BRPM | HEART RATE: 67 BPM | BODY MASS INDEX: 27.54 KG/M2 | SYSTOLIC BLOOD PRESSURE: 118 MMHG | OXYGEN SATURATION: 99 % | WEIGHT: 155.4 LBS | DIASTOLIC BLOOD PRESSURE: 64 MMHG | HEIGHT: 63 IN

## 2022-04-19 DIAGNOSIS — R31.29 MICROSCOPIC HEMATURIA: Chronic | ICD-10-CM

## 2022-04-19 DIAGNOSIS — Z51.81 THERAPEUTIC DRUG MONITORING: ICD-10-CM

## 2022-04-19 DIAGNOSIS — I10 BENIGN ESSENTIAL HYPERTENSION: Chronic | ICD-10-CM

## 2022-04-19 DIAGNOSIS — Z00.00 ENCOUNTER FOR SUBSEQUENT ANNUAL WELLNESS VISIT (AWV) IN MEDICARE PATIENT: Primary | ICD-10-CM

## 2022-04-19 DIAGNOSIS — I80.02 THROMBOPHLEBITIS OF SUPERFICIAL VEINS OF LEFT LOWER EXTREMITY: ICD-10-CM

## 2022-04-19 DIAGNOSIS — Z86.16 HISTORY OF COVID-19: Chronic | ICD-10-CM

## 2022-04-19 DIAGNOSIS — I65.23 ATHEROSCLEROSIS OF BOTH CAROTID ARTERIES: Chronic | ICD-10-CM

## 2022-04-19 DIAGNOSIS — M79.605 PAIN AND SWELLING OF LOWER EXTREMITY, LEFT: ICD-10-CM

## 2022-04-19 DIAGNOSIS — N95.1 MENOPAUSAL STATE: Chronic | ICD-10-CM

## 2022-04-19 DIAGNOSIS — M81.0 AGE-RELATED OSTEOPOROSIS WITHOUT CURRENT PATHOLOGICAL FRACTURE: Chronic | ICD-10-CM

## 2022-04-19 DIAGNOSIS — R73.01 IMPAIRED FASTING GLUCOSE: Chronic | ICD-10-CM

## 2022-04-19 DIAGNOSIS — Z85.820 HISTORY OF MALIGNANT MELANOMA: Chronic | ICD-10-CM

## 2022-04-19 DIAGNOSIS — E78.2 MIXED HYPERLIPIDEMIA: Chronic | ICD-10-CM

## 2022-04-19 DIAGNOSIS — E03.8 SUBCLINICAL HYPOTHYROIDISM: Chronic | ICD-10-CM

## 2022-04-19 DIAGNOSIS — M79.89 PAIN AND SWELLING OF LOWER EXTREMITY, LEFT: ICD-10-CM

## 2022-04-19 DIAGNOSIS — K21.9 GASTROESOPHAGEAL REFLUX DISEASE WITHOUT ESOPHAGITIS: Chronic | ICD-10-CM

## 2022-04-19 DIAGNOSIS — E55.9 VITAMIN D DEFICIENCY: Chronic | ICD-10-CM

## 2022-04-19 DIAGNOSIS — Z78.9 INTOLERANCE OF ORAL BISPHOSPHONATE THERAPY: Chronic | ICD-10-CM

## 2022-04-19 PROCEDURE — 96160 PT-FOCUSED HLTH RISK ASSMT: CPT | Performed by: INTERNAL MEDICINE

## 2022-04-19 PROCEDURE — 1126F AMNT PAIN NOTED NONE PRSNT: CPT | Performed by: INTERNAL MEDICINE

## 2022-04-19 PROCEDURE — 1159F MED LIST DOCD IN RCRD: CPT | Performed by: INTERNAL MEDICINE

## 2022-04-19 PROCEDURE — G0439 PPPS, SUBSEQ VISIT: HCPCS | Performed by: INTERNAL MEDICINE

## 2022-04-19 PROCEDURE — 1170F FXNL STATUS ASSESSED: CPT | Performed by: INTERNAL MEDICINE

## 2022-04-19 PROCEDURE — 99214 OFFICE O/P EST MOD 30 MIN: CPT | Performed by: INTERNAL MEDICINE

## 2022-04-19 RX ORDER — ASPIRIN 81 MG/1
TABLET ORAL
Start: 2022-04-19

## 2022-04-19 NOTE — PROGRESS NOTES
04/19/2022    Patient Information  Lachelle ESTES Kami                                                                                          [address]  [facility] [address]      1940  [unfilled]  There is no work phone number on file.    Chief Complaint:     Medicare wellness visit.  Follow-up lab work in order to monitor chronic medical issues listed in history of present illness.  Follow-up superficial thrombophlebitis of the left leg.  No new acute complaints.    History of Present Illness:    Patient with impaired fasting glucose, hyperlipidemia, hypertension, carotid artery plaque, esophageal reflux, microscopic hematuria, subclinical hypothyroidism, osteoporosis, history of malignant melanoma in 2008, vitamin D deficiency, menopausal state, intolerance of oral bisphosphonate therapy, history of COVID-19 infection, pain and swelling of left leg due to thrombophlebitis treated with antibiotics.  She presents today for subsequent Medicare wellness visit.  She also had lab work in order to monitor chronic medical issues.  Her past medical history reviewed and updated were necessary including health maintenance parameters.  This reveals she will be up-to-date or else accounted for after today's visit with the exception of COVID-19 booster vaccine which was deferred due to recent history of COVID-19 infection.    Review of Systems   Constitutional: Negative.   HENT: Negative.    Eyes: Negative.    Cardiovascular: Negative.    Respiratory: Negative.    Endocrine: Negative.    Hematologic/Lymphatic: Negative.    Skin: Negative.    Musculoskeletal: Negative.    Gastrointestinal: Negative.    Genitourinary: Negative.    Neurological: Negative.    Psychiatric/Behavioral: Negative.    Allergic/Immunologic: Negative.        Active Problems:    Patient Active Problem List   Diagnosis   • Benign essential hypertension   • Carotid artery plaque, 4/3/2019--mild bilateral.  10/06/2016--vascular screen is  now normal without carotid disease.  Improved.   • Gastroesophageal reflux disease without esophagitis   • Hyperlipidemia   • Microscopic hematuria   • Osteoporosis, 3/23/2022--lumbar spine -0.9; left femoral neck -2.4; right femoral neck -2.3.  11/5/2019--lumbar spine -0.3.  Left femoral neck -2.7.  Right femoral neck -2.4.   • Tinnitus of right ear   • History of malignant melanoma, 2008--right side of face.  1999--lower back.   • Therapeutic drug monitoring   • Vitamin D deficiency   • Lumbar disc disease   • Chronic right hip pain   • Impaired fasting glucose   • Menopausal state   • History of basal cell carcinoma of skin   • Subclinical hypothyroidism   • Intolerance of oral bisphosphonate therapy   • Primary osteoarthritis of both hips   • History of COVID-19   • Pain and swelling of lower extremity, left   • Thrombophlebitis of superficial veins of left lower extremity   • Varicose veins of leg with pain, left         Past Medical History:   Diagnosis Date   • Benign essential hypertension 04/18/2006 04/18/2006--treatment for hypertension begun.   • Carotid artery plaque, 10/06/2016--vascular screen is now normal without carotid disease.  Improved. 07/30/2010    10/06/2016--vascular screen reveals no evidence of carotid plaque, negative for AAA, negative for PAD.  04/23/2014--vascular screen reveals mild bilateral carotid plaque, negative for AAA, negative for PAD.   12/07/2012--vascular screen reveals mild left-sided carotid plaque, normal right carotid, negative for AAA, negative for PAD.   07/30/2010--vascular screen reveals mild left-sided carotid plaque, normal right carotid, negative for AAA, negative for PAD.   • Chronic right hip pain 09/01/2017 09/01/2017--patient has had a one-year history of intermittent episodes of lateral right hip pain that at times is very severe and debilitating.  She also has tenderness over this area that limits her from sleeping on her right side.  Last year I gave  her a cortisone injection for trochanteric bursitis and this provided immediate relief but not long lasting.  X-rays were negative for fracture or even degenerative arthritis.  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  Ordered.   • Gastroesophageal reflux disease without esophagitis 01/14/2013    04/10/2014--patient presented with right upper quadrant/epigastric burning pain and discomfort that was postprandial. H pylori breath test was negative. Empiric trial of Dexilant 60 mg per day was initiated for esophageal reflux/dyspepsia.   01/14/2013--EGD performed for epigastric and right upper quadrant pain. There was some edematous appearing mucosa in the antrum and body of the stomach which was biopsied. No significant erythema. No ulcerations. Normal appearing duodenum and esophagus. Pathology revealed moderate chronic active gastritis. No intestinal metaplasia. Helicobacter pylori positive. Patient was treated with appropriate antibiotic and PPI therapy.   • History of basal cell carcinoma of skin 02/12/2019 June 2018--Mohs micrographic surgery of basal cell carcinoma on the tip of the nose.   • History of Helicobacter pylori gastritis 01/14/2013    04/10/2014--patient presented with right upper quadrant/epigastric burning pain and discomfort that was postprandial. H pylori breath test was negative. Empiric trial of Dexilant 60 mg per day was initiated for esophageal reflux/dyspepsia.  01/14/2013--EGD performed for epigastric and right upper quadrant pain. There was some edematous appearing mucosa in the antrum and body of the stomach which w   • History of malignant melanoma, 2008--right side of face.  1999--lower back. 01/01/2008 2008--melanoma resected from right face.   1999--malignant melanoma resected from lower back.   • History of routine gynecologic exam yearly    Patient sees a gynecologist.   • History of thrombophlebitis, superficial,  left lower extremity. 01/12/2022 January 12, 2022--patient reports a 2-week history of left lower extremity swelling which is just above the ankle and sometimes extends all the way down into the ankle and is associated with redness and tenderness. It tends to get worse towards the end of the day. She has had no shortness of breath or chest pain and there is been no significant swelling involving the upper part of either leg. On e   • Hyperlipidemia 08/13/2010 08/13/2010--treatment for hyperlipidemia begun.   • Impaired fasting glucose 09/01/2017 09/01/2017--routine physical.  Serum glucose elevated at 101.  Recommend weight loss and decrease carbohydrate intake.   • Intolerance of oral bisphosphonate therapy 02/21/2020   • Lumbar disc disease 10/19/2016    09/13/2016--x-ray lumbar spine reveals disc space narrowing at L2-L3, L3-L4, L4-L5, and possibly L5-S1.  There is mild anterior listhesis of L4 on L5 measuring 4.5 mm.  No compression deformity, nor other evidence of acute process.   • Menopausal state 09/01/2017   • Microscopic hematuria 12/18/2015 12/22/2015--urine cytology negative for malignant cells.  Red blood cells noted.  Transitional epithelial cells noted.  Urine culture revealed less than 10,000 colony forming units of mixed urogenital laurie.  Repeat urinalysis was negative.  Recommend observation.  12/18/2015--routine urinalysis reveals 3-5 WBCs and 3-5 RBCs. 0 epithelial cells. 0 bacteria. Patient is asymptomatic. Repeat urinalysis, urine cytology, and urine culture sent.   • Osteoporosis, 3/23/2022--lumbar spine -0.9; left femoral neck -2.4; right femoral neck -2.3.  11/5/2019--lumbar spine -0.3.  Left femoral neck -2.7.  Right femoral neck -2.4. 07/24/2009 March 23, 2022--DEXA scan reveals lumbar spine T score -0.9 representing a 5.7% interval decrease.  Left femoral neck T score -2.4 which represents a 5.7% increase.  Right femoral neck T score -2.3 which is unchanged.  Impression is  osteopenia at the hips with mixed interval change in density values.  November 5, 2019--DEXA scan reveals lumbar spine T score -0.3 representing a 5% increase.  Left f   • Primary osteoarthritis of both hips 11/19/2021 November 19, 2021-9 being evaluated and treated by the orthopedist.  Patient may need bilateral hip replacements at some point in the future.   • Subclinical hypothyroidism 02/21/2020 February 21, 2020--TSH slightly elevated at 4.41.  Free T3 and free T4 are normal.  Close observation.   • Tinnitus of right ear 12/18/2015 12/18/2015--patient presents with a four-month history of ringing in her right ear. No hearing loss. Examination reveals a cerumen impaction of the right ear canal. This was removed with irrigation and curettage.   • Vitamin D deficiency 08/12/2016         Past Surgical History:   Procedure Laterality Date   • BASAL CELL CARCINOMA EXCISION  06/2018 June 2018--Mohs micrographic surgery of basal cell carcinoma on the tip of the nose.   • CHOLECYSTECTOMY  05/17/2013 05/17/2013--laparoscopic cholecystectomy.   • COLONOSCOPY  07/12/2012 07/12/2012--normal colonoscopy to the cecum with an excellent prep.    • COLONOSCOPY  06/20/2003 06/20/2003--normal colonoscopy to the cecum.   • ESOPHAGOSCOPY / EGD  01/14/2013 01/14/2013--EGD performed for epigastric and right upper quadrant pain. There was some edematous appearing mucosa in the antrum and body of the stomach which was biopsied. No significant erythema. No ulcerations. Normal appearing duodenum and esophagus. Pathology revealed moderate chronic active gastritis. No intestinal metaplasia. Helicobacter pylori positive. Patient was treated with appropriate   • FOOT SURGERY  1992 1992--orthopedic 10 placed in right great toe.   • HYSTERECTOMY  1986 1986--total abdominal hysterectomy.   • SKIN CANCER EXCISION  09/14/2009 09/14/2009--excision 1 cm mid scalp cyst. Pathology benign. 2008--melanoma resected from  "right face. 1999--malignant melanoma resected from lower back.   • TONSILLECTOMY AND ADENOIDECTOMY  1946    1946.         No Known Allergies        Current Outpatient Medications:   •  Calcium Carbonate-Vit D-Min (CALCIUM 1200) 8268-3349 MG-UNIT chewable tablet, Chew 1 tablet daily, Disp: , Rfl:   •  Cholecalciferol (VITAMIN D) 2000 UNITS capsule, Take 1 capsule by mouth daily, Disp: , Rfl:   •  Coenzyme Q10 (COQ10) 400 MG capsule, Take 1 capsule by mouth Daily., Disp: , Rfl:   •  simvastatin (ZOCOR) 40 MG tablet, TAKE 1 TABLET EVERY DAY FOR HIGH CHOLESTEROL (NEED MD APPOINTMENT), Disp: 90 tablet, Rfl: 2  •  valsartan-hydrochlorothiazide (DIOVAN-HCT) 160-25 MG per tablet, TAKE 1 TABLET EVERY DAY FOR BLOOD PRESSURE, Disp: 90 tablet, Rfl: 2  •  aspirin (aspirin) 81 MG EC tablet, Take 1 p.o. twice weekly, Disp: , Rfl:   •  triamcinolone (KENALOG) 0.1 % ointment, , Disp: , Rfl:   •  zoledronic acid (RECLAST) 5 MG/100ML solution infusion, Infuse 100 mL into a venous catheter 1 (One) Time for 1 dose., Disp: 100 mL, Rfl: 0      Family History   Problem Relation Age of Onset   • COPD Mother    • Hypertension Mother         Essential   • COPD Father    • Hypertension Father          Social History     Socioeconomic History   • Marital status:    • Highest education level: Bachelor's degree (e.g., BA, AB, BS)   Tobacco Use   • Smoking status: Never Smoker   • Smokeless tobacco: Never Used   Vaping Use   • Vaping Use: Never used   Substance and Sexual Activity   • Alcohol use: No   • Drug use: No   • Sexual activity: Yes     Partners: Male         Vitals:    04/19/22 0819   BP: 118/64   Pulse: 67   Resp: 18   SpO2: 99%   Weight: 70.5 kg (155 lb 6.4 oz)   Height: 160 cm (63\")        Body mass index is 27.53 kg/m².      Physical Exam:    General: Alert and oriented x 3.  No acute distress.  Normal affect.  HEENT: Pupils equal, round, reactive to light; extraocular movements intact; sclerae nonicteric; pharynx, ear canals " and TMs normal.  Neck: Without JVD, thyromegaly, bruit, or adenopathy.  Lungs: Clear to auscultation in all fields.  Heart: Regular rate and rhythm without murmur, rub, gallop, or click.  Abdomen: Soft, nontender, without hepatosplenomegaly or hernia.  Bowel sounds normal.  : Deferred.  Rectal: Deferred.  Extremities: Without clubbing, cyanosis, edema, or pulse deficit.  There is no evidence of active superficial thrombophlebitis of the either lower extremity.  Varicose veins noted.  Neurologic: Intact without focal deficit.  Normal station and gait observed during ingress and egress from the examination room.  Skin: Without significant lesion.  Musculoskeletal: Unremarkable.    Lab/other results:    CMP is normal.  Magnesium normal at 2.1.  Phosphorus normal at 3.1.  CBC is normal.    March 23, 2022--DEXA scan reveals lumbar spine T score -0.9 representing a 5.7% interval decrease.  Left femoral neck T score -2.4 which represents a 5.7% increase.  Right femoral neck T score -2.3 which is unchanged.  Impression is osteopenia at the hips with mixed interval change in density values.     Assessment/Plan:     Diagnosis Plan   1. Encounter for subsequent annual wellness visit (AWV) in Medicare patient     2. Impaired fasting glucose  Comprehensive Metabolic Panel    Hemoglobin A1c   3. Hyperlipidemia  CK    Comprehensive Metabolic Panel    NMR LipoProfile   4. Benign essential hypertension     5. Carotid artery plaque, 4/3/2019--mild bilateral.  10/06/2016--vascular screen is now normal without carotid disease.  Improved.  aspirin (aspirin) 81 MG EC tablet   6. Gastroesophageal reflux disease without esophagitis     7. Microscopic hematuria     8. Subclinical hypothyroidism  TSH    T4, Free    T3, Free   9. Osteoporosis, 11/5/2019--lumbar spine -0.3.  Left femoral neck -2.7.  Right femoral neck -2.4.     10. History of malignant melanoma, 2008--right side of face.  1999--lower back.     11. Vitamin D deficiency   Vitamin D 25 Hydroxy   12. Menopausal state     13. Intolerance of oral bisphosphonate therapy     14. History of COVID-19  SARS-CoV-2 Antibodies ([employer])   15. Pain and swelling of lower extremity, left     16. Thrombophlebitis of superficial veins of left lower extremity     17. Therapeutic drug monitoring  CBC (No Diff)     The subsequent Medicare wellness visit is documented on separate note.      Patient has impaired fasting glucose but unfortunately hemoglobin A1c was not performed.  However, one was performed about 4 months ago and it was normal.  Also the lab did not do an NMR profile despite patient being on cholesterol medication.  I reviewed her NMR profile from about 4 months ago and it was normal as well.  Patient has carotid artery plaque and needs a carotid Doppler study.  Her esophageal reflux is currently not requiring medication.  Microscopic hematuria had been evaluated in the past and work-up was negative.  She has subclinical hypothyroidism and will need thyroid function test monitoring in future.  She has osteoporosis and is up-to-date on her DEXA scan which was performed March 23, 2022 and I reviewed with the patient.  Her vitamin D is in the normal range in the past and needs to be monitored in the future.  Patient receives Reclast infusions as she is intolerant of oral bisphosphonate therapy.  She has a history of COVID-19 infection and we will check antibodies in the near future.  Her thrombophlebitis has resolved.  She has seen the vascular surgeon and is having vein mapping in the near future.  She is wearing some mild compression stockings.    Plan is as follows: No change in current medical regimen.  Patient is taking aspirin 81 mg twice weekly.  Patient will follow-up in 6 months with lab prior or follow-up as needed.      Procedures

## 2022-04-19 NOTE — PROGRESS NOTES
The ABCs of the Annual Wellness Visit  Subsequent Medicare Wellness Visit    No chief complaint on file.     Subjective    History of Present Illness:  Lachelle Mcclure is a 81 y.o. female who presents for a Subsequent Medicare Wellness Visit.    The following portions of the patient's history were reviewed and   updated as appropriate: allergies, current medications, past family history, past medical history, past social history, past surgical history and problem list.    Compared to one year ago, the patient feels her physical   health is the same.    Compared to one year ago, the patient feels her mental   health is the same.    Recent Hospitalizations:  She was not admitted to the hospital during the last year.       Current Medical Providers:  Patient Care Team:  Juan Vega MD as PCP - General (Internal Medicine)    Outpatient Medications Prior to Visit   Medication Sig Dispense Refill   • Calcium Carbonate-Vit D-Min (CALCIUM 1200) 9923-4341 MG-UNIT chewable tablet Chew 1 tablet daily     • Cholecalciferol (VITAMIN D) 2000 UNITS capsule Take 1 capsule by mouth daily     • Coenzyme Q10 (COQ10) 400 MG capsule Take 1 capsule by mouth Daily.     • simvastatin (ZOCOR) 40 MG tablet TAKE 1 TABLET EVERY DAY FOR HIGH CHOLESTEROL (NEED MD APPOINTMENT) 90 tablet 2   • valsartan-hydrochlorothiazide (DIOVAN-HCT) 160-25 MG per tablet TAKE 1 TABLET EVERY DAY FOR BLOOD PRESSURE 90 tablet 2   • triamcinolone (KENALOG) 0.1 % ointment      • zoledronic acid (RECLAST) 5 MG/100ML solution infusion Infuse 100 mL into a venous catheter 1 (One) Time for 1 dose. 100 mL 0   • aspirin  MG tablet Take 1 p.o. 3 times daily until further direction.     • cephalexin (Keflex) 500 MG capsule Take 1 p.o. 3 times daily until gone 21 capsule 0     No facility-administered medications prior to visit.       No opioid medication identified on active medication list. I have reviewed chart for other potential  high risk medication/s and  harmful drug interactions in the elderly.          Aspirin is not on active medication list.  Aspirin use is indicated based on review of current medical condition/s. Pros and cons of this therapy have been discussed with this patient. Benefits of this medication outweigh potential harm.  Patient has been instructed to start taking this medication..    Patient Active Problem List   Diagnosis   • Benign essential hypertension   • Carotid artery plaque, 4/3/2019--mild bilateral.  10/06/2016--vascular screen is now normal without carotid disease.  Improved.   • Gastroesophageal reflux disease without esophagitis   • Hyperlipidemia   • Microscopic hematuria   • Osteoporosis, 3/23/2022--lumbar spine -0.9; left femoral neck -2.4; right femoral neck -2.3.  11/5/2019--lumbar spine -0.3.  Left femoral neck -2.7.  Right femoral neck -2.4.   • Tinnitus of right ear   • History of malignant melanoma, 2008--right side of face.  1999--lower back.   • Therapeutic drug monitoring   • Vitamin D deficiency   • Lumbar disc disease   • Chronic right hip pain   • Impaired fasting glucose   • Menopausal state   • History of basal cell carcinoma of skin   • Subclinical hypothyroidism   • Intolerance of oral bisphosphonate therapy   • Primary osteoarthritis of both hips   • History of COVID-19   • Pain and swelling of lower extremity, left   • Thrombophlebitis of superficial veins of left lower extremity   • Varicose veins of leg with pain, left     Advance Care Planning  Advance Directive is not on file.  ACP discussion was held with the patient during this visit. Patient has an advance directive (not in EMR), copy requested.    Review of Systems   Constitutional: Negative.    HENT: Negative.    Eyes: Negative.    Respiratory: Negative.    Cardiovascular: Negative.    Gastrointestinal: Negative.    Endocrine: Negative.    Genitourinary: Negative.    Musculoskeletal: Positive for arthralgias and back pain.   Skin: Negative.   "  Allergic/Immunologic: Negative.    Neurological: Negative.    Hematological: Negative.    Psychiatric/Behavioral: Negative.         Objective    Vitals:    04/19/22 0819   BP: 118/64   Pulse: 67   Resp: 18   SpO2: 99%   Weight: 70.5 kg (155 lb 6.4 oz)   Height: 160 cm (63\")   PainSc: 0-No pain     BMI Readings from Last 1 Encounters:   04/19/22 27.53 kg/m²   BMI is above normal parameters. Recommendations include: exercise counseling and nutrition counseling    Does the patient have evidence of cognitive impairment? No    Physical Exam       General: Alert and oriented x 3.  No acute distress.  Normal affect.  HEENT: Pupils equal, round, reactive to light; extraocular movements intact; sclerae nonicteric; pharynx, ear canals and TMs normal.  Neck: Without JVD, thyromegaly, bruit, or adenopathy.  Lungs: Clear to auscultation in all fields.  Heart: Regular rate and rhythm without murmur, rub, gallop, or click.  Abdomen: Soft, nontender, without hepatosplenomegaly or hernia.  Bowel sounds normal.  : Deferred.  Rectal: Deferred.  Extremities: Without clubbing, cyanosis, edema, or pulse deficit.  There is no evidence of active superficial thrombophlebitis of the either lower extremity.  Varicose veins noted.  Neurologic: Intact without focal deficit.  Normal station and gait observed during ingress and egress from the examination room.  Skin: Without significant lesion.  Musculoskeletal: Unremarkable.         HEALTH RISK ASSESSMENT    Smoking Status:  Social History     Tobacco Use   Smoking Status Never Smoker   Smokeless Tobacco Never Used     Alcohol Consumption:  Social History     Substance and Sexual Activity   Alcohol Use No     Fall Risk Screen:    STEADI Fall Risk Assessment was completed, and patient is at LOW risk for falls.Assessment completed on:1/12/2022    Depression Screening:  PHQ-2/PHQ-9 Depression Screening 1/12/2022   Retired PHQ-9 Total Score 0   Retired Total Score 0       Health Habits and " Functional and Cognitive Screening:  Functional & Cognitive Status 4/19/2022   Do you have difficulty preparing food and eating? No   Do you have difficulty bathing yourself, getting dressed or grooming yourself? No   Do you have difficulty using the toilet? No   Do you have difficulty moving around from place to place? No   Do you have trouble with steps or getting out of a bed or a chair? No   Current Diet Well Balanced Diet   Dental Exam Up to date   Eye Exam Up to date   Exercise (times per week) -   Current Exercises Include No Regular Exercise   Current Exercise Activities Include -   Do you need help using the phone?  No   Are you deaf or do you have serious difficulty hearing?  No   Do you need help with transportation? No   Do you need help shopping? No   Do you need help preparing meals?  No   Do you need help with housework?  No   Do you need help with laundry? No   Do you need help taking your medications? No   Do you need help managing money? No   Do you ever drive or ride in a car without wearing a seat belt? No   Have you felt unusual stress, anger or loneliness in the last month? No   Who do you live with? Spouse   If you need help, do you have trouble finding someone available to you? No   Have you been bothered in the last four weeks by sexual problems? No   Do you have difficulty concentrating, remembering or making decisions? No       Age-appropriate Screening Schedule:  Refer to the list below for future screening recommendations based on patient's age, sex and/or medical conditions. Orders for these recommended tests are listed in the plan section. The patient has been provided with a written plan.    Health Maintenance   Topic Date Due   • INFLUENZA VACCINE  08/01/2022   • LIPID PANEL  12/22/2022   • MAMMOGRAM  09/27/2023   • DXA SCAN  03/23/2024   • TDAP/TD VACCINES (2 - Td or Tdap) 09/01/2027   • ZOSTER VACCINE  Discontinued              Assessment/Plan   CMS Preventative Services Quick  Reference  Risk Factors Identified During Encounter  Cardiovascular Disease  Immunizations Discussed/Encouraged (specific Immunizations; COVID19  Obesity/Overweight   The above risks/problems have been discussed with the patient.  Follow up actions/plans if indicated are seen below in the Assessment/Plan Section.  Pertinent information has been shared with the patient in the After Visit Summary.    Diagnoses and all orders for this visit:    1. Encounter for subsequent annual wellness visit (AWV) in Medicare patient (Primary)    2. Impaired fasting glucose  -     Comprehensive Metabolic Panel; Future  -     Hemoglobin A1c; Future    3. Hyperlipidemia  -     CK; Future  -     Comprehensive Metabolic Panel; Future  -     NMR LipoProfile; Future    4. Benign essential hypertension    5. Carotid artery plaque, 4/3/2019--mild bilateral.  10/06/2016--vascular screen is now normal without carotid disease.  Improved.    6. Gastroesophageal reflux disease without esophagitis    7. Microscopic hematuria    8. Subclinical hypothyroidism  -     TSH; Future  -     T4, Free; Future  -     T3, Free; Future    9. Osteoporosis, 11/5/2019--lumbar spine -0.3.  Left femoral neck -2.7.  Right femoral neck -2.4.    10. History of malignant melanoma, 2008--right side of face.  1999--lower back.    11. Vitamin D deficiency  -     Vitamin D 25 Hydroxy; Future    12. Menopausal state    13. Intolerance of oral bisphosphonate therapy    14. History of COVID-19    15. Pain and swelling of lower extremity, left    16. Thrombophlebitis of superficial veins of left lower extremity    17. Therapeutic drug monitoring  -     CBC (No Diff); Future        Follow Up:   No follow-ups on file.     An After Visit Summary and PPPS were made available to the patient.

## 2022-05-09 ENCOUNTER — HOSPITAL ENCOUNTER (OUTPATIENT)
Dept: CARDIOLOGY | Facility: HOSPITAL | Age: 82
Discharge: HOME | End: 2022-05-09
Admitting: INTERNAL MEDICINE

## 2022-05-09 VITALS
HEIGHT: 63 IN | BODY MASS INDEX: 28.53 KG/M2 | DIASTOLIC BLOOD PRESSURE: 84 MMHG | WEIGHT: 161 LBS | SYSTOLIC BLOOD PRESSURE: 138 MMHG | HEART RATE: 64 BPM

## 2022-05-09 DIAGNOSIS — Z13.6 ENCOUNTER FOR SCREENING FOR VASCULAR DISEASE: ICD-10-CM

## 2022-05-09 LAB
BH CV ECHO MEAS - DIST AO DIAM: NORMAL CM
BH CV VAS BP LEFT ARM: NORMAL MMHG
BH CV VAS BP RIGHT ARM: NORMAL MMHG
BH CV XLRA MEAS - MID AO DIAM: 1.51 CM
BH CV XLRA MEAS - PAD LEFT ABI DP: 1.21
BH CV XLRA MEAS - PAD LEFT ABI PT: 1.19
BH CV XLRA MEAS - PAD LEFT ARM: 133 MMHG
BH CV XLRA MEAS - PAD LEFT LEG DP: 167 MMHG
BH CV XLRA MEAS - PAD LEFT LEG PT: 164 MMHG
BH CV XLRA MEAS - PAD RIGHT ABI DP: 1.22
BH CV XLRA MEAS - PAD RIGHT ABI PT: 1.21
BH CV XLRA MEAS - PAD RIGHT ARM: 138 MMHG
BH CV XLRA MEAS - PAD RIGHT LEG DP: 168 MMHG
BH CV XLRA MEAS - PAD RIGHT LEG PT: 167 MMHG
BH CV XLRA MEAS - PROX AO DIAM: 1.68 CM
BH CV XLRA MEAS LEFT ICA/CCA RATIO: 1.05
BH CV XLRA MEAS LEFT MID CCA PSV: NORMAL CM/SEC
BH CV XLRA MEAS LEFT MID ICA PSV: NORMAL CM/SEC
BH CV XLRA MEAS LEFT PROX ECA PSV: NORMAL CM/SEC
BH CV XLRA MEAS RIGHT ICA/CCA RATIO: 0.85
BH CV XLRA MEAS RIGHT MID CCA PSV: NORMAL CM/SEC
BH CV XLRA MEAS RIGHT MID ICA PSV: NORMAL CM/SEC
BH CV XLRA MEAS RIGHT PROX ECA PSV: NORMAL CM/SEC
MAXIMAL PREDICTED HEART RATE: 141 BPM
STRESS TARGET HR: 120 BPM

## 2022-05-09 PROCEDURE — 93799 UNLISTED CV SVC/PROCEDURE: CPT

## 2022-10-17 ENCOUNTER — APPOINTMENT (OUTPATIENT)
Dept: WOMENS IMAGING | Facility: HOSPITAL | Age: 82
End: 2022-10-17

## 2022-10-17 PROCEDURE — 77063 BREAST TOMOSYNTHESIS BI: CPT | Performed by: RADIOLOGY

## 2022-10-17 PROCEDURE — 77067 SCR MAMMO BI INCL CAD: CPT | Performed by: RADIOLOGY

## 2022-11-01 ENCOUNTER — TELEPHONE (OUTPATIENT)
Dept: INTERNAL MEDICINE | Facility: CLINIC | Age: 82
End: 2022-11-01

## 2022-11-01 NOTE — TELEPHONE ENCOUNTER
Caller: Lachelle Mcclure    Relationship: Self    Best call back number: [phone]    What orders are you requesting (i.e. lab or imaging): FASTING LABS     In what timeframe would the patient need to come in: WEEK PRIOR TO 5/1/23    Where will you receive your lab/imaging services: IN OFFICE    Additional notes: PLEASE CALL AND SCHEDULE ONCE ORDERS ARE IN.

## 2022-11-01 NOTE — TELEPHONE ENCOUNTER
Labs are in the system and pending.     Will call pt later to give info.     **HUB ok to give info**

## 2023-04-20 ENCOUNTER — TELEPHONE (OUTPATIENT)
Dept: INTERNAL MEDICINE | Facility: CLINIC | Age: 83
End: 2023-04-20

## 2023-04-20 NOTE — TELEPHONE ENCOUNTER
Caller: Lachelle Mcclure    Relationship: Self    Best call back number: [phone]     What is the best time to reach you: ANYTIME    Who are you requesting to speak with (clinical staff, provider,  specific staff member): CLINICAL    What was the call regarding: PATIENT CALLING WANTING TO GET SOME CLARIFICATION ON HER APPOINTMENTS THAT ARE SCHEDULED FOR HER AND HER  TRISHA MCCLURE. HUB ATTEMPTED TO WARM TRANSFER WAS UNSUCCESSFUL     Do you require a callback: YES

## 2023-05-01 ENCOUNTER — HOSPITAL ENCOUNTER (OUTPATIENT)
Dept: GENERAL RADIOLOGY | Facility: HOSPITAL | Age: 83
Discharge: HOME | End: 2023-05-01
Payer: MEDICARE

## 2023-05-01 ENCOUNTER — OFFICE VISIT (OUTPATIENT)
Dept: INTERNAL MEDICINE | Facility: CLINIC | Age: 83
End: 2023-05-01
Payer: MEDICARE

## 2023-05-01 VITALS
SYSTOLIC BLOOD PRESSURE: 120 MMHG | WEIGHT: 154 LBS | HEIGHT: 64 IN | TEMPERATURE: 96.8 F | DIASTOLIC BLOOD PRESSURE: 70 MMHG | HEART RATE: 74 BPM | RESPIRATION RATE: 16 BRPM | BODY MASS INDEX: 26.29 KG/M2 | OXYGEN SATURATION: 97 %

## 2023-05-01 DIAGNOSIS — I65.23 ATHEROSCLEROSIS OF BOTH CAROTID ARTERIES: Chronic | ICD-10-CM

## 2023-05-01 DIAGNOSIS — Z86.16 HISTORY OF COVID-19: Chronic | ICD-10-CM

## 2023-05-01 DIAGNOSIS — M81.0 AGE-RELATED OSTEOPOROSIS WITHOUT CURRENT PATHOLOGICAL FRACTURE: Chronic | ICD-10-CM

## 2023-05-01 DIAGNOSIS — M16.0 PRIMARY OSTEOARTHRITIS OF BOTH HIPS: Chronic | ICD-10-CM

## 2023-05-01 DIAGNOSIS — R73.01 IMPAIRED FASTING GLUCOSE: Chronic | ICD-10-CM

## 2023-05-01 DIAGNOSIS — Z00.00 ENCOUNTER FOR SUBSEQUENT ANNUAL WELLNESS VISIT (AWV) IN MEDICARE PATIENT: Primary | ICD-10-CM

## 2023-05-01 DIAGNOSIS — Z12.11 COLON CANCER SCREENING: ICD-10-CM

## 2023-05-01 DIAGNOSIS — Z78.0 POSTMENOPAUSAL STATE: Chronic | ICD-10-CM

## 2023-05-01 DIAGNOSIS — Z78.9 INTOLERANCE OF ORAL BISPHOSPHONATE THERAPY: Chronic | ICD-10-CM

## 2023-05-01 DIAGNOSIS — R31.29 MICROSCOPIC HEMATURIA: Chronic | ICD-10-CM

## 2023-05-01 DIAGNOSIS — E78.2 MIXED HYPERLIPIDEMIA: Chronic | ICD-10-CM

## 2023-05-01 DIAGNOSIS — S60.211A CONTUSION OF RIGHT WRIST, INITIAL ENCOUNTER: ICD-10-CM

## 2023-05-01 DIAGNOSIS — K21.9 GASTROESOPHAGEAL REFLUX DISEASE WITHOUT ESOPHAGITIS: Chronic | ICD-10-CM

## 2023-05-01 DIAGNOSIS — R05.3 COUGH, PERSISTENT: ICD-10-CM

## 2023-05-01 DIAGNOSIS — I10 BENIGN ESSENTIAL HYPERTENSION: Chronic | ICD-10-CM

## 2023-05-01 DIAGNOSIS — E55.9 VITAMIN D DEFICIENCY: Chronic | ICD-10-CM

## 2023-05-01 DIAGNOSIS — E03.8 SUBCLINICAL HYPOTHYROIDISM: Chronic | ICD-10-CM

## 2023-05-01 DIAGNOSIS — Z85.820 HISTORY OF MALIGNANT MELANOMA: Chronic | ICD-10-CM

## 2023-05-01 PROBLEM — I80.02 THROMBOPHLEBITIS OF SUPERFICIAL VEINS OF LEFT LOWER EXTREMITY: Status: RESOLVED | Noted: 2022-01-12 | Resolved: 2023-05-01

## 2023-05-01 PROBLEM — M79.605 PAIN AND SWELLING OF LOWER EXTREMITY, LEFT: Status: RESOLVED | Noted: 2022-01-12 | Resolved: 2023-05-01

## 2023-05-01 PROBLEM — I83.812 VARICOSE VEINS OF LEG WITH PAIN, LEFT: Status: RESOLVED | Noted: 2022-03-07 | Resolved: 2023-05-01

## 2023-05-01 PROBLEM — M79.89 PAIN AND SWELLING OF LOWER EXTREMITY, LEFT: Status: RESOLVED | Noted: 2022-01-12 | Resolved: 2023-05-01

## 2023-05-01 PROCEDURE — 73110 X-RAY EXAM OF WRIST: CPT

## 2023-05-01 PROCEDURE — 71046 X-RAY EXAM CHEST 2 VIEWS: CPT

## 2023-05-01 RX ORDER — PREDNISONE 10 MG/1
TABLET ORAL
Qty: 46 TABLET | Refills: 0 | Status: SHIPPED | OUTPATIENT
Start: 2023-05-01

## 2023-05-01 NOTE — PROGRESS NOTES
The ABCs of the Annual Wellness Visit  Subsequent Medicare Wellness Visit    Subjective        Lachelle Mcclure is a 82 y.o. female who presents for a Subsequent Medicare Wellness Visit.    The following portions of the patient's history were reviewed and   updated as appropriate: allergies, current medications, past family history, past medical history, past social history, past surgical history and problem list.    Compared to one year ago, the patient feels her physical   health is worse.    Compared to one year ago, the patient feels her mental   health is the same.    Recent Hospitalizations:  She was not admitted to the hospital during the last year.       Current Medical Providers:  Patient Care Team:  uJan Vega MD as PCP - General (Internal Medicine)    Outpatient Medications Prior to Visit   Medication Sig Dispense Refill   • Calcium Carbonate-Vit D-Min (CALCIUM 1200) 1234-6813 MG-UNIT chewable tablet Chew 1 tablet daily     • Cholecalciferol (VITAMIN D) 2000 UNITS capsule Take 1 capsule by mouth Daily.     • Coenzyme Q10 (COQ10) 400 MG capsule Take 1 capsule by mouth Daily.     • mupirocin (BACTROBAN) 2 % ointment Apply  topically to the appropriate area as directed 3 (Three) Times a Day. 22 g 0   • simvastatin (ZOCOR) 40 MG tablet TAKE 1 TABLET EVERY DAY FOR HIGH CHOLESTEROL (NEED MD APPOINTMENT) 90 tablet 2   • valsartan-hydrochlorothiazide (DIOVAN-HCT) 160-25 MG per tablet TAKE 1 TABLET EVERY DAY FOR BLOOD PRESSURE 90 tablet 2   • aspirin (aspirin) 81 MG EC tablet Take 1 p.o. twice weekly     • Influenza Vac High-Dose Quad (FLUZONE HIGH DOSE) 0.7 ML suspension prefilled syringe injection      • zoledronic acid (RECLAST) 5 MG/100ML solution infusion Infuse 100 mL into a venous catheter 1 (One) Time for 1 dose. 100 mL 0   • triamcinolone (KENALOG) 0.1 % ointment        No facility-administered medications prior to visit.       No opioid medication identified on active medication list. I have reviewed  "chart for other potential  high risk medication/s and harmful drug interactions in the elderly.          Aspirin is on active medication list. Aspirin use is not indicated based on review of current medical condition/s. Risk of harm outweighs potential benefits. Patient instructed to discontinue this medication.  .      Patient Active Problem List   Diagnosis   • Benign essential hypertension   • Carotid artery plaque, 5/9/2022-- mild bilateral plaque without stenosis.  4/3/2019--mild bilateral.  10/06/2016--vascular screen is now normal without carotid disease.  Improved.   • Gastroesophageal reflux disease without esophagitis   • Hyperlipidemia   • Microscopic hematuria   • Osteoporosis, 3/23/2022--lumbar spine -0.9; left femoral neck -2.4; right femoral neck -2.3.  11/5/2019--lumbar spine -0.3.  Left femoral neck -2.7.  Right femoral neck -2.4.   • Tinnitus of right ear   • History of malignant melanoma, 2008--right side of face.  1999--lower back.   • Therapeutic drug monitoring   • Vitamin D deficiency   • Lumbar disc disease   • Chronic right hip pain   • Impaired fasting glucose   • Postmenopausal state   • History of basal cell carcinoma of skin   • Subclinical hypothyroidism   • Intolerance of oral bisphosphonate therapy   • Primary osteoarthritis of both hips   • History of COVID-19   • Contusion of right wrist   • Cough, persistent     Advance Care Planning   Advance Care Planning     Advance Directive is not on file.  ACP discussion was held with the patient during this visit. Patient has an advance directive (not in EMR), copy requested.     Objective    Vitals:    05/01/23 0900   BP: 120/70   Pulse: 74   Resp: 16   Temp: 96.8 °F (36 °C)   SpO2: 97%   Weight: 69.9 kg (154 lb)   Height: 162.6 cm (64\")     Estimated body mass index is 26.43 kg/m² as calculated from the following:    Height as of this encounter: 162.6 cm (64\").    Weight as of this encounter: 69.9 kg (154 lb).    BMI is >= 25 and <30. " (Overweight) The following options were offered after discussion;: weight loss educational material (shared in after visit summary), exercise counseling/recommendations and nutrition counseling/recommendations      Does the patient have evidence of cognitive impairment?   No            HEALTH RISK ASSESSMENT    Smoking Status:  Social History     Tobacco Use   Smoking Status Never   Smokeless Tobacco Never     Alcohol Consumption:  Social History     Substance and Sexual Activity   Alcohol Use No     Fall Risk Screen:    LAURA Fall Risk Assessment was completed, and patient is at LOW risk for falls.Assessment completed on:2023    Depression Screenin/1/2023     9:05 AM   PHQ-2/PHQ-9 Depression Screening   Little Interest or Pleasure in Doing Things 0-->not at all   Feeling Down, Depressed or Hopeless 0-->not at all   PHQ-9: Brief Depression Severity Measure Score 0       Health Habits and Functional and Cognitive Screenin/1/2023     9:04 AM   Functional & Cognitive Status   Do you have difficulty preparing food and eating? No   Do you have difficulty bathing yourself, getting dressed or grooming yourself? No   Do you have difficulty using the toilet? No   Do you have difficulty moving around from place to place? No   Do you have trouble with steps or getting out of a bed or a chair? No   Current Diet Well Balanced Diet   Dental Exam Up to date   Eye Exam Up to date   Exercise (times per week) 2 times per week   Current Exercises Include Walking   Do you need help using the phone?  No   Are you deaf or do you have serious difficulty hearing?  No   Do you need help with transportation? No   Do you need help shopping? No   Do you need help preparing meals?  No   Do you need help with housework?  No   Do you need help with laundry? No   Do you need help taking your medications? No   Do you need help managing money? No   Do you ever drive or ride in a car without wearing a seat belt? No   Have you  felt unusual stress, anger or loneliness in the last month? No   Who do you live with? Spouse   If you need help, do you have trouble finding someone available to you? No   Have you been bothered in the last four weeks by sexual problems? No   Do you have difficulty concentrating, remembering or making decisions? No       Age-appropriate Screening Schedule:  Refer to the list below for future screening recommendations based on patient's age, sex and/or medical conditions. Orders for these recommended tests are listed in the plan section. The patient has been provided with a written plan.    Health Maintenance   Topic Date Due   • COLORECTAL CANCER SCREENING  07/13/2012   • LIPID PANEL  12/22/2022   • INFLUENZA VACCINE  08/01/2023   • DXA SCAN  03/23/2024   • ANNUAL WELLNESS VISIT  05/01/2024   • MAMMOGRAM  10/17/2024   • TDAP/TD VACCINES (3 - Td or Tdap) 04/19/2033   • Pneumococcal Vaccine 65+  Completed   • COVID-19 Vaccine  Discontinued   • ZOSTER VACCINE  Discontinued                  CMS Preventative Services Quick Reference  Risk Factors Identified During Encounter:    Immunizations Discussed/Encouraged: COVID19    The above risks/problems have been discussed with the patient.  Pertinent information has been shared with the patient in the After Visit Summary.    Diagnoses and all orders for this visit:    1. Encounter for subsequent annual wellness visit (AWV) in Medicare patient (Primary)    2. Impaired fasting glucose    3. Hyperlipidemia    4. Microscopic hematuria    5. Benign essential hypertension    6. Vitamin D deficiency    7. Subclinical hypothyroidism    8. Postmenopausal state    9. Osteoporosis, 3/23/2022--lumbar spine -0.9; left femoral neck -2.4; right femoral neck -2.3.  11/5/2019--lumbar spine -0.3.  Left femoral neck -2.7.  Right femoral neck -2.4.  -     XR Wrist 3+ View Right; Future    10. Intolerance of oral bisphosphonate therapy    11. Primary osteoarthritis of both hips    12. History  of COVID-19  -     predniSONE (DELTASONE) 10 MG tablet; 5 by mouth daily 5 days, then 4 daily 2 days, 3 daily 2 days, 2 daily 2 days, 1 daily 2 days, one half daily 2 days and then discontinue.  Dispense: 46 tablet; Refill: 0  -     XR Chest PA & Lateral    13. History of malignant melanoma, 2008--right side of face.  1999--lower back.    14. Gastroesophageal reflux disease without esophagitis    15. Carotid artery plaque, 4/3/2019--mild bilateral.  10/06/2016--vascular screen is now normal without carotid disease.  Improved.    16. Colon cancer screening  -     Cologuard - Stool, Per Rectum; Future    17. Contusion of right wrist, initial encounter  -     XR Wrist 3+ View Right; Future    18. Cough, persistent  -     predniSONE (DELTASONE) 10 MG tablet; 5 by mouth daily 5 days, then 4 daily 2 days, 3 daily 2 days, 2 daily 2 days, 1 daily 2 days, one half daily 2 days and then discontinue.  Dispense: 46 tablet; Refill: 0  -     XR Chest PA & Lateral    Patient presents with 2 new problems that need to be addressed today as follows:    May 1, 2023--patient injured her right wrist by striking it against a wall and suffered a contusion of the wrist.  She is complaining of pain and swelling on the ulnar aspect of the wrist.  On exam she has an obvious ecchymosis/contusion that extends over the dorsum of the hand and there is tenderness to palpation over the ulnar process.  Patient has osteoporosis and should have an x-ray which was ordered.    May 1, 2023--patient reports that she had COVID about the middle of April 2023.  This was her second episode.  She also had a sinus infection and had a lot of green drainage and head congestion as well as an irritated throat.  She continues to have hoarseness and discomfort in her throat and also a persistent cough.  She was given Paxlovid which she only took for 2 days due to potential drug interactions which she was not told about.  On exam today she has a few scattered rhonchi  but no wheezes.  No definite consolidation.  Plan is as follows: Prednisone 50 mg p.o. daily x5 days, taper and discontinue.  Chest x-ray PA and lateral.    Follow Up:   Next Medicare Wellness visit to be scheduled in 1 year.      An After Visit Summary and PPPS were made available to the patient.

## 2023-05-02 DIAGNOSIS — S69.90XA: Primary | ICD-10-CM

## 2023-05-22 ENCOUNTER — TELEPHONE (OUTPATIENT)
Dept: INTERNAL MEDICINE | Facility: CLINIC | Age: 83
End: 2023-05-22
Payer: MEDICARE

## 2023-05-22 NOTE — TELEPHONE ENCOUNTER
Caller: Lachelle Mcclure    Relationship: Self    Best call back number: [phone]    What is the medical concern/diagnosis: POS COLO GUARD TEST    What specialty or service is being requested: GASTRO    What is the provider, practice or medical service name: DR. SERGE INMAN     What is the office location: [address] [place] [address]    What is the office phone number: [phone]    [phone]    Any additional details: PLEASE SEND THE COPY OF POSITIVE COLO GUARD TEST AS WELL. PLEASE CALL.

## 2023-06-14 DIAGNOSIS — I10 BENIGN ESSENTIAL HYPERTENSION: ICD-10-CM

## 2023-06-14 DIAGNOSIS — E78.2 MIXED HYPERLIPIDEMIA: ICD-10-CM

## 2023-06-15 RX ORDER — VALSARTAN AND HYDROCHLOROTHIAZIDE 160; 25 MG/1; MG/1
TABLET ORAL
Qty: 90 TABLET | Refills: 1 | Status: SHIPPED | OUTPATIENT
Start: 2023-06-15

## 2023-06-15 RX ORDER — SIMVASTATIN 40 MG
TABLET ORAL
Qty: 90 TABLET | Refills: 1 | Status: SHIPPED | OUTPATIENT
Start: 2023-06-15

## 2023-06-15 NOTE — TELEPHONE ENCOUNTER
Rx Refill Note  Requested Prescriptions     Pending Prescriptions Disp Refills    valsartan-hydrochlorothiazide (DIOVAN-HCT) 160-25 MG per tablet [Pharmacy Med Name: VALSARTAN/HYDROCHLOROTHIAZIDE 160-25 MG Tablet] 90 tablet 1     Sig: TAKE 1 TABLET EVERY DAY FOR BLOOD PRESSURE    simvastatin (ZOCOR) 40 MG tablet [Pharmacy Med Name: SIMVASTATIN 40 MG Tablet] 90 tablet 1     Sig: TAKE 1 TABLET EVERY DAY FOR HIGH CHOLESTEROL (NEED MD APPOINTMENT)      Last office visit with prescribing clinician: 5/1/2023   Last telemedicine visit with prescribing clinician: Visit date not found   Next office visit with prescribing clinician: 7/17/2023                         Would you like a call back once the refill request has been completed: [] Yes [] No    If the office needs to give you a call back, can they leave a voicemail: [] Yes [] No    Ana Laura Barney MA  06/15/23, 15:54 EDT

## 2023-06-16 DIAGNOSIS — R19.5 POSITIVE COLORECTAL CANCER SCREENING USING COLOGUARD TEST: Primary | ICD-10-CM

## 2023-07-10 ENCOUNTER — TELEPHONE (OUTPATIENT)
Dept: INTERNAL MEDICINE | Facility: CLINIC | Age: 83
End: 2023-07-10

## 2023-07-10 NOTE — TELEPHONE ENCOUNTER
Caller: Lachelle Mcclure    Relationship: Self    Best call back number:     [phone]       What was the call regarding: RETURNED CALL TO CLINIC PLEASE RETURN CALL

## 2023-07-17 ENCOUNTER — TELEPHONE (OUTPATIENT)
Dept: INTERNAL MEDICINE | Facility: CLINIC | Age: 83
End: 2023-07-17

## 2023-07-17 PROBLEM — R05.3 COUGH, PERSISTENT: Status: RESOLVED | Noted: 2023-05-01 | Resolved: 2023-07-17

## 2023-07-17 PROBLEM — E83.52 HYPERCALCEMIA: Status: ACTIVE | Noted: 2023-07-17

## 2023-07-17 PROBLEM — S69.90XA SCAPHOLUNATE LIGAMENT INJURY WITH NO INSTABILITY: Status: RESOLVED | Noted: 2023-05-02 | Resolved: 2023-07-17

## 2023-07-17 PROBLEM — S60.211A CONTUSION OF RIGHT WRIST: Status: RESOLVED | Noted: 2023-05-01 | Resolved: 2023-07-17

## 2023-07-21 ENCOUNTER — TELEPHONE (OUTPATIENT)
Dept: INTERNAL MEDICINE | Facility: CLINIC | Age: 83
End: 2023-07-21

## 2023-07-21 NOTE — TELEPHONE ENCOUNTER
Dr. Vega please see note below from scheduling-      Comments    23 PLEASE HAVE DR VEGA PUT IN A NEW PATIENT THERAPY / ORDER FOR THE RECLAST. THE ONE THAT IS IN THERE NOW IS     Thanks-Juanito

## 2023-07-27 ENCOUNTER — OFFICE VISIT (OUTPATIENT)
Dept: SURGERY | Facility: CLINIC | Age: 83
End: 2023-07-27
Payer: MEDICARE

## 2023-07-27 ENCOUNTER — TELEPHONE (OUTPATIENT)
Dept: SURGERY | Facility: CLINIC | Age: 83
End: 2023-07-27

## 2023-07-27 VITALS — HEIGHT: 64 IN | BODY MASS INDEX: 26.5 KG/M2 | WEIGHT: 155.2 LBS

## 2023-07-27 DIAGNOSIS — R10.31 GROIN PAIN, CHRONIC, RIGHT: ICD-10-CM

## 2023-07-27 DIAGNOSIS — M89.9 PUBIC BONE PAIN: Primary | ICD-10-CM

## 2023-07-27 DIAGNOSIS — G89.29 GROIN PAIN, CHRONIC, RIGHT: ICD-10-CM

## 2023-07-27 PROCEDURE — 1159F MED LIST DOCD IN RCRD: CPT | Performed by: SURGERY

## 2023-07-27 PROCEDURE — 99203 OFFICE O/P NEW LOW 30 MIN: CPT | Performed by: SURGERY

## 2023-07-27 PROCEDURE — 1160F RVW MEDS BY RX/DR IN RCRD: CPT | Performed by: SURGERY

## 2023-07-27 NOTE — PROGRESS NOTES
CC: Right inguinal and suprapubic pain     HPI: Patient is a very pleasant 82-year-old female that is here today referred by Dr. Juan Vega for evaluation of right-sided inguinal pain.  Patient reports for the past 3 years she has been having sciatica symptoms and has been having epidural injections.  For the past year she has noticed pain over the right groin and right lower quadrant.  The pain is sharp, lasts for several seconds, is severe and burning in nature.  The pain usually is exacerbated by bending, lifting her legs to entering the car, lifting.  The pain improves with cessation of activity and sitting.  She has been getting epidural injections over the lower lumbar area's recommendation by her orthopedic surgeon.  She has not had any improvement of her symptoms.  She has tried ibuprofen and Tylenol without any difference in her symptoms.  She denies any nausea vomiting or any change of bowel habits.  She recently had a positive Cologuard and is scheduled for colonoscopy in August with Dr. Goyal.  She denies feeling any bulge over the area or any anatomical abnormality.     PMH: Hypertension, hyperlipidemia, vitamin D deficiency, history of melanoma     PSH:   -Eye surgery  -Appendectomy  -Tonsillectomy and adenoidectomy 1946  -Hysterectomy 1986  -Foot surgery 1992  -Colonoscopy 2003  -Melanoma excision 2009  -Colonoscopy 2012  -Upper endoscopy 2013  -Laparoscopic cholecystectomy 2013  -Basal cell carcinoma excision 2018    MEDS: Reviewed and reconciled in epic     ALL: No known drug allergies    FH and SH: Family history is noncontributory.  Denies smoking drinking or taking any drugs     ROS:   Constitutional: denies any weight changes, fatigue or weakness.  HEENT: Denies hearing loss and rhinorrhea  Cardiovascular: denies chest pain, palpitations, edemas.  Respiratory: denies cough, sputum, SOB.  Gastrointestinal: denies N&V, abd pain, diarrhea, constipation.  Genitourinary: denies dysuria,  "frequency.  Endocrine: denies cold intolerance, lethargy and flushing.  Hem: denies excessive bruising and postop bleeding.  Musculoskeletal: denies weakness, joint swelling, pain or stiffness.  Reports back pain  Neuro: denies seizures, CVA, paresthesia, or peripheral neuropathy.   Skin: denies change in nevi, rashes, masses.     PE:   Vitals:    07/27/23 0808   Weight: 70.4 kg (155 lb 3.2 oz)   Height: 162.6 cm (64.02\")     Body mass index is 26.62 kg/m².   Alert and oriented ×3, no acute distress.  Head is normocephalic and atraumatic.  Neck is supple there is no thyromegaly or lymphadenopathy  Chest is clear bilaterally there is no added sounds  Regular rate and rhythm, no murmurs  Abdomen is soft.  There is tenderness over the right lower quadrant as well as tenderness at the suprapubic and pubic area.  There is tenderness over the right groin.  There is tenderness over occur hysterectomy incision.  I do not feel a hernia.  No clubbing cyanosis or edema in lower or upper extremities    Diagnostic studies:   Labs 7/17/2023:   Normal CBC and CMP     Assessment and plan    The patient is a very pleasant 82-year-old female with pain in the inguinal area as well as the suprapubic area.  Most of her pain seem to be over the insertion of the rectus muscle at the pubic bone.  She is very tender at the rectus muscle close to the insertion of the pubic bone.  I do not feel any hernia.  She has an incision from prior hysterectomy and I do not feel a hernia over the area.  I discussed with the patient about further step.  I think she should have CT scan of the pelvis without contrast for further characterization and to assess for small herniation that may be causing her pain.  If no evidence of hernia on CT scan then she will be referred for physical therapy and starting a short course of meloxicam.    Patient verbalized understanding and agree with the plan     Ross Goodwni MD  General, Minimally Invasive and " Endoscopic Surgery  [facility]     [address], [address]  [place], [place], [address]  P: [phone]  F: [phone]

## 2023-08-11 ENCOUNTER — HOSPITAL ENCOUNTER (OUTPATIENT)
Dept: CT IMAGING | Facility: HOSPITAL | Age: 83
Discharge: HOME | End: 2023-08-11
Admitting: SURGERY
Payer: MEDICARE

## 2023-08-11 DIAGNOSIS — G89.29 GROIN PAIN, CHRONIC, RIGHT: ICD-10-CM

## 2023-08-11 DIAGNOSIS — M89.9 PUBIC BONE PAIN: ICD-10-CM

## 2023-08-11 DIAGNOSIS — R10.31 GROIN PAIN, CHRONIC, RIGHT: ICD-10-CM

## 2023-08-11 PROCEDURE — 72192 CT PELVIS W/O DYE: CPT

## 2023-08-15 ENCOUNTER — TELEPHONE (OUTPATIENT)
Dept: SURGERY | Facility: CLINIC | Age: 83
End: 2023-08-15
Payer: MEDICARE

## 2023-08-15 NOTE — TELEPHONE ENCOUNTER
I spoke with the patient regarding her CT scan results.  She does not have any inguinal hernia or incisional hernia at the area of the prior hysterectomy.  Patient has history of spinal stenosis on right hip bursitis.  She is following with Dr. Townsend that perform injections for her.  I discussed with her that next option will be for her to have physical therapy  She verbalized understanding and agrees with the plan

## 2023-08-30 ENCOUNTER — ANESTHESIA EVENT (OUTPATIENT)
Dept: GASTROENTEROLOGY | Facility: HOSPITAL | Age: 83
End: 2023-08-30
Payer: MEDICARE

## 2023-08-30 ENCOUNTER — ANESTHESIA (OUTPATIENT)
Dept: GASTROENTEROLOGY | Facility: HOSPITAL | Age: 83
End: 2023-08-30
Payer: MEDICARE

## 2023-08-30 ENCOUNTER — HOSPITAL ENCOUNTER (OUTPATIENT)
Facility: HOSPITAL | Age: 83
Setting detail: HOSPITAL OUTPATIENT SURGERY
Discharge: HOME | End: 2023-08-30
Attending: INTERNAL MEDICINE | Admitting: INTERNAL MEDICINE
Payer: MEDICARE

## 2023-08-30 ENCOUNTER — ON CAMPUS - OUTPATIENT (OUTPATIENT)
Dept: URBAN - METROPOLITAN AREA HOSPITAL 114 | Facility: HOSPITAL | Age: 83
End: 2023-08-30
Payer: MEDICARE

## 2023-08-30 VITALS
RESPIRATION RATE: 16 BRPM | OXYGEN SATURATION: 96 % | HEART RATE: 61 BPM | WEIGHT: 154.5 LBS | HEIGHT: 64 IN | BODY MASS INDEX: 26.38 KG/M2 | DIASTOLIC BLOOD PRESSURE: 70 MMHG | SYSTOLIC BLOOD PRESSURE: 119 MMHG

## 2023-08-30 DIAGNOSIS — R19.5 OTHER FECAL ABNORMALITIES: ICD-10-CM

## 2023-08-30 DIAGNOSIS — D12.7 BENIGN NEOPLASM OF RECTOSIGMOID JUNCTION: ICD-10-CM

## 2023-08-30 DIAGNOSIS — R19.5 POSITIVE COLORECTAL CANCER SCREENING USING COLOGUARD TEST: ICD-10-CM

## 2023-08-30 DIAGNOSIS — D12.0 BENIGN NEOPLASM OF CECUM: ICD-10-CM

## 2023-08-30 DIAGNOSIS — Z12.11 ENCOUNTER FOR SCREENING FOR MALIGNANT NEOPLASM OF COLON: ICD-10-CM

## 2023-08-30 DIAGNOSIS — K64.0 FIRST DEGREE HEMORRHOIDS: ICD-10-CM

## 2023-08-30 PROCEDURE — 45380 COLONOSCOPY AND BIOPSY: CPT | Mod: 59 | Performed by: INTERNAL MEDICINE

## 2023-08-30 PROCEDURE — 25010000002 PROPOFOL 10 MG/ML EMULSION: Performed by: ANESTHESIOLOGY

## 2023-08-30 PROCEDURE — 45381 COLONOSCOPY SUBMUCOUS NJX: CPT | Performed by: INTERNAL MEDICINE

## 2023-08-30 PROCEDURE — 25010000002 PHENYLEPHRINE 10 MG/ML SOLUTION: Performed by: ANESTHESIOLOGY

## 2023-08-30 PROCEDURE — 88305 TISSUE EXAM BY PATHOLOGIST: CPT | Performed by: INTERNAL MEDICINE

## 2023-08-30 PROCEDURE — 45385 COLONOSCOPY W/LESION REMOVAL: CPT | Performed by: INTERNAL MEDICINE

## 2023-08-30 RX ORDER — LABETALOL HYDROCHLORIDE 5 MG/ML
10 INJECTION, SOLUTION INTRAVENOUS
Status: DISCONTINUED | OUTPATIENT
Start: 2023-08-30 | End: 2023-08-30 | Stop reason: HOSPADM

## 2023-08-30 RX ORDER — DIPHENHYDRAMINE HYDROCHLORIDE 50 MG/ML
12.5 INJECTION INTRAMUSCULAR; INTRAVENOUS
Status: DISCONTINUED | OUTPATIENT
Start: 2023-08-30 | End: 2023-08-30 | Stop reason: HOSPADM

## 2023-08-30 RX ORDER — ONDANSETRON 2 MG/ML
4 INJECTION INTRAMUSCULAR; INTRAVENOUS ONCE AS NEEDED
Status: DISCONTINUED | OUTPATIENT
Start: 2023-08-30 | End: 2023-08-30 | Stop reason: HOSPADM

## 2023-08-30 RX ORDER — PHENYLEPHRINE HYDROCHLORIDE 10 MG/ML
INJECTION INTRAVENOUS AS NEEDED
Status: DISCONTINUED | OUTPATIENT
Start: 2023-08-30 | End: 2023-08-30 | Stop reason: SURG

## 2023-08-30 RX ORDER — DROPERIDOL 2.5 MG/ML
0.62 INJECTION, SOLUTION INTRAMUSCULAR; INTRAVENOUS ONCE AS NEEDED
Status: DISCONTINUED | OUTPATIENT
Start: 2023-08-30 | End: 2023-08-30 | Stop reason: HOSPADM

## 2023-08-30 RX ORDER — FENTANYL CITRATE 50 UG/ML
50 INJECTION, SOLUTION INTRAMUSCULAR; INTRAVENOUS
Status: DISCONTINUED | OUTPATIENT
Start: 2023-08-30 | End: 2023-08-30 | Stop reason: HOSPADM

## 2023-08-30 RX ORDER — EPHEDRINE SULFATE 50 MG/ML
10 INJECTION, SOLUTION INTRAVENOUS ONCE AS NEEDED
Status: DISCONTINUED | OUTPATIENT
Start: 2023-08-30 | End: 2023-08-30 | Stop reason: HOSPADM

## 2023-08-30 RX ORDER — PROPOFOL 10 MG/ML
VIAL (ML) INTRAVENOUS AS NEEDED
Status: DISCONTINUED | OUTPATIENT
Start: 2023-08-30 | End: 2023-08-30 | Stop reason: SURG

## 2023-08-30 RX ORDER — LIDOCAINE HYDROCHLORIDE 20 MG/ML
INJECTION, SOLUTION INFILTRATION; PERINEURAL AS NEEDED
Status: DISCONTINUED | OUTPATIENT
Start: 2023-08-30 | End: 2023-08-30 | Stop reason: SURG

## 2023-08-30 RX ORDER — NALOXONE HCL 0.4 MG/ML
0.2 VIAL (ML) INJECTION AS NEEDED
Status: DISCONTINUED | OUTPATIENT
Start: 2023-08-30 | End: 2023-08-30 | Stop reason: HOSPADM

## 2023-08-30 RX ORDER — SODIUM CHLORIDE 0.9 % (FLUSH) 0.9 %
10 SYRINGE (ML) INJECTION AS NEEDED
Status: DISCONTINUED | OUTPATIENT
Start: 2023-08-30 | End: 2023-08-30 | Stop reason: HOSPADM

## 2023-08-30 RX ORDER — FENTANYL CITRATE 50 UG/ML
25 INJECTION, SOLUTION INTRAMUSCULAR; INTRAVENOUS
Status: DISCONTINUED | OUTPATIENT
Start: 2023-08-30 | End: 2023-08-30 | Stop reason: HOSPADM

## 2023-08-30 RX ORDER — HYDRALAZINE HYDROCHLORIDE 20 MG/ML
10 INJECTION INTRAMUSCULAR; INTRAVENOUS
Status: DISCONTINUED | OUTPATIENT
Start: 2023-08-30 | End: 2023-08-30 | Stop reason: HOSPADM

## 2023-08-30 RX ORDER — FLUMAZENIL 0.1 MG/ML
0.2 INJECTION INTRAVENOUS AS NEEDED
Status: DISCONTINUED | OUTPATIENT
Start: 2023-08-30 | End: 2023-08-30 | Stop reason: HOSPADM

## 2023-08-30 RX ORDER — SODIUM CHLORIDE, SODIUM LACTATE, POTASSIUM CHLORIDE, CALCIUM CHLORIDE 600; 310; 30; 20 MG/100ML; MG/100ML; MG/100ML; MG/100ML
1000 INJECTION, SOLUTION INTRAVENOUS CONTINUOUS
Status: DISCONTINUED | OUTPATIENT
Start: 2023-08-30 | End: 2023-08-30 | Stop reason: HOSPADM

## 2023-08-30 RX ADMIN — PROPOFOL 100 MG: 10 INJECTION, EMULSION INTRAVENOUS at 08:06

## 2023-08-30 RX ADMIN — PHENYLEPHRINE HYDROCHLORIDE 100 MCG: 10 INJECTION INTRAVENOUS at 08:33

## 2023-08-30 RX ADMIN — LIDOCAINE HYDROCHLORIDE 60 MG: 20 INJECTION, SOLUTION INFILTRATION; PERINEURAL at 08:06

## 2023-08-30 RX ADMIN — SODIUM CHLORIDE, POTASSIUM CHLORIDE, SODIUM LACTATE AND CALCIUM CHLORIDE 1000 ML: 600; 310; 30; 20 INJECTION, SOLUTION INTRAVENOUS at 07:28

## 2023-08-30 RX ADMIN — PHENYLEPHRINE HYDROCHLORIDE 100 MCG: 10 INJECTION INTRAVENOUS at 08:13

## 2023-08-30 RX ADMIN — PROPOFOL 160 MCG/KG/MIN: 10 INJECTION, EMULSION INTRAVENOUS at 08:06

## 2023-08-30 NOTE — ANESTHESIA PREPROCEDURE EVALUATION
Anesthesia Evaluation     no history of anesthetic complications:   NPO Solid Status: > 8 hours  NPO Liquid Status: > 2 hours           Airway   Mallampati: II  Neck ROM: full  no difficulty expected  Dental - normal exam     Pulmonary - negative pulmonary ROS and normal exam   (-) COPD, asthma, sleep apnea, not a smoker    PE comment: nonlabored  Cardiovascular - normal exam  Exercise tolerance: good (4-7 METS)    Rhythm: regular  Rate: normal    (+) hypertension, hyperlipidemia,  carotid artery disease (bilateral mild carotid plaques without stenosis) carotid bilateral  (-) valvular problems/murmurs, past MI, CAD, dysrhythmias, angina      Neuro/Psych- negative ROS  (-) seizures, TIA, CVA  GI/Hepatic/Renal/Endo    (+) GERD, thyroid problem hypothyroidism  (-) liver disease, no renal disease, diabetes    Musculoskeletal     (+) arthralgias, back pain  Abdominal    Substance History      OB/GYN          Other   arthritis,   history of cancer (melanoma) remission                  Anesthesia Plan    ASA 2     MAC       Anesthetic plan, risks, benefits, and alternatives have been provided, discussed and informed consent has been obtained with: patient.    CODE STATUS:

## 2023-08-30 NOTE — H&P
[facility]   HISTORY AND PHYSICAL    Patient Name: Lachelle Mcclure  : 1940  MRN: [id number]  Primary Care Physician:  Juan Vega MD  Date of admission: 2023    Subjective   Subjective     Chief Complaint: + cologuard, screening    History of Present Illness  The patient presents with no gastrointestinal  Symptoms or issues at this time   Review of Systems   Musculoskeletal:  Positive for back pain.   All other systems reviewed and are negative.     Personal History     Past Medical History:   Diagnosis Date    Benign essential hypertension 2006--treatment for hypertension begun.    Carotid artery plaque, 2022-- mild bilateral plaque without stenosis.  4/3/2019--mild bilateral.  10/06/2016--vascular screen is now normal without carotid disease.  Improved. 2010    May 9, 2022--vascular screening reveals mild carotid artery plaque bilaterally without stenosis.  Negative for AAA.  Negative for PAD.  April 3, 2019--vascular screening reveals mild bilateral carotid plaque without stenosis.  Negative for AAA.  Negative for PAD.  10/06/2016--vascular screen reveals no evidence of carotid plaque, negative for AAA, negative for PAD.  2014--vascular screen rev    Cholelithiasis     Chronic right hip pain 2017--patient has had a one-year history of intermittent episodes of lateral right hip pain that at times is very severe and debilitating.  She also has tenderness over this area that limits her from sleeping on her right side.  Last year I gave her a cortisone injection for trochanteric bursitis and this provided immediate relief but not long lasting.  X-rays were negative for fracture or even degenerative arthritis.  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  Ordered.    Gastroesophageal reflux disease without esophagitis 2013    04/10/2014--patient presented with right upper  quadrant/epigastric burning pain and discomfort that was postprandial. H pylori breath test was negative. Empiric trial of Dexilant 60 mg per day was initiated for esophageal reflux/dyspepsia.   01/14/2013--EGD performed for epigastric and right upper quadrant pain. There was some edematous appearing mucosa in the antrum and body of the stomach which was biopsied. No significant erythema. No ulcerations. Normal appearing duodenum and esophagus. Pathology revealed moderate chronic active gastritis. No intestinal metaplasia. Helicobacter pylori positive. Patient was treated with appropriate antibiotic and PPI therapy.    History of basal cell carcinoma of skin 02/12/2019 June 2018--Mohs micrographic surgery of basal cell carcinoma on the tip of the nose.    History of Helicobacter pylori gastritis 01/14/2013    04/10/2014--patient presented with right upper quadrant/epigastric burning pain and discomfort that was postprandial. H pylori breath test was negative. Empiric trial of Dexilant 60 mg per day was initiated for esophageal reflux/dyspepsia.  01/14/2013--EGD performed for epigastric and right upper quadrant pain. There was some edematous appearing mucosa in the antrum and body of the stomach which w    History of malignant melanoma, 2008--right side of face.  1999--lower back. 01/01/2008 2008--melanoma resected from right face.   1999--malignant melanoma resected from lower back.    History of routine gynecologic exam yearly    Patient sees a gynecologist.    History of thrombophlebitis, superficial, left lower extremity. 01/12/2022 January 12, 2022--patient reports a 2-week history of left lower extremity swelling which is just above the ankle and sometimes extends all the way down into the ankle and is associated with redness and tenderness. It tends to get worse towards the end of the day. She has had no shortness of breath or chest pain and there is been no significant swelling involving the upper  part of either leg. On e    Hyperlipidemia 08/13/2010 08/13/2010--treatment for hyperlipidemia begun.    Impaired fasting glucose 09/01/2017 09/01/2017--routine physical.  Serum glucose elevated at 101.  Recommend weight loss and decrease carbohydrate intake.    Intolerance of oral bisphosphonate therapy 02/21/2020    Lumbar disc disease 10/19/2016    09/13/2016--x-ray lumbar spine reveals disc space narrowing at L2-L3, L3-L4, L4-L5, and possibly L5-S1.  There is mild anterior listhesis of L4 on L5 measuring 4.5 mm.  No compression deformity, nor other evidence of acute process.    Microscopic hematuria 12/18/2015 12/22/2015--urine cytology negative for malignant cells.  Red blood cells noted.  Transitional epithelial cells noted.  Urine culture revealed less than 10,000 colony forming units of mixed urogenital laurie.  Repeat urinalysis was negative.  Recommend observation.  12/18/2015--routine urinalysis reveals 3-5 WBCs and 3-5 RBCs. 0 epithelial cells. 0 bacteria. Patient is asymptomatic. Repeat urinalysis, urine cytology, and urine culture sent.    Osteoporosis, 3/23/2022--lumbar spine -0.9; left femoral neck -2.4; right femoral neck -2.3.  11/5/2019--lumbar spine -0.3.  Left femoral neck -2.7.  Right femoral neck -2.4. 07/24/2009 March 23, 2022--DEXA scan reveals lumbar spine T score -0.9 representing a 5.7% interval decrease.  Left femoral neck T score -2.4 which represents a 5.7% increase.  Right femoral neck T score -2.3 which is unchanged.  Impression is osteopenia at the hips with mixed interval change in density values.  November 5, 2019--DEXA scan reveals lumbar spine T score -0.3 representing a 5% increase.  Left f    Positive colorectal cancer screening using Cologuard test     Postmenopausal state 09/01/2017    Primary osteoarthritis of both hips 11/19/2021 November 19, 2021-9 being evaluated and treated by the orthopedist.  Patient may need bilateral hip replacements at some point in  the future.    Subclinical hypothyroidism 02/21/2020 February 21, 2020--TSH slightly elevated at 4.41.  Free T3 and free T4 are normal.  Close observation.    Tinnitus of right ear 12/18/2015 12/18/2015--patient presents with a four-month history of ringing in her right ear. No hearing loss. Examination reveals a cerumen impaction of the right ear canal. This was removed with irrigation and curettage.    Vitamin D deficiency 08/12/2016       Past Surgical History:   Procedure Laterality Date    APPENDECTOMY      BASAL CELL CARCINOMA EXCISION  06/2018 June 2018--Mohs micrographic surgery of basal cell carcinoma on the tip of the nose.    CHOLECYSTECTOMY  05/17/2013 05/17/2013--laparoscopic cholecystectomy.    COLONOSCOPY  07/12/2012 07/12/2012--normal colonoscopy to the cecum with an excellent prep.     COLONOSCOPY  06/20/2003 06/20/2003--normal colonoscopy to the cecum.    ESOPHAGOSCOPY / EGD  01/14/2013 01/14/2013--EGD performed for epigastric and right upper quadrant pain. There was some edematous appearing mucosa in the antrum and body of the stomach which was biopsied. No significant erythema. No ulcerations. Normal appearing duodenum and esophagus. Pathology revealed moderate chronic active gastritis. No intestinal metaplasia. Helicobacter pylori positive. Patient was treated with appropriate    EYE SURGERY      FOOT SURGERY  1992 1992--orthopedic 10 placed in right great toe.    HYSTERECTOMY  1986 1986--total abdominal hysterectomy.    SKIN CANCER EXCISION  09/14/2009 09/14/2009--excision 1 cm mid scalp cyst. Pathology benign. 2008--melanoma resected from right face. 1999--malignant melanoma resected from lower back.    TONSILLECTOMY AND ADENOIDECTOMY  1946 1946.       Family History: family history includes COPD in her father and mother; Hypertension in her father and mother. Otherwise pertinent FHx was reviewed and not pertinent to current issue.    Social History:  reports  that she has never smoked. She has never used smokeless tobacco. She reports that she does not drink alcohol and does not use drugs.    Home Medications:  Calcium Carb-Cholecalciferol, Vitamin D, simvastatin, valsartan-hydrochlorothiazide, and zoledronic acid    Allergies:  No Known Allergies    Objective    Objective     Vitals:   Heart Rate:  [81] 81  Resp:  [20] 20  BP: (129)/(78) 129/78    Physical Exam  HENT:      Right Ear: External ear normal.      Left Ear: External ear normal.      Mouth/Throat:      Pharynx: Oropharynx is clear.   Eyes:      Conjunctiva/sclera: Conjunctivae normal.   Cardiovascular:      Rate and Rhythm: Normal rate.      Pulses: Normal pulses.   Pulmonary:      Effort: Pulmonary effort is normal.   Abdominal:      General: Abdomen is flat.   Skin:     General: Skin is warm.   Neurological:      General: No focal deficit present.      Mental Status: She is alert.   Psychiatric:         Mood and Affect: Mood normal.       Result Review    Result Review:  I have personally reviewed the results from the time of this admission to 8/30/2023 08:07 EDT and agree with these findings:  []  Laboratory list / accordion  []  Microbiology  []  Radiology  []  EKG/Telemetry   []  Cardiology/Vascular   []  Pathology  []  Old records  []  Other:  Most notable findings include:       Assessment & Plan   Assessment / Plan     Brief Patient Summary:  Lachelle Mcclure is a 83 y.o. female who   Positive cologuard  Screening for colon cancer    Active Hospital Problems:  There are no active hospital problems to display for this patient.    Plan: Colonoscopy risks, alternatives and benefits discussed with patient and the patient is agreeable to having procedure done.      DVT prophylaxis:  No DVT prophylaxis order currently exists.    CODE STATUS:       Admission Status:  I believe this patient meets outpatient  status.    Ho Goyal MD

## 2023-08-30 NOTE — ANESTHESIA POSTPROCEDURE EVALUATION
"Patient: Lachelle Mcclure    Procedure Summary       Date: 08/30/23 Room / Location:  [facility]    Anesthesia Start: 0757 Anesthesia Stop: 0835    Procedure: COLONOSCOPY INTO CECUM WITH COLD BIOPSY POLYPECTOMIES AND HOT SNARE POLYPECTOMY Diagnosis:     Surgeons: Ho Goyal MD Provider: Fermin Moss MD    Anesthesia Type: MAC ASA Status: 2            Anesthesia Type: MAC    Vitals  Vitals Value Taken Time   BP 98/62 08/30/23 0836   Temp     Pulse 59 08/30/23 0836   Resp 16 08/30/23 0836   SpO2 99 % 08/30/23 0836           Post Anesthesia Care and Evaluation    Patient location during evaluation: bedside  Pain management: adequate    Airway patency: patent  Anesthetic complications: No anesthetic complications    Cardiovascular status: acceptable  Respiratory status: acceptable  Hydration status: acceptable    Comments: *BP 98/62 (BP Location: Left arm, Patient Position: Lying)   Pulse 59   Resp 16   Ht 162.6 cm (64\")   Wt 70.1 kg (154 lb 8 oz)   SpO2 99%   BMI 26.52 kg/mý       "

## 2023-08-30 NOTE — DISCHARGE INSTRUCTIONS
For the next 24 hours patient needs to be with a responsible adult.    For 24 hours DO NOT drive, operate machinery, appliances, drink alcohol, make important decisions or sign legal documents.    Start with a light or bland diet if you are feeling sick to your stomach otherwise advance to regular diet as tolerated.    Follow recommendations on procedure report if provided by your doctor.    Call Dr Goyal for problems [phone]. Await pathology result in 7-10 days.    Problems may include but not limited to: large amounts of bleeding, trouble breathing, repeated vomiting, severe unrelieved pain, fever or chills.

## 2023-09-01 LAB
LAB AP CASE REPORT: NORMAL
LAB AP INTRADEPARTMENTAL CONSULT: NORMAL
PATH REPORT.FINAL DX SPEC: NORMAL
PATH REPORT.GROSS SPEC: NORMAL

## 2023-10-05 DIAGNOSIS — M81.0 AGE-RELATED OSTEOPOROSIS WITHOUT CURRENT PATHOLOGICAL FRACTURE: Primary | ICD-10-CM

## 2023-10-05 RX ORDER — ZOLEDRONIC ACID 5 MG/100ML
5 INJECTION, SOLUTION INTRAVENOUS ONCE
OUTPATIENT
Start: 2023-10-05

## 2023-10-05 RX ORDER — SODIUM CHLORIDE 9 MG/ML
250 INJECTION, SOLUTION INTRAVENOUS ONCE
OUTPATIENT
Start: 2023-10-05

## 2023-11-15 ENCOUNTER — HOSPITAL ENCOUNTER (OUTPATIENT)
Dept: INFUSION THERAPY | Facility: HOSPITAL | Age: 83
Discharge: HOME | End: 2023-11-15
Admitting: INTERNAL MEDICINE
Payer: MEDICARE

## 2023-11-15 VITALS
TEMPERATURE: 97.7 F | BODY MASS INDEX: 25.92 KG/M2 | OXYGEN SATURATION: 98 % | WEIGHT: 151 LBS | DIASTOLIC BLOOD PRESSURE: 78 MMHG | HEART RATE: 63 BPM | SYSTOLIC BLOOD PRESSURE: 143 MMHG | RESPIRATION RATE: 16 BRPM

## 2023-11-15 DIAGNOSIS — M81.0 AGE-RELATED OSTEOPOROSIS WITHOUT CURRENT PATHOLOGICAL FRACTURE: Primary | ICD-10-CM

## 2023-11-15 LAB
ALBUMIN SERPL-MCNC: 4.1 G/DL (ref 3.5–5.2)
ALBUMIN/GLOB SERPL: 2 G/DL
ALP SERPL-CCNC: 52 U/L (ref 39–117)
ALT SERPL W P-5'-P-CCNC: 14 U/L (ref 1–33)
ANION GAP SERPL CALCULATED.3IONS-SCNC: 5.8 MMOL/L (ref 5–15)
AST SERPL-CCNC: 15 U/L (ref 1–32)
BASOPHILS # BLD AUTO: 0.02 10*3/MM3 (ref 0–0.2)
BASOPHILS NFR BLD AUTO: 0.5 % (ref 0–1.5)
BILIRUB SERPL-MCNC: 0.3 MG/DL (ref 0–1.2)
BUN SERPL-MCNC: 12 MG/DL (ref 8–23)
BUN/CREAT SERPL: 19.4 (ref 7–25)
CALCIUM SPEC-SCNC: 10.5 MG/DL (ref 8.6–10.5)
CHLORIDE SERPL-SCNC: 106 MMOL/L (ref 98–107)
CO2 SERPL-SCNC: 29.2 MMOL/L (ref 22–29)
CREAT SERPL-MCNC: 0.62 MG/DL (ref 0.57–1)
DEPRECATED RDW RBC AUTO: 41.3 FL (ref 37–54)
EGFRCR SERPLBLD CKD-EPI 2021: 89.6 ML/MIN/1.73
EOSINOPHIL # BLD AUTO: 0.09 10*3/MM3 (ref 0–0.4)
EOSINOPHIL NFR BLD AUTO: 2.3 % (ref 0.3–6.2)
ERYTHROCYTE [DISTWIDTH] IN BLOOD BY AUTOMATED COUNT: 12.5 % (ref 12.3–15.4)
GLOBULIN UR ELPH-MCNC: 2.1 GM/DL
GLUCOSE SERPL-MCNC: 97 MG/DL (ref 65–99)
HCT VFR BLD AUTO: 42.4 % (ref 34–46.6)
HGB BLD-MCNC: 13.5 G/DL (ref 12–15.9)
IMM GRANULOCYTES # BLD AUTO: 0.01 10*3/MM3 (ref 0–0.05)
IMM GRANULOCYTES NFR BLD AUTO: 0.3 % (ref 0–0.5)
LYMPHOCYTES # BLD AUTO: 1.03 10*3/MM3 (ref 0.7–3.1)
LYMPHOCYTES NFR BLD AUTO: 26.1 % (ref 19.6–45.3)
MAGNESIUM SERPL-MCNC: 2.3 MG/DL (ref 1.6–2.4)
MCH RBC QN AUTO: 29.2 PG (ref 26.6–33)
MCHC RBC AUTO-ENTMCNC: 31.8 G/DL (ref 31.5–35.7)
MCV RBC AUTO: 91.8 FL (ref 79–97)
MONOCYTES # BLD AUTO: 0.35 10*3/MM3 (ref 0.1–0.9)
MONOCYTES NFR BLD AUTO: 8.9 % (ref 5–12)
NEUTROPHILS NFR BLD AUTO: 2.44 10*3/MM3 (ref 1.7–7)
NEUTROPHILS NFR BLD AUTO: 61.9 % (ref 42.7–76)
NRBC BLD AUTO-RTO: 0 /100 WBC (ref 0–0.2)
PHOSPHATE SERPL-MCNC: 2.9 MG/DL (ref 2.5–4.5)
PLATELET # BLD AUTO: 177 10*3/MM3 (ref 140–450)
PMV BLD AUTO: 9.4 FL (ref 6–12)
POTASSIUM SERPL-SCNC: 4.2 MMOL/L (ref 3.5–5.2)
PROT SERPL-MCNC: 6.2 G/DL (ref 6–8.5)
RBC # BLD AUTO: 4.62 10*6/MM3 (ref 3.77–5.28)
SODIUM SERPL-SCNC: 141 MMOL/L (ref 136–145)
WBC NRBC COR # BLD: 3.94 10*3/MM3 (ref 3.4–10.8)

## 2023-11-15 PROCEDURE — 36415 COLL VENOUS BLD VENIPUNCTURE: CPT

## 2023-11-15 PROCEDURE — 80053 COMPREHEN METABOLIC PANEL: CPT | Performed by: INTERNAL MEDICINE

## 2023-11-15 PROCEDURE — 25010000002 ZOLEDRONIC ACID 5 MG/100ML SOLUTION: Performed by: INTERNAL MEDICINE

## 2023-11-15 PROCEDURE — 96365 THER/PROPH/DIAG IV INF INIT: CPT

## 2023-11-15 PROCEDURE — 96374 THER/PROPH/DIAG INJ IV PUSH: CPT

## 2023-11-15 PROCEDURE — 85025 COMPLETE CBC W/AUTO DIFF WBC: CPT | Performed by: INTERNAL MEDICINE

## 2023-11-15 PROCEDURE — 84100 ASSAY OF PHOSPHORUS: CPT | Performed by: INTERNAL MEDICINE

## 2023-11-15 PROCEDURE — 83735 ASSAY OF MAGNESIUM: CPT | Performed by: INTERNAL MEDICINE

## 2023-11-15 RX ORDER — SODIUM CHLORIDE 9 MG/ML
250 INJECTION, SOLUTION INTRAVENOUS ONCE
Status: DISCONTINUED | OUTPATIENT
Start: 2023-11-15 | End: 2023-11-17 | Stop reason: HOSPADM

## 2023-11-15 RX ORDER — ZOLEDRONIC ACID 5 MG/100ML
5 INJECTION, SOLUTION INTRAVENOUS ONCE
Status: CANCELLED | OUTPATIENT
Start: 2024-11-14

## 2023-11-15 RX ORDER — ZOLEDRONIC ACID 5 MG/100ML
5 INJECTION, SOLUTION INTRAVENOUS ONCE
Status: COMPLETED | OUTPATIENT
Start: 2023-11-15 | End: 2023-11-15

## 2023-11-15 RX ORDER — SODIUM CHLORIDE 9 MG/ML
250 INJECTION, SOLUTION INTRAVENOUS ONCE
OUTPATIENT
Start: 2024-11-14

## 2023-11-15 RX ADMIN — ZOLEDRONIC ACID 5 MG: 5 INJECTION INTRAVENOUS at 09:50

## 2023-11-16 ENCOUNTER — APPOINTMENT (OUTPATIENT)
Dept: WOMENS IMAGING | Facility: HOSPITAL | Age: 83
End: 2023-11-16
Payer: MEDICARE

## 2023-11-16 PROCEDURE — 77067 SCR MAMMO BI INCL CAD: CPT | Performed by: RADIOLOGY

## 2023-11-16 PROCEDURE — 77063 BREAST TOMOSYNTHESIS BI: CPT | Performed by: RADIOLOGY

## 2024-07-15 ENCOUNTER — PREP FOR SURGERY (OUTPATIENT)
Dept: OTHER | Facility: HOSPITAL | Age: 84
End: 2024-07-15
Payer: MEDICARE

## 2024-07-15 DIAGNOSIS — M16.11 PRIMARY OSTEOARTHRITIS OF RIGHT HIP: Primary | ICD-10-CM

## 2024-07-15 RX ORDER — CHLORHEXIDINE GLUCONATE 500 MG/1
CLOTH TOPICAL
OUTPATIENT
Start: 2024-07-15 | End: 2024-07-15

## 2024-07-15 RX ORDER — TRANEXAMIC ACID 10 MG/ML
1000 INJECTION, SOLUTION INTRAVENOUS ONCE
OUTPATIENT
Start: 2024-07-15 | End: 2024-07-15

## 2024-07-15 RX ORDER — CHLORHEXIDINE GLUCONATE 500 MG/1
CLOTH TOPICAL 2 TIMES DAILY
OUTPATIENT
Start: 2024-07-15

## 2024-07-15 RX ORDER — ACETAMINOPHEN 10 MG/ML
1000 INJECTION, SOLUTION INTRAVENOUS ONCE
OUTPATIENT
Start: 2024-07-15 | End: 2024-07-15

## 2024-07-15 RX ORDER — PREGABALIN 75 MG/1
75 CAPSULE ORAL ONCE
OUTPATIENT
Start: 2024-07-15 | End: 2024-07-15

## 2024-07-15 NOTE — H&P
BRENDAN Mcclure 83 y.o. female presents today for follow-up of her right hip pain.   She continues to have worsening pain in the right hip.  She is known to me from her previous evaluations and was diagnosed with a right hip osteoarthritis.  This was confirmed with an MRI of the right hip.  She was recommended a right total hip replacement and she is here in the office for a preoperative discussion and to schedule her surgery.    She continues to experience pain in the right groin area. She has tried tramadol but is having difficulty tolerating it. She was also given meloxicam 7.5 mg. She does not feel significant relief. She continues to have difficulty with activities of daily living. She normally likes to walk and this is becoming difficult for her especially towards the later part of the day. She has difficulty going down stairs to get her laundry. She ambulates without assistive device. She has difficulty lying down on her right side. She has been noticing increasing pain mostly in the right groin area and also on the lateral aspect of her right hip. She has been managed for lower lumbar canal stenosis and lumbar spondylosis with epidural injections by Dr. Townsend for the past 3-1/2 years. She did feel good relief with the epidural injections. She also received a hip injection on the lateral aspect of her hip and this gave her relief for about 2 weeks. She states that she is able to reach down to tie her shoes and socks on but has difficulty getting in and out of the car due to the right hip pain. Also she has occasional feeling of sudden giving away due to sharp pains in the right hip area.  She complains of a limp.  Denies any night pain.     She is otherwise healthy.     Denies any history of DVT, cardiac problems. No history of diabetes mellitus.     Medications:  Current Outpatient Medications on File Prior to Visit   Medication Sig Dispense Refill    calcium carbonate (Calcium 600) 600 MG tablet 600 mg  "twice a day by oral route.      cholecalciferol (D3) 50 MCG (2000 UT) tablet 1 capsule every day by oral route.      gabapentin (Neurontin) 400 MG capsule       meloxicam (Mobic) 7.5 MG tablet Take 1 tablet by mouth 1 (one) time each day.      mupirocin (Bactroban) 2 % ointment APPLY TOPICALLY TO THE AFFECTED AREA THREE TIMES DAILY AS DIRECTED      pregabalin (Lyrica) 50 MG capsule Take 1 capsule by mouth 2 (two) times a day.      simvastatin (Zocor) 40 MG tablet       traMADol (Ultram) 50 MG tablet TAKE 1/2 TO 1 TABLET BY MOUTH TWICE DAILY AS NEEDED      valsartan-hydroCHLOROthiazide (Diovan-HCT) 160-25 MG tablet        No current facility-administered medications on file prior to visit.       Allergies:  No Known Allergies    Social Hx:  Social History     Substance and Sexual Activity   Alcohol Use None     Social History     Tobacco Use   Smoking Status Never   Smokeless Tobacco Never       Surgical Hx  History reviewed. No pertinent surgical history.    Past Med Hx  Past Medical History:   Diagnosis Date    Hypertension          Vitals  Visit Vitals  Ht 5' 4\" (1.626 m)   Wt 150 lb (68 kg)   BMI 25.75 kg/m²   Smoking Status Never   BSA 1.75 m²         Review of Systems:  12 systems are reviewed and are negative other than what is stated in HPI    Physical Exam:    General Appearance:Patient is awake, alert, and oriented and appears in no acute distress  Head:Atraumatic, normocephalic  Eyes:EOMs are intact  Lungs:Patient is in no acute respiratory distress  Neurologic:No acute neurological deficits or lateralizing findings  Peripheral Pulses:No sign of acute vascular compromise  Skin:No sign of acute infection    Musculoskeletal:     GAIT - antalgic, Assistive Device none    Right HIP  Active ROM Internal Rotation: <30 External Rotation: <40 Abduction: <45 Adduction: <15 Flexion: <100 Extension: <5 Tenderness   Location: groin   Radiation of Pain: anterior thigh.   Pain w/ Palpation: none   Thomas Test: negative "   Impingement: negative   Straight Leg Raise: negative   Strength Quad: normal Abduction: normal Adduction: normal Flexion: normal Extension: normal   Positive Stinchfield sign.   Positive Trendelenburg sign.   Attempted movements of the hip are painful and restricted  Neurovascular status is intact. Sensation in hip is normal. DP Pulse is 3+. PT PULSE is 3+. Capillary Refill is normal. Reflexes are normal.       Imaging:      No new x-rays obtained today in the office.    Assessment:    1. Primary osteoarthritis of right hip         Treatment Plan:       I have reviewed her x-rays from previous office visit. Confirms presence of moderate narrowing of the joint space associated with early femoral head osteophyte formation.    I have reviewed the MRI films and report dated April 30, 2024. Evidence of mild osteoarthritis of the right hip. There is also evidence of degenerative labral involvement. She has a small right hip effusion.    Continue meloxicam    Options and alternatives were discussed in detail with the patient.    The patient has reached the point of disability and failed nonoperative management.    The patient is indicated for a right total hip replacement .    The likely Risks and benefits of the procedure including but not limited to infection, DVT, pulmonary embolism, recurrent dislocation, Leg length discrepancy, periprosthetic fractures, possibility of injury to nerves or vessels, tendons, possibility of morbidity and mortality likely medical risks for stroke and heart attack, have been discussed in detail. Despite the risks involved the patient would like to proceed.     The patient is being scheduled for a right total HIP arthroplasty  by DIRECT ANTERIOR APPROACH at [facility] [place], operated by [facility] on   August 16,2024.    I will request for Medical and cardiac clearance from Juan Vega MD  .    Postoperative DVT prophylaxis - Patient has no high risk factors Plan for ASPIRIN     Preoperative antibiotic  prophylaxis - Plan for SCIP protocol with CEFAZOLIN weight based.     Surgery will be scheduled for 23-hour observation.

## 2024-07-19 ENCOUNTER — LAB (OUTPATIENT)
Dept: LAB | Facility: HOSPITAL | Age: 84
End: 2024-07-19
Payer: MEDICARE

## 2024-07-19 DIAGNOSIS — M81.0 AGE-RELATED OSTEOPOROSIS WITHOUT CURRENT PATHOLOGICAL FRACTURE: ICD-10-CM

## 2024-07-24 ENCOUNTER — LAB (OUTPATIENT)
Dept: LAB | Facility: HOSPITAL | Age: 84
End: 2024-07-24
Payer: MEDICARE

## 2024-07-24 DIAGNOSIS — M81.0 AGE-RELATED OSTEOPOROSIS WITHOUT CURRENT PATHOLOGICAL FRACTURE: ICD-10-CM

## 2024-07-24 LAB
ALBUMIN SERPL-MCNC: 4.1 G/DL (ref 3.5–5.2)
ALBUMIN/GLOB SERPL: 2.1 G/DL
ALP SERPL-CCNC: 52 U/L (ref 39–117)
ALT SERPL W P-5'-P-CCNC: 16 U/L (ref 1–33)
ANION GAP SERPL CALCULATED.3IONS-SCNC: 8 MMOL/L (ref 5–15)
AST SERPL-CCNC: 15 U/L (ref 1–32)
BASOPHILS # BLD AUTO: 0.03 10*3/MM3 (ref 0–0.2)
BASOPHILS NFR BLD AUTO: 0.7 % (ref 0–1.5)
BILIRUB SERPL-MCNC: 0.6 MG/DL (ref 0–1.2)
BUN SERPL-MCNC: 11 MG/DL (ref 8–23)
BUN/CREAT SERPL: 16.2 (ref 7–25)
CALCIUM SPEC-SCNC: 10.6 MG/DL (ref 8.6–10.5)
CHLORIDE SERPL-SCNC: 105 MMOL/L (ref 98–107)
CO2 SERPL-SCNC: 28 MMOL/L (ref 22–29)
CREAT SERPL-MCNC: 0.68 MG/DL (ref 0.57–1)
DEPRECATED RDW RBC AUTO: 42.8 FL (ref 37–54)
EGFRCR SERPLBLD CKD-EPI 2021: 87.6 ML/MIN/1.73
EOSINOPHIL # BLD AUTO: 0.13 10*3/MM3 (ref 0–0.4)
EOSINOPHIL NFR BLD AUTO: 3.1 % (ref 0.3–6.2)
ERYTHROCYTE [DISTWIDTH] IN BLOOD BY AUTOMATED COUNT: 12.8 % (ref 12.3–15.4)
GLOBULIN UR ELPH-MCNC: 2 GM/DL
GLUCOSE SERPL-MCNC: 95 MG/DL (ref 65–99)
HCT VFR BLD AUTO: 42.9 % (ref 34–46.6)
HGB BLD-MCNC: 13.6 G/DL (ref 12–15.9)
IMM GRANULOCYTES # BLD AUTO: 0.01 10*3/MM3 (ref 0–0.05)
IMM GRANULOCYTES NFR BLD AUTO: 0.2 % (ref 0–0.5)
LYMPHOCYTES # BLD AUTO: 0.89 10*3/MM3 (ref 0.7–3.1)
LYMPHOCYTES NFR BLD AUTO: 21.4 % (ref 19.6–45.3)
MAGNESIUM SERPL-MCNC: 2.2 MG/DL (ref 1.6–2.4)
MCH RBC QN AUTO: 29 PG (ref 26.6–33)
MCHC RBC AUTO-ENTMCNC: 31.7 G/DL (ref 31.5–35.7)
MCV RBC AUTO: 91.5 FL (ref 79–97)
MONOCYTES # BLD AUTO: 0.39 10*3/MM3 (ref 0.1–0.9)
MONOCYTES NFR BLD AUTO: 9.4 % (ref 5–12)
NEUTROPHILS NFR BLD AUTO: 2.7 10*3/MM3 (ref 1.7–7)
NEUTROPHILS NFR BLD AUTO: 65.2 % (ref 42.7–76)
NRBC BLD AUTO-RTO: 0 /100 WBC (ref 0–0.2)
PHOSPHATE SERPL-MCNC: 2.9 MG/DL (ref 2.5–4.5)
PLATELET # BLD AUTO: 189 10*3/MM3 (ref 140–450)
PMV BLD AUTO: 9.7 FL (ref 6–12)
POTASSIUM SERPL-SCNC: 4.1 MMOL/L (ref 3.5–5.2)
PROT SERPL-MCNC: 6.1 G/DL (ref 6–8.5)
RBC # BLD AUTO: 4.69 10*6/MM3 (ref 3.77–5.28)
SODIUM SERPL-SCNC: 141 MMOL/L (ref 136–145)
WBC NRBC COR # BLD AUTO: 4.15 10*3/MM3 (ref 3.4–10.8)

## 2024-07-24 PROCEDURE — 80061 LIPID PANEL: CPT | Performed by: INTERNAL MEDICINE

## 2024-07-24 PROCEDURE — 36415 COLL VENOUS BLD VENIPUNCTURE: CPT

## 2024-07-24 PROCEDURE — 82330 ASSAY OF CALCIUM: CPT | Performed by: INTERNAL MEDICINE

## 2024-07-24 PROCEDURE — 80053 COMPREHEN METABOLIC PANEL: CPT

## 2024-07-24 PROCEDURE — 83704 LIPOPROTEIN BLD QUAN PART: CPT | Performed by: INTERNAL MEDICINE

## 2024-07-24 PROCEDURE — 82550 ASSAY OF CK (CPK): CPT | Performed by: INTERNAL MEDICINE

## 2024-07-24 PROCEDURE — 85025 COMPLETE CBC W/AUTO DIFF WBC: CPT

## 2024-07-24 PROCEDURE — 84443 ASSAY THYROID STIM HORMONE: CPT | Performed by: INTERNAL MEDICINE

## 2024-07-24 PROCEDURE — 84439 ASSAY OF FREE THYROXINE: CPT | Performed by: INTERNAL MEDICINE

## 2024-07-24 PROCEDURE — 82306 VITAMIN D 25 HYDROXY: CPT | Performed by: INTERNAL MEDICINE

## 2024-07-24 PROCEDURE — 84481 FREE ASSAY (FT-3): CPT | Performed by: INTERNAL MEDICINE

## 2024-07-24 PROCEDURE — 84100 ASSAY OF PHOSPHORUS: CPT

## 2024-07-24 PROCEDURE — 86769 SARS-COV-2 COVID-19 ANTIBODY: CPT | Performed by: INTERNAL MEDICINE

## 2024-07-24 PROCEDURE — 83735 ASSAY OF MAGNESIUM: CPT

## 2024-07-24 PROCEDURE — 83036 HEMOGLOBIN GLYCOSYLATED A1C: CPT | Performed by: INTERNAL MEDICINE

## 2024-07-26 DIAGNOSIS — E78.2 MIXED HYPERLIPIDEMIA: ICD-10-CM

## 2024-07-26 DIAGNOSIS — I10 BENIGN ESSENTIAL HYPERTENSION: ICD-10-CM

## 2024-07-26 RX ORDER — VALSARTAN AND HYDROCHLOROTHIAZIDE 160; 25 MG/1; MG/1
TABLET ORAL
Qty: 90 TABLET | Refills: 1 | OUTPATIENT
Start: 2024-07-26

## 2024-07-26 RX ORDER — SIMVASTATIN 40 MG
TABLET ORAL
Qty: 90 TABLET | Refills: 1 | OUTPATIENT
Start: 2024-07-26

## 2024-07-26 NOTE — TELEPHONE ENCOUNTER
Caller: Lachelle Mcclure    Relationship: Self    Best call back number:     [phone] (Home)       Requested Prescriptions:   Requested Prescriptions     Pending Prescriptions Disp Refills    valsartan-hydrochlorothiazide (DIOVAN-HCT) 160-25 MG per tablet 90 tablet 1     Sig: TAKE 1 TABLET EVERY DAY FOR BLOOD PRESSURE    simvastatin (ZOCOR) 40 MG tablet 90 tablet 1     Sig: TAKE 1 TABLET EVERY DAY FOR HIGH CHOLESTEROL (NEED MD APPOINTMENT)        Pharmacy where request should be sent: [email or web address] [address] [phone] [facility] [phone]      Last office visit with prescribing clinician: 7/17/2023   Last telemedicine visit with prescribing clinician: Visit date not found   Next office visit with prescribing clinician: 7/31/2024     Additional details provided by patient: HAS AT LEAST 3 DAYS BUT IS MAIL ORDER     Does the patient have less than a 3 day supply:  [] Yes  [x] No    Would you like a call back once the refill request has been completed: [x] Yes [] No    If the office needs to give you a call back, can they leave a voicemail: [x] Yes [] No    Lila Woodard Rep   07/26/24 11:35 EDT

## 2024-07-31 ENCOUNTER — OFFICE VISIT (OUTPATIENT)
Dept: INTERNAL MEDICINE | Facility: CLINIC | Age: 84
End: 2024-07-31
Payer: MEDICARE

## 2024-07-31 VITALS
DIASTOLIC BLOOD PRESSURE: 72 MMHG | OXYGEN SATURATION: 96 % | BODY MASS INDEX: 26.63 KG/M2 | WEIGHT: 156 LBS | HEIGHT: 64 IN | SYSTOLIC BLOOD PRESSURE: 114 MMHG | HEART RATE: 71 BPM

## 2024-07-31 DIAGNOSIS — M81.0 AGE-RELATED OSTEOPOROSIS WITHOUT CURRENT PATHOLOGICAL FRACTURE: Chronic | ICD-10-CM

## 2024-07-31 DIAGNOSIS — Z86.16 HISTORY OF COVID-19: Chronic | ICD-10-CM

## 2024-07-31 DIAGNOSIS — M25.551 CHRONIC RIGHT HIP PAIN: Chronic | ICD-10-CM

## 2024-07-31 DIAGNOSIS — E78.2 MIXED HYPERLIPIDEMIA: Chronic | ICD-10-CM

## 2024-07-31 DIAGNOSIS — Z00.00 ENCOUNTER FOR SUBSEQUENT ANNUAL WELLNESS VISIT (AWV) IN MEDICARE PATIENT: Primary | ICD-10-CM

## 2024-07-31 DIAGNOSIS — E55.9 VITAMIN D DEFICIENCY: Chronic | ICD-10-CM

## 2024-07-31 DIAGNOSIS — R73.01 IMPAIRED FASTING GLUCOSE: Chronic | ICD-10-CM

## 2024-07-31 DIAGNOSIS — Z78.0 POSTMENOPAUSAL STATE: Chronic | ICD-10-CM

## 2024-07-31 DIAGNOSIS — E83.52 HYPERCALCEMIA: Primary | ICD-10-CM

## 2024-07-31 DIAGNOSIS — M51.9 LUMBAR DISC DISEASE: Chronic | ICD-10-CM

## 2024-07-31 DIAGNOSIS — I65.23 ATHEROSCLEROSIS OF BOTH CAROTID ARTERIES: Chronic | ICD-10-CM

## 2024-07-31 DIAGNOSIS — I10 BENIGN ESSENTIAL HYPERTENSION: ICD-10-CM

## 2024-07-31 DIAGNOSIS — R31.29 MICROSCOPIC HEMATURIA: Chronic | ICD-10-CM

## 2024-07-31 DIAGNOSIS — Z85.828 HISTORY OF BASAL CELL CARCINOMA OF SKIN: Chronic | ICD-10-CM

## 2024-07-31 DIAGNOSIS — E78.2 MIXED HYPERLIPIDEMIA: ICD-10-CM

## 2024-07-31 DIAGNOSIS — M16.0 PRIMARY OSTEOARTHRITIS OF BOTH HIPS: Chronic | ICD-10-CM

## 2024-07-31 DIAGNOSIS — Z85.820 HISTORY OF MALIGNANT MELANOMA: Chronic | ICD-10-CM

## 2024-07-31 DIAGNOSIS — G89.29 CHRONIC RIGHT HIP PAIN: Chronic | ICD-10-CM

## 2024-07-31 DIAGNOSIS — Z78.9 INTOLERANCE OF ORAL BISPHOSPHONATE THERAPY: Chronic | ICD-10-CM

## 2024-07-31 DIAGNOSIS — N95.1 MENOPAUSAL STATE: Chronic | ICD-10-CM

## 2024-07-31 DIAGNOSIS — I10 BENIGN ESSENTIAL HYPERTENSION: Chronic | ICD-10-CM

## 2024-07-31 DIAGNOSIS — Z51.81 THERAPEUTIC DRUG MONITORING: ICD-10-CM

## 2024-07-31 DIAGNOSIS — K21.9 GASTROESOPHAGEAL REFLUX DISEASE WITHOUT ESOPHAGITIS: Chronic | ICD-10-CM

## 2024-07-31 PROCEDURE — G0439 PPPS, SUBSEQ VISIT: HCPCS | Performed by: INTERNAL MEDICINE

## 2024-07-31 PROCEDURE — 3078F DIAST BP <80 MM HG: CPT | Performed by: INTERNAL MEDICINE

## 2024-07-31 PROCEDURE — 1126F AMNT PAIN NOTED NONE PRSNT: CPT | Performed by: INTERNAL MEDICINE

## 2024-07-31 PROCEDURE — 3074F SYST BP LT 130 MM HG: CPT | Performed by: INTERNAL MEDICINE

## 2024-07-31 PROCEDURE — 1160F RVW MEDS BY RX/DR IN RCRD: CPT | Performed by: INTERNAL MEDICINE

## 2024-07-31 PROCEDURE — 1159F MED LIST DOCD IN RCRD: CPT | Performed by: INTERNAL MEDICINE

## 2024-07-31 RX ORDER — ZOLEDRONIC ACID 5 MG/100ML
5 INJECTION, SOLUTION INTRAVENOUS ONCE
Start: 2024-07-31 | End: 2024-07-31

## 2024-07-31 RX ORDER — SIMVASTATIN 40 MG
TABLET ORAL
Qty: 90 TABLET | Refills: 1 | Status: SHIPPED | OUTPATIENT
Start: 2024-07-31

## 2024-07-31 RX ORDER — VALSARTAN AND HYDROCHLOROTHIAZIDE 160; 25 MG/1; MG/1
TABLET ORAL
Qty: 90 TABLET | Refills: 1 | Status: SHIPPED | OUTPATIENT
Start: 2024-07-31

## 2024-07-31 NOTE — PROGRESS NOTES
Subjective   The ABCs of the Annual Wellness Visit  Medicare Wellness Visit      Lachelle Mcclure is a 83 y.o. patient who presents for a Medicare Wellness Visit.    The following portions of the patient's history were reviewed and   updated as appropriate: allergies, current medications, past family history, past medical history, past social history, past surgical history, and problem list.    Compared to one year ago, the patient's physical   health is worse.  Compared to one year ago, the patient's mental   health is the same.    Recent Hospitalizations:  She was not admitted to the hospital during the last year.     Current Medical Providers:  Patient Care Team:  Juan Vega MD as PCP - General (Internal Medicine)  Ho Goyal MD as Consulting Physician (Gastroenterology)    Outpatient Medications Prior to Visit   Medication Sig Dispense Refill    Calcium Carb-Cholecalciferol (CALCIUM 600 + D PO) Take 1 tablet by mouth 2 (Two) Times a Day.      Cholecalciferol (VITAMIN D) 2000 UNITS capsule Take 1 capsule by mouth Daily.      simvastatin (ZOCOR) 40 MG tablet TAKE 1 TABLET EVERY DAY FOR HIGH CHOLESTEROL (NEED MD APPOINTMENT) 90 tablet 1    valsartan-hydrochlorothiazide (DIOVAN-HCT) 160-25 MG per tablet TAKE 1 TABLET EVERY DAY FOR BLOOD PRESSURE 90 tablet 1    zoledronic acid (RECLAST) 5 MG/100ML solution infusion Infuse 100 mL into a venous catheter 1 (One) Time for 1 dose. 100 mL 0     No facility-administered medications prior to visit.     No opioid medication identified on active medication list. I have reviewed chart for other potential  high risk medication/s and harmful drug interactions in the elderly.      Aspirin is not on active medication list.  Aspirin use is not indicated based on review of current medical condition/s. Risk of harm outweighs potential benefits.  .    Patient Active Problem List   Diagnosis    Benign essential hypertension    Carotid artery plaque, 5/9/2022-- mild bilateral  "plaque without stenosis.  4/3/2019--mild bilateral.  10/06/2016--vascular screen is now normal without carotid disease.  Improved.    Gastroesophageal reflux disease without esophagitis    Hyperlipidemia    Microscopic hematuria    Osteoporosis, 3/23/2022--lumbar spine -0.9; left femoral neck -2.4; right femoral neck -2.3.  11/5/2019--lumbar spine -0.3.  Left femoral neck -2.7.  Right femoral neck -2.4.    Tinnitus of right ear    History of malignant melanoma, 2008--right side of face.  1999--lower back.    Therapeutic drug monitoring    Vitamin D deficiency    Lumbar disc disease    Chronic right hip pain    Impaired fasting glucose    Postmenopausal state    History of basal cell carcinoma of skin    Subclinical hypothyroidism    Intolerance of oral bisphosphonate therapy    Primary osteoarthritis of both hips    History of COVID-19    Hypercalcemia     Advance Care Planning (Click this link to access ACP Navigator)  Advance Directive is not on file.  ACP discussion was held with the patient during this visit. Patient has an advance directive (not in EMR), copy requested.        Objective   Vitals:    07/31/24 1359   BP: 114/72   Pulse: 71   SpO2: 96%   Weight: 70.8 kg (156 lb)   Height: 162.6 cm (64.02\")       Estimated body mass index is 26.76 kg/m² as calculated from the following:    Height as of this encounter: 162.6 cm (64.02\").    Weight as of this encounter: 70.8 kg (156 lb).    BMI is >= 25 and <30. (Overweight) The following options were offered after discussion;: none (medical contraindication)        Does the patient have evidence of cognitive impairment? No  Lab Results   Component Value Date    CHLPL 132 07/24/2024    TRIG 70 07/24/2024    HGBA1C 5.20 07/24/2024                                                                                               Health  Risk Assessment    Smoking Status:  Social History     Tobacco Use   Smoking Status Never   Smokeless Tobacco Never     Alcohol " Consumption:  Social History     Substance and Sexual Activity   Alcohol Use Never     Fall Risk Screen:  LUKASZADI Fall Risk Assessment was completed, and patient is at LOW risk for falls.Assessment completed on:2024    Depression Screenin/31/2024     2:29 PM   PHQ-2/PHQ-9 Depression Screening   Little Interest or Pleasure in Doing Things 0-->not at all   Feeling Down, Depressed or Hopeless 0-->not at all   PHQ-9: Brief Depression Severity Measure Score 0     Health Habits and Functional and Cognitive Screenin/24/2024     5:56 PM   Functional & Cognitive Status   Do you have difficulty preparing food and eating? No   Do you have difficulty bathing yourself, getting dressed or grooming yourself? No   Do you have difficulty using the toilet? No   Do you have difficulty moving around from place to place? Yes   Do you have trouble with steps or getting out of a bed or a chair? Yes   Current Diet Well Balanced Diet   Dental Exam Up to date   Eye Exam Up to date   Exercise (times per week) 2 times per week   Current Exercises Include House Cleaning;Yard Work   Do you need help using the phone?  No   Are you deaf or do you have serious difficulty hearing?  No   Do you need help to go to places out of walking distance? No   Do you need help shopping? No   Do you need help preparing meals?  No   Do you need help with housework?  No   Do you need help with laundry? Yes   Do you need help taking your medications? No   Do you need help managing money? No   Do you ever drive or ride in a car without wearing a seat belt? No   Have you felt unusual stress, anger or loneliness in the last month? No   Who do you live with? Spouse   If you need help, do you have trouble finding someone available to you? No   Have you been bothered in the last four weeks by sexual problems? No   Do you have difficulty concentrating, remembering or making decisions? No             Age-appropriate Screening Schedule:  Refer to the  list below for future screening recommendations based on patient's age, sex and/or medical conditions. Orders for these recommended tests are listed in the plan section. The patient has been provided with a written plan.    Health Maintenance List  Health Maintenance   Topic Date Due    DXA SCAN  03/23/2024    ANNUAL WELLNESS VISIT  05/01/2024    BMI FOLLOWUP  05/01/2024    INFLUENZA VACCINE  08/01/2024    LIPID PANEL  07/24/2025    COLORECTAL CANCER SCREENING  05/08/2026    TDAP/TD VACCINES (3 - Td or Tdap) 04/19/2033    RSV Vaccine - Adults  Completed    Pneumococcal Vaccine 65+  Completed    COVID-19 Vaccine  Discontinued    ZOSTER VACCINE  Discontinued                                                                                                                                                CMS Preventative Services Quick Reference  Risk Factors Identified During Encounter  Immunizations Discussed/Encouraged: RSV (Respiratory Syncytial Virus)    The above risks/problems have been discussed with the patient.  Pertinent information has been shared with the patient in the After Visit Summary.  An After Visit Summary and PPPS were made available to the patient.    Follow Up:   Next Medicare Wellness visit to be scheduled in 1 year.     Assessment & Plan  Encounter for subsequent annual wellness visit (AWV) in Medicare patient    Vitamin D deficiency    Primary osteoarthritis of both hips    Postmenopausal state    Osteoporosis, 3/23/2022--lumbar spine -0.9; left femoral neck -2.4; right femoral neck -2.3.  11/5/2019--lumbar spine -0.3.  Left femoral neck -2.7.  Right femoral neck -2.4.    Microscopic hematuria    Lumbar disc disease    Intolerance of oral bisphosphonate therapy    Impaired fasting glucose    Hyperlipidemia     History of malignant melanoma, 2008--right side of face.  1999--lower back.    History of COVID-19    History of basal cell carcinoma of skin    Gastroesophageal reflux disease without  esophagitis    Chronic right hip pain    Carotid artery plaque, 5/9/2022-- mild bilateral plaque without stenosis.  4/3/2019--mild bilateral.  10/06/2016--vascular screen is now normal without carotid disease.  Improved.    Benign essential hypertension    Therapeutic drug monitoring    Menopausal state    Osteoporosis, 11/5/2019--lumbar spine -0.3.  Left femoral neck -2.7.  Right femoral neck -2.4.               Follow Up:   No follow-ups on file.

## 2024-08-05 PROBLEM — M16.11 PRIMARY OSTEOARTHRITIS OF RIGHT HIP: Status: ACTIVE | Noted: 2024-07-15

## 2024-08-07 ENCOUNTER — PRE-ADMISSION TESTING (OUTPATIENT)
Dept: PREADMISSION TESTING | Facility: HOSPITAL | Age: 84
End: 2024-08-07
Payer: MEDICARE

## 2024-08-07 ENCOUNTER — HOSPITAL ENCOUNTER (OUTPATIENT)
Dept: GENERAL RADIOLOGY | Facility: HOSPITAL | Age: 84
Discharge: HOME | End: 2024-08-07
Payer: MEDICARE

## 2024-08-07 VITALS
SYSTOLIC BLOOD PRESSURE: 151 MMHG | TEMPERATURE: 98.5 F | HEART RATE: 74 BPM | HEIGHT: 63 IN | BODY MASS INDEX: 28.14 KG/M2 | RESPIRATION RATE: 18 BRPM | OXYGEN SATURATION: 100 % | WEIGHT: 158.8 LBS | DIASTOLIC BLOOD PRESSURE: 82 MMHG

## 2024-08-07 LAB
APTT PPP: 27.5 SECONDS (ref 22.7–35.4)
BACTERIA UR QL AUTO: NORMAL /HPF
BILIRUB UR QL STRIP: NEGATIVE
CLARITY UR: CLEAR
COLOR UR: YELLOW
GLUCOSE UR STRIP-MCNC: NEGATIVE MG/DL
HGB UR QL STRIP.AUTO: NEGATIVE
HYALINE CASTS UR QL AUTO: NORMAL /LPF
INR PPP: 1.05 (ref 0.9–1.1)
KETONES UR QL STRIP: NEGATIVE
LEUKOCYTE ESTERASE UR QL STRIP.AUTO: ABNORMAL
NITRITE UR QL STRIP: NEGATIVE
PH UR STRIP.AUTO: 6 [PH] (ref 5–8)
PROT UR QL STRIP: NEGATIVE
PROTHROMBIN TIME: 13.9 SECONDS (ref 11.7–14.2)
QT INTERVAL: 618 MS
QTC INTERVAL: 648 MS
RBC # UR STRIP: NORMAL /HPF
REF LAB TEST METHOD: NORMAL
SP GR UR STRIP: 1.01 (ref 1–1.03)
SQUAMOUS #/AREA URNS HPF: NORMAL /HPF
UROBILINOGEN UR QL STRIP: ABNORMAL
WBC # UR STRIP: NORMAL /HPF

## 2024-08-07 PROCEDURE — 36415 COLL VENOUS BLD VENIPUNCTURE: CPT

## 2024-08-07 PROCEDURE — 87081 CULTURE SCREEN ONLY: CPT

## 2024-08-07 PROCEDURE — 85610 PROTHROMBIN TIME: CPT

## 2024-08-07 PROCEDURE — 93010 ELECTROCARDIOGRAM REPORT: CPT | Performed by: INTERNAL MEDICINE

## 2024-08-07 PROCEDURE — 87086 URINE CULTURE/COLONY COUNT: CPT

## 2024-08-07 PROCEDURE — 81001 URINALYSIS AUTO W/SCOPE: CPT

## 2024-08-07 PROCEDURE — 85730 THROMBOPLASTIN TIME PARTIAL: CPT

## 2024-08-07 PROCEDURE — 93005 ELECTROCARDIOGRAM TRACING: CPT

## 2024-08-07 PROCEDURE — 71046 X-RAY EXAM CHEST 2 VIEWS: CPT

## 2024-08-07 NOTE — DISCHARGE INSTRUCTIONS
Take the following medications the morning of surgery:  NONE      If you are on prescription narcotic pain medication to control your pain you may also take that medication the morning of surgery.      General Instructions:     Do not eat solid food after midnight the night before surgery.  Clear liquids day of surgery are allowed but must be stopped at least two hours before your hospital arrival time.       Allowed clear liquids      Water, sodas, and tea or coffee with no cream or milk added.       12 to 20 ounces of a clear liquid that contains carbohydrates is recommended.  If non-diabetic, have Gatorade or Powerade.  If diabetic, have G2 or Powerade Zero.     Do not have liquids red in color.  Do not consume chicken, beef, pork or vegetable broth or bouillon cubes of any variety as they are not considered clear liquids and are not allowed.      Infants may have breast milk up to four hours before surgery.  Infants drinking formula may drink formula up to six hours before surgery.   Patients who avoid smoking, chewing tobacco and alcohol for 4 weeks prior to surgery have a reduced risk of post-operative complications.  Quit smoking as many days before surgery as you can.  Do not smoke, use chewing tobacco or drink alcohol the day of surgery.   If applicable bring your C-PAP/ BI-PAP machine in with you to preop day of surgery.  Bring any papers given to you in the doctor’s office.  Wear clean comfortable clothes.  Do not wear contact lenses, false eyelashes or make-up.  Bring a case for your glasses.   Bring crutches or walker if applicable.  Remove all piercings.  Leave jewelry and any other valuables at home.  Hair extensions with metal clips must be removed prior to surgery.  The Pre-Admission Testing nurse will instruct you to bring medications if unable to obtain an accurate list in Pre-Admission Testing.        If you were given a blood bank ID arm band remember to bring it with you the day of  surgery.    Preventing a Surgical Site Infection:  For 2 to 3 days before surgery, avoid shaving with a razor because the razor can irritate skin and make it easier to develop an infection.    Any areas of open skin can increase the risk of a post-operative wound infection by allowing bacteria to enter and travel throughout the body.  Notify your surgeon if you have any skin wounds / rashes even if it is not near the expected surgical site.  The area will need assessed to determine if surgery should be delayed until it is healed.  The night prior to surgery shower using a fresh bar of anti-bacterial soap (such as Dial) and clean washcloth.  Sleep in a clean bed with clean clothing.  Do not allow pets to sleep with you.  Shower on the morning of surgery using a fresh bar of anti-bacterial soap (such as Dial) and clean washcloth.  Dry with a clean towel and dress in clean clothing.  Ask your surgeon if you will be receiving antibiotics prior to surgery.  Make sure you, your family, and all healthcare providers clean their hands with soap and water or an alcohol based hand  before caring for you or your wound.      CHLORHEXIDINE CLOTH INSTRUCTIONS  The morning of surgery follow these instructions using the Chlorhexidine cloths you've been given.  These steps reduce bacteria on the body.  Do not use the cloths near your eyes, ears mouth, genitalia or on open wounds.  Throw the cloths away after use but do not try to flush them down a toilet.      Open and remove one cloth at a time from the package.    Leave the cloth unfolded and begin the bathing.  Massage the skin with the cloths using gentle pressure to remove bacteria.  Do not scrub harshly.   Follow the steps below with one 2% CHG cloth per area (6 total cloths).  One cloth for neck, shoulders and chest.  One cloth for both arms, hands, fingers and underarms (do underarms last).  One cloth for the abdomen followed by groin.  One cloth for right leg and  foot including between the toes.  One cloth for left leg and foot including between the toes.  The last cloth is to be used for the back of the neck, back and buttocks.    Allow the CHG to air dry 3 minutes on the skin which will give it time to work and decrease the chance of irritation.  The skin may feel sticky until it is dry.  Do not rinse with water or any other liquid or you will lose the beneficial effects of the CHG.  If mild skin irritation occurs, do rinse the skin to remove the CHG.  Report this to the nurse at time of admission.  Do not apply lotions, creams, ointments, deodorants or perfumes after using the clothes. Dress in clean clothes before coming to the hospital.      Day of surgery:  Your arrival time is approximately two hours before your scheduled surgery time.  Upon arrival, a Pre-op nurse and Anesthesiologist will review your health history, obtain vital signs, and answer questions you may have.  The only belongings needed at this time will be a list of your home medications and if applicable your C-PAP/BI-PAP machine.  A Pre-op nurse will start an IV and you may receive medication in preparation for surgery, including something to help you relax.     Please be aware that surgery does come with discomfort.  We want to make every effort to control your discomfort so please discuss any uncontrolled symptoms with your nurse.   Your doctor will most likely have prescribed pain medications.      If you are going home after surgery you will receive individualized written care instructions before being discharged.  A responsible adult must drive you to and from the hospital on the day of your surgery and ideally stay with you through the night.   .  Discharge prescriptions can be filled by the hospital pharmacy during regular pharmacy hours.  If you are having surgery late in the day/evening your prescription may be e-prescribed to your pharmacy.  Please verify your pharmacy hours or chose a 24  hour pharmacy to avoid not having access to your prescription because your pharmacy has closed for the day.    If you are staying overnight following surgery, you will be transported to your hospital room following the recovery period.  [facility] has all private rooms.    If you have any questions please call Pre-Admission Testing at [phone].  Deductibles and co-payments are collected on the day of service. Please be prepared to pay the required co-pay, deductible or deposit on the day of service as defined by your plan.    Call your surgeon immediately if you experience any of the following symptoms:  Sore Throat  Shortness of Breath or difficulty breathing  Cough  Chills  Body soreness or muscle pain  Headache  Fever  New loss of taste or smell  Do not arrive for your surgery ill.  Your procedure will need to be rescheduled to another time.  You will need to call your physician before the day of surgery to avoid any unnecessary exposure to hospital staff as well as other patients.

## 2024-08-08 LAB — MRSA SPEC QL CULT: NORMAL

## 2024-08-09 LAB — BACTERIA SPEC AEROBE CULT: NO GROWTH

## 2024-08-16 ENCOUNTER — ANESTHESIA (OUTPATIENT)
Dept: PERIOP | Facility: HOSPITAL | Age: 84
End: 2024-08-16
Payer: MEDICARE

## 2024-08-16 ENCOUNTER — ANESTHESIA EVENT (OUTPATIENT)
Dept: PERIOP | Facility: HOSPITAL | Age: 84
End: 2024-08-16
Payer: MEDICARE

## 2024-08-16 ENCOUNTER — APPOINTMENT (OUTPATIENT)
Dept: GENERAL RADIOLOGY | Facility: HOSPITAL | Age: 84
End: 2024-08-16
Payer: MEDICARE

## 2024-08-16 ENCOUNTER — HOSPITAL ENCOUNTER (OUTPATIENT)
Facility: HOSPITAL | Age: 84
Setting detail: OBSERVATION
Discharge: HOME HEALTH | End: 2024-08-17
Attending: ORTHOPAEDIC SURGERY | Admitting: ORTHOPAEDIC SURGERY
Payer: MEDICARE

## 2024-08-16 DIAGNOSIS — Z96.641 STATUS POST TOTAL HIP REPLACEMENT, RIGHT: Primary | ICD-10-CM

## 2024-08-16 DIAGNOSIS — M16.11 PRIMARY OSTEOARTHRITIS OF RIGHT HIP: ICD-10-CM

## 2024-08-16 PROCEDURE — 63710000001 DOCUSATE SODIUM 100 MG CAPSULE: Performed by: ORTHOPAEDIC SURGERY

## 2024-08-16 PROCEDURE — C1776 JOINT DEVICE (IMPLANTABLE): HCPCS | Performed by: ORTHOPAEDIC SURGERY

## 2024-08-16 PROCEDURE — 25010000002 FENTANYL CITRATE (PF) 50 MCG/ML SOLUTION: Performed by: NURSE ANESTHETIST, CERTIFIED REGISTERED

## 2024-08-16 PROCEDURE — A9270 NON-COVERED ITEM OR SERVICE: HCPCS | Performed by: ORTHOPAEDIC SURGERY

## 2024-08-16 PROCEDURE — 25010000002 DROPERIDOL PER 5 MG: Performed by: NURSE ANESTHETIST, CERTIFIED REGISTERED

## 2024-08-16 PROCEDURE — 25010000002 ONDANSETRON PER 1 MG: Performed by: NURSE ANESTHETIST, CERTIFIED REGISTERED

## 2024-08-16 PROCEDURE — G0378 HOSPITAL OBSERVATION PER HR: HCPCS

## 2024-08-16 PROCEDURE — 25010000002 SUGAMMADEX 200 MG/2ML SOLUTION: Performed by: NURSE ANESTHETIST, CERTIFIED REGISTERED

## 2024-08-16 PROCEDURE — 76000 FLUOROSCOPY <1 HR PHYS/QHP: CPT

## 2024-08-16 PROCEDURE — 25010000002 MAGNESIUM SULFATE PER 500 MG OF MAGNESIUM: Performed by: NURSE ANESTHETIST, CERTIFIED REGISTERED

## 2024-08-16 PROCEDURE — 63710000001 ACETAMINOPHEN EXTRA STRENGTH 500 MG TABLET: Performed by: ORTHOPAEDIC SURGERY

## 2024-08-16 PROCEDURE — 25010000002 ONDANSETRON PER 1 MG: Performed by: ORTHOPAEDIC SURGERY

## 2024-08-16 PROCEDURE — 25010000002 HYDROMORPHONE 1 MG/ML SOLUTION: Performed by: NURSE ANESTHETIST, CERTIFIED REGISTERED

## 2024-08-16 PROCEDURE — 25010000002 ROPIVACAINE PER 1 MG: Performed by: ORTHOPAEDIC SURGERY

## 2024-08-16 PROCEDURE — 25010000002 PROPOFOL 200 MG/20ML EMULSION: Performed by: NURSE ANESTHETIST, CERTIFIED REGISTERED

## 2024-08-16 PROCEDURE — 25010000002 KETOROLAC TROMETHAMINE PER 15 MG: Performed by: ORTHOPAEDIC SURGERY

## 2024-08-16 PROCEDURE — 25010000002 CEFAZOLIN PER 500 MG: Performed by: ORTHOPAEDIC SURGERY

## 2024-08-16 PROCEDURE — 25010000002 DEXAMETHASONE SODIUM PHOSPHATE 20 MG/5ML SOLUTION: Performed by: NURSE ANESTHETIST, CERTIFIED REGISTERED

## 2024-08-16 PROCEDURE — 73501 X-RAY EXAM HIP UNI 1 VIEW: CPT

## 2024-08-16 PROCEDURE — 97161 PT EVAL LOW COMPLEX 20 MIN: CPT

## 2024-08-16 PROCEDURE — 63710000001 PREGABALIN 75 MG CAPSULE: Performed by: ORTHOPAEDIC SURGERY

## 2024-08-16 PROCEDURE — 25810000003 LACTATED RINGERS PER 1000 ML: Performed by: STUDENT IN AN ORGANIZED HEALTH CARE EDUCATION/TRAINING PROGRAM

## 2024-08-16 PROCEDURE — 25010000002 CLONIDINE PER 1 MG: Performed by: ORTHOPAEDIC SURGERY

## 2024-08-16 PROCEDURE — 25010000002 ACETAMINOPHEN 10 MG/ML SOLUTION: Performed by: NURSE ANESTHETIST, CERTIFIED REGISTERED

## 2024-08-16 PROCEDURE — 97110 THERAPEUTIC EXERCISES: CPT

## 2024-08-16 DEVICE — EMPHASYS ACETABULAR SHELL THREE-HOLE 48MM CEMENTLESS
Type: IMPLANTABLE DEVICE | Site: HIP | Status: FUNCTIONAL
Brand: EMPHASYS

## 2024-08-16 DEVICE — FW,BPB #2 SUTR,BLU W/NDL
Type: IMPLANTABLE DEVICE | Site: HIP | Status: FUNCTIONAL
Brand: ARTHREX®

## 2024-08-16 DEVICE — ACTIS DUOFIX HIP PROSTHESIS (FEMORAL STEM 12/14 TAPER CEMENTLESS SIZE 6 STD COLLAR)  CE
Type: IMPLANTABLE DEVICE | Site: HIP | Status: FUNCTIONAL
Brand: ACTIS

## 2024-08-16 DEVICE — KNOTLESS TISSUE CONTROL DEVICE, VIOLET UNIDIRECTIONAL (ANTIBACTERIAL) SYNTHETIC ABSORBABLE DEVICE
Type: IMPLANTABLE DEVICE | Site: HIP | Status: FUNCTIONAL
Brand: STRATAFIX

## 2024-08-16 DEVICE — TOTL HIP COP DEPUY 9641334: Type: IMPLANTABLE DEVICE | Status: FUNCTIONAL

## 2024-08-16 DEVICE — BIOLOX DELTA CERAMIC FEMORAL HEAD +5.0 36MM DIA 12/14 TAPER
Type: IMPLANTABLE DEVICE | Site: HIP | Status: FUNCTIONAL
Brand: BIOLOX DELTA

## 2024-08-16 DEVICE — EMPHASYS POLYETHYLENE LINER AOX NEUTRAL 48MM 36MM
Type: IMPLANTABLE DEVICE | Site: HIP | Status: FUNCTIONAL
Brand: EMPHASYS

## 2024-08-16 DEVICE — HEMOST ABS SURGIFOAM SZ100 8X12 10MM: Type: IMPLANTABLE DEVICE | Site: HIP | Status: FUNCTIONAL

## 2024-08-16 DEVICE — KNOTLESS TISSUE CONTROL DEVICE, UNDYED UNIDIRECTIONAL (ANTIBACTERIAL) SYNTHETIC ABSORBABLE DEVICE
Type: IMPLANTABLE DEVICE | Site: HIP | Status: FUNCTIONAL
Brand: STRATAFIX

## 2024-08-16 RX ORDER — ONDANSETRON 2 MG/ML
4 INJECTION INTRAMUSCULAR; INTRAVENOUS EVERY 6 HOURS PRN
Status: DISCONTINUED | OUTPATIENT
Start: 2024-08-16 | End: 2024-08-17 | Stop reason: HOSPADM

## 2024-08-16 RX ORDER — PROMETHAZINE HYDROCHLORIDE 25 MG/1
25 SUPPOSITORY RECTAL ONCE AS NEEDED
Status: DISCONTINUED | OUTPATIENT
Start: 2024-08-16 | End: 2024-08-16 | Stop reason: HOSPADM

## 2024-08-16 RX ORDER — HYDROCODONE BITARTRATE AND ACETAMINOPHEN 5; 325 MG/1; MG/1
1 TABLET ORAL EVERY 4 HOURS PRN
Status: DISCONTINUED | OUTPATIENT
Start: 2024-08-16 | End: 2024-08-17 | Stop reason: HOSPADM

## 2024-08-16 RX ORDER — ONDANSETRON 4 MG/1
4 TABLET, ORALLY DISINTEGRATING ORAL EVERY 6 HOURS PRN
Status: DISCONTINUED | OUTPATIENT
Start: 2024-08-16 | End: 2024-08-17 | Stop reason: HOSPADM

## 2024-08-16 RX ORDER — NALOXONE HCL 0.4 MG/ML
0.1 VIAL (ML) INJECTION
Status: DISCONTINUED | OUTPATIENT
Start: 2024-08-16 | End: 2024-08-17 | Stop reason: HOSPADM

## 2024-08-16 RX ORDER — HYDROCHLOROTHIAZIDE 12.5 MG/1
12.5 TABLET ORAL
Status: DISCONTINUED | OUTPATIENT
Start: 2024-08-16 | End: 2024-08-16

## 2024-08-16 RX ORDER — BISACODYL 5 MG/1
10 TABLET, DELAYED RELEASE ORAL DAILY PRN
Status: DISCONTINUED | OUTPATIENT
Start: 2024-08-17 | End: 2024-08-17 | Stop reason: HOSPADM

## 2024-08-16 RX ORDER — TRANEXAMIC ACID 10 MG/ML
1000 INJECTION, SOLUTION INTRAVENOUS ONCE
Status: DISCONTINUED | OUTPATIENT
Start: 2024-08-16 | End: 2024-08-16 | Stop reason: HOSPADM

## 2024-08-16 RX ORDER — PROMETHAZINE HYDROCHLORIDE 25 MG/1
25 TABLET ORAL ONCE AS NEEDED
Status: DISCONTINUED | OUTPATIENT
Start: 2024-08-16 | End: 2024-08-16 | Stop reason: HOSPADM

## 2024-08-16 RX ORDER — DROPERIDOL 2.5 MG/ML
0.62 INJECTION, SOLUTION INTRAMUSCULAR; INTRAVENOUS
Status: DISCONTINUED | OUTPATIENT
Start: 2024-08-16 | End: 2024-08-16 | Stop reason: HOSPADM

## 2024-08-16 RX ORDER — TRANEXAMIC ACID 100 MG/ML
INJECTION, SOLUTION INTRAVENOUS AS NEEDED
Status: DISCONTINUED | OUTPATIENT
Start: 2024-08-16 | End: 2024-08-16 | Stop reason: SURG

## 2024-08-16 RX ORDER — PROPOFOL 10 MG/ML
INJECTION, EMULSION INTRAVENOUS AS NEEDED
Status: DISCONTINUED | OUTPATIENT
Start: 2024-08-16 | End: 2024-08-16 | Stop reason: SURG

## 2024-08-16 RX ORDER — HYDROCODONE BITARTRATE AND ACETAMINOPHEN 5; 325 MG/1; MG/1
1 TABLET ORAL ONCE AS NEEDED
Status: DISCONTINUED | OUTPATIENT
Start: 2024-08-16 | End: 2024-08-16 | Stop reason: HOSPADM

## 2024-08-16 RX ORDER — IPRATROPIUM BROMIDE AND ALBUTEROL SULFATE 2.5; .5 MG/3ML; MG/3ML
3 SOLUTION RESPIRATORY (INHALATION) ONCE AS NEEDED
Status: DISCONTINUED | OUTPATIENT
Start: 2024-08-16 | End: 2024-08-16 | Stop reason: HOSPADM

## 2024-08-16 RX ORDER — ACETAMINOPHEN 500 MG
1000 TABLET ORAL EVERY 8 HOURS
Status: DISCONTINUED | OUTPATIENT
Start: 2024-08-16 | End: 2024-08-17 | Stop reason: HOSPADM

## 2024-08-16 RX ORDER — MAGNESIUM HYDROXIDE 1200 MG/15ML
LIQUID ORAL AS NEEDED
Status: DISCONTINUED | OUTPATIENT
Start: 2024-08-16 | End: 2024-08-16 | Stop reason: HOSPADM

## 2024-08-16 RX ORDER — ROCURONIUM BROMIDE 10 MG/ML
INJECTION, SOLUTION INTRAVENOUS AS NEEDED
Status: DISCONTINUED | OUTPATIENT
Start: 2024-08-16 | End: 2024-08-16 | Stop reason: SURG

## 2024-08-16 RX ORDER — ASPIRIN 81 MG/1
81 TABLET ORAL EVERY 12 HOURS SCHEDULED
Status: DISCONTINUED | OUTPATIENT
Start: 2024-08-17 | End: 2024-08-17 | Stop reason: HOSPADM

## 2024-08-16 RX ORDER — VALSARTAN 80 MG/1
80 TABLET ORAL
Status: DISCONTINUED | OUTPATIENT
Start: 2024-08-16 | End: 2024-08-16

## 2024-08-16 RX ORDER — DOCUSATE SODIUM 100 MG/1
100 CAPSULE, LIQUID FILLED ORAL 2 TIMES DAILY
Status: DISCONTINUED | OUTPATIENT
Start: 2024-08-16 | End: 2024-08-17 | Stop reason: HOSPADM

## 2024-08-16 RX ORDER — ONDANSETRON 2 MG/ML
INJECTION INTRAMUSCULAR; INTRAVENOUS AS NEEDED
Status: DISCONTINUED | OUTPATIENT
Start: 2024-08-16 | End: 2024-08-16 | Stop reason: SURG

## 2024-08-16 RX ORDER — NALOXONE HCL 0.4 MG/ML
0.2 VIAL (ML) INJECTION AS NEEDED
Status: DISCONTINUED | OUTPATIENT
Start: 2024-08-16 | End: 2024-08-16 | Stop reason: HOSPADM

## 2024-08-16 RX ORDER — SODIUM CHLORIDE 0.9 % (FLUSH) 0.9 %
3-10 SYRINGE (ML) INJECTION AS NEEDED
Status: DISCONTINUED | OUTPATIENT
Start: 2024-08-16 | End: 2024-08-16 | Stop reason: HOSPADM

## 2024-08-16 RX ORDER — DEXAMETHASONE SODIUM PHOSPHATE 4 MG/ML
INJECTION, SOLUTION INTRA-ARTICULAR; INTRALESIONAL; INTRAMUSCULAR; INTRAVENOUS; SOFT TISSUE AS NEEDED
Status: DISCONTINUED | OUTPATIENT
Start: 2024-08-16 | End: 2024-08-16 | Stop reason: SURG

## 2024-08-16 RX ORDER — DROPERIDOL 2.5 MG/ML
INJECTION, SOLUTION INTRAMUSCULAR; INTRAVENOUS AS NEEDED
Status: DISCONTINUED | OUTPATIENT
Start: 2024-08-16 | End: 2024-08-16 | Stop reason: SURG

## 2024-08-16 RX ORDER — HYDROCODONE BITARTRATE AND ACETAMINOPHEN 7.5; 325 MG/1; MG/1
1 TABLET ORAL EVERY 4 HOURS PRN
Status: DISCONTINUED | OUTPATIENT
Start: 2024-08-16 | End: 2024-08-16 | Stop reason: HOSPADM

## 2024-08-16 RX ORDER — MAGNESIUM SULFATE HEPTAHYDRATE 500 MG/ML
INJECTION, SOLUTION INTRAMUSCULAR; INTRAVENOUS AS NEEDED
Status: DISCONTINUED | OUTPATIENT
Start: 2024-08-16 | End: 2024-08-16 | Stop reason: SURG

## 2024-08-16 RX ORDER — HYDROCODONE BITARTRATE AND ACETAMINOPHEN 7.5; 325 MG/1; MG/1
1 TABLET ORAL EVERY 4 HOURS PRN
Status: DISCONTINUED | OUTPATIENT
Start: 2024-08-16 | End: 2024-08-17 | Stop reason: HOSPADM

## 2024-08-16 RX ORDER — SODIUM CHLORIDE, SODIUM LACTATE, POTASSIUM CHLORIDE, CALCIUM CHLORIDE 600; 310; 30; 20 MG/100ML; MG/100ML; MG/100ML; MG/100ML
9 INJECTION, SOLUTION INTRAVENOUS CONTINUOUS
Status: DISCONTINUED | OUTPATIENT
Start: 2024-08-16 | End: 2024-08-16

## 2024-08-16 RX ORDER — FENTANYL CITRATE 50 UG/ML
INJECTION, SOLUTION INTRAMUSCULAR; INTRAVENOUS AS NEEDED
Status: DISCONTINUED | OUTPATIENT
Start: 2024-08-16 | End: 2024-08-16 | Stop reason: SURG

## 2024-08-16 RX ORDER — SODIUM CHLORIDE 9 MG/ML
125 INJECTION, SOLUTION INTRAVENOUS CONTINUOUS
Status: CANCELLED | OUTPATIENT
Start: 2024-08-16

## 2024-08-16 RX ORDER — CHLORHEXIDINE GLUCONATE 500 MG/1
CLOTH TOPICAL
Status: COMPLETED | OUTPATIENT
Start: 2024-08-16 | End: 2024-08-16

## 2024-08-16 RX ORDER — LIDOCAINE HYDROCHLORIDE 20 MG/ML
INJECTION, SOLUTION EPIDURAL; INFILTRATION; INTRACAUDAL; PERINEURAL AS NEEDED
Status: DISCONTINUED | OUTPATIENT
Start: 2024-08-16 | End: 2024-08-16 | Stop reason: SURG

## 2024-08-16 RX ORDER — LIDOCAINE HYDROCHLORIDE 10 MG/ML
0.5 INJECTION, SOLUTION INFILTRATION; PERINEURAL ONCE AS NEEDED
Status: DISCONTINUED | OUTPATIENT
Start: 2024-08-16 | End: 2024-08-16 | Stop reason: HOSPADM

## 2024-08-16 RX ORDER — EPHEDRINE SULFATE 50 MG/ML
INJECTION INTRAVENOUS AS NEEDED
Status: DISCONTINUED | OUTPATIENT
Start: 2024-08-16 | End: 2024-08-16 | Stop reason: SURG

## 2024-08-16 RX ORDER — FLUMAZENIL 0.1 MG/ML
0.2 INJECTION INTRAVENOUS AS NEEDED
Status: DISCONTINUED | OUTPATIENT
Start: 2024-08-16 | End: 2024-08-16 | Stop reason: HOSPADM

## 2024-08-16 RX ORDER — LABETALOL HYDROCHLORIDE 5 MG/ML
5 INJECTION, SOLUTION INTRAVENOUS
Status: DISCONTINUED | OUTPATIENT
Start: 2024-08-16 | End: 2024-08-16 | Stop reason: HOSPADM

## 2024-08-16 RX ORDER — ONDANSETRON 2 MG/ML
4 INJECTION INTRAMUSCULAR; INTRAVENOUS ONCE AS NEEDED
Status: DISCONTINUED | OUTPATIENT
Start: 2024-08-16 | End: 2024-08-16 | Stop reason: HOSPADM

## 2024-08-16 RX ORDER — SODIUM CHLORIDE 9 MG/ML
100 INJECTION, SOLUTION INTRAVENOUS CONTINUOUS
Status: DISCONTINUED | OUTPATIENT
Start: 2024-08-16 | End: 2024-08-17 | Stop reason: HOSPADM

## 2024-08-16 RX ORDER — FENTANYL CITRATE 50 UG/ML
25 INJECTION, SOLUTION INTRAMUSCULAR; INTRAVENOUS
Status: DISCONTINUED | OUTPATIENT
Start: 2024-08-16 | End: 2024-08-16 | Stop reason: HOSPADM

## 2024-08-16 RX ORDER — PREGABALIN 75 MG/1
75 CAPSULE ORAL ONCE
Status: COMPLETED | OUTPATIENT
Start: 2024-08-16 | End: 2024-08-16

## 2024-08-16 RX ORDER — ACETAMINOPHEN 10 MG/ML
INJECTION, SOLUTION INTRAVENOUS AS NEEDED
Status: DISCONTINUED | OUTPATIENT
Start: 2024-08-16 | End: 2024-08-16 | Stop reason: SURG

## 2024-08-16 RX ORDER — HYDROMORPHONE HYDROCHLORIDE 1 MG/ML
0.5 INJECTION, SOLUTION INTRAMUSCULAR; INTRAVENOUS; SUBCUTANEOUS
Status: DISCONTINUED | OUTPATIENT
Start: 2024-08-16 | End: 2024-08-17 | Stop reason: HOSPADM

## 2024-08-16 RX ORDER — HYDRALAZINE HYDROCHLORIDE 20 MG/ML
5 INJECTION INTRAMUSCULAR; INTRAVENOUS
Status: DISCONTINUED | OUTPATIENT
Start: 2024-08-16 | End: 2024-08-16 | Stop reason: HOSPADM

## 2024-08-16 RX ORDER — HYDROMORPHONE HYDROCHLORIDE 1 MG/ML
0.25 INJECTION, SOLUTION INTRAMUSCULAR; INTRAVENOUS; SUBCUTANEOUS
Status: DISCONTINUED | OUTPATIENT
Start: 2024-08-16 | End: 2024-08-16 | Stop reason: HOSPADM

## 2024-08-16 RX ORDER — EPHEDRINE SULFATE 50 MG/ML
5 INJECTION, SOLUTION INTRAVENOUS ONCE AS NEEDED
Status: DISCONTINUED | OUTPATIENT
Start: 2024-08-16 | End: 2024-08-16 | Stop reason: HOSPADM

## 2024-08-16 RX ORDER — DIPHENHYDRAMINE HYDROCHLORIDE 50 MG/ML
12.5 INJECTION INTRAMUSCULAR; INTRAVENOUS
Status: DISCONTINUED | OUTPATIENT
Start: 2024-08-16 | End: 2024-08-16 | Stop reason: HOSPADM

## 2024-08-16 RX ORDER — SODIUM CHLORIDE 0.9 % (FLUSH) 0.9 %
3 SYRINGE (ML) INJECTION EVERY 12 HOURS SCHEDULED
Status: DISCONTINUED | OUTPATIENT
Start: 2024-08-16 | End: 2024-08-16 | Stop reason: HOSPADM

## 2024-08-16 RX ADMIN — EPHEDRINE SULFATE 10 MG: 50 INJECTION INTRAVENOUS at 07:43

## 2024-08-16 RX ADMIN — MAGNESIUM SULFATE HEPTAHYDRATE 2 G: 500 INJECTION, SOLUTION INTRAMUSCULAR; INTRAVENOUS at 07:37

## 2024-08-16 RX ADMIN — SODIUM CHLORIDE, POTASSIUM CHLORIDE, SODIUM LACTATE AND CALCIUM CHLORIDE 9 ML/HR: 600; 310; 30; 20 INJECTION, SOLUTION INTRAVENOUS at 06:29

## 2024-08-16 RX ADMIN — FENTANYL CITRATE 50 MCG: 50 INJECTION, SOLUTION INTRAMUSCULAR; INTRAVENOUS at 08:03

## 2024-08-16 RX ADMIN — ROCURONIUM BROMIDE 50 MG: 10 INJECTION, SOLUTION INTRAVENOUS at 07:30

## 2024-08-16 RX ADMIN — SUGAMMADEX 300 MG: 100 INJECTION, SOLUTION INTRAVENOUS at 08:48

## 2024-08-16 RX ADMIN — SODIUM CHLORIDE, POTASSIUM CHLORIDE, SODIUM LACTATE AND CALCIUM CHLORIDE: 600; 310; 30; 20 INJECTION, SOLUTION INTRAVENOUS at 08:52

## 2024-08-16 RX ADMIN — ACETAMINOPHEN 1000 MG: 1000 INJECTION INTRAVENOUS at 07:37

## 2024-08-16 RX ADMIN — DOCUSATE SODIUM 100 MG: 100 CAPSULE, LIQUID FILLED ORAL at 12:44

## 2024-08-16 RX ADMIN — CHLORHEXIDINE GLUCONATE: 500 CLOTH TOPICAL at 06:45

## 2024-08-16 RX ADMIN — PROPOFOL 150 MG: 10 INJECTION, EMULSION INTRAVENOUS at 07:30

## 2024-08-16 RX ADMIN — ONDANSETRON 4 MG: 2 INJECTION INTRAMUSCULAR; INTRAVENOUS at 14:57

## 2024-08-16 RX ADMIN — SODIUM CHLORIDE 2 G: 900 INJECTION INTRAVENOUS at 07:18

## 2024-08-16 RX ADMIN — EPHEDRINE SULFATE 10 MG: 50 INJECTION INTRAVENOUS at 08:53

## 2024-08-16 RX ADMIN — SODIUM CHLORIDE 2000 MG: 900 INJECTION INTRAVENOUS at 23:11

## 2024-08-16 RX ADMIN — SODIUM CHLORIDE 100 ML/HR: 900 INJECTION INTRAVENOUS at 12:43

## 2024-08-16 RX ADMIN — FENTANYL CITRATE 50 MCG: 50 INJECTION, SOLUTION INTRAMUSCULAR; INTRAVENOUS at 07:26

## 2024-08-16 RX ADMIN — LIDOCAINE HYDROCHLORIDE 100 MG: 20 INJECTION, SOLUTION EPIDURAL; INFILTRATION; INTRACAUDAL; PERINEURAL at 07:30

## 2024-08-16 RX ADMIN — HYDROMORPHONE HYDROCHLORIDE 1 MG: 1 INJECTION, SOLUTION INTRAMUSCULAR; INTRAVENOUS; SUBCUTANEOUS at 08:51

## 2024-08-16 RX ADMIN — DEXAMETHASONE SODIUM PHOSPHATE 8 MG: 4 INJECTION, SOLUTION INTRAMUSCULAR; INTRAVENOUS at 07:37

## 2024-08-16 RX ADMIN — DROPERIDOL 0.62 MG: 2.5 INJECTION, SOLUTION INTRAMUSCULAR; INTRAVENOUS at 07:37

## 2024-08-16 RX ADMIN — SODIUM CHLORIDE 2000 MG: 900 INJECTION INTRAVENOUS at 14:58

## 2024-08-16 RX ADMIN — ONDANSETRON 4 MG: 2 INJECTION INTRAMUSCULAR; INTRAVENOUS at 08:38

## 2024-08-16 RX ADMIN — PREGABALIN 75 MG: 75 CAPSULE ORAL at 06:01

## 2024-08-16 RX ADMIN — ACETAMINOPHEN 1000 MG: 500 TABLET ORAL at 23:11

## 2024-08-16 RX ADMIN — TRANEXAMIC ACID 1000 MG: 100 INJECTION, SOLUTION INTRAVENOUS at 07:37

## 2024-08-16 RX ADMIN — SODIUM CHLORIDE 100 ML/HR: 900 INJECTION INTRAVENOUS at 14:58

## 2024-08-16 NOTE — OP NOTE
Operative  Note    Patient: Lachelle Mcclure  YOB: 1940    Medical Record Number: [id number]    Attending Physician: Rico Patterson,*    Date of Service: 8/16/2024     Pre-op Diagnosis:   Primary osteoarthritis of right hip [M16.11]    Post-Op Diagnosis Codes:     * Primary osteoarthritis of right hip [M16.11]    PROCEDURE PERFORMED: RIGHT TOTAL HIP ARTHROPLASTY ANTERIOR WITH HANA TABLE by utilizing a Direct anterior approach with     Depuy   Emphasys Acetabular  Shell Sector with  holes size 48 mm outer diameter   Neutral ALTRX  Polyethylene acetabular  liner- 36 mm internal diameter,   Actis Duo fix Cementless  Standard Collar femoral stem size # 6    Delta ceramic femoral head- 36 mm outer diameter, + 5 neck length by utilizing a Sayre table and image intensifier.     Implant Name Type Inv. Item Serial No.  Lot No. LRB No. Used Action   DEV CONTRL TISS STRATAFIX SPIRAL MNCRYL UD 3/0 PLS 30CM - [id number] Implant DEV CONTRL TISS STRATAFIX SPIRAL MNCRYL UD 3/0 PLS 30CM  [employer]  DIV OF  AND J [id number] Right 1 Implanted   DEV CONTRL TISS STRATAFIX SPIRAL PDS PLS CT1 2-0 45CM - [id number] Implant DEV CONTRL TISS STRATAFIX SPIRAL PDS PLS CT1 2-0 45CM  [employer]  DIV OF  AND J [id number] Right 1 Implanted   SUT FW #2 W/TPR NDL 1/2 CIR 38IN 97CM 26.5MM LINDA - [id number] Implant SUT FW #2 W/TPR NDL 1/2 CIR 38IN 97CM 26.5MM LINDA  ARTH[employer] [id number] Right 1 Implanted   SUT FW #2 W/TPR NDL 1/2 CIR 38IN 97CM 26.5MM LINDA - [id number] Implant SUT FW #2 W/TPR NDL 1/2 CIR 38IN 97CM 26.5MM LINDA  ARTHREX [id number] Right 1 Implanted   HEMOST ABS SURGIFOAM  8X12 10MM - [id number] Implant HEMOST ABS SURGIFOAM  8X12 10MM  [employer]  DIV OF J AND J [id number] Right 1 Implanted   SHLL ACET FEM EMPHASYS 3HL 48MM - [id number] Implant SHLL ACET FEM EMPHASYS 3HL 48MM  DEPUY [id number] Right 1 Implanted   LINER ACET EMPHASYS AOX NTRL 96B34AA - [id number] Implant LINER ACET EMPHASYS AOX NTRL  [id number]  DEPUY [id number] Right 1 Implanted   STEM FEM/HIP ACTIS COLR CMTLS STD OFFST 12/14TPR SZ6 - [id number] Implant STEM FEM/HIP ACTIS COLR CMTLS STD OFFST 12/14TPR SZ6  DEPUY SYNTHES [id number] Right 1 Implanted   HD FEM BIOLOXDELTA/ART CERAM 36MM PLS5 - [id number] Implant HD FEM BIOLOXDELTA/ART CERAM 36MM PLS5  DEPUY [id number] Right 1 Implanted       SURGEON: Rico Patterson MD     ASSISTANT: Cosmo López MD, Fellow    Kimani LUCAS      The services of a first assist were necessary for performing the procedure safely and expeditiously.  The first assist was present for the entire duration of the case and helped with positioning, retraction and closure of the incision.     ANESTHESIA: General  Anesthesiologist: Kang Ceron MD  CRNA: Analilia Landers CRNA     Estimated Blood Loss: 200 mL    Specimens: * No orders in the log *    COMPLICATIONS: Nil.     DRAINS: * No LDAs found *    INDICATIONS: The patient is a 84 y.o. female who presented to my office with complaints of progressively worsening pain in the hip. The patient has reached a point of disability secondary to the severe pain and immobility. The patient had difficulty with activities of daily living.  The patient has failed nonoperative management.      The medical history was reviewed. The patient was indicated for a  total hip arthroplasty. Likely risks and benefits of the procedure including, but not limited to infection, DVT, pulmonary embolism, leg length discrepancy, recurrent dislocation, possibility of injury to nerves or vessels, and periprosthetic fractures, Possibility of medical complications including but not limited to stroke, heart attack have been discussed in detail. Despite the risks involved, the patient elected to proceed and informed consent was obtained. The patient was seen in the preoperative holding area, and the  operative site was marked.     DESCRIPTION OF PROCEDURE: The patient has been transferred to [facility] Operating Room.   Preoperative antibiotics in the form of  Kefzol was given intravenously prior to the incision.   After achieving adequate  anesthesia, the patient was transferred onto the [facility] [place] table. All bony prominences were padded adequately.   The operative hip was prepped and draped in the usual sterile fashion.   Surgical timeout was done. Correct patient, surgical side and site were identified.      Tranexamic acid was given intravenously at the time of skin incision.    A skin incision was made overlying the anterolateral aspect of the  hip for about 10 cm from just below and lateral to the anterior superior iliac spine. Skin and subcutaneous tissue were incised and the deep fascia was incised in line with the skin incision overlying the tensor fascia geeta muscle. The tensor muscle was retracted laterally and interval between the sartorius and the rectus femoris medially and the tensor fascia geeta muscle laterally was developed. The lateral circumflex femoral vessels were identified. They were clamped, cut, and ligated. Unnamed deep fascia was incised. Capsule of the hip joint was identified. Retractors were placed extracapsularly.     The capsule was incised in a L-shaped manner and the anterior capsule was tagged with FiberWire.  Followed by this, the retractors were placed intracapsularly.     There was a large effusion and a thickened capsule. Appropriate capsular releases were done along the inferior and  medial neck and along the saddle of the femoral neck.  The femoral neck was identified. The femoral neck osteotomy was done according to the preoperative templating , utilizing an oscillating saw. Femoral head was extracted using a corkscrew without any difficulty. The femoral head revealed presence of extensive loss of articular cartilage in the superior weight bearing portion along with femoral head osteophytes.      The acetabular cavity was inspected and exposed appropriately by  placing retractors taking care to protect the anterior neurovascular structures. . The labrum was excised, pulvinar was excised from the cotyloid fossa. Acetabular cavity was progressively reamed, maintaining the correct inclination and version under image intensifier control. Adequate medialization was obtained. Adequate fit was found to be obtained at reamer size 48 .  The  Emphasys  Acetabular Shell Sector with  holes  48 mm was seated into position. It was found to be seated satisfactorily with adequate inclination and anteversion. There was good stability. Followed by this, a polyethylene liner was seated into position, checked for stability. This was a 36 mm internal diameter,  highly cross linked neutral 0° lip liner.     The periarticular tissue was infiltrated with local anaesthetic injection which consisted of Naropin, clonidine and ketorolac mixture.     Attention was then directed to the proximal femur. The proximal femur was delivered by utilizing a trochanteric hook placed just distal to the vastus tubercle and proximal to the gluteus mary tendon. The leg was then externally rotated with the gross traction released and  hyperextension, adduction was done using the [place] table spar. The mechanical lift on the table was utilized to support the femur.  Appropriate capsular releases were made to expose the medial slope of the greater trochanter and the proximal femur was delivered. The femoral canal was entered by removing the lateral femoral neck with a box chisel followed by serial broaching. Adequate fit was found to be obtained with a size 6 broach. A trial reduction was performed. Leg lengths and offset were found to be satisfactory under image intensifier on comparison with the opposite hip under image intensifier. Reduction was found to be satisfactory and there was good stability with range of motion of the hip in internal and external rotation. Having been satisfied with the trials, the hip  joint was dislocated.     The femoral  trial broach was removed and  Actis Duo fix Cementless  Standard Collar femoral stem size # 6 was seated into position. This was found to be satisfactorily seated with good stability.      The delta ceramic femoral head 36 mm +5 mm neck length was seated onto the trunnion and impacted. Followed by this, the hip joint was reduced. Reduction was found to be satisfactory and image confirmed leg length, offset , implant position and stem fill of the femoral canal.  There were no iatrogenic fractures. Hip joint was thoroughly irrigated with saline. Soft tissue hemostasis was secured. The sponge count and needle count was correct. The FiberWire sutures was tagged to the anterior capsular flaps and they were tied to each other. The incision was closed in layers with vicryl and monocryl sutures and the patient was transferred to the recovery room in a stable condition.     The patient tolerated the procedure well. There were no complications. The patient had adequate distal pulses and good capillary refill.     The patient will be mobilized with physical therapy.   Antibiotic prophylaxis  will be given postoperatively.     The  patient will be started on DVT prophylaxis with  Aspirin 81 mg twice daily.    I discussed the satisfactory performance of the procedure with the patient's family and discussed with them the postoperative management.    CPT CODE : 15590    Rico Patterson MD     Date: 8/16/2024  Time: 09:12 EDT    cc: Juan Vega MD; MD Leonardo Chauhan, Rico BARR,*

## 2024-08-16 NOTE — ANESTHESIA PROCEDURE NOTES
Airway  Urgency: elective    Date/Time: 8/16/2024 7:35 AM  Airway not difficult    General Information and Staff    Patient location during procedure: OR  Anesthesiologist: Kang Ceron MD  CRNA/CAA: Analilia Landers CRNA    Indications and Patient Condition  Indications for airway management: airway protection    Preoxygenated: yes  MILS maintained throughout  Mask difficulty assessment: 2 - vent by mask + OA or adjuvant +/- NMBA    Final Airway Details  Final airway type: endotracheal airway      Successful airway: ETT  Cuffed: yes   Successful intubation technique: direct laryngoscopy  Facilitating devices/methods: intubating stylet and cricoid pressure  Endotracheal tube insertion site: oral  Blade: Dixon  Blade size: 2  ETT size (mm): 7.0  Cormack-Lehane Classification: grade I - full view of glottis  Placement verified by: chest auscultation and capnometry   Cuff volume (mL): 7  Measured from: teeth  ETT/EBT  to teeth (cm): 20  Number of attempts at approach: 1  Assessment: lips, teeth, and gum same as pre-op and atraumatic intubation

## 2024-08-16 NOTE — THERAPY EVALUATION
Patient Name: Lachelle Mcclure  : 1940    MRN: [id number]                              Today's Date: 2024       Admit Date: 2024    Visit Dx:     ICD-10-CM ICD-9-CM   1. Primary osteoarthritis of right hip  M16.11 715.15     Patient Active Problem List   Diagnosis    Benign essential hypertension    Carotid artery plaque, 2022-- mild bilateral plaque without stenosis.  4/3/2019--mild bilateral.  10/06/2016--vascular screen is now normal without carotid disease.  Improved.    Gastroesophageal reflux disease without esophagitis    Hyperlipidemia    Microscopic hematuria    Osteoporosis, 3/23/2022--lumbar spine -0.9; left femoral neck -2.4; right femoral neck -2.3.  2019--lumbar spine -0.3.  Left femoral neck -2.7.  Right femoral neck -2.4.    Tinnitus of right ear    History of malignant melanoma, --right side of face.  --lower back.    Therapeutic drug monitoring    Vitamin D deficiency    Lumbar disc disease    Chronic right hip pain    Impaired fasting glucose    Postmenopausal state    History of basal cell carcinoma of skin    Subclinical hypothyroidism    Intolerance of oral bisphosphonate therapy    Primary osteoarthritis of both hips    History of COVID-19    Hypercalcemia    Status post total hip replacement, right     Past Medical History:   Diagnosis Date    Benign essential hypertension 2006--treatment for hypertension begun.    Carotid artery plaque, 2022-- mild bilateral plaque without stenosis.  4/3/2019--mild bilateral.  10/06/2016--vascular screen is now normal without carotid disease.  Improved. 2010    May 9, 2022--vascular screening reveals mild carotid artery plaque bilaterally without stenosis.  Negative for AAA.  Negative for PAD.  April 3, 2019--vascular screening reveals mild bilateral carotid plaque without stenosis.  Negative for AAA.  Negative for PAD.  10/06/2016--vascular screen reveals no evidence of carotid plaque, negative for  AAA, negative for PAD.  04/23/2014--vascular screen rev    Chronic right hip pain 09/01/2017 09/01/2017--patient has had a one-year history of intermittent episodes of lateral right hip pain that at times is very severe and debilitating.  She also has tenderness over this area that limits her from sleeping on her right side.  Last year I gave her a cortisone injection for trochanteric bursitis and this provided immediate relief but not long lasting.  X-rays were negative for fracture or even degenerative arthritis.  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  Ordered.    Gastroesophageal reflux disease without esophagitis 01/14/2013    04/10/2014--patient presented with right upper quadrant/epigastric burning pain and discomfort that was postprandial. H pylori breath test was negative. Empiric trial of Dexilant 60 mg per day was initiated for esophageal reflux/dyspepsia.   01/14/2013--EGD performed for epigastric and right upper quadrant pain. There was some edematous appearing mucosa in the antrum and body of the stomach which was biopsied. No significant erythema. No ulcerations. Normal appearing duodenum and esophagus. Pathology revealed moderate chronic active gastritis. No intestinal metaplasia. Helicobacter pylori positive. Patient was treated with appropriate antibiotic and PPI therapy.    Hip pain, acute, right     History of basal cell carcinoma of skin 02/12/2019 June 2018--Mohs micrographic surgery of basal cell carcinoma on the tip of the nose.    History of Helicobacter pylori gastritis 01/14/2013    04/10/2014--patient presented with right upper quadrant/epigastric burning pain and discomfort that was postprandial. H pylori breath test was negative. Empiric trial of Dexilant 60 mg per day was initiated for esophageal reflux/dyspepsia.  01/14/2013--EGD performed for epigastric and right upper quadrant pain. There was some edematous  appearing mucosa in the antrum and body of the stomach which w    History of malignant melanoma, 2008--right side of face.  1999--lower back. 01/01/2008 2008--melanoma resected from right face.   1999--malignant melanoma resected from lower back.    History of routine gynecologic exam yearly    Patient sees a gynecologist.    History of thrombophlebitis, superficial, left lower extremity. 01/12/2022 January 12, 2022--patient reports a 2-week history of left lower extremity swelling which is just above the ankle and sometimes extends all the way down into the ankle and is associated with redness and tenderness. It tends to get worse towards the end of the day. She has had no shortness of breath or chest pain and there is been no significant swelling involving the upper part of either leg. On e    Hx of malignant skin melanoma     Hyperlipidemia 08/13/2010 08/13/2010--treatment for hyperlipidemia begun.    Impaired fasting glucose 09/01/2017 09/01/2017--routine physical.  Serum glucose elevated at 101.  Recommend weight loss and decrease carbohydrate intake.    Intolerance of oral bisphosphonate therapy 02/21/2020    Lumbar disc disease 10/19/2016    09/13/2016--x-ray lumbar spine reveals disc space narrowing at L2-L3, L3-L4, L4-L5, and possibly L5-S1.  There is mild anterior listhesis of L4 on L5 measuring 4.5 mm.  No compression deformity, nor other evidence of acute process.    Microscopic hematuria 12/18/2015 12/22/2015--urine cytology negative for malignant cells.  Red blood cells noted.  Transitional epithelial cells noted.  Urine culture revealed less than 10,000 colony forming units of mixed urogenital laurie.  Repeat urinalysis was negative.  Recommend observation.  12/18/2015--routine urinalysis reveals 3-5 WBCs and 3-5 RBCs. 0 epithelial cells. 0 bacteria. Patient is asymptomatic. Repeat urinalysis, urine cytology, and urine culture sent.    Osteoporosis, 3/23/2022--lumbar spine -0.9; left  femoral neck -2.4; right femoral neck -2.3.  11/5/2019--lumbar spine -0.3.  Left femoral neck -2.7.  Right femoral neck -2.4. 07/24/2009 March 23, 2022--DEXA scan reveals lumbar spine T score -0.9 representing a 5.7% interval decrease.  Left femoral neck T score -2.4 which represents a 5.7% increase.  Right femoral neck T score -2.3 which is unchanged.  Impression is osteopenia at the hips with mixed interval change in density values.  November 5, 2019--DEXA scan reveals lumbar spine T score -0.3 representing a 5% increase.  Left f    Positive colorectal cancer screening using Cologuard test     Postmenopausal state 09/01/2017    Primary osteoarthritis of both hips 11/19/2021 November 19, 2021-9 being evaluated and treated by the orthopedist.  Patient may need bilateral hip replacements at some point in the future.    Subclinical hypothyroidism 02/21/2020 February 21, 2020--TSH slightly elevated at 4.41.  Free T3 and free T4 are normal.  Close observation.    Tinnitus of right ear 12/18/2015 12/18/2015--patient presents with a four-month history of ringing in her right ear. No hearing loss. Examination reveals a cerumen impaction of the right ear canal. This was removed with irrigation and curettage.    Vitamin D deficiency 08/12/2016     Past Surgical History:   Procedure Laterality Date    ADENOIDECTOMY  1946    APPENDECTOMY      BASAL CELL CARCINOMA EXCISION  06/2018 June 2018--Mohs micrographic surgery of basal cell carcinoma on the tip of the nose.    CHOLECYSTECTOMY  05/17/2013 05/17/2013--laparoscopic cholecystectomy.    COLONOSCOPY  07/12/2012 07/12/2012--normal colonoscopy to the cecum with an excellent prep.     COLONOSCOPY  06/20/2003 06/20/2003--normal colonoscopy to the cecum.    COLONOSCOPY N/A 08/30/2023    Procedure: COLONOSCOPY INTO CECUM WITH COLD BIOPSY POLYPECTOMIES AND HOT SNARE POLYPECTOMY;  Surgeon: Ho Goyal MD;  Location: [facility] ENDOSCOPY;  Service:  Gastroenterology;  Laterality: N/A;  PRE- POSITIVE COLOGUARD  POST- POLYPS, HEMORRHOIDS    ESOPHAGOSCOPY / EGD  01/14/2013 01/14/2013--EGD performed for epigastric and right upper quadrant pain. There was some edematous appearing mucosa in the antrum and body of the stomach which was biopsied. No significant erythema. No ulcerations. Normal appearing duodenum and esophagus. Pathology revealed moderate chronic active gastritis. No intestinal metaplasia. Helicobacter pylori positive. Patient was treated with appropriate    EYE SURGERY      FOOT SURGERY  1992 1992--orthopedic 10 placed in right great toe.    HYSTERECTOMY  1986 1986--total abdominal hysterectomy.    SKIN CANCER EXCISION  09/14/2009 09/14/2009--excision 1 cm mid scalp cyst. Pathology benign. 2008--melanoma resected from right face. 1999--malignant melanoma resected from lower back.    TONSILLECTOMY AND ADENOIDECTOMY  1946 1946.      General Information       Row Name 08/16/24 1515          Physical Therapy Time and Intention    Document Type evaluation  -EF     Mode of Treatment individual therapy;physical therapy  -EF       Row Name 08/16/24 1515          General Information    Patient Profile Reviewed yes  -EF     Prior Level of Function independent:;all household mobility;community mobility  -EF     Existing Precautions/Restrictions fall;right;hip, anterior  -EF     Barriers to Rehab none identified  -EF       Row Name 08/16/24 1515          Living Environment    People in Home spouse  -EF       Row Name 08/16/24 1515          Home Main Entrance    Number of Stairs, Main Entrance one  -EF       Row Name 08/16/24 1515          Cognition    Orientation Status (Cognition) oriented x 4  -EF       Row Name 08/16/24 1515          Safety Issues, Functional Mobility    Impairments Affecting Function (Mobility) endurance/activity tolerance;pain;strength;range of motion (ROM)  -EF     Comment, Safety Issues/Impairments (Mobility) nausea limiting  at time of PT eval  -EF               User Key  (r) = Recorded By, (t) = Taken By, (c) = Cosigned By      Initials Name Provider Type    EF Karen Austin PT Physical Therapist                   Mobility       Row Name 08/16/24 1515          Bed Mobility    Bed Mobility supine-sit;sit-supine  -EF     Supine-Sit [place] (Bed Mobility) minimum assist (75% patient effort);verbal cues  -EF     Sit-Supine [place] (Bed Mobility) minimum assist (75% patient effort);verbal cues  -EF     Assistive Device (Bed Mobility) head of bed elevated;bed rails  -EF     Comment, (Bed Mobility) increased time required due to pain and nausea  -EF       Row Name 08/16/24 1515          Transfers    Comment, (Transfers) Transfers not tested; pt with increasing nausea and vomiting when sitting EOB which limited further activity. RN notified and pt assisted back to bed.  -EF       Row Name 08/16/24 1515          Sit-Stand Transfer    Sit-Stand [place] (Transfers) unable to assess  -EF       Row Name 08/16/24 1515          Gait/Stairs (Locomotion)    [place] Level (Gait) unable to assess  -EF     Patient was able to Ambulate no, other medical factors prevent ambulation  -EF     Reason Patient was unable to Ambulate Nausea/Vomiting  -EF               User Key  (r) = Recorded By, (t) = Taken By, (c) = Cosigned By      Initials Name Provider Type    Karen Nichols, PT Physical Therapist                   Obj/Interventions       Row Name 08/16/24 1516          Range of Motion Comprehensive    General Range of Motion lower extremity range of motion deficits identified  -EF     Comment, General Range of Motion R hip limited by pain; L LE ROM WFL  -EF       Row Name 08/16/24 1516          Strength Comprehensive (MMT)    General Manual Muscle Testing (MMT) Assessment lower extremity strength deficits identified  -EF     Comment, General Manual Muscle Testing (MMT) Assessment R hip grossly 2+/5; L LE WFL  -EF       Row  Name 08/16/24 1516          Motor Skills    Therapeutic Exercise --  THEO Protocol x 10 reps  -EF       Row Name 08/16/24 1516          Balance    Balance Assessment sitting static balance  -EF     Static Sitting Balance independent  -EF     Position, Sitting Balance unsupported;sitting edge of bed  -EF               User Key  (r) = Recorded By, (t) = Taken By, (c) = Cosigned By      Initials Name Provider Type    EF Karen Austin, PT Physical Therapist                   Goals/Plan       Row Name 08/16/24 1523          Bed Mobility Goal 1 (PT)    Activity/Assistive Device (Bed Mobility Goal 1, PT) sit to supine/supine to sit  -EF     [place] Level/Cues Needed (Bed Mobility Goal 1, PT) independent  -EF     Time Frame (Bed Mobility Goal 1, PT) 3 days;long term goal (LTG)  -EF     Progress/Outcomes (Bed Mobility Goal 1, PT) goal ongoing  -EF       Row Name 08/16/24 1523          Transfer Goal 1 (PT)    Activity/Assistive Device (Transfer Goal 1, PT) sit-to-stand/stand-to-sit;bed-to-chair/chair-to-bed;walker, rolling  -EF     [place] Level/Cues Needed (Transfer Goal 1, PT) modified independence  -EF     Time Frame (Transfer Goal 1, PT) 3 days;long term goal (LTG)  -EF     Progress/Outcome (Transfer Goal 1, PT) goal ongoing  -EF       Row Name 08/16/24 1523          Gait Training Goal 1 (PT)    Activity/Assistive Device (Gait Training Goal 1, PT) gait (walking locomotion);walker, rolling  -EF     [place] Level (Gait Training Goal 1, PT) modified independence  -EF     Time Frame (Gait Training Goal 1, PT) 3 days;long term goal (LTG)  -EF     Progress/Outcome (Gait Training Goal 1, PT) goal ongoing  -EF       Row Name 08/16/24 1523          Stairs Goal 1 (PT)    Activity/Assistive Device (Stairs Goal 1, PT) ascending stairs;descending stairs  -EF     [place] Level/Cues Needed (Stairs Goal 1, PT) contact guard required  -EF     Number of Stairs (Stairs Goal 1, PT) 1  -EF     Time Frame (Stairs Goal  1, PT) 3 days;long term goal (LTG)  -EF     Progress/Outcome (Stairs Goal 1, PT) goal ongoing  -EF       Row Name 08/16/24 1523          Therapy Assessment/Plan (PT)    Planned Therapy Interventions (PT) balance training;bed mobility training;gait training;home exercise program;patient/family education;ROM (range of motion);stair training;strengthening;transfer training  -EF               User Key  (r) = Recorded By, (t) = Taken By, (c) = Cosigned By      Initials Name Provider Type    EF Karen Austin, PT Physical Therapist                   Clinical Impression       Row Name 08/16/24 1517          Pain    Pretreatment Pain Rating 3/10  -EF     Posttreatment Pain Rating 3/10  -EF     Pain Location - Side/Orientation Right  -EF     Pain Location incisional  -EF     Pain Location - hip  -EF     Pain Intervention(s) Repositioned;Cold applied  -EF       Row Name 08/16/24 2997          Plan of Care Review    Plan of Care Reviewed With patient  -EF     Outcome Evaluation Pt is an 83 yo female who presents POD0 s/p R ant THEO demonstrating post op pain,R LE weakness, and decreased endurance limiting mobility. Prior to admission, pt was living at home with spouse and independent with all mobility. Upon exam, pt performed bed mobility with min A and completed THEO exercises with supervision. Pt became increasingly nauseated and began vomiting while sitting EOB which limited further activity. Transfers and ambulation deferred due to nausea and pt assisted back to bed. Pt will continue to benefit from PT to address impairments and increase independence with mobility. Will plan to assess ambulation and stair negotiation at next session. Pt planning to DC home tomorrow and continue with  PT. No problems anticipated.  -EF       Row Name 08/16/24 4133          Therapy Assessment/Plan (PT)    Rehab Potential (PT) good, to achieve stated therapy goals  -EF     Criteria for Skilled Interventions Met (PT) yes;meets  criteria;skilled treatment is necessary  -EF     Therapy Frequency (PT) daily  -EF       Row Name 08/16/24 1517          Positioning and Restraints    Pre-Treatment Position in bed  -EF     Post Treatment Position bed  -EF     In Bed fowlers;call light within reach;encouraged to call for assist;exit alarm on;with family/caregiver;notified nsg  -EF               User Key  (r) = Recorded By, (t) = Taken By, (c) = Cosigned By      Initials Name Provider Type    Karen Nichols, PT Physical Therapist                   Outcome Measures       Row Name 08/16/24 1524 08/16/24 1115       How much help from another person do you currently need...    Turning from your back to your side while in flat bed without using bedrails? 3  -EF 3  -ST    Moving from lying on back to sitting on the side of a flat bed without bedrails? 3  -EF 3  -ST    Moving to and from a bed to a chair (including a wheelchair)? 2  -EF 2  -ST    Standing up from a chair using your arms (e.g., wheelchair, bedside chair)? 2  -EF 2  -ST    Climbing 3-5 steps with a railing? 2  -EF 2  -ST    To walk in hospital room? 2  -EF 2  -ST    AM-PAC 6 Clicks Score (PT) 14  -EF 14  -ST    Highest Level of Mobility Goal 4 --> Transfer to chair/commode  -EF 4 --> Transfer to chair/commode  -ST              User Key  (r) = Recorded By, (t) = Taken By, (c) = Cosigned By      Initials Name Provider Type    Karen Nichols, SAKINA Physical Therapist    Kira Almaraz RN Registered Nurse                                 Physical Therapy Education       Title: PT OT SLP Therapies (In Progress)       Topic: Physical Therapy (In Progress)       Point: Mobility training (Done)       Learning Progress Summary             Patient Acceptance, E,TB, VU,NR by  at 8/16/2024 1524                         Point: Home exercise program (Done)       Learning Progress Summary             Patient Acceptance, E,TB, VU,NR by  at 8/16/2024 1524                         Point:  Body mechanics (Not Started)       Learner Progress:  Not documented in this visit.              Point: Precautions (Not Started)       Learner Progress:  Not documented in this visit.                              User Key       Initials Effective Dates Name Provider Type Discipline     05/31/24 -  Karen Austin PT Physical Therapist PT                  PT Recommendation and Plan  Planned Therapy Interventions (PT): balance training, bed mobility training, gait training, home exercise program, patient/family education, ROM (range of motion), stair training, strengthening, transfer training  Plan of Care Reviewed With: patient  Outcome Evaluation: Pt is an 85 yo female who presents POD0 s/p R ant THEO demonstrating post op pain,R LE weakness, and decreased endurance limiting mobility. Prior to admission, pt was living at home with spouse and independent with all mobility. Upon exam, pt performed bed mobility with min A and completed THEO exercises with supervision. Pt became increasingly nauseated and began vomiting while sitting EOB which limited further activity. Transfers and ambulation deferred due to nausea and pt assisted back to bed. Pt will continue to benefit from PT to address impairments and increase independence with mobility. Will plan to assess ambulation and stair negotiation at next session. Pt planning to DC home tomorrow and continue with HH PT. No problems anticipated.     Time Calculation:         PT Charges       Row Name 08/16/24 1524             Time Calculation    Start Time 1428  -EF      Stop Time 1450  -EF      Time Calculation (min) 22 min  -EF      PT Received On 08/16/24  -EF      PT - Next Appointment 08/17/24  -EF      PT Goal Re-Cert Due Date 08/19/24  -EF         Time Calculation- PT    Total Timed Code Minutes- PT 12 minute(s)  -EF         Timed Charges    60972 - PT Therapeutic Exercise Minutes 12  -EF         Total Minutes    Timed Charges Total Minutes 12  -EF       Total  Minutes 12  -EF                User Key  (r) = Recorded By, (t) = Taken By, (c) = Cosigned By      Initials Name Provider Type    EF Karen Austin, PT Physical Therapist                  Therapy Charges for Today       Code Description Service Date Service Provider Modifiers Qty    [id number]  PT THER PROC EA 15 MIN 8/16/2024 Karen Austin, PT GP 1    [id number] HC PT EVAL LOW COMPLEXITY 2 8/16/2024 Karen Austin, PT GP 1            PT G-Codes  AM-PAC 6 Clicks Score (PT): 14  PT Discharge Summary  Anticipated Discharge Disposition (PT): home with home health, home with assist    Karen Austin, PT  8/16/2024

## 2024-08-16 NOTE — ANESTHESIA POSTPROCEDURE EVALUATION
Patient: Lachelle Mcclure    Procedure Summary       Date: 08/16/24 Room / Location:  [facility] [address] [place] [address] [facility]    Anesthesia Start: 0724 Anesthesia Stop: 0922    Procedure: TOTAL HIP ARTHROPLASTY ANTERIOR WITH HANA TABLE (Right: Hip) Diagnosis:       Primary osteoarthritis of right hip      (Primary osteoarthritis of right hip [M16.11])    Surgeons: Rico Patterson MD Provider: Kang Ceron MD    Anesthesia Type: general ASA Status: 3            Anesthesia Type: general    Vitals  Vitals Value Taken Time   /62 08/16/24 1000   Temp 36.4 °C (97.5 °F) 08/16/24 1000   Pulse 85 08/16/24 1004   Resp 12 08/16/24 1000   SpO2 96 % 08/16/24 1004   Vitals shown include unfiled device data.        Post Anesthesia Care and Evaluation    Patient location during evaluation: PACU  Patient participation: complete - patient participated  Level of consciousness: awake  Pain management: adequate    Airway patency: patent  Anesthetic complications: No anesthetic complications  PONV Status: none  Cardiovascular status: acceptable  Respiratory status: acceptable  Hydration status: acceptable

## 2024-08-16 NOTE — ANESTHESIA PREPROCEDURE EVALUATION
Anesthesia Evaluation     Patient summary reviewed and Nursing notes reviewed   no history of anesthetic complications:   NPO Solid Status: > 8 hours  NPO Liquid Status: > 2 hours           Airway   Mallampati: II  TM distance: >3 FB  Neck ROM: full  Dental      Pulmonary    Cardiovascular     ECG reviewed    (+) hypertension, hyperlipidemia,  carotid artery disease      Neuro/Psych  GI/Hepatic/Renal/Endo    (+) GERD, thyroid problem hypothyroidism    Musculoskeletal     Abdominal    Substance History      OB/GYN          Other                      Anesthesia Plan    ASA 3     general     (Hx of prolonged wake up, plan for BIS monitoring )  intravenous induction     Anesthetic plan, risks, benefits, and alternatives have been provided, discussed and informed consent has been obtained with: patient.      CODE STATUS:

## 2024-08-16 NOTE — CONSULTS
CONSULT NOTE    INTERNAL MEDICINE   [facility]       Patient Identification:  Name: Lachelle Mcclure  Age: 84 y.o.  Sex: female  :  1940  MRN: [id number]             Date of Consultation: 2024      Primary Care Physician: Juan Vega MD                               Requesting Physician: Dr. Patterson  Reason for Consultation: Hypertension    Chief Complaint: Right hip pain    History of Present Illness:    is a wonderfully pleasant 84-year-old with a purely limited past medical history.  Her blood pressure is well-controlled on ARB/HCTZ.  She denies any acute coronary history and has not had any issues with fever chills or night sweats prior to admission.  She is here for an elective procedure involving her right hip due to osteoarthritis and when she is postop total hip arthroplasty per Dr. Patterson performed on 2024.  She is tolerated the procedure well and postoperatively she has been sleeping a bit much and is starting to wake up.  She was easily aroused for me on exam and conversational and pleasant.  She had a supportive spouse at bedside.  She currently is feeling fine.  When she got up after the procedure she noted some dizziness and had some nausea but was never brought to emesis and she already feels the symptoms are resolving.  Her blood pressures have been low normal ranging 113 down to 101 and I discussed my care plan with managing RN to complete the IVF bag that is hanging in there currently as it is nearly full bag of LR.  Patient denies any current chest pain palpitation cough or shortness of air.      Past Medical History:  Past Medical History:   Diagnosis Date    Benign essential hypertension 2006--treatment for hypertension begun.    Carotid artery plaque, 2022-- mild bilateral plaque without stenosis.  4/3/2019--mild bilateral.  10/06/2016--vascular screen is now normal without carotid disease.  Improved. 2010    May 9,  2022--vascular screening reveals mild carotid artery plaque bilaterally without stenosis.  Negative for AAA.  Negative for PAD.  April 3, 2019--vascular screening reveals mild bilateral carotid plaque without stenosis.  Negative for AAA.  Negative for PAD.  10/06/2016--vascular screen reveals no evidence of carotid plaque, negative for AAA, negative for PAD.  04/23/2014--vascular screen rev    Chronic right hip pain 09/01/2017 09/01/2017--patient has had a one-year history of intermittent episodes of lateral right hip pain that at times is very severe and debilitating.  She also has tenderness over this area that limits her from sleeping on her right side.  Last year I gave her a cortisone injection for trochanteric bursitis and this provided immediate relief but not long lasting.  X-rays were negative for fracture or even degenerative arthritis.  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  Ordered.    Gastroesophageal reflux disease without esophagitis 01/14/2013    04/10/2014--patient presented with right upper quadrant/epigastric burning pain and discomfort that was postprandial. H pylori breath test was negative. Empiric trial of Dexilant 60 mg per day was initiated for esophageal reflux/dyspepsia.   01/14/2013--EGD performed for epigastric and right upper quadrant pain. There was some edematous appearing mucosa in the antrum and body of the stomach which was biopsied. No significant erythema. No ulcerations. Normal appearing duodenum and esophagus. Pathology revealed moderate chronic active gastritis. No intestinal metaplasia. Helicobacter pylori positive. Patient was treated with appropriate antibiotic and PPI therapy.    Hip pain, acute, right     History of basal cell carcinoma of skin 02/12/2019 June 2018--Mohs micrographic surgery of basal cell carcinoma on the tip of the nose.    History of Helicobacter pylori gastritis 01/14/2013     04/10/2014--patient presented with right upper quadrant/epigastric burning pain and discomfort that was postprandial. H pylori breath test was negative. Empiric trial of Dexilant 60 mg per day was initiated for esophageal reflux/dyspepsia.  01/14/2013--EGD performed for epigastric and right upper quadrant pain. There was some edematous appearing mucosa in the antrum and body of the stomach which w    History of malignant melanoma, 2008--right side of face.  1999--lower back. 01/01/2008 2008--melanoma resected from right face.   1999--malignant melanoma resected from lower back.    History of routine gynecologic exam yearly    Patient sees a gynecologist.    History of thrombophlebitis, superficial, left lower extremity. 01/12/2022 January 12, 2022--patient reports a 2-week history of left lower extremity swelling which is just above the ankle and sometimes extends all the way down into the ankle and is associated with redness and tenderness. It tends to get worse towards the end of the day. She has had no shortness of breath or chest pain and there is been no significant swelling involving the upper part of either leg. On e    Hx of malignant skin melanoma     Hyperlipidemia 08/13/2010 08/13/2010--treatment for hyperlipidemia begun.    Impaired fasting glucose 09/01/2017 09/01/2017--routine physical.  Serum glucose elevated at 101.  Recommend weight loss and decrease carbohydrate intake.    Intolerance of oral bisphosphonate therapy 02/21/2020    Lumbar disc disease 10/19/2016    09/13/2016--x-ray lumbar spine reveals disc space narrowing at L2-L3, L3-L4, L4-L5, and possibly L5-S1.  There is mild anterior listhesis of L4 on L5 measuring 4.5 mm.  No compression deformity, nor other evidence of acute process.    Microscopic hematuria 12/18/2015 12/22/2015--urine cytology negative for malignant cells.  Red blood cells noted.  Transitional epithelial cells noted.  Urine culture revealed less than 10,000  colony forming units of mixed urogenital laurie.  Repeat urinalysis was negative.  Recommend observation.  12/18/2015--routine urinalysis reveals 3-5 WBCs and 3-5 RBCs. 0 epithelial cells. 0 bacteria. Patient is asymptomatic. Repeat urinalysis, urine cytology, and urine culture sent.    Osteoporosis, 3/23/2022--lumbar spine -0.9; left femoral neck -2.4; right femoral neck -2.3.  11/5/2019--lumbar spine -0.3.  Left femoral neck -2.7.  Right femoral neck -2.4. 07/24/2009 March 23, 2022--DEXA scan reveals lumbar spine T score -0.9 representing a 5.7% interval decrease.  Left femoral neck T score -2.4 which represents a 5.7% increase.  Right femoral neck T score -2.3 which is unchanged.  Impression is osteopenia at the hips with mixed interval change in density values.  November 5, 2019--DEXA scan reveals lumbar spine T score -0.3 representing a 5% increase.  Left f    Positive colorectal cancer screening using Cologuard test     Postmenopausal state 09/01/2017    Primary osteoarthritis of both hips 11/19/2021 November 19, 2021-9 being evaluated and treated by the orthopedist.  Patient may need bilateral hip replacements at some point in the future.    Subclinical hypothyroidism 02/21/2020 February 21, 2020--TSH slightly elevated at 4.41.  Free T3 and free T4 are normal.  Close observation.    Tinnitus of right ear 12/18/2015 12/18/2015--patient presents with a four-month history of ringing in her right ear. No hearing loss. Examination reveals a cerumen impaction of the right ear canal. This was removed with irrigation and curettage.    Vitamin D deficiency 08/12/2016     Past Surgical History:  Past Surgical History:   Procedure Laterality Date    ADENOIDECTOMY  1946    APPENDECTOMY      BASAL CELL CARCINOMA EXCISION  06/2018 June 2018--Mohs micrographic surgery of basal cell carcinoma on the tip of the nose.    CHOLECYSTECTOMY  05/17/2013 05/17/2013--laparoscopic cholecystectomy.    COLONOSCOPY   07/12/2012 07/12/2012--normal colonoscopy to the cecum with an excellent prep.     COLONOSCOPY  06/20/2003 06/20/2003--normal colonoscopy to the cecum.    COLONOSCOPY N/A 08/30/2023    Procedure: COLONOSCOPY INTO CECUM WITH COLD BIOPSY POLYPECTOMIES AND HOT SNARE POLYPECTOMY;  Surgeon: Ho Goyal MD;  Location: [facility] ENDOSCOPY;  Service: Gastroenterology;  Laterality: N/A;  PRE- POSITIVE COLOGUARD  POST- POLYPS, HEMORRHOIDS    ESOPHAGOSCOPY / EGD  01/14/2013 01/14/2013--EGD performed for epigastric and right upper quadrant pain. There was some edematous appearing mucosa in the antrum and body of the stomach which was biopsied. No significant erythema. No ulcerations. Normal appearing duodenum and esophagus. Pathology revealed moderate chronic active gastritis. No intestinal metaplasia. Helicobacter pylori positive. Patient was treated with appropriate    EYE SURGERY      FOOT SURGERY  1992 1992--orthopedic 10 placed in right great toe.    HYSTERECTOMY  1986 1986--total abdominal hysterectomy.    SKIN CANCER EXCISION  09/14/2009 09/14/2009--excision 1 cm mid scalp cyst. Pathology benign. 2008--melanoma resected from right face. 1999--malignant melanoma resected from lower back.    TONSILLECTOMY AND ADENOIDECTOMY  1946 1946.      Home Meds:  Medications Prior to Admission   Medication Sig Dispense Refill Last Dose    Calcium Carb-Cholecalciferol (CALCIUM 600 + D PO) Take 1 tablet by mouth 2 (Two) Times a Day.   8/15/2024    Cholecalciferol (VITAMIN D) 2000 UNITS capsule Take 1 capsule by mouth Daily.   8/15/2024    multivitamin with minerals (MULTIVITAMIN ADULT PO) Take 1 tablet by mouth Daily.   8/15/2024    simvastatin (ZOCOR) 40 MG tablet TAKE 1 TABLET EVERY DAY FOR HIGH CHOLESTEROL (NEED MD APPOINTMENT) 90 tablet 1 8/15/2024    valsartan-hydrochlorothiazide (DIOVAN-HCT) 160-25 MG per tablet TAKE 1 TABLET EVERY DAY FOR BLOOD PRESSURE 90 tablet 1 8/15/2024     Current Meds:     Current  Facility-Administered Medications:     acetaminophen (TYLENOL) tablet 1,000 mg, 1,000 mg, Oral, Q8H, Rico Patterson MD    [START ON 8/17/2024] aspirin EC tablet 81 mg, 81 mg, Oral, Q12H, Rico Patterson MD    [START ON 8/17/2024] bisacodyl (DULCOLAX) EC tablet 10 mg, 10 mg, Oral, Daily PRN, Rico Patterson MD    ceFAZolin 2000 mg IVPB in 100 mL NS (MBP), 2,000 mg, Intravenous, Q8H, Rico Patterson MD    docusate sodium (COLACE) capsule 100 mg, 100 mg, Oral, BID, Rico Patterson MD, 100 mg at 08/16/24 1244    HYDROcodone-acetaminophen (NORCO) 5-325 MG per tablet 1 tablet, 1 tablet, Oral, Q4H PRN, Rico Patterson MD    HYDROcodone-acetaminophen (NORCO) 7.5-325 MG per tablet 1 tablet, 1 tablet, Oral, Q4H PRN, Rico Patterson MD    HYDROmorphone (DILAUDID) injection 0.5 mg, 0.5 mg, Intravenous, Q2H PRN **AND** naloxone (NARCAN) injection 0.1 mg, 0.1 mg, Intravenous, Q5 Min PRN, Rico Patterson MD    ondansetron ODT (ZOFRAN-ODT) disintegrating tablet 4 mg, 4 mg, Oral, Q6H PRN **OR** ondansetron (ZOFRAN) injection 4 mg, 4 mg, Intravenous, Q6H PRN, Rico Patterson MD    sodium chloride 0.9 % infusion, 100 mL/hr, Intravenous, Continuous, Rico Patterson MD, Last Rate: 100 mL/hr at 08/16/24 1243, 100 mL/hr at 08/16/24 1243  Allergies:  No Known Allergies  Social History:   Social History     Socioeconomic History    Marital status:     Highest education level: Bachelor's degree (e.g., BA, AB, BS)   Tobacco Use    Smoking status: Never    Smokeless tobacco: Never   Vaping Use    Vaping status: Never Used   Substance and Sexual Activity    Alcohol use: Never    Drug use: Never    Sexual activity: Yes     Partners: Male     Family History:  Family History   Problem Relation Age of Onset    COPD Mother     Hypertension Mother         Essential    Arthritis Mother     Hearing loss Mother     Hyperlipidemia Mother     COPD Father      Hypertension Father     Arthritis Father     Hyperlipidemia Father     Malig Hyperthermia Neg Hx           Review of Systems  See history of present illness and past medical history.  Patient denies fever chills.  Admits to some nausea but denies any emesis.  Denies any abdominal pain diarrhea.  Denies any chest pain palpitation cough shortness of breath.  No issues with confusion.  Remainder of ROS is negative.      Vitals:   /66 (BP Location: Right arm, Patient Position: Lying)   Pulse 75   Temp 97.5 °F (36.4 °C) (Oral)   Resp 16   SpO2 96%   I/O:   Intake/Output Summary (Last 24 hours) at 8/16/2024 1330  Last data filed at 8/16/2024 1000  Gross per 24 hour   Intake 1250 ml   Output 200 ml   Net 1050 ml     Exam:  General Appearance:  Resting soundly though easily awakened and then conversational, nontoxic in appearance, fluent speech, oriented x 3, supportive spouse at bedside   Head:    Normocephalic, without obvious abnormality, atraumatic       Ears:    Normal external ear canals, both ears   Nose:   Nares normal, septum midline, mucosa normal, no drainage    or sinus tenderness   Throat: Mucous membranes a bit dry   Neck:   Supple, no JVD       Lungs:     Clear to auscultation bilaterally, respirations unlabored        Heart:    Regular rate and rhythm, S1 and S2 normal   Abdomen:     Soft, nontender, bowel sounds active all four quadrants   Extremities: Postoperative right hip, no cyanosis or edema   Pulses:   Pulses palpable in lower extremities; symmetric all extremities       Neurologic:   CNII-XII intact, no focal deficits noted       Data Review:  Labs in chart were reviewed.            Imaging Results (Last 7 Days)       Procedure Component Value Units Date/Time    XR Hip With or Without Pelvis 1 View Right [[id number]] Collected: 08/16/24 0951     Updated: 08/16/24 0954    Narrative:      ONE-VIEW PORTABLE AP PELVIS     HISTORY: Right hip replacement for osteoarthritis     FINDINGS: The  patient has had recent total hip replacement and the  alignment appears satisfactory.     This report was finalized on 8/16/2024 9:51 AM by Dr. Myles Little M.D  on Workstation: [id number]       FL C Arm During Surgery [[id number]] Resulted: 08/16/24 0921     Updated: 08/16/24 0921    Narrative:      This procedure was auto-finalized with no dictation required.          Past Medical History:   Diagnosis Date    Benign essential hypertension 04/18/2006 04/18/2006--treatment for hypertension begun.    Carotid artery plaque, 5/9/2022-- mild bilateral plaque without stenosis.  4/3/2019--mild bilateral.  10/06/2016--vascular screen is now normal without carotid disease.  Improved. 07/30/2010    May 9, 2022--vascular screening reveals mild carotid artery plaque bilaterally without stenosis.  Negative for AAA.  Negative for PAD.  April 3, 2019--vascular screening reveals mild bilateral carotid plaque without stenosis.  Negative for AAA.  Negative for PAD.  10/06/2016--vascular screen reveals no evidence of carotid plaque, negative for AAA, negative for PAD.  04/23/2014--vascular screen rev    Chronic right hip pain 09/01/2017 09/01/2017--patient has had a one-year history of intermittent episodes of lateral right hip pain that at times is very severe and debilitating.  She also has tenderness over this area that limits her from sleeping on her right side.  Last year I gave her a cortisone injection for trochanteric bursitis and this provided immediate relief but not long lasting.  X-rays were negative for fracture or even degenerative arthritis.  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  Ordered.    Gastroesophageal reflux disease without esophagitis 01/14/2013    04/10/2014--patient presented with right upper quadrant/epigastric burning pain and discomfort that was postprandial. H pylori breath test was negative. Empiric trial of Dexilant 60 mg per day  was initiated for esophageal reflux/dyspepsia.   01/14/2013--EGD performed for epigastric and right upper quadrant pain. There was some edematous appearing mucosa in the antrum and body of the stomach which was biopsied. No significant erythema. No ulcerations. Normal appearing duodenum and esophagus. Pathology revealed moderate chronic active gastritis. No intestinal metaplasia. Helicobacter pylori positive. Patient was treated with appropriate antibiotic and PPI therapy.    Hip pain, acute, right     History of basal cell carcinoma of skin 02/12/2019 June 2018--Mohs micrographic surgery of basal cell carcinoma on the tip of the nose.    History of Helicobacter pylori gastritis 01/14/2013    04/10/2014--patient presented with right upper quadrant/epigastric burning pain and discomfort that was postprandial. H pylori breath test was negative. Empiric trial of Dexilant 60 mg per day was initiated for esophageal reflux/dyspepsia.  01/14/2013--EGD performed for epigastric and right upper quadrant pain. There was some edematous appearing mucosa in the antrum and body of the stomach which w    History of malignant melanoma, 2008--right side of face.  1999--lower back. 01/01/2008 2008--melanoma resected from right face.   1999--malignant melanoma resected from lower back.    History of routine gynecologic exam yearly    Patient sees a gynecologist.    History of thrombophlebitis, superficial, left lower extremity. 01/12/2022 January 12, 2022--patient reports a 2-week history of left lower extremity swelling which is just above the ankle and sometimes extends all the way down into the ankle and is associated with redness and tenderness. It tends to get worse towards the end of the day. She has had no shortness of breath or chest pain and there is been no significant swelling involving the upper part of either leg. On e    Hx of malignant skin melanoma     Hyperlipidemia 08/13/2010 08/13/2010--treatment for  hyperlipidemia begun.    Impaired fasting glucose 09/01/2017 09/01/2017--routine physical.  Serum glucose elevated at 101.  Recommend weight loss and decrease carbohydrate intake.    Intolerance of oral bisphosphonate therapy 02/21/2020    Lumbar disc disease 10/19/2016    09/13/2016--x-ray lumbar spine reveals disc space narrowing at L2-L3, L3-L4, L4-L5, and possibly L5-S1.  There is mild anterior listhesis of L4 on L5 measuring 4.5 mm.  No compression deformity, nor other evidence of acute process.    Microscopic hematuria 12/18/2015 12/22/2015--urine cytology negative for malignant cells.  Red blood cells noted.  Transitional epithelial cells noted.  Urine culture revealed less than 10,000 colony forming units of mixed urogenital laurie.  Repeat urinalysis was negative.  Recommend observation.  12/18/2015--routine urinalysis reveals 3-5 WBCs and 3-5 RBCs. 0 epithelial cells. 0 bacteria. Patient is asymptomatic. Repeat urinalysis, urine cytology, and urine culture sent.    Osteoporosis, 3/23/2022--lumbar spine -0.9; left femoral neck -2.4; right femoral neck -2.3.  11/5/2019--lumbar spine -0.3.  Left femoral neck -2.7.  Right femoral neck -2.4. 07/24/2009 March 23, 2022--DEXA scan reveals lumbar spine T score -0.9 representing a 5.7% interval decrease.  Left femoral neck T score -2.4 which represents a 5.7% increase.  Right femoral neck T score -2.3 which is unchanged.  Impression is osteopenia at the hips with mixed interval change in density values.  November 5, 2019--DEXA scan reveals lumbar spine T score -0.3 representing a 5% increase.  Left f    Positive colorectal cancer screening using Cologuard test     Postmenopausal state 09/01/2017    Primary osteoarthritis of both hips 11/19/2021 November 19, 2021-9 being evaluated and treated by the orthopedist.  Patient may need bilateral hip replacements at some point in the future.    Subclinical hypothyroidism 02/21/2020 February 21, 2020--TSH  slightly elevated at 4.41.  Free T3 and free T4 are normal.  Close observation.    Tinnitus of right ear 12/18/2015 12/18/2015--patient presents with a four-month history of ringing in her right ear. No hearing loss. Examination reveals a cerumen impaction of the right ear canal. This was removed with irrigation and curettage.    Vitamin D deficiency 08/12/2016       Assessment:  Active Hospital Problems    Diagnosis  POA    Status post total hip replacement, right [Z96.641]  Not Applicable      Resolved Hospital Problems    Diagnosis Date Resolved POA    **Primary osteoarthritis of right hip [M16.11] 08/16/2024 Yes       Plan:    Hypertension normally controlled but in a postoperative state with current level of 101/66   -Hold blood pressure medications including ARB/HCTZ   -Increase IVF rate to 125 cc/h      Status post right total hip arthroplasty secondary to primary arthritis of the right hip   -DVT prophylaxis per orthopedics -aspirin noted   -Anticipate some acute blood loss anemia with Hgb/CBC pending in a.m.   -Postoperative pain management per Ortho    HLD on statin    Vitamin D deficiency on supplementation outpatient          Thank you very much Dr. Patterson for allowing [facility] to participate in the care of your patient.  Will happy to follow and give medical recommendations accordingly.    Melvin Bolanos MD   8/16/2024  13:30 EDT

## 2024-08-16 NOTE — H&P
BRENDAN Mcclure 83 y.o. female presents today for follow-up of her right hip pain.   She continues to have worsening pain in the right hip.  She is known to me from her previous evaluations and was diagnosed with a right hip osteoarthritis.  This was confirmed with an MRI of the right hip.  She was recommended a right total hip replacement and she is here in the office for a preoperative discussion and to schedule her surgery.    She continues to experience pain in the right groin area. She has tried tramadol but is having difficulty tolerating it. She was also given meloxicam 7.5 mg. She does not feel significant relief. She continues to have difficulty with activities of daily living. She normally likes to walk and this is becoming difficult for her especially towards the later part of the day. She has difficulty going down stairs to get her laundry. She ambulates without assistive device. She has difficulty lying down on her right side. She has been noticing increasing pain mostly in the right groin area and also on the lateral aspect of her right hip. She has been managed for lower lumbar canal stenosis and lumbar spondylosis with epidural injections by Dr. Townsend for the past 3-1/2 years. She did feel good relief with the epidural injections. She also received a hip injection on the lateral aspect of her hip and this gave her relief for about 2 weeks. She states that she is able to reach down to tie her shoes and socks on but has difficulty getting in and out of the car due to the right hip pain. Also she has occasional feeling of sudden giving away due to sharp pains in the right hip area.  She complains of a limp.  Denies any night pain.     She is otherwise healthy.     Denies any history of DVT, cardiac problems. No history of diabetes mellitus.     Medications:  Current Outpatient Medications on File Prior to Visit   Medication Sig Dispense Refill    calcium carbonate (Calcium 600) 600 MG tablet 600 mg  "twice a day by oral route.      cholecalciferol (D3) 50 MCG (2000 UT) tablet 1 capsule every day by oral route.      gabapentin (Neurontin) 400 MG capsule       meloxicam (Mobic) 7.5 MG tablet Take 1 tablet by mouth 1 (one) time each day.      mupirocin (Bactroban) 2 % ointment APPLY TOPICALLY TO THE AFFECTED AREA THREE TIMES DAILY AS DIRECTED      pregabalin (Lyrica) 50 MG capsule Take 1 capsule by mouth 2 (two) times a day.      simvastatin (Zocor) 40 MG tablet       traMADol (Ultram) 50 MG tablet TAKE 1/2 TO 1 TABLET BY MOUTH TWICE DAILY AS NEEDED      valsartan-hydroCHLOROthiazide (Diovan-HCT) 160-25 MG tablet        No current facility-administered medications on file prior to visit.       Allergies:  No Known Allergies    Social Hx:  Social History     Substance and Sexual Activity   Alcohol Use None     Social History     Tobacco Use   Smoking Status Never   Smokeless Tobacco Never       Surgical Hx  History reviewed. No pertinent surgical history.    Past Med Hx  Past Medical History:   Diagnosis Date    Hypertension          Vitals  Visit Vitals  Ht 5' 4\" (1.626 m)   Wt 150 lb (68 kg)   BMI 25.75 kg/m²   Smoking Status Never   BSA 1.75 m²         Review of Systems:  12 systems are reviewed and are negative other than what is stated in HPI    Physical Exam:    General Appearance:Patient is awake, alert, and oriented and appears in no acute distress  Head:Atraumatic, normocephalic  Eyes:EOMs are intact  Lungs:Patient is in no acute respiratory distress  Neurologic:No acute neurological deficits or lateralizing findings  Peripheral Pulses:No sign of acute vascular compromise  Skin:No sign of acute infection    Musculoskeletal:     GAIT - antalgic, Assistive Device none    Right HIP  Active ROM Internal Rotation: <30 External Rotation: <40 Abduction: <45 Adduction: <15 Flexion: <100 Extension: <5 Tenderness   Location: groin   Radiation of Pain: anterior thigh.   Pain w/ Palpation: none   Thomas Test: negative "   Impingement: negative   Straight Leg Raise: negative   Strength Quad: normal Abduction: normal Adduction: normal Flexion: normal Extension: normal   Positive Stinchfield sign.   Positive Trendelenburg sign.   Attempted movements of the hip are painful and restricted  Neurovascular status is intact. Sensation in hip is normal. DP Pulse is 3+. PT PULSE is 3+. Capillary Refill is normal. Reflexes are normal.       Imaging:      No new x-rays obtained today in the office.    Assessment:    1. Primary osteoarthritis of right hip         Treatment Plan:       I have reviewed her x-rays from previous office visit. Confirms presence of moderate narrowing of the joint space associated with early femoral head osteophyte formation.    I have reviewed the MRI films and report dated April 30, 2024. Evidence of mild osteoarthritis of the right hip. There is also evidence of degenerative labral involvement. She has a small right hip effusion.    Continue meloxicam    Options and alternatives were discussed in detail with the patient.    The patient has reached the point of disability and failed nonoperative management.    The patient is indicated for a right total hip replacement .    The likely Risks and benefits of the procedure including but not limited to infection, DVT, pulmonary embolism, recurrent dislocation, Leg length discrepancy, periprosthetic fractures, possibility of injury to nerves or vessels, tendons, possibility of morbidity and mortality likely medical risks for stroke and heart attack, have been discussed in detail. Despite the risks involved the patient would like to proceed.     The patient is being scheduled for a right total HIP arthroplasty  by DIRECT ANTERIOR APPROACH at [facility] on   August 16,2024.    I will request for Medical and cardiac clearance from Juan Vega MD  .    Postoperative DVT prophylaxis - Patient has no high risk factors Plan for ASPIRIN     Preoperative antibiotic  prophylaxis - Plan for SCIP protocol with CEFAZOLIN weight based.     Surgery will be scheduled for 23-hour observation.

## 2024-08-16 NOTE — PLAN OF CARE
Goal Outcome Evaluation:  Plan of Care Reviewed With: patient           Outcome Evaluation: Pt is an 85 yo female who presents POD0 s/p R ant THEO demonstrating post op pain,R LE weakness, and decreased endurance limiting mobility. Prior to admission, pt was living at home with spouse and independent with all mobility. Upon exam, pt performed bed mobility with min A and completed THEO exercises with supervision. Pt became increasingly nauseated and began vomiting while sitting EOB which limited further activity. Transfers and ambulation deferred due to nausea and pt assisted back to bed. Pt will continue to benefit from PT to address impairments and increase independence with mobility. Will plan to assess ambulation and stair negotiation at next session. Pt planning to DC home tomorrow and continue with  PT. No problems anticipated.      Anticipated Discharge Disposition (PT): home with home health, home with assist

## 2024-08-17 ENCOUNTER — DOCUMENTATION (OUTPATIENT)
Dept: HOME HEALTH SERVICES | Facility: HOME HEALTHCARE | Age: 84
End: 2024-08-17
Payer: COMMERCIAL

## 2024-08-17 ENCOUNTER — READMISSION MANAGEMENT (OUTPATIENT)
Dept: CALL CENTER | Facility: HOSPITAL | Age: 84
End: 2024-08-17
Payer: MEDICARE

## 2024-08-17 ENCOUNTER — HOME HEALTH ADMISSION (OUTPATIENT)
Dept: HOME HEALTH SERVICES | Facility: HOME HEALTHCARE | Age: 84
End: 2024-08-17
Payer: COMMERCIAL

## 2024-08-17 VITALS
TEMPERATURE: 97.5 F | HEIGHT: 63 IN | RESPIRATION RATE: 16 BRPM | DIASTOLIC BLOOD PRESSURE: 66 MMHG | HEART RATE: 74 BPM | WEIGHT: 158.73 LBS | SYSTOLIC BLOOD PRESSURE: 109 MMHG | OXYGEN SATURATION: 92 % | BODY MASS INDEX: 28.12 KG/M2

## 2024-08-17 LAB
ANION GAP SERPL CALCULATED.3IONS-SCNC: 6.8 MMOL/L (ref 5–15)
BASOPHILS # BLD AUTO: 0.01 10*3/MM3 (ref 0–0.2)
BASOPHILS NFR BLD AUTO: 0.1 % (ref 0–1.5)
BUN SERPL-MCNC: 9 MG/DL (ref 8–23)
BUN/CREAT SERPL: 15.5 (ref 7–25)
CALCIUM SPEC-SCNC: 9.5 MG/DL (ref 8.6–10.5)
CHLORIDE SERPL-SCNC: 108 MMOL/L (ref 98–107)
CO2 SERPL-SCNC: 26.2 MMOL/L (ref 22–29)
CREAT SERPL-MCNC: 0.58 MG/DL (ref 0.57–1)
DEPRECATED RDW RBC AUTO: 40.6 FL (ref 37–54)
EGFRCR SERPLBLD CKD-EPI 2021: 90.5 ML/MIN/1.73
EOSINOPHIL # BLD AUTO: 0 10*3/MM3 (ref 0–0.4)
EOSINOPHIL NFR BLD AUTO: 0 % (ref 0.3–6.2)
ERYTHROCYTE [DISTWIDTH] IN BLOOD BY AUTOMATED COUNT: 12.3 % (ref 12.3–15.4)
GLUCOSE SERPL-MCNC: 123 MG/DL (ref 65–99)
HCT VFR BLD AUTO: 35.7 % (ref 34–46.6)
HGB BLD-MCNC: 11.6 G/DL (ref 12–15.9)
IMM GRANULOCYTES # BLD AUTO: 0.05 10*3/MM3 (ref 0–0.05)
IMM GRANULOCYTES NFR BLD AUTO: 0.5 % (ref 0–0.5)
LYMPHOCYTES # BLD AUTO: 0.53 10*3/MM3 (ref 0.7–3.1)
LYMPHOCYTES NFR BLD AUTO: 4.8 % (ref 19.6–45.3)
MCH RBC QN AUTO: 29.8 PG (ref 26.6–33)
MCHC RBC AUTO-ENTMCNC: 32.5 G/DL (ref 31.5–35.7)
MCV RBC AUTO: 91.8 FL (ref 79–97)
MONOCYTES # BLD AUTO: 0.95 10*3/MM3 (ref 0.1–0.9)
MONOCYTES NFR BLD AUTO: 8.6 % (ref 5–12)
NEUTROPHILS NFR BLD AUTO: 86 % (ref 42.7–76)
NEUTROPHILS NFR BLD AUTO: 9.51 10*3/MM3 (ref 1.7–7)
NRBC BLD AUTO-RTO: 0 /100 WBC (ref 0–0.2)
PLATELET # BLD AUTO: 152 10*3/MM3 (ref 140–450)
PMV BLD AUTO: 9.8 FL (ref 6–12)
POTASSIUM SERPL-SCNC: 4.1 MMOL/L (ref 3.5–5.2)
RBC # BLD AUTO: 3.89 10*6/MM3 (ref 3.77–5.28)
SODIUM SERPL-SCNC: 141 MMOL/L (ref 136–145)
WBC NRBC COR # BLD AUTO: 11.05 10*3/MM3 (ref 3.4–10.8)

## 2024-08-17 PROCEDURE — A9270 NON-COVERED ITEM OR SERVICE: HCPCS | Performed by: ORTHOPAEDIC SURGERY

## 2024-08-17 PROCEDURE — 97530 THERAPEUTIC ACTIVITIES: CPT

## 2024-08-17 PROCEDURE — 97110 THERAPEUTIC EXERCISES: CPT

## 2024-08-17 PROCEDURE — 63710000001 ACETAMINOPHEN EXTRA STRENGTH 500 MG TABLET: Performed by: ORTHOPAEDIC SURGERY

## 2024-08-17 PROCEDURE — 80048 BASIC METABOLIC PNL TOTAL CA: CPT | Performed by: ORTHOPAEDIC SURGERY

## 2024-08-17 PROCEDURE — G0378 HOSPITAL OBSERVATION PER HR: HCPCS

## 2024-08-17 PROCEDURE — 63710000001 DOCUSATE SODIUM 100 MG CAPSULE: Performed by: ORTHOPAEDIC SURGERY

## 2024-08-17 PROCEDURE — 85025 COMPLETE CBC W/AUTO DIFF WBC: CPT | Performed by: ORTHOPAEDIC SURGERY

## 2024-08-17 PROCEDURE — 63710000001 ASPIRIN 81 MG TABLET DELAYED-RELEASE: Performed by: ORTHOPAEDIC SURGERY

## 2024-08-17 RX ORDER — ASPIRIN 81 MG/1
81 TABLET ORAL EVERY 12 HOURS SCHEDULED
Qty: 60 TABLET | Refills: 0 | Status: SHIPPED | OUTPATIENT
Start: 2024-08-17

## 2024-08-17 RX ORDER — BISACODYL 5 MG/1
10 TABLET, DELAYED RELEASE ORAL DAILY PRN
Qty: 10 TABLET | Refills: 0 | Status: SHIPPED | OUTPATIENT
Start: 2024-08-17

## 2024-08-17 RX ORDER — HYDROCODONE BITARTRATE AND ACETAMINOPHEN 5; 325 MG/1; MG/1
1 TABLET ORAL EVERY 6 HOURS PRN
Qty: 18 TABLET | Refills: 0 | Status: SHIPPED | OUTPATIENT
Start: 2024-08-17 | End: 2024-08-22

## 2024-08-17 RX ORDER — PSEUDOEPHEDRINE HCL 30 MG
100 TABLET ORAL 2 TIMES DAILY
Qty: 31 CAPSULE | Refills: 0 | Status: SHIPPED | OUTPATIENT
Start: 2024-08-17 | End: 2024-09-02

## 2024-08-17 RX ORDER — ONDANSETRON 4 MG/1
4 TABLET, ORALLY DISINTEGRATING ORAL EVERY 6 HOURS PRN
Qty: 30 TABLET | Refills: 0 | Status: SHIPPED | OUTPATIENT
Start: 2024-08-17 | End: 2024-08-27

## 2024-08-17 RX ADMIN — ASPIRIN 81 MG: 81 TABLET, COATED ORAL at 08:07

## 2024-08-17 RX ADMIN — DOCUSATE SODIUM 100 MG: 100 CAPSULE, LIQUID FILLED ORAL at 08:07

## 2024-08-17 RX ADMIN — ACETAMINOPHEN 1000 MG: 500 TABLET ORAL at 07:01

## 2024-08-17 NOTE — PLAN OF CARE
Goal Outcome Evaluation:POD0 Anterior right total hip, VSS,a febrile, no c/o pain voiced. Pt very sleepy upon admit to unit, ambulated from stretcher to bed with walker and assist x2 staff, barely able to keep eyes open. Anticipate pt will be able to do more tomorrow.

## 2024-08-17 NOTE — PROGRESS NOTES
Patient is discharging today , Spoke to patient who is agreeable to home health and has no current home health . Face sheet information is correct and prefers we please call home number - [phone].Orders for home health PT in [facility]. Thank you!

## 2024-08-17 NOTE — OUTREACH NOTE
Prep Survey      Flowsheet Row Responses   [facility] patient discharged from? [place]   Is LACE score < 7 ? Yes   Eligibility [facility]   Date of Admission 08/16/24   Date of Discharge 08/17/24   Discharge Disposition Home-Health Care [facility]   Discharge diagnosis Primary osteoarthritis of right hip  [TOTAL HIP ARTHROPLASTY ANTERIOR WITH HANA TABLE]   Does the patient have one of the following disease processes/diagnoses(primary or secondary)? Total Joint Replacement   Does the patient have Home health ordered? Yes   What is the Home health agency?  [facility]   Is there a DME ordered? No   Medication alerts for this patient see AVS   Prep survey completed? Yes            Tania BARR - Registered Nurse

## 2024-08-17 NOTE — DISCHARGE INSTRUCTIONS
Discharge and Follow up Instructions:      I. ACTIVITIES:  1. Exercises:  Complete exercise program as taught by the hospital physical therapist 2 times per day  Exercise program will be advanced by the physical therapist  During the day be up ambulating every 2 hours (while awake) for short distances  Complete the ankle pump exercises at least 10 times per hour (while awake)  Elevate legs when in bed and for at least 30 minutes during the day.Use cold packs 20-30 minutes approximately 5 times per day. This should be done before and after completing your exercises and at any time you are experiencing pain/ stiffness in your operative extremity.        2. Activities of Daily Living:  No tub baths, hot tubs, or swimming pools for 4 weeks  May shower and let water run over the incision on post-operative day #5 if no drainage. Do not scrub or rub the incision. Simply let the water run over the incision and pat dry.     II. Restrictions  Continue  Anterior hip precautions as taught at the hospital  Your surgeon will discuss with you when you will be able to drive again. Usual guidelines are you are to be off pain medications prior to driving.  Weight bearing is as tolerated  First week stay inside on even terrain. May go up and down stairs one stair at a time utilizing the hand rail once cleared by physical therapy to do so.  After one week, you may venture outside (if cleared to do so by physical therapist).     III. Precautions:  Everyone that comes near you should wash their hands  No elective dental, genital-urinary, or colon procedures or surgical procedures for 12 weeks after surgery unless absolutely necessary.   If dental work or surgical procedure is deemed absolutely necessary, you will need to contact your surgeon as you will need to take antibiotics 1 hour prior to any dental work (including teeth cleanings).  Please discuss with your surgeon prophylactic antibiotics as the length of time this intervention  will be necessary for you varies with each patient’s health history and situation.  Avoid sick people. If you must be around someone who is ill, they should wear a mask.  Avoid visits to the Emergency Room or Urgent Care. If you feel you need to go to the Emergency Room, please notify your Surgeon's office.  Stockings are to be worn for one week after surgery and are to be placed on in the morning and removed at night. Observe your skin when stocking is removed for any problems. Monitor the stockings to ensure that any swelling is not causing the stockings to become too tight. In this case, remove stockings immediately.        IV. INCISION CARE:  Wash your hands prior to dressing changes  Change the dressing as needed to keep incision clean and dry. Utilize dry gauze and paper tape. Avoid touching the side of the gauze that goes against the incision with your hands.  No creams or ointments to the incision  May remove dressing once the incision is free of drainage  Do not touch or pick at the incision  Check incision every day and notify surgeon immediately if any of the following signs or symptoms are noted:  Increase in redness  Increase in swelling around the incision and of the entire extremity  Increase in pain  Drainage oozing from the incision  Pulling apart of the edges of the incision  Increase in overall body temperature (greater than 100.5 degrees)      You have absorbable sutures with steristrips, please do not remove the          steristrips for 14 days, you can shower on them 6 days after surgery.                   V. Medications:   1. Anticoagulants: You will be discharged on an anticoagulant. This is a prophylactic medication that helps prevent blood clots during your post-operative period.  You will be on Aspirin  81 mg twice daily for 30 days. If you were on Aspirin 81 mg prior to surgery you can go back to home dose once the 30 days are completed.      While taking the anticoagulant, you should  avoid taking any additional aspirin, ibuprofen (Advil or Motrin), Aleve (Naprosyn) or other non-steroidal anti-inflammatory medications.   Notify surgeon immediately if any zully bleeding is noted in the urine, stool, emesis, or from the nose or the incision. Blood in the stool will often appear as black rather than red. Blood in urine may appear as pink. Blood in emesis may appear as brown/black like coffee grounds.  You will need to apply pressure for longer periods of time to any cuts or abrasions to stop bleeding  Avoid alcohol while taking anticoagulants     2. Stool Softeners: You will be at greater risk of constipation after surgery due to being less mobile and the pain medications.   Take stool softeners as instructed by your surgeon while on pain medications. Over the counter Colace 100 mg 1-2 capsules twice daily.   If stools become too loose or too frequent, please decreases the dosage or stop the stool softener.  If constipation occurs despite use of stool softeners, you are to continue the stool softeners and add a laxative (Milk of Magnesia 1 ounce daily as needed).  Dulcolax oral tabs or suppository, or a fleets enema can also be utilized for constipation and can be obtained over the counter.   If above interventions are unsuccessful in inducing bowel movements, please contact your surgeon's office / family physician's office.  Drink plenty of fluids, and eat fruits and vegetables during your recovery time     3. Pain Medications utilized after surgery are narcotics and the law requires that the following information be given to all patients that are prescribed narcotics:  CLASSIFICATION: Pain medications are called Opioids and are narcotics  LEGALITIES: It is illegal to share narcotics with others and to drive within 24 hours of taking narcotics  POTENTIAL SIDE EFFECTS: Potential side effects of opioids include: nausea, vomiting, itching, dizziness, drowsiness, dry mouth, constipation, and difficulty  urinating.  POTENTIAL ADVERSE EFFECTS:   Opioid tolerance can develop with use of pain medications and this simply means that it requires more and more of the medication to control pain; however, this is seen more in patients that use opioids for longer periods of time.  Opioid dependence can develop with use of Opioids and this simply means that to stop the medication can cause withdrawal symptoms; however, this is seen with patients that use Opioids for longer periods of time.  Opioid addiction can develop with use of Opioids and the incidence of this is very unlikely in patients who take the medications as ordered and stop the medications as instructed.  Opioid overdose can be dangerous, but is unlikely when the medication is taken as ordered and stopped when ordered. It is important not to mix opioids with alcohol or with and type of sedative such as Benadryl as this can lead to over sedation and respiratory difficulty.  DOSAGE:   Pain medications will need to be taken consistently for the first week to decrease pain and promote adequate pain relief and participation in physical therapy.  After the initial surgical pain begins to resolve, you may begin to decrease the pain medication. By the end of 6 weeks, you should be off of pain medications.  Refills will not be given by the office during evening hours, on weekends, or after 6 weeks post-op.  To seek refills on pain medications during the initial 6 week post-operative period, you must call the office 48 hours in advance to request the refill. The office will then notify you when to  the prescription. DO NOT wait until you are out of the medication to request a refill.     V. FOLLOW-UP VISITS:  You will need to follow up in the office with your surgeon in 3 weeks. Please call this number [phone]  to schedule this appointment.  If you have any concerns or suspected complications prior to your follow up visit, please call your surgeons office. Do  not wait until your appointment time if you suspect complications. These will need to be addressed in the office promptly.

## 2024-08-17 NOTE — DISCHARGE SUMMARY
Orthopedic Discharge Summary      Patient: Lachelle Mcclure    YOB: 1940    Medical Record Number: [id number]    Attending Physician: Rico Patterson,*    Consulting Physician(s):   Consulting Physician(s)         Provider   Role Specialty     Melivn Bolanos MD      Consulting Physician Hospitalist            Date of Admission: 8/16/2024  5:20 AM  Date of Discharge:     Admitting Diagnosis: Primary osteoarthritis of right hip [M16.11]    Procedure(s):  TOTAL HIP ARTHROPLASTY ANTERIOR WITH HANA TABLE      Status post total hip replacement, right       No Known Allergies       Discharge Medications        New Medications        Instructions Start Date   aspirin 81 MG EC tablet   81 mg, Oral, Every 12 Hours Scheduled      bisacodyl 5 MG EC tablet  Commonly known as: DULCOLAX   10 mg, Oral, Daily PRN      docusate sodium 100 MG capsule   100 mg, Oral, 2 Times Daily      HYDROcodone-acetaminophen 5-325 MG per tablet  Commonly known as: NORCO   1 tablet, Oral, Every 6 Hours PRN      ondansetron ODT 4 MG disintegrating tablet  Commonly known as: ZOFRAN-ODT   4 mg, Oral, Every 6 Hours PRN             Continue These Medications        Instructions Start Date   CALCIUM 600 + D PO   1 tablet, Oral, 2 Times Daily      multivitamin with minerals tablet tablet   1 tablet, Oral, Daily      simvastatin 40 MG tablet  Commonly known as: ZOCOR   TAKE 1 TABLET EVERY DAY FOR HIGH CHOLESTEROL (NEED MD APPOINTMENT)      valsartan-hydrochlorothiazide 160-25 MG per tablet  Commonly known as: DIOVAN-HCT   TAKE 1 TABLET EVERY DAY FOR BLOOD PRESSURE      Vitamin D 50 MCG (2000 UT) capsule   1 capsule, Oral, Daily                    Past Medical History:   Diagnosis Date    Benign essential hypertension 04/18/2006 04/18/2006--treatment for hypertension begun.    Carotid artery plaque, 5/9/2022-- mild bilateral plaque without stenosis.  4/3/2019--mild bilateral.  10/06/2016--vascular screen is now normal without  carotid disease.  Improved. 07/30/2010    May 9, 2022--vascular screening reveals mild carotid artery plaque bilaterally without stenosis.  Negative for AAA.  Negative for PAD.  April 3, 2019--vascular screening reveals mild bilateral carotid plaque without stenosis.  Negative for AAA.  Negative for PAD.  10/06/2016--vascular screen reveals no evidence of carotid plaque, negative for AAA, negative for PAD.  04/23/2014--vascular screen rev    Chronic right hip pain 09/01/2017 09/01/2017--patient has had a one-year history of intermittent episodes of lateral right hip pain that at times is very severe and debilitating.  She also has tenderness over this area that limits her from sleeping on her right side.  Last year I gave her a cortisone injection for trochanteric bursitis and this provided immediate relief but not long lasting.  X-rays were negative for fracture or even degenerative arthritis.  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  Ordered.    Gastroesophageal reflux disease without esophagitis 01/14/2013    04/10/2014--patient presented with right upper quadrant/epigastric burning pain and discomfort that was postprandial. H pylori breath test was negative. Empiric trial of Dexilant 60 mg per day was initiated for esophageal reflux/dyspepsia.   01/14/2013--EGD performed for epigastric and right upper quadrant pain. There was some edematous appearing mucosa in the antrum and body of the stomach which was biopsied. No significant erythema. No ulcerations. Normal appearing duodenum and esophagus. Pathology revealed moderate chronic active gastritis. No intestinal metaplasia. Helicobacter pylori positive. Patient was treated with appropriate antibiotic and PPI therapy.    Hip pain, acute, right     History of basal cell carcinoma of skin 02/12/2019 June 2018--Mohs micrographic surgery of basal cell carcinoma on the tip of the nose.    History of  Helicobacter pylori gastritis 01/14/2013    04/10/2014--patient presented with right upper quadrant/epigastric burning pain and discomfort that was postprandial. H pylori breath test was negative. Empiric trial of Dexilant 60 mg per day was initiated for esophageal reflux/dyspepsia.  01/14/2013--EGD performed for epigastric and right upper quadrant pain. There was some edematous appearing mucosa in the antrum and body of the stomach which w    History of malignant melanoma, 2008--right side of face.  1999--lower back. 01/01/2008 2008--melanoma resected from right face.   1999--malignant melanoma resected from lower back.    History of routine gynecologic exam yearly    Patient sees a gynecologist.    History of thrombophlebitis, superficial, left lower extremity. 01/12/2022 January 12, 2022--patient reports a 2-week history of left lower extremity swelling which is just above the ankle and sometimes extends all the way down into the ankle and is associated with redness and tenderness. It tends to get worse towards the end of the day. She has had no shortness of breath or chest pain and there is been no significant swelling involving the upper part of either leg. On e    Hx of malignant skin melanoma     Hyperlipidemia 08/13/2010 08/13/2010--treatment for hyperlipidemia begun.    Impaired fasting glucose 09/01/2017 09/01/2017--routine physical.  Serum glucose elevated at 101.  Recommend weight loss and decrease carbohydrate intake.    Intolerance of oral bisphosphonate therapy 02/21/2020    Lumbar disc disease 10/19/2016    09/13/2016--x-ray lumbar spine reveals disc space narrowing at L2-L3, L3-L4, L4-L5, and possibly L5-S1.  There is mild anterior listhesis of L4 on L5 measuring 4.5 mm.  No compression deformity, nor other evidence of acute process.    Microscopic hematuria 12/18/2015 12/22/2015--urine cytology negative for malignant cells.  Red blood cells noted.  Transitional epithelial cells  noted.  Urine culture revealed less than 10,000 colony forming units of mixed urogenital laurie.  Repeat urinalysis was negative.  Recommend observation.  12/18/2015--routine urinalysis reveals 3-5 WBCs and 3-5 RBCs. 0 epithelial cells. 0 bacteria. Patient is asymptomatic. Repeat urinalysis, urine cytology, and urine culture sent.    Osteoporosis, 3/23/2022--lumbar spine -0.9; left femoral neck -2.4; right femoral neck -2.3.  11/5/2019--lumbar spine -0.3.  Left femoral neck -2.7.  Right femoral neck -2.4. 07/24/2009 March 23, 2022--DEXA scan reveals lumbar spine T score -0.9 representing a 5.7% interval decrease.  Left femoral neck T score -2.4 which represents a 5.7% increase.  Right femoral neck T score -2.3 which is unchanged.  Impression is osteopenia at the hips with mixed interval change in density values.  November 5, 2019--DEXA scan reveals lumbar spine T score -0.3 representing a 5% increase.  Left f    Positive colorectal cancer screening using Cologuard test     Postmenopausal state 09/01/2017    Primary osteoarthritis of both hips 11/19/2021 November 19, 2021-9 being evaluated and treated by the orthopedist.  Patient may need bilateral hip replacements at some point in the future.    Subclinical hypothyroidism 02/21/2020 February 21, 2020--TSH slightly elevated at 4.41.  Free T3 and free T4 are normal.  Close observation.    Tinnitus of right ear 12/18/2015 12/18/2015--patient presents with a four-month history of ringing in her right ear. No hearing loss. Examination reveals a cerumen impaction of the right ear canal. This was removed with irrigation and curettage.    Vitamin D deficiency 08/12/2016        Past Surgical History:   Procedure Laterality Date    ADENOIDECTOMY  1946    APPENDECTOMY      BASAL CELL CARCINOMA EXCISION  06/2018 June 2018--Mohs micrographic surgery of basal cell carcinoma on the tip of the nose.    CHOLECYSTECTOMY  05/17/2013 05/17/2013--laparoscopic  cholecystectomy.    COLONOSCOPY  07/12/2012 07/12/2012--normal colonoscopy to the cecum with an excellent prep.     COLONOSCOPY  06/20/2003 06/20/2003--normal colonoscopy to the cecum.    COLONOSCOPY N/A 08/30/2023    Procedure: COLONOSCOPY INTO CECUM WITH COLD BIOPSY POLYPECTOMIES AND HOT SNARE POLYPECTOMY;  Surgeon: Ho Goyal MD;  Location: [facility] ENDOSCOPY;  Service: Gastroenterology;  Laterality: N/A;  PRE- POSITIVE COLOGUARD  POST- POLYPS, HEMORRHOIDS    ESOPHAGOSCOPY / EGD  01/14/2013 01/14/2013--EGD performed for epigastric and right upper quadrant pain. There was some edematous appearing mucosa in the antrum and body of the stomach which was biopsied. No significant erythema. No ulcerations. Normal appearing duodenum and esophagus. Pathology revealed moderate chronic active gastritis. No intestinal metaplasia. Helicobacter pylori positive. Patient was treated with appropriate    EYE SURGERY      FOOT SURGERY  1992 1992--orthopedic 10 placed in right great toe.    HYSTERECTOMY  1986 1986--total abdominal hysterectomy.    SKIN CANCER EXCISION  09/14/2009 09/14/2009--excision 1 cm mid scalp cyst. Pathology benign. 2008--melanoma resected from right face. 1999--malignant melanoma resected from lower back.    TONSILLECTOMY AND ADENOIDECTOMY  1946    1946.        Social History     Occupational History    Occupation: Registered Nurse   Tobacco Use    Smoking status: Never    Smokeless tobacco: Never   Vaping Use    Vaping status: Never Used   Substance and Sexual Activity    Alcohol use: Never    Drug use: Never    Sexual activity: Yes     Partners: Male      Social History     Social History Narrative    Not on file        Family History   Problem Relation Age of Onset    COPD Mother     Hypertension Mother         Essential    Arthritis Mother     Hearing loss Mother     Hyperlipidemia Mother     COPD Father     Hypertension Father     Arthritis Father     Hyperlipidemia Father     Malig  "Hyperthermia Neg Hx        Physical Exam: 84 y.o. female  General Appearance:    Alert, cooperative, in no acute distress                      Vitals:    08/16/24 1753 08/16/24 2132 08/17/24 0121 08/17/24 0528   BP: 126/73 109/69 94/60 104/68   BP Location:  Right arm Right arm Right arm   Patient Position:  Lying Lying Lying   Pulse: 80 78 79 77   Resp: 16 18 18 18   Temp: 97.6 °F (36.4 °C) 97.2 °F (36.2 °C) 97.5 °F (36.4 °C) 97.4 °F (36.3 °C)   TempSrc: Oral Oral Oral Oral   SpO2: 99% 94% 95% 96%   Weight:  72 kg (158 lb 11.7 oz)     Height:  159.4 cm (62.75\")          Hospital Course:  84 y.o. female admitted to [facility] to services of Rico Patterson * with Primary osteoarthritis of right hip [M16.11] on 8/16/2024 and underwent a RIGHT total hip arthroplasty Per Rico Patterson MD. Antibiotic  Kefzol  every 8 hours and VTE prophylaxis  Aspirin  orally  were per SCIP protocols. Post-operatively the patient transferred to the post-operative floor where the patient underwent mobilization therapy that included active ROM exercises. Opioids were titrated to achieve appropriate pain management to allow for participation in mobilization exercises. Vital signs are now stable. The incision is intact without signs or symptoms of infection. Operative extremity neurovascular status remains intact.     Appropriate education re: incision care, activity levels, medications, hip dislocation precautions, and follow up visits was completed and all questions were answered.     The patient is now deemed stable for discharge.    DISCHARGE DISPOSITION AND PLAN:  The  Patient is being discharged home with home health for PT   2-3 X per week for 2-3 weeks and nursing care as needed.     DIAGNOSTIC TESTS:     Admission on 08/16/2024   Component Date Value Ref Range Status    Glucose 08/17/2024 123 (H)  65 - 99 mg/dL Final    BUN 08/17/2024 9  8 - 23 mg/dL Final    Creatinine 08/17/2024 0.58  0.57 - 1.00 mg/dL " "Final    Sodium 08/17/2024 141  136 - 145 mmol/L Final    Potassium 08/17/2024 4.1  3.5 - 5.2 mmol/L Final    Chloride 08/17/2024 108 (H)  98 - 107 mmol/L Final    CO2 08/17/2024 26.2  22.0 - 29.0 mmol/L Final    Calcium 08/17/2024 9.5  8.6 - 10.5 mg/dL Final    BUN/Creatinine Ratio 08/17/2024 15.5  7.0 - 25.0 Final    Anion Gap 08/17/2024 6.8  5.0 - 15.0 mmol/L Final    eGFR 08/17/2024 90.5  >60.0 mL/min/1.73 Final    WBC 08/17/2024 11.05 (H)  3.40 - 10.80 10*3/mm3 Final    RBC 08/17/2024 3.89  3.77 - 5.28 10*6/mm3 Final    Hemoglobin 08/17/2024 11.6 (L)  12.0 - 15.9 g/dL Final    Hematocrit 08/17/2024 35.7  34.0 - 46.6 % Final    MCV 08/17/2024 91.8  79.0 - 97.0 fL Final    MCH 08/17/2024 29.8  26.6 - 33.0 pg Final    MCHC 08/17/2024 32.5  31.5 - 35.7 g/dL Final    RDW 08/17/2024 12.3  12.3 - 15.4 % Final    RDW-SD 08/17/2024 40.6  37.0 - 54.0 fl Final    MPV 08/17/2024 9.8  6.0 - 12.0 fL Final    Platelets 08/17/2024 152  140 - 450 10*3/mm3 Final    Neutrophil % 08/17/2024 86.0 (H)  42.7 - 76.0 % Final    Lymphocyte % 08/17/2024 4.8 (L)  19.6 - 45.3 % Final    Monocyte % 08/17/2024 8.6  5.0 - 12.0 % Final    Eosinophil % 08/17/2024 0.0 (L)  0.3 - 6.2 % Final    Basophil % 08/17/2024 0.1  0.0 - 1.5 % Final    Immature Grans % 08/17/2024 0.5  0.0 - 0.5 % Final    Neutrophils, Absolute 08/17/2024 9.51 (H)  1.70 - 7.00 10*3/mm3 Final    Lymphocytes, Absolute 08/17/2024 0.53 (L)  0.70 - 3.10 10*3/mm3 Final    Monocytes, Absolute 08/17/2024 0.95 (H)  0.10 - 0.90 10*3/mm3 Final    Eosinophils, Absolute 08/17/2024 0.00  0.00 - 0.40 10*3/mm3 Final    Basophils, Absolute 08/17/2024 0.01  0.00 - 0.20 10*3/mm3 Final    Immature Grans, Absolute 08/17/2024 0.05  0.00 - 0.05 10*3/mm3 Final    nRBC 08/17/2024 0.0  0.0 - 0.2 /100 WBC Final     No results found for: \"URICACID\"  No results found for: \"CRYSTAL\"  Microbiology Results (last 10 days)       Procedure Component Value - Date/Time    Urine Culture - Urine, Urine, Clean " Catch [[id number]]  (Normal) Collected: 08/07/24 1510    Lab Status: Edited Result - FINAL Specimen: Urine, Clean Catch Updated: 08/09/24 0729     Urine Culture No growth    MRSA Screen Culture (Outpatient) - Swab, Nares [[id number]]  (Normal) Collected: 08/07/24 1454    Lab Status: Final result Specimen: Swab from Nares Updated: 08/08/24 1835     MRSA Screen Cx No Methicillin Resistant Staphylococcus aureus isolated    Narrative:      The negative predictive value of this diagnostic test is high and should only be used to consider de-escalating anti-MRSA therapy. A positive result may indicate colonization with MRSA and must be correlated clinically.            No radiology results for the last 7 days      Follow-up Appointments  Future Appointments   Date Time Provider Department Center   9/19/2024  9:00 AM [facility] [place] [facility]   9/26/2024  1:30 PM Juan Vega MD [facility]         Discharge and Follow up Instructions:     I. ACTIVITIES:  1. Exercises:  Complete exercise program as taught by the hospital physical therapist 2 times per day  Exercise program will be advanced by the physical therapist  During the day be up ambulating every 2 hours (while awake) for short distances  Complete the ankle pump exercises at least 10 times per hour (while awake)  Elevate legs when in bed and for at least 30 minutes during the day.Use cold packs 20-30 minutes approximately 5 times per day. This should be done before and after completing your exercises and at any time you are experiencing pain/ stiffness in your operative extremity.      2. Activities of Daily Living:  No tub baths, hot tubs, or swimming pools for 4 weeks  May shower and let water run over the incision on post-operative day #5 if no drainage. Do not scrub or rub the incision. Simply let the water run over the incision and pat dry.    II. Restrictions  Continue  Anterior hip precautions as taught at the hospital  Your surgeon will  discuss with you when you will be able to drive again. Usual guidelines are you are to be off pain medications prior to driving.  Weight bearing is as tolerated  First week stay inside on even terrain. May go up and down stairs one stair at a time utilizing the hand rail once cleared by physical therapy to do so.  After one week, you may venture outside (if cleared to do so by physical therapist).    III. Precautions:  Everyone that comes near you should wash their hands  No elective dental, genital-urinary, or colon procedures or surgical procedures for 12 weeks after surgery unless absolutely necessary.   If dental work or surgical procedure is deemed absolutely necessary, you will need to contact your surgeon as you will need to take antibiotics 1 hour prior to any dental work (including teeth cleanings).  Please discuss with your surgeon prophylactic antibiotics as the length of time this intervention will be necessary for you varies with each patient’s health history and situation.  Avoid sick people. If you must be around someone who is ill, they should wear a mask.  Avoid visits to the Emergency Room or Urgent Care. If you feel you need to go to the Emergency Room, please notify your Surgeon's office.  Stockings are to be worn for one week after surgery and are to be placed on in the morning and removed at night. Observe your skin when stocking is removed for any problems. Monitor the stockings to ensure that any swelling is not causing the stockings to become too tight. In this case, remove stockings immediately.      IV. INCISION CARE:  Wash your hands prior to dressing changes  Change the dressing as needed to keep incision clean and dry. Utilize dry gauze and paper tape. Avoid touching the side of the gauze that goes against the incision with your hands.  No creams or ointments to the incision  May remove dressing once the incision is free of drainage  Do not touch or pick at the incision  Check incision  every day and notify surgeon immediately if any of the following signs or symptoms are noted:  Increase in redness  Increase in swelling around the incision and of the entire extremity  Increase in pain  Drainage oozing from the incision  Pulling apart of the edges of the incision  Increase in overall body temperature (greater than 100.5 degrees)     You have absorbable sutures with steristrips, please do not remove the  steristrips for 14 days, you can shower on them 6 days after surgery.       V. Medications:   1. Anticoagulants: You will be discharged on an anticoagulant. This is a prophylactic medication that helps prevent blood clots during your post-operative period.  You will be on Aspirin  81 mg twice daily for 30 days. If you were on Aspirin 81 mg prior to surgery you can go back to home dose once the 30 days are completed.     While taking the anticoagulant, you should avoid taking any additional aspirin, ibuprofen (Advil or Motrin), Aleve (Naprosyn) or other non-steroidal anti-inflammatory medications.   Notify surgeon immediately if any zully bleeding is noted in the urine, stool, emesis, or from the nose or the incision. Blood in the stool will often appear as black rather than red. Blood in urine may appear as pink. Blood in emesis may appear as brown/black like coffee grounds.  You will need to apply pressure for longer periods of time to any cuts or abrasions to stop bleeding  Avoid alcohol while taking anticoagulants    2. Stool Softeners: You will be at greater risk of constipation after surgery due to being less mobile and the pain medications.   Take stool softeners as instructed by your surgeon while on pain medications. Over the counter Colace 100 mg 1-2 capsules twice daily.   If stools become too loose or too frequent, please decreases the dosage or stop the stool softener.  If constipation occurs despite use of stool softeners, you are to continue the stool softeners and add a laxative (Milk  of Magnesia 1 ounce daily as needed).  Dulcolax oral tabs or suppository, or a fleets enema can also be utilized for constipation and can be obtained over the counter.   If above interventions are unsuccessful in inducing bowel movements, please contact your surgeon's office / family physician's office.  Drink plenty of fluids, and eat fruits and vegetables during your recovery time    3. Pain Medications utilized after surgery are narcotics and the law requires that the following information be given to all patients that are prescribed narcotics:  CLASSIFICATION: Pain medications are called Opioids and are narcotics  LEGALITIES: It is illegal to share narcotics with others and to drive within 24 hours of taking narcotics  POTENTIAL SIDE EFFECTS: Potential side effects of opioids include: nausea, vomiting, itching, dizziness, drowsiness, dry mouth, constipation, and difficulty urinating.  POTENTIAL ADVERSE EFFECTS:   Opioid tolerance can develop with use of pain medications and this simply means that it requires more and more of the medication to control pain; however, this is seen more in patients that use opioids for longer periods of time.  Opioid dependence can develop with use of Opioids and this simply means that to stop the medication can cause withdrawal symptoms; however, this is seen with patients that use Opioids for longer periods of time.  Opioid addiction can develop with use of Opioids and the incidence of this is very unlikely in patients who take the medications as ordered and stop the medications as instructed.  Opioid overdose can be dangerous, but is unlikely when the medication is taken as ordered and stopped when ordered. It is important not to mix opioids with alcohol or with and type of sedative such as Benadryl as this can lead to over sedation and respiratory difficulty.  DOSAGE:   Pain medications will need to be taken consistently for the first week to decrease pain and promote adequate  pain relief and participation in physical therapy.  After the initial surgical pain begins to resolve, you may begin to decrease the pain medication. By the end of 6 weeks, you should be off of pain medications.  Refills will not be given by the office during evening hours, on weekends, or after 6 weeks post-op.  To seek refills on pain medications during the initial 6 week post-operative period, you must call the office 48 hours in advance to request the refill. The office will then notify you when to  the prescription. DO NOT wait until you are out of the medication to request a refill.    V. FOLLOW-UP VISITS:  You will need to follow up in the office with your surgeon in 3 weeks. Please call this number [phone]  to schedule this appointment.  If you have any concerns or suspected complications prior to your follow up visit, please call your surgeons office. Do not wait until your appointment time if you suspect complications. These will need to be addressed in the office promptly.    Date: 8/17/2024    Rico Patterson MD    CC: Juan Vega MD; MD Leonardo Chauhan Madhusudhan R,*

## 2024-08-17 NOTE — PLAN OF CARE
Goal Outcome Evaluation:  Plan of Care Reviewed With: patient        Progress: improving  Outcome Evaluation: VSS, RA, SL, up with assist of 1, voiding per BRP, pain minimal, scheduled Tylenol given, nausea better, IS, educated on BP monitoring, home today

## 2024-08-17 NOTE — PROGRESS NOTES
"      Patient: Lachelle Mcclure  YOB: 1940     Date of Admission: 8/16/2024  5:20 AM Medical Record Number: [id number]     Attending Physician: Rico Patterson,*    Procedure(s):  TOTAL HIP ARTHROPLASTY ANTERIOR WITH HANA TABLE Post Operative Day Number: 1    Subjective : No new orthopaedic complaints     Pain Relief: some relief with present medication.     Systemic Complaints: No Complaints  Vitals:    08/16/24 1753 08/16/24 2132 08/17/24 0121 08/17/24 0528   BP: 126/73 109/69 94/60 104/68   BP Location:  Right arm Right arm Right arm   Patient Position:  Lying Lying Lying   Pulse: 80 78 79 77   Resp: 16 18 18 18   Temp: 97.6 °F (36.4 °C) 97.2 °F (36.2 °C) 97.5 °F (36.4 °C) 97.4 °F (36.3 °C)   TempSrc: Oral Oral Oral Oral   SpO2: 99% 94% 95% 96%   Weight:  72 kg (158 lb 11.7 oz)     Height:  159.4 cm (62.75\")         Physical Exam: 84 y.o. female    General Appearance:       Alert, cooperative, in no acute distress                  Extremities:    Dressing Clean, Dry and Intact         Incision healthy without signs or symptoms of infections         No clinical sign of DVT        Able to do good movements of digits    Pulses:   Pulses palpable and equal bilaterally           Diagnostic Tests:     Results from last 7 days   Lab Units 08/17/24  0353   WBC 10*3/mm3 11.05*   HEMOGLOBIN g/dL 11.6*   HEMATOCRIT % 35.7   PLATELETS 10*3/mm3 152     Results from last 7 days   Lab Units 08/17/24  0353   SODIUM mmol/L 141   POTASSIUM mmol/L 4.1   CHLORIDE mmol/L 108*   CO2 mmol/L 26.2   BUN mg/dL 9   CREATININE mg/dL 0.58   GLUCOSE mg/dL 123*   CALCIUM mg/dL 9.5         No results found for: \"CRP\"  No results found for: \"SEDRATE\"  No results found for: \"URICACID\"  No results found for: \"CRYSTAL\"  Microbiology Results (last 10 days)       Procedure Component Value - Date/Time    Urine Culture - Urine, Urine, Clean Catch [[id number]]  (Normal) Collected: 08/07/24 1510    Lab Status: Edited Result - FINAL " Specimen: Urine, Clean Catch Updated: 08/09/24 0729     Urine Culture No growth    MRSA Screen Culture (Outpatient) - Swab, Nares [[id number]]  (Normal) Collected: 08/07/24 1454    Lab Status: Final result Specimen: Swab from Nares Updated: 08/08/24 1835     MRSA Screen Cx No Methicillin Resistant Staphylococcus aureus isolated    Narrative:      The negative predictive value of this diagnostic test is high and should only be used to consider de-escalating anti-MRSA therapy. A positive result may indicate colonization with MRSA and must be correlated clinically.            No radiology results for the last 7 days          Current Medications:  Scheduled Meds:acetaminophen, 1,000 mg, Oral, Q8H  aspirin, 81 mg, Oral, Q12H  docusate sodium, 100 mg, Oral, BID      Continuous Infusions:sodium chloride, 100 mL/hr, Last Rate: Stopped (08/17/24 0136)      PRN Meds:.  bisacodyl    HYDROcodone-acetaminophen    HYDROcodone-acetaminophen    HYDROmorphone **AND** naloxone    ondansetron ODT **OR** ondansetron    Assessment:    Procedure(s):  TOTAL HIP ARTHROPLASTY ANTERIOR WITH HANA TABLE      Status post total hip replacement, right      PLAN:   Continues current post-op course  Anticoagulation: Aspirin started  Mobilize with PT as tolerated per protocol    Weight Bearing: WBAT  Discharge Plan: OK to plan for discharge in  today to home and home health  from orthopadic perspective.      Rico Patterson MD    Date: 8/17/2024    Time: 06:31 EDT

## 2024-08-18 ENCOUNTER — HOME CARE VISIT (OUTPATIENT)
Dept: HOME HEALTH SERVICES | Facility: HOME HEALTHCARE | Age: 84
End: 2024-08-18
Payer: COMMERCIAL

## 2024-08-18 VITALS
SYSTOLIC BLOOD PRESSURE: 130 MMHG | TEMPERATURE: 98.4 F | RESPIRATION RATE: 17 BRPM | DIASTOLIC BLOOD PRESSURE: 68 MMHG | OXYGEN SATURATION: 97 % | HEART RATE: 74 BPM

## 2024-08-18 PROCEDURE — G0151 HHCP-SERV OF PT,EA 15 MIN: HCPCS

## 2024-08-18 NOTE — PLAN OF CARE
Goal Outcome Evaluation:  Plan of Care Reviewed With: patient, spouse        Progress: improving  Outcome Evaluation: Pt agreed to PT session, pt shan amb ~120ft CGA, shan educ w/pt and spouse, shan stair training w/o concern, shan 10reps THR protocol , plans home w/HH at DC , ready for DC today, no c/o nausea or dizziness

## 2024-08-18 NOTE — THERAPY TREATMENT NOTE
Patient Name: Lachelle Mcclure  : 1940    MRN: [id number]                              Today's Date: 2024       Admit Date: 2024    Visit Dx:     ICD-10-CM ICD-9-CM   1. Status post total hip replacement, right  Z96.641 V43.64   2. Primary osteoarthritis of right hip  M16.11 715.15     Patient Active Problem List   Diagnosis    Benign essential hypertension    Carotid artery plaque, 2022-- mild bilateral plaque without stenosis.  4/3/2019--mild bilateral.  10/06/2016--vascular screen is now normal without carotid disease.  Improved.    Gastroesophageal reflux disease without esophagitis    Hyperlipidemia    Microscopic hematuria    Osteoporosis, 3/23/2022--lumbar spine -0.9; left femoral neck -2.4; right femoral neck -2.3.  2019--lumbar spine -0.3.  Left femoral neck -2.7.  Right femoral neck -2.4.    Tinnitus of right ear    History of malignant melanoma, --right side of face.  --lower back.    Therapeutic drug monitoring    Vitamin D deficiency    Lumbar disc disease    Chronic right hip pain    Impaired fasting glucose    Postmenopausal state    History of basal cell carcinoma of skin    Subclinical hypothyroidism    Intolerance of oral bisphosphonate therapy    Primary osteoarthritis of both hips    History of COVID-19    Hypercalcemia    Status post total hip replacement, right     Past Medical History:   Diagnosis Date    Benign essential hypertension 2006--treatment for hypertension begun.    Carotid artery plaque, 2022-- mild bilateral plaque without stenosis.  4/3/2019--mild bilateral.  10/06/2016--vascular screen is now normal without carotid disease.  Improved. 2010    May 9, 2022--vascular screening reveals mild carotid artery plaque bilaterally without stenosis.  Negative for AAA.  Negative for PAD.  April 3, 2019--vascular screening reveals mild bilateral carotid plaque without stenosis.  Negative for AAA.  Negative for PAD.   10/06/2016--vascular screen reveals no evidence of carotid plaque, negative for AAA, negative for PAD.  04/23/2014--vascular screen rev    Chronic right hip pain 09/01/2017 09/01/2017--patient has had a one-year history of intermittent episodes of lateral right hip pain that at times is very severe and debilitating.  She also has tenderness over this area that limits her from sleeping on her right side.  Last year I gave her a cortisone injection for trochanteric bursitis and this provided immediate relief but not long lasting.  X-rays were negative for fracture or even degenerative arthritis.  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  Ordered.    Gastroesophageal reflux disease without esophagitis 01/14/2013    04/10/2014--patient presented with right upper quadrant/epigastric burning pain and discomfort that was postprandial. H pylori breath test was negative. Empiric trial of Dexilant 60 mg per day was initiated for esophageal reflux/dyspepsia.   01/14/2013--EGD performed for epigastric and right upper quadrant pain. There was some edematous appearing mucosa in the antrum and body of the stomach which was biopsied. No significant erythema. No ulcerations. Normal appearing duodenum and esophagus. Pathology revealed moderate chronic active gastritis. No intestinal metaplasia. Helicobacter pylori positive. Patient was treated with appropriate antibiotic and PPI therapy.    Hip pain, acute, right     History of basal cell carcinoma of skin 02/12/2019 June 2018--Mohs micrographic surgery of basal cell carcinoma on the tip of the nose.    History of Helicobacter pylori gastritis 01/14/2013    04/10/2014--patient presented with right upper quadrant/epigastric burning pain and discomfort that was postprandial. H pylori breath test was negative. Empiric trial of Dexilant 60 mg per day was initiated for esophageal reflux/dyspepsia.  01/14/2013--EGD  performed for epigastric and right upper quadrant pain. There was some edematous appearing mucosa in the antrum and body of the stomach which w    History of malignant melanoma, 2008--right side of face.  1999--lower back. 01/01/2008 2008--melanoma resected from right face.   1999--malignant melanoma resected from lower back.    History of routine gynecologic exam yearly    Patient sees a gynecologist.    History of thrombophlebitis, superficial, left lower extremity. 01/12/2022 January 12, 2022--patient reports a 2-week history of left lower extremity swelling which is just above the ankle and sometimes extends all the way down into the ankle and is associated with redness and tenderness. It tends to get worse towards the end of the day. She has had no shortness of breath or chest pain and there is been no significant swelling involving the upper part of either leg. On e    Hx of malignant skin melanoma     Hyperlipidemia 08/13/2010 08/13/2010--treatment for hyperlipidemia begun.    Impaired fasting glucose 09/01/2017 09/01/2017--routine physical.  Serum glucose elevated at 101.  Recommend weight loss and decrease carbohydrate intake.    Intolerance of oral bisphosphonate therapy 02/21/2020    Lumbar disc disease 10/19/2016    09/13/2016--x-ray lumbar spine reveals disc space narrowing at L2-L3, L3-L4, L4-L5, and possibly L5-S1.  There is mild anterior listhesis of L4 on L5 measuring 4.5 mm.  No compression deformity, nor other evidence of acute process.    Microscopic hematuria 12/18/2015 12/22/2015--urine cytology negative for malignant cells.  Red blood cells noted.  Transitional epithelial cells noted.  Urine culture revealed less than 10,000 colony forming units of mixed urogenital laurie.  Repeat urinalysis was negative.  Recommend observation.  12/18/2015--routine urinalysis reveals 3-5 WBCs and 3-5 RBCs. 0 epithelial cells. 0 bacteria. Patient is asymptomatic. Repeat urinalysis, urine  cytology, and urine culture sent.    Osteoporosis, 3/23/2022--lumbar spine -0.9; left femoral neck -2.4; right femoral neck -2.3.  11/5/2019--lumbar spine -0.3.  Left femoral neck -2.7.  Right femoral neck -2.4. 07/24/2009 March 23, 2022--DEXA scan reveals lumbar spine T score -0.9 representing a 5.7% interval decrease.  Left femoral neck T score -2.4 which represents a 5.7% increase.  Right femoral neck T score -2.3 which is unchanged.  Impression is osteopenia at the hips with mixed interval change in density values.  November 5, 2019--DEXA scan reveals lumbar spine T score -0.3 representing a 5% increase.  Left f    Positive colorectal cancer screening using Cologuard test     Postmenopausal state 09/01/2017    Primary osteoarthritis of both hips 11/19/2021 November 19, 2021-9 being evaluated and treated by the orthopedist.  Patient may need bilateral hip replacements at some point in the future.    Subclinical hypothyroidism 02/21/2020 February 21, 2020--TSH slightly elevated at 4.41.  Free T3 and free T4 are normal.  Close observation.    Tinnitus of right ear 12/18/2015 12/18/2015--patient presents with a four-month history of ringing in her right ear. No hearing loss. Examination reveals a cerumen impaction of the right ear canal. This was removed with irrigation and curettage.    Vitamin D deficiency 08/12/2016     Past Surgical History:   Procedure Laterality Date    ADENOIDECTOMY  1946    APPENDECTOMY      BASAL CELL CARCINOMA EXCISION  06/2018 June 2018--Mohs micrographic surgery of basal cell carcinoma on the tip of the nose.    CHOLECYSTECTOMY  05/17/2013 05/17/2013--laparoscopic cholecystectomy.    COLONOSCOPY  07/12/2012 07/12/2012--normal colonoscopy to the cecum with an excellent prep.     COLONOSCOPY  06/20/2003 06/20/2003--normal colonoscopy to the cecum.    COLONOSCOPY N/A 08/30/2023    Procedure: COLONOSCOPY INTO CECUM WITH COLD BIOPSY POLYPECTOMIES AND HOT SNARE  POLYPECTOMY;  Surgeon: Ho Goyal MD;  Location: [facility] ENDOSCOPY;  Service: Gastroenterology;  Laterality: N/A;  PRE- POSITIVE COLOGUARD  POST- POLYPS, HEMORRHOIDS    ESOPHAGOSCOPY / EGD  01/14/2013 01/14/2013--EGD performed for epigastric and right upper quadrant pain. There was some edematous appearing mucosa in the antrum and body of the stomach which was biopsied. No significant erythema. No ulcerations. Normal appearing duodenum and esophagus. Pathology revealed moderate chronic active gastritis. No intestinal metaplasia. Helicobacter pylori positive. Patient was treated with appropriate    EYE SURGERY      FOOT SURGERY  1992 1992--orthopedic 10 placed in right great toe.    HYSTERECTOMY  1986 1986--total abdominal hysterectomy.    SKIN CANCER EXCISION  09/14/2009 09/14/2009--excision 1 cm mid scalp cyst. Pathology benign. 2008--melanoma resected from right face. 1999--malignant melanoma resected from lower back.    TONSILLECTOMY AND ADENOIDECTOMY  1946 1946.      General Information    No documentation.                  Mobility    No documentation.                  Obj/Interventions    No documentation.                  Goals/Plan    No documentation.                  Clinical Impression    No documentation.                  Outcome Measures    No documentation.                                Physical Therapy Education       Title: PT OT SLP Therapies (Resolved)       Topic: Physical Therapy (Resolved)       Point: Mobility training (Resolved)       Learning Progress Summary             Patient Acceptance, E,TB, VU,NR by EF at 8/16/2024 1524                         Point: Home exercise program (Resolved)       Learning Progress Summary             Patient Acceptance, E,TB, VU,NR by EF at 8/16/2024 1524                         Point: Body mechanics (Resolved)       Learner Progress:  Not documented in this visit.              Point: Precautions (Resolved)       Learner Progress:  Not  documented in this visit.                              User Key       Initials Effective Dates Name Provider Type Discipline    EF 05/31/24 -  Karen Austin, PT Physical Therapist PT                  PT Recommendation and Plan     Plan of Care Reviewed With: patient, spouse  Progress: improving  Outcome Evaluation: Pt agreed to PT session, pt shan amb ~120ft CGA, shan educ w/pt and spouse, shan stair training w/o concern, shan 10reps THR protocol , plans home w/HH at DC , ready for DC today, no c/o nausea or dizziness     Time Calculation:          Therapy Charges for Today       Code Description Service Date Service Provider Modifiers Qty    61749811922 HC PT THERAPEUTIC ACT EA 15 MIN 8/17/2024 Sabra Dixon PTA GP 3    92484524504 HC PT THER PROC EA 15 MIN 8/17/2024 Sabra Dixon PTA GP 1            PT G-Codes  AM-PAC 6 Clicks Score (PT): 18       Sabra Dixon PTA  8/18/2024

## 2024-08-19 ENCOUNTER — TRANSITIONAL CARE MANAGEMENT TELEPHONE ENCOUNTER (OUTPATIENT)
Dept: CALL CENTER | Facility: HOSPITAL | Age: 84
End: 2024-08-19
Payer: MEDICARE

## 2024-08-19 NOTE — HOME HEALTH
Lachelle Mcclure is homebound due to difficulty with walking, transfers, and ADLs secondary to decrease functional strength, unsteadiness, and pain following hip replacement. PT will focus on aftercare of right total hip replacement.

## 2024-08-19 NOTE — OUTREACH NOTE
Call Center TCM Note      Flowsheet Row Responses   [facility] patient discharged from? [place]   Does the patient have one of the following disease processes/diagnoses(primary or secondary)? Total Joint Replacement   Joint surgery performed? Hip   TCM attempt successful? Yes   Call start time 0940   Call end time 0948   Discharge diagnosis Primary osteoarthritis of right hip   Does the patient have all medications related to this admission filled (includes all antibiotics, pain medications, etc.) Yes   Is the patient taking all medications as directed (includes completed medication regime)? Yes   Is the patient able to teach back alternate methods of pain control? Ice, Short, frequent activity   Does the patient have an appointment with their PCP within 7-14 days of discharge? Yes  [Pt reports she has a f/u appt on 8/29/24]   What is the Home health agency?  [facility]   Has home health visited the patient within 72 hours of discharge? Yes   DME comments pt uses a walker for balance   Psychosocial issues? No   Has the patient began therapy sessions (either in the home or as an out patient)? Yes   Does the patient have a wound vac in place? N/A   Has the patient fallen since discharge? No   Did the patient receive a copy of their discharge instructions? Yes   Nursing interventions Reviewed instructions with patient   What is the patient's perception of their functional status since discharge? Improving   Is the patient able to teach back signs and symptoms of infection? Shortness of breath or chest pain, Severe discomfort or pain, Increased swelling or redness around incision (not associated with surgical edema), Temp >100.4 for 24h or longer   Is the patient able to teach back how to prevent infection? Check incision daily, No tub baths, hot tub or swimming, Shower only as directed by surgeon   Is the patient able to teach back signs and symptoms of DVT? Redness in calf, Swelling in calf, Area hot to touch,  Severe pain in calf, Shortness of breath or chest pain   Is the patient/caregiver able to teach back the hierarchy of who to call/visit for symptoms/problems? PCP, Specialist, Home health nurse, Urgent Care, ED, 911 Yes   TCM call completed? Yes   Call end time 0948   Would this patient benefit from a Referral to [facility] Social Work? No   Is the patient interested in additional calls from an ambulatory ? No            Mar Ny RN    8/19/2024, 09:49 EDT

## 2024-08-20 ENCOUNTER — HOME CARE VISIT (OUTPATIENT)
Dept: HOME HEALTH SERVICES | Facility: HOME HEALTHCARE | Age: 84
End: 2024-08-20
Payer: COMMERCIAL

## 2024-08-20 VITALS
SYSTOLIC BLOOD PRESSURE: 117 MMHG | DIASTOLIC BLOOD PRESSURE: 63 MMHG | RESPIRATION RATE: 18 BRPM | TEMPERATURE: 97.3 F | HEART RATE: 71 BPM | OXYGEN SATURATION: 98 %

## 2024-08-20 PROCEDURE — G0151 HHCP-SERV OF PT,EA 15 MIN: HCPCS

## 2024-08-20 NOTE — HOME HEALTH
"Subjective: \"I am not sure how to manage the swelling in my left knee\"    no new med changes  no recent falls    Skill/education provided: see interventions for details    Patient/caregiver response: see interventions for details    Assessment: patient able to progress to amb outside on deck today, reports good pain control. Ongoing physical therapy is needed due to increased fall risk for gait training, transfer training, HEP progression, pain/edema management    Plan for next visit: gait training, progress HEP, pain/edema management"

## 2024-08-23 ENCOUNTER — TELEPHONE (OUTPATIENT)
Dept: ORTHOPEDIC SURGERY | Facility: HOSPITAL | Age: 84
End: 2024-08-23
Payer: MEDICARE

## 2024-08-23 ENCOUNTER — HOME CARE VISIT (OUTPATIENT)
Dept: HOME HEALTH SERVICES | Facility: HOME HEALTHCARE | Age: 84
End: 2024-08-23
Payer: COMMERCIAL

## 2024-08-23 VITALS
OXYGEN SATURATION: 98 % | SYSTOLIC BLOOD PRESSURE: 109 MMHG | DIASTOLIC BLOOD PRESSURE: 72 MMHG | TEMPERATURE: 97.2 F | RESPIRATION RATE: 18 BRPM | HEART RATE: 66 BPM

## 2024-08-23 PROCEDURE — G0151 HHCP-SERV OF PT,EA 15 MIN: HCPCS

## 2024-08-23 NOTE — HOME HEALTH
"Subjective: \"The pain has been doing pretty well.\" per patient    no new med changes  no recent falls    Skill/education provided: see interventions for details    Patient/caregiver response: see interventions for details    Assessment: patient able to progress standing ther ex and able to progress to amb on outside surfaces today. Ongoing skilled physical therapy is needed due to increased fall risk and impaired balance.    Plan for next visit: gait training, ther ex, HEP, gait training"

## 2024-08-23 NOTE — TELEPHONE ENCOUNTER
Called and spoke with Ms. Mcclure to see how she is doing as she is 1 week SP THEO. She said she is doing pretty good. She's working with HH PT and making progress. She is walking and doing the exercises. She isn't taking the pain medication as she doesn't like how it makes her feel. She has some increased soreness in her thigh, but she is taking Tylenol and it helps. She is using the ice. Dressing looks good. She plans to take a shower and change the dressing today. BM's are fine. Things seem to be progressing as expected. Ms. Mcclure doesn't have any questions for me at this time. She was given my contact information should she need anything.

## 2024-08-27 ENCOUNTER — HOME CARE VISIT (OUTPATIENT)
Dept: HOME HEALTH SERVICES | Facility: HOME HEALTHCARE | Age: 84
End: 2024-08-27
Payer: COMMERCIAL

## 2024-08-27 VITALS
SYSTOLIC BLOOD PRESSURE: 110 MMHG | RESPIRATION RATE: 18 BRPM | HEART RATE: 69 BPM | TEMPERATURE: 97.5 F | OXYGEN SATURATION: 97 % | DIASTOLIC BLOOD PRESSURE: 63 MMHG

## 2024-08-27 PROCEDURE — G0151 HHCP-SERV OF PT,EA 15 MIN: HCPCS

## 2024-08-27 NOTE — HOME HEALTH
"Subjective: \"My leg is sore after our doctor's appointment this morning\" per patient    no new med changes  no recent falls    Skill/education provided: see interventions for details    Patient/caregiver response: see interventions for details    Assessment: patient able to progress to cane amb today. Agrees to try to take pain meds on a more regular scheduled. Ongoing skilled physical therapy is needed due to impaired balance for gait training, ther ex, HEP progression    Plan for next visit: gait training with cane, fall prevention, pain management"

## 2024-08-30 ENCOUNTER — HOME CARE VISIT (OUTPATIENT)
Dept: HOME HEALTH SERVICES | Facility: HOME HEALTHCARE | Age: 84
End: 2024-08-30
Payer: COMMERCIAL

## 2024-08-30 VITALS
TEMPERATURE: 97.5 F | RESPIRATION RATE: 18 BRPM | DIASTOLIC BLOOD PRESSURE: 74 MMHG | SYSTOLIC BLOOD PRESSURE: 128 MMHG | OXYGEN SATURATION: 98 % | HEART RATE: 64 BPM

## 2024-08-30 PROCEDURE — G0151 HHCP-SERV OF PT,EA 15 MIN: HCPCS

## 2024-08-30 NOTE — HOME HEALTH
Discharge Summary:    Patient was seen for PT discharge today due to PT goals met see interventions/goal status for details. Patient was seen for a total of 5  visits for R THR       for evaluation, gait training, transfer training, home safety, fall prevention, and pain/edema management. Currently patient is able to to amb IND/SUP with FWW, IND with transfers, IND with HEP with written handouts, IND with pain/edema management and fall prevention. Patient agrees with PT discharge.    Home environment: lives with spouse who provides assist as needed    Follow up: with MD

## 2024-09-16 DIAGNOSIS — E83.52 HYPERCALCEMIA: ICD-10-CM

## 2024-09-17 LAB
ALBUMIN SERPL-MCNC: 4.2 G/DL (ref 3.7–4.7)
ALP SERPL-CCNC: 85 IU/L (ref 44–121)
ALT SERPL-CCNC: 12 IU/L (ref 0–32)
AST SERPL-CCNC: 16 IU/L (ref 0–40)
BILIRUB SERPL-MCNC: 0.3 MG/DL (ref 0–1.2)
BUN SERPL-MCNC: 14 MG/DL (ref 8–27)
BUN/CREAT SERPL: 23 (ref 12–28)
CA-I SERPL ISE-MCNC: 5.6 MG/DL (ref 4.5–5.6)
CALCIUM SERPL-MCNC: 10.4 MG/DL (ref 8.7–10.3)
CHLORIDE SERPL-SCNC: 105 MMOL/L (ref 96–106)
CO2 SERPL-SCNC: 24 MMOL/L (ref 20–29)
CREAT SERPL-MCNC: 0.61 MG/DL (ref 0.57–1)
EGFRCR SERPLBLD CKD-EPI 2021: 89 ML/MIN/1.73
GLOBULIN SER CALC-MCNC: 1.8 G/DL (ref 1.5–4.5)
GLUCOSE SERPL-MCNC: 111 MG/DL (ref 70–99)
POTASSIUM SERPL-SCNC: 4.2 MMOL/L (ref 3.5–5.2)
PROT SERPL-MCNC: 6 G/DL (ref 6–8.5)
PTH-INTACT SERPL-MCNC: 44 PG/ML (ref 15–65)
SODIUM SERPL-SCNC: 142 MMOL/L (ref 134–144)

## 2024-09-27 ENCOUNTER — OFFICE VISIT (OUTPATIENT)
Dept: INTERNAL MEDICINE | Facility: CLINIC | Age: 84
End: 2024-09-27
Payer: MEDICARE

## 2024-09-27 VITALS
HEART RATE: 75 BPM | BODY MASS INDEX: 28.17 KG/M2 | WEIGHT: 159 LBS | HEIGHT: 63 IN | OXYGEN SATURATION: 98 % | TEMPERATURE: 97.8 F | RESPIRATION RATE: 16 BRPM | SYSTOLIC BLOOD PRESSURE: 122 MMHG | DIASTOLIC BLOOD PRESSURE: 78 MMHG

## 2024-09-27 DIAGNOSIS — E55.9 VITAMIN D DEFICIENCY: Chronic | ICD-10-CM

## 2024-09-27 DIAGNOSIS — Z12.31 BREAST CANCER SCREENING BY MAMMOGRAM: ICD-10-CM

## 2024-09-27 DIAGNOSIS — Z78.0 POSTMENOPAUSAL STATE: Chronic | ICD-10-CM

## 2024-09-27 DIAGNOSIS — M81.0 AGE-RELATED OSTEOPOROSIS WITHOUT CURRENT PATHOLOGICAL FRACTURE: Chronic | ICD-10-CM

## 2024-09-27 DIAGNOSIS — Z85.820 HISTORY OF MALIGNANT MELANOMA: Chronic | ICD-10-CM

## 2024-09-27 DIAGNOSIS — E83.52 HYPERCALCEMIA: Primary | ICD-10-CM

## 2024-09-27 DIAGNOSIS — M16.0 PRIMARY OSTEOARTHRITIS OF BOTH HIPS: Chronic | ICD-10-CM

## 2024-09-27 DIAGNOSIS — Z51.81 THERAPEUTIC DRUG MONITORING: ICD-10-CM

## 2024-09-27 DIAGNOSIS — M25.551 CHRONIC RIGHT HIP PAIN: Chronic | ICD-10-CM

## 2024-09-27 DIAGNOSIS — I65.23 ATHEROSCLEROSIS OF BOTH CAROTID ARTERIES: Chronic | ICD-10-CM

## 2024-09-27 DIAGNOSIS — Z85.828 HISTORY OF BASAL CELL CARCINOMA OF SKIN: Chronic | ICD-10-CM

## 2024-09-27 DIAGNOSIS — Z96.641 STATUS POST TOTAL HIP REPLACEMENT, RIGHT: ICD-10-CM

## 2024-09-27 DIAGNOSIS — I10 BENIGN ESSENTIAL HYPERTENSION: Chronic | ICD-10-CM

## 2024-09-27 DIAGNOSIS — G89.29 CHRONIC RIGHT HIP PAIN: Chronic | ICD-10-CM

## 2024-09-27 DIAGNOSIS — E03.8 SUBCLINICAL HYPOTHYROIDISM: Chronic | ICD-10-CM

## 2024-09-27 DIAGNOSIS — K21.9 GASTROESOPHAGEAL REFLUX DISEASE WITHOUT ESOPHAGITIS: Chronic | ICD-10-CM

## 2024-09-27 DIAGNOSIS — R31.29 MICROSCOPIC HEMATURIA: Chronic | ICD-10-CM

## 2024-09-27 DIAGNOSIS — Z23 NEED FOR INFLUENZA VACCINATION: ICD-10-CM

## 2024-09-27 DIAGNOSIS — Z78.9 INTOLERANCE OF ORAL BISPHOSPHONATE THERAPY: Chronic | ICD-10-CM

## 2024-09-27 DIAGNOSIS — M51.9 LUMBAR DISC DISEASE: Chronic | ICD-10-CM

## 2024-09-27 DIAGNOSIS — E78.2 MIXED HYPERLIPIDEMIA: Chronic | ICD-10-CM

## 2024-09-27 DIAGNOSIS — Z86.16 HISTORY OF COVID-19: Chronic | ICD-10-CM

## 2024-09-27 DIAGNOSIS — R73.01 IMPAIRED FASTING GLUCOSE: Chronic | ICD-10-CM

## 2024-09-27 PROCEDURE — 90662 IIV NO PRSV INCREASED AG IM: CPT | Performed by: INTERNAL MEDICINE

## 2024-09-27 PROCEDURE — 1126F AMNT PAIN NOTED NONE PRSNT: CPT | Performed by: INTERNAL MEDICINE

## 2024-09-27 PROCEDURE — 1160F RVW MEDS BY RX/DR IN RCRD: CPT | Performed by: INTERNAL MEDICINE

## 2024-09-27 PROCEDURE — 99214 OFFICE O/P EST MOD 30 MIN: CPT | Performed by: INTERNAL MEDICINE

## 2024-09-27 PROCEDURE — 3074F SYST BP LT 130 MM HG: CPT | Performed by: INTERNAL MEDICINE

## 2024-09-27 PROCEDURE — 1159F MED LIST DOCD IN RCRD: CPT | Performed by: INTERNAL MEDICINE

## 2024-09-27 PROCEDURE — G0008 ADMIN INFLUENZA VIRUS VAC: HCPCS | Performed by: INTERNAL MEDICINE

## 2024-09-27 PROCEDURE — 3078F DIAST BP <80 MM HG: CPT | Performed by: INTERNAL MEDICINE

## 2024-09-27 RX ORDER — SIMVASTATIN 40 MG
TABLET ORAL
Start: 2024-09-27

## 2024-10-31 ENCOUNTER — HOSPITAL ENCOUNTER (OUTPATIENT)
Facility: HOSPITAL | Age: 84
Discharge: HOME | End: 2024-10-31
Admitting: INTERNAL MEDICINE
Payer: MEDICARE

## 2024-10-31 PROCEDURE — 77080 DXA BONE DENSITY AXIAL: CPT

## 2024-11-01 ENCOUNTER — TELEPHONE (OUTPATIENT)
Dept: INTERNAL MEDICINE | Facility: CLINIC | Age: 84
End: 2024-11-01

## 2024-11-01 DIAGNOSIS — M81.0 AGE-RELATED OSTEOPOROSIS WITHOUT CURRENT PATHOLOGICAL FRACTURE: Chronic | ICD-10-CM

## 2024-11-01 DIAGNOSIS — Z78.0 POSTMENOPAUSAL STATE: Primary | Chronic | ICD-10-CM

## 2024-11-01 NOTE — TELEPHONE ENCOUNTER
Caller: Lachelle Mcclure    Relationship: Self    Best call back number: [phone]     What was the call regarding: PATIENT CALLING STATING THAT SHE RECEIVED A CARD IN THE MAIL STATING THAT SHE IS DUE FOR A RECLAST APPOINTMENT

## 2024-12-03 ENCOUNTER — APPOINTMENT (OUTPATIENT)
Dept: WOMENS IMAGING | Facility: HOSPITAL | Age: 84
End: 2024-12-03
Payer: MEDICARE

## 2024-12-03 PROCEDURE — 77067 SCR MAMMO BI INCL CAD: CPT | Performed by: RADIOLOGY

## 2024-12-03 PROCEDURE — 77063 BREAST TOMOSYNTHESIS BI: CPT | Performed by: RADIOLOGY

## 2024-12-18 ENCOUNTER — TELEPHONE (OUTPATIENT)
Dept: INTERNAL MEDICINE | Facility: CLINIC | Age: 84
End: 2024-12-18

## 2024-12-18 NOTE — TELEPHONE ENCOUNTER
Caller: Lachelle Mcclure    Relationship: Self    Best call back number: [phone]       What was the call regarding: PATIENT HAD REQUESTED AN ORDER FOR PROLIA IN NOVEMBER AND THE NOTE SAYS THAT IT WAS DONE BUT PATIENT CALLED [facility] SCHEDULING AND THEY SAID THAT THEY DON'T SEE THE ORDER. PLEASE CALL AND ADVISE WHEN DONE.

## 2024-12-26 ENCOUNTER — TELEPHONE (OUTPATIENT)
Dept: INTERNAL MEDICINE | Facility: CLINIC | Age: 84
End: 2024-12-26
Payer: MEDICARE

## 2024-12-26 NOTE — TELEPHONE ENCOUNTER
[phone] [facility] scheduling called and stated patient has had Reclast infusion in 2023 and a new order for Prolia was entered for this year so she needs clarification on which patient should be getting. Your last office note states she is on Reclast and tolerating well but then there are two different patient calls from patient one requesting Reclast and one requesting Prolia so I ordered the Prolia based off the patient call I got. Please advise on which patient needs so we can get her scheduled.

## 2024-12-30 DIAGNOSIS — M81.0 AGE-RELATED OSTEOPOROSIS WITHOUT CURRENT PATHOLOGICAL FRACTURE: Primary | Chronic | ICD-10-CM

## 2024-12-30 DIAGNOSIS — E78.2 MIXED HYPERLIPIDEMIA: Chronic | ICD-10-CM

## 2024-12-30 DIAGNOSIS — M16.0 PRIMARY OSTEOARTHRITIS OF BOTH HIPS: Chronic | ICD-10-CM

## 2024-12-30 DIAGNOSIS — I10 BENIGN ESSENTIAL HYPERTENSION: ICD-10-CM

## 2024-12-30 RX ORDER — VALSARTAN AND HYDROCHLOROTHIAZIDE 160; 25 MG/1; MG/1
TABLET ORAL
Qty: 90 TABLET | Refills: 1 | Status: SHIPPED | OUTPATIENT
Start: 2024-12-30

## 2024-12-30 RX ORDER — SIMVASTATIN 40 MG
TABLET ORAL
Qty: 90 TABLET | Refills: 1 | Status: SHIPPED | OUTPATIENT
Start: 2024-12-30

## 2025-01-14 DIAGNOSIS — M81.0 AGE-RELATED OSTEOPOROSIS WITHOUT CURRENT PATHOLOGICAL FRACTURE: Primary | ICD-10-CM

## 2025-01-15 ENCOUNTER — TREATMENT (OUTPATIENT)
Dept: PHYSICAL THERAPY | Facility: CLINIC | Age: 85
End: 2025-01-15
Payer: MEDICARE

## 2025-01-15 ENCOUNTER — OFFICE VISIT (OUTPATIENT)
Dept: INTERNAL MEDICINE | Facility: CLINIC | Age: 85
End: 2025-01-15
Payer: MEDICARE

## 2025-01-15 ENCOUNTER — TELEPHONE (OUTPATIENT)
Dept: INTERNAL MEDICINE | Facility: CLINIC | Age: 85
End: 2025-01-15

## 2025-01-15 VITALS
RESPIRATION RATE: 16 BRPM | BODY MASS INDEX: 27.11 KG/M2 | HEIGHT: 63 IN | OXYGEN SATURATION: 98 % | HEART RATE: 75 BPM | DIASTOLIC BLOOD PRESSURE: 80 MMHG | TEMPERATURE: 97.9 F | SYSTOLIC BLOOD PRESSURE: 104 MMHG | WEIGHT: 153 LBS

## 2025-01-15 DIAGNOSIS — M79.662 PAIN IN LEFT LOWER LEG: ICD-10-CM

## 2025-01-15 DIAGNOSIS — Z96.641 H/O TOTAL HIP ARTHROPLASTY, RIGHT: Primary | ICD-10-CM

## 2025-01-15 DIAGNOSIS — M25.551 RIGHT HIP PAIN: ICD-10-CM

## 2025-01-15 DIAGNOSIS — Z74.09 IMPAIRED FUNCTIONAL MOBILITY, BALANCE, GAIT, AND ENDURANCE: ICD-10-CM

## 2025-01-15 DIAGNOSIS — R22.42 LOCALIZED SWELLING OF LEFT LOWER LEG: Primary | ICD-10-CM

## 2025-01-15 PROCEDURE — 3079F DIAST BP 80-89 MM HG: CPT | Performed by: NURSE PRACTITIONER

## 2025-01-15 PROCEDURE — 1159F MED LIST DOCD IN RCRD: CPT | Performed by: NURSE PRACTITIONER

## 2025-01-15 PROCEDURE — 99214 OFFICE O/P EST MOD 30 MIN: CPT | Performed by: NURSE PRACTITIONER

## 2025-01-15 PROCEDURE — 3074F SYST BP LT 130 MM HG: CPT | Performed by: NURSE PRACTITIONER

## 2025-01-15 PROCEDURE — 1126F AMNT PAIN NOTED NONE PRSNT: CPT | Performed by: NURSE PRACTITIONER

## 2025-01-15 PROCEDURE — 1160F RVW MEDS BY RX/DR IN RCRD: CPT | Performed by: NURSE PRACTITIONER

## 2025-01-15 NOTE — PROGRESS NOTES
Physical Therapy Initial Evaluation and Plan of Care  [facility]  [address], [address]  [place] [address]    Patient: Lachelle Mcclure   : 1940  Diagnosis/ICD-10 Code:  H/O total hip arthroplasty, right [Z96.641]  Referring practitioner: Rico Patterson,*  Today's Date: 1/15/2025    Subjective Evaluation    History of Present Illness  Date of surgery: 2024  Mechanism of injury: Had chronic right hip pain - especially anterior hip pain  Underwent anterior approach  THR  2 weeks of home health PT - then Op PT - was doing well but aggravated the anterior hip pain with hip flexor stretch type exercise  Fell 10/27/24 landing on right hip - hip xrays were normal -some lateral hip soreness after that  Fell gain 24 - tripping over a rubber mat landing on the lateral aspect of the right hip - hip xrayd again and had no fracture  Was referred to PT  Had been off of the cane until the recent falls  Walks daily weather dependent, wants to start back to water aerobics  Not taking any meds other than OTC pain relievers    Pain  Current pain ratin  At best pain ratin  At worst pain ratin  Location: Right groin - anterior hip skin feels sore, lateral hip soreness from the fall  Quality: burning, throbbing, dull ache, discomfort and pressure  Relieving factors: heat, ice and rest  Aggravating factors: sleeping, standing, stairs, ambulation, squatting, lifting and prolonged positioning  Progression: improved    Social Support  Lives in: multiple-level home    Diagnostic Tests  X-ray: normal    Treatments  Previous treatment: medication  Current treatment: medication and physical therapy  Patient Goals  Patient goal: Be able to walk again without pain, be able to stand prolonged periods without pain           Objective          Observations     Additional Hip Observation Details  Presents walking with straight cane in left hand to assist right  hip  Walks with short stride length     Palpation     Right Tenderness of the iliopsoas, rectus femoris, sartorius and TFL.     Tenderness     Right Hip   Tenderness in the inguinal ligament and pubic tubercle.     Neurological Testing     Sensation     Hip     Right Hip   Diminished: light touch    Comments   Right light touch: lateral thigh.     Active Range of Motion     Right Hip   Flexion: 90 degrees   Abduction: 30 degrees   Adduction: 20 degrees   External rotation (90/90): 35 degrees   Internal rotation (90/90): 25 degrees     Strength/Myotome Testing     Right Hip   Planes of Motion   Flexion: 4  Extension: 4+  Abduction: 4  Adduction: 4+    Tests     Right Hip   Mahin: Positive.   SLR: Negative.           Assessment & Plan       Assessment  Impairments: abnormal gait, abnormal muscle tone, abnormal or restricted ROM, activity intolerance, impaired balance, impaired physical strength, lacks appropriate home exercise program and pain with function   Functional limitations: carrying objects, lifting, walking, uncomfortable because of pain, sitting and standing   Assessment details: Pt is 85 y/o female to PT with symptoms consistent with right hip pain and weakness post THEO  Exhibits symptoms of: 1. Right anterior hip pain, 2. Decreased AROM right hip, 3. Decreased strength right hip mm, . 4. Tenderness to palpation anterior hip soft tissues,  5. Antalgic gait pattern, 6. Decreased tolerance for many normal ADL's to include prolonged standing and walking     Pt would benefit from skilled PT intervention to address the deficits noted.     Prognosis: good    Goals  Plan Goals:     SHORT TERM GOALS: Time for Goal Achievement: 4 weeks    1.  Patient to be compliant with HEP.   2.  Pt able to ascend/descend steps and transfer with less hip pain < 5/10  3.  Patient will be able to sleep without pain interruption  4. Patient will be able to stand/walk for 15 minutes without increased symptoms      LONG TERM GOALS:  Time for Goal Achievement: 8 weeks  1.  Pt to improve LEFS score > 25% perceived disability   2.  Patient able to ascend/descend steps and prolonged standing & cooking with pain < 2/10  3.  Pt to exhibit functional hip AROM to allow for kneeling, bending squatting as is necessary for ADL's, IADL's and household activities.   4.  Patient will be able to stand/walk for 30 minutes without increased symptoms       Plan  Therapy options: will be seen for skilled therapy services  Planned modality interventions: cryotherapy, TENS and thermotherapy (hydrocollator packs)  Planned therapy interventions: functional ROM exercises, home exercise program, manual therapy, therapeutic activities, strengthening, neuromuscular re-education, soft tissue mobilization, gait training and stretching  Frequency: 2x week  Duration in visits: 12  Duration in weeks: 6  Treatment plan discussed with: patient  Plan details: Access Code: [email or web address]  URL: [email or web address]  Date: 01/15/2025  Prepared by: Madelaine Meléndez    Exercises  - Supine Hip Adduction Isometric with Ball  - 2 x daily - 7 x weekly - 1 sets - 10 reps - 3 seconds hold  - Bent Knee Fallouts with Alternating Legs  - 2 x daily - 7 x weekly - 3 sets - 10 reps  - Hip Flexor Stretch at Edge of Bed  - 2 x daily - 7 x weekly - 1 sets - 3 reps - 20 seconds hold  - Supine ITB Stretch with Strap  - 2 x daily - 7 x weekly - 1 sets - 3 reps - 20 seconds hold  - Clamshell  - 2 x daily - 7 x weekly - 1 sets - 10 reps        Manual Therapy:    10     mins  01023;  Therapeutic Exercise:    15     mins  41733;     Neuromuscular Kiana:    0    mins  08864;    Therapeutic Activity:     0     mins  28733;     Ultrasound                  __0_  mins  97991  Iontophoresis                 0    mins  15870    Electrical Stimulation     0    mins  94840 ([id number])  Traction                         _0  mins  34114     Evaluation Time:     25  mins  Timed Treatment:   25   mins   Total Treatment:      55   mins    PT: Madelaine Meléndez, PT     License Number: [place] [id number]  Electronically signed by Madelaine Meléndez, PT, 01/15/25, 4:58 PM EST    Certification Period: 1/16/2025 thru 4/15/2025  I certify that the therapy services are furnished while this patient is under my care.  The services outlined above are required by this patient, and will be reviewed every 90 days.         Physician Signature:__________________________________________________    PHYSICIAN: Rico Patterson MD      DATE:     Please sign and return via fax to [email or web address] . Thank you, [facility].    PT SIGNATURE: Madelaine Meléndez PT   [place] LICENSE:  [id number]

## 2025-01-15 NOTE — PROGRESS NOTES
"Chief Complaint  Lower Extremity Issue (Left ankle and pain and redness a few days)    Subjective        Lachelle Mcclure presents to [facility]  History of Present Illness  History of Present Illness  The patient presents for evaluation of left lower extremity swelling.    She underwent hip surgery approximately 3 to 4 months ago and has since experienced 2 falls on 10/27/2024 and 12/08/2024. Both incidents were evaluated by her physician, with radiographic imaging revealing no abnormalities. The first fall occurred when she tripped over a wooden stool, and the second incident happened at a [employer] party when she tripped over a rubber mat. She has been experiencing intermittent leg swelling post-surgery, which was anticipated by her surgeon.     However, she recently noticed pain and redness in her left leg, which she attributes to the fall in October. She describes the pain as a burning sensation, accompanied by itching. She also reports mild tightness in her calf but no associated pain. She notes that the swelling was more pronounced this morning compared to the previous day. She has been attempting to manage the swelling by elevating her leg. She has an upcoming appointment with Dr. Vega at the end of the month.    She has been experiencing soreness and discomfort, leading to a decrease in her exercise routine. She is currently under the care of a physical therapist who has introduced new exercises. She has a scheduled appointment with her physical therapist today at 5 PM.    MEDICATIONS  aspirin       Objective   Vital Signs:  /80   Pulse 75   Temp 97.9 °F (36.6 °C)   Resp 16   Ht 159.4 cm (62.76\")   Wt 69.4 kg (153 lb)   SpO2 98%   BMI 27.31 kg/m²   Estimated body mass index is 27.31 kg/m² as calculated from the following:    Height as of this encounter: 159.4 cm (62.76\").    Weight as of this encounter: 69.4 kg (153 lb).               Physical Exam  Vitals and " nursing note reviewed.   Constitutional:       Appearance: Normal appearance.   HENT:      Head: Normocephalic.      Nose: Nose normal.   Eyes:      Pupils: Pupils are equal, round, and reactive to light.   Cardiovascular:      Rate and Rhythm: Normal rate and regular rhythm.      Pulses: Normal pulses.      Heart sounds: Normal heart sounds.      Comments: Slight peripheral edema bilaterally  KHANH hose on  Pulmonary:      Effort: Pulmonary effort is normal. No respiratory distress.      Breath sounds: Normal breath sounds. No stridor. No wheezing, rhonchi or rales.      Comments: Denies shortness of breath  Chest:      Chest wall: No tenderness.   Musculoskeletal:         General: Swelling and tenderness present. No signs of injury.      Comments: LLL tenderness/swelling laterally/posterior     Multiple superficial veins and varicose veins noted    Skin:     General: Skin is warm.      Capillary Refill: Capillary refill takes less than 2 seconds.   Neurological:      Mental Status: She is alert and oriented to person, place, and time.   Psychiatric:         Behavior: Behavior normal.        Physical Exam  There is a little bit of redness and swelling posteriorly and laterally above the left ankle.     Result Review :      Common labs          7/24/2024    09:06 8/17/2024    03:53 9/16/2024    13:58   Common Labs   Glucose 95  123  111    BUN 11  9  14    Creatinine 0.68  0.58  0.61    Sodium 141  141  142    Potassium 4.1  4.1  4.2    Chloride 105  108  105    Calcium 10.6  9.5  10.4    Total Protein   6.0    Albumin 4.1   4.2    Total Bilirubin 0.6   0.3    Alkaline Phosphatase 52   85    AST (SGOT) 15   16    ALT (SGPT) 16   12    WBC 4.15  11.05     Hemoglobin 13.6  11.6     Hematocrit 42.9  35.7     Platelets 189  152     Total Cholesterol 132      Triglycerides 70      Hemoglobin A1C 5.20          Results  Imaging  X-ray of the hip showed no abnormalities.              Assessment and Plan     Diagnoses and all  orders for this visit:    1. Localized swelling of left lower leg (Primary)  -     Duplex Venous Lower Extremity - Left CAR    2. Pain in left lower leg  -     Duplex Venous Lower Extremity - Left CAR      Assessment & Plan  1. Left lower extremity edema.  The patient presents with left lower extremity edema, which is more pronounced compared to the right side. There is also evidence of superficial varicose veins. She reports a burning sensation and mild tightness in the affected area. Given these symptoms, it is prudent to rule out the possibility of a blood clot. A venous Doppler ultrasound will be ordered to exclude the presence of any thrombotic events.    2. Post-surgical hip recovery.  The patient had hip surgery approximately 3-4 months ago and has experienced 2 falls since then. She reports soreness and has not been consistent with her exercises. She is currently seeing a physical therapist to start new exercises.    PROCEDURE  The patient underwent hip surgery approximately 3 to 4 months ago.         I spent 25 minutes caring for Lachelle on this date of service. This time includes time spent by me in the following activities:preparing for the visit, reviewing tests, obtaining and/or reviewing a separately obtained history, performing a medically appropriate examination and/or evaluation , counseling and educating the patient/family/caregiver, ordering medications, tests, or procedures, referring and communicating with other health care professionals , documenting information in the medical record, independently interpreting results and communicating that information with the patient/family/caregiver, and care coordination  Follow Up     Return if symptoms worsen or fail to improve, for Recheck.  Patient was given instructions and counseling regarding her condition or for health maintenance advice. Please see specific information pulled into the AVS if appropriate.     Patient or patient representative  verbalized consent for the use of Ambient Listening during the visit with  INDIRA Christensen for chart documentation. 1/15/2025  13:59 EST

## 2025-01-15 NOTE — TELEPHONE ENCOUNTER
Hub staff attempted to follow warm transfer process and was unsuccessful     Caller: CHERI GALLO    Relationship to patient: PATIENT    Best call back number: [phone]     Patient is needing: PATIENT IS RETURNING CALL FOR POSSIBLE APPOINTMENT  WITH DR KO.    UNABLE TO WARM TRANSFER.    PLEASE ADVISE.

## 2025-01-16 ENCOUNTER — HOSPITAL ENCOUNTER (OUTPATIENT)
Dept: CARDIOLOGY | Facility: HOSPITAL | Age: 85
Discharge: HOME | End: 2025-01-16
Payer: MEDICARE

## 2025-01-16 ENCOUNTER — LAB (OUTPATIENT)
Dept: OTHER | Facility: HOSPITAL | Age: 85
End: 2025-01-16
Payer: MEDICARE

## 2025-01-16 ENCOUNTER — LAB (OUTPATIENT)
Dept: LAB | Facility: HOSPITAL | Age: 85
End: 2025-01-16
Payer: MEDICARE

## 2025-01-16 ENCOUNTER — INFUSION (OUTPATIENT)
Dept: ONCOLOGY | Facility: HOSPITAL | Age: 85
End: 2025-01-16
Payer: MEDICARE

## 2025-01-16 DIAGNOSIS — M81.0 AGE-RELATED OSTEOPOROSIS WITHOUT CURRENT PATHOLOGICAL FRACTURE: Primary | ICD-10-CM

## 2025-01-16 DIAGNOSIS — M81.0 AGE-RELATED OSTEOPOROSIS WITHOUT CURRENT PATHOLOGICAL FRACTURE: ICD-10-CM

## 2025-01-16 DIAGNOSIS — Z51.81 THERAPEUTIC DRUG MONITORING: ICD-10-CM

## 2025-01-16 DIAGNOSIS — I65.23 ATHEROSCLEROSIS OF BOTH CAROTID ARTERIES: Primary | Chronic | ICD-10-CM

## 2025-01-16 LAB
25(OH)D3 SERPL-MCNC: 27.4 NG/ML (ref 30–100)
25(OH)D3 SERPL-MCNC: 33.7 NG/ML (ref 30–100)
ALBUMIN SERPL-MCNC: 4.1 G/DL (ref 3.5–5.2)
ALBUMIN SERPL-MCNC: 4.3 G/DL (ref 3.5–5.2)
ALBUMIN/GLOB SERPL: 1.7 G/DL
ALBUMIN/GLOB SERPL: 1.7 G/DL
ALP SERPL-CCNC: 69 U/L (ref 39–117)
ALP SERPL-CCNC: 70 U/L (ref 39–117)
ALT SERPL W P-5'-P-CCNC: 17 U/L (ref 1–33)
ALT SERPL W P-5'-P-CCNC: 18 U/L (ref 1–33)
ANION GAP SERPL CALCULATED.3IONS-SCNC: 8 MMOL/L (ref 5–15)
ANION GAP SERPL CALCULATED.3IONS-SCNC: 8.1 MMOL/L (ref 5–15)
AST SERPL-CCNC: 20 U/L (ref 1–32)
AST SERPL-CCNC: 21 U/L (ref 1–32)
BH CV LOWER VASCULAR LEFT COMMON FEMORAL AUGMENT: NORMAL
BH CV LOWER VASCULAR LEFT COMMON FEMORAL COMPETENT: NORMAL
BH CV LOWER VASCULAR LEFT COMMON FEMORAL COMPRESS: NORMAL
BH CV LOWER VASCULAR LEFT COMMON FEMORAL PHASIC: NORMAL
BH CV LOWER VASCULAR LEFT COMMON FEMORAL SPONT: NORMAL
BH CV LOWER VASCULAR LEFT DISTAL FEMORAL COMPRESS: NORMAL
BH CV LOWER VASCULAR LEFT GASTRONEMIUS COMPRESS: NORMAL
BH CV LOWER VASCULAR LEFT GREATER SAPH AK COMPRESS: NORMAL
BH CV LOWER VASCULAR LEFT GREATER SAPH BK COMPRESS: NORMAL
BH CV LOWER VASCULAR LEFT LESSER SAPH COMPRESS: NORMAL
BH CV LOWER VASCULAR LEFT MID FEMORAL AUGMENT: NORMAL
BH CV LOWER VASCULAR LEFT MID FEMORAL COMPETENT: NORMAL
BH CV LOWER VASCULAR LEFT MID FEMORAL COMPRESS: NORMAL
BH CV LOWER VASCULAR LEFT MID FEMORAL PHASIC: NORMAL
BH CV LOWER VASCULAR LEFT MID FEMORAL SPONT: NORMAL
BH CV LOWER VASCULAR LEFT PERONEAL COMPRESS: NORMAL
BH CV LOWER VASCULAR LEFT POPLITEAL AUGMENT: NORMAL
BH CV LOWER VASCULAR LEFT POPLITEAL COMPETENT: NORMAL
BH CV LOWER VASCULAR LEFT POPLITEAL COMPRESS: NORMAL
BH CV LOWER VASCULAR LEFT POPLITEAL PHASIC: NORMAL
BH CV LOWER VASCULAR LEFT POPLITEAL SPONT: NORMAL
BH CV LOWER VASCULAR LEFT POSTERIOR TIBIAL COMPRESS: NORMAL
BH CV LOWER VASCULAR LEFT PROFUNDA FEMORAL COMPRESS: NORMAL
BH CV LOWER VASCULAR LEFT PROXIMAL FEMORAL COMPRESS: NORMAL
BH CV LOWER VASCULAR LEFT SAPHENOFEMORAL JUNCTION COMPRESS: NORMAL
BH CV LOWER VASCULAR RIGHT COMMON FEMORAL AUGMENT: NORMAL
BH CV LOWER VASCULAR RIGHT COMMON FEMORAL COMPETENT: NORMAL
BH CV LOWER VASCULAR RIGHT COMMON FEMORAL COMPRESS: NORMAL
BH CV LOWER VASCULAR RIGHT COMMON FEMORAL PHASIC: NORMAL
BH CV LOWER VASCULAR RIGHT COMMON FEMORAL SPONT: NORMAL
BILIRUB SERPL-MCNC: 0.4 MG/DL (ref 0–1.2)
BILIRUB SERPL-MCNC: 0.4 MG/DL (ref 0–1.2)
BUN SERPL-MCNC: 18 MG/DL (ref 8–23)
BUN SERPL-MCNC: 18 MG/DL (ref 8–23)
BUN/CREAT SERPL: 24.3 (ref 7–25)
BUN/CREAT SERPL: 26.1 (ref 7–25)
CA-I SERPL ISE-MCNC: 1.52 MMOL/L (ref 1.15–1.35)
CALCIUM SPEC-SCNC: 11.9 MG/DL (ref 8.6–10.5)
CALCIUM SPEC-SCNC: 12 MG/DL (ref 8.6–10.5)
CHLORIDE SERPL-SCNC: 102 MMOL/L (ref 98–107)
CHLORIDE SERPL-SCNC: 102 MMOL/L (ref 98–107)
CO2 SERPL-SCNC: 30 MMOL/L (ref 22–29)
CO2 SERPL-SCNC: 30.9 MMOL/L (ref 22–29)
CREAT SERPL-MCNC: 0.69 MG/DL (ref 0.57–1)
CREAT SERPL-MCNC: 0.74 MG/DL (ref 0.57–1)
EGFRCR SERPLBLD CKD-EPI 2021: 80.9 ML/MIN/1.73
EGFRCR SERPLBLD CKD-EPI 2021: 86.8 ML/MIN/1.73
GLOBULIN UR ELPH-MCNC: 2.4 GM/DL
GLOBULIN UR ELPH-MCNC: 2.5 GM/DL
GLUCOSE SERPL-MCNC: 106 MG/DL (ref 65–99)
GLUCOSE SERPL-MCNC: 99 MG/DL (ref 65–99)
MAGNESIUM SERPL-MCNC: 2.1 MG/DL (ref 1.6–2.4)
MAGNESIUM SERPL-MCNC: 2.1 MG/DL (ref 1.6–2.4)
PHOSPHATE SERPL-MCNC: 3.3 MG/DL (ref 2.5–4.5)
PHOSPHATE SERPL-MCNC: 3.4 MG/DL (ref 2.5–4.5)
POTASSIUM SERPL-SCNC: 4 MMOL/L (ref 3.5–5.2)
POTASSIUM SERPL-SCNC: 4 MMOL/L (ref 3.5–5.2)
PROT SERPL-MCNC: 6.5 G/DL (ref 6–8.5)
PROT SERPL-MCNC: 6.8 G/DL (ref 6–8.5)
PTH-INTACT SERPL-MCNC: 40.7 PG/ML (ref 15–65)
SODIUM SERPL-SCNC: 140 MMOL/L (ref 136–145)
SODIUM SERPL-SCNC: 141 MMOL/L (ref 136–145)

## 2025-01-16 PROCEDURE — 83735 ASSAY OF MAGNESIUM: CPT

## 2025-01-16 PROCEDURE — 36415 COLL VENOUS BLD VENIPUNCTURE: CPT

## 2025-01-16 PROCEDURE — 84100 ASSAY OF PHOSPHORUS: CPT

## 2025-01-16 PROCEDURE — 80053 COMPREHEN METABOLIC PANEL: CPT | Performed by: INTERNAL MEDICINE

## 2025-01-16 PROCEDURE — 82306 VITAMIN D 25 HYDROXY: CPT | Performed by: INTERNAL MEDICINE

## 2025-01-16 PROCEDURE — 96372 THER/PROPH/DIAG INJ SC/IM: CPT

## 2025-01-16 PROCEDURE — 25010000002 DENOSUMAB 60 MG/ML SOLUTION PREFILLED SYRINGE: Performed by: INTERNAL MEDICINE

## 2025-01-16 PROCEDURE — 80053 COMPREHEN METABOLIC PANEL: CPT

## 2025-01-16 PROCEDURE — 93971 EXTREMITY STUDY: CPT

## 2025-01-16 PROCEDURE — 83970 ASSAY OF PARATHORMONE: CPT | Performed by: INTERNAL MEDICINE

## 2025-01-16 PROCEDURE — 83735 ASSAY OF MAGNESIUM: CPT | Performed by: INTERNAL MEDICINE

## 2025-01-16 PROCEDURE — 82330 ASSAY OF CALCIUM: CPT | Performed by: INTERNAL MEDICINE

## 2025-01-16 PROCEDURE — 82306 VITAMIN D 25 HYDROXY: CPT

## 2025-01-16 PROCEDURE — 84100 ASSAY OF PHOSPHORUS: CPT | Performed by: INTERNAL MEDICINE

## 2025-01-16 RX ADMIN — DENOSUMAB 60 MG: 60 INJECTION SUBCUTANEOUS at 15:01

## 2025-01-16 NOTE — NURSING NOTE
"Called to office of Dr. PORTIA Vega to report calcium 12.0. S/W Alissa and she states that she will inform Dr. Vega via text and likely \"he will have me call her in the morning\".  Will inform patient to hold oral calcium until she hears from doctors office tomorrow.      F/U; Office of Dr. Vega' called and said when she was finished with PROLIA Dr. would like for her to return to office today or tomorrow for additional labs.  "

## 2025-01-17 ENCOUNTER — TREATMENT (OUTPATIENT)
Dept: PHYSICAL THERAPY | Facility: CLINIC | Age: 85
End: 2025-01-17
Payer: MEDICARE

## 2025-01-17 DIAGNOSIS — Z74.09 IMPAIRED FUNCTIONAL MOBILITY, BALANCE, GAIT, AND ENDURANCE: ICD-10-CM

## 2025-01-17 DIAGNOSIS — Z96.641 H/O TOTAL HIP ARTHROPLASTY, RIGHT: Primary | ICD-10-CM

## 2025-01-17 DIAGNOSIS — M25.551 RIGHT HIP PAIN: ICD-10-CM

## 2025-01-17 NOTE — PROGRESS NOTES
Physical Therapy Daily Treatment Note  1/17/2025  Visit Diagnoses:    ICD-10-CM ICD-9-CM   1. H/O total hip arthroplasty, right  Z96.641 V43.64   2. Right hip pain  M25.551 719.45   3. Impaired functional mobility, balance, gait, and endurance  Z74.09 V49.89       VISIT#: 2      Lachelle Mcclure reports: She had a R hip replacement in August. She has fallen twice since then. Her groin pain has decreased significantly since her last PT session.. She had an US in the area a few days ago and the pressure did bother her inguinal area. The pain seems to come and go.   Current Pain Level:    soreness in lateral hip and thigh/10;   Affected Area: Right groin - anterior hip skin feels sore, lateral hip soreness from the fall   Quality of Pain: burning, throbbing, dull ache, discomfort and pressure. Decreased sensation in R lateral thigh  Functional Deficits/Irritating Factors:  sleeping, standing, stairs, ambulation, squatting, lifting and prolonged positioning   Progression: improving  Compliance with HEP Reported: Yes    Objective  Presents: Presents walking with straight cane in left hand to assist right hip  Walks with short stride length   Increased sets/reps of:  hip adduction         Very tight in R hip flexors and quads.   Decreased sensation down to ankle in R LE  See Exercise, Manual, and Modality Logs for complete treatment.     Patient Education: Pt was educated on exercise biomechanical correctness, intensity, and speed.     Assessment:  Lachelle did well with manual therapy and was able to tolerate inferior glide at her R hip in spite of pressure on her inguinal area. Noted during STM her R hip anterolateral hip and proximal quadriceps are very tight and tender. Did well with her exercises. Moderatel cuing needed.  Pt will continue to benefit from skilled PT interventions to address current functional deficits and impairments.     SHORT TERM GOALS: Time for Goal Achievement: 4 weeks    1.  Patient to be compliant with  HEP.   2.  Pt able to ascend/descend steps and transfer with less hip pain < 5/10  3.  Patient will be able to sleep without pain interruption  4. Patient will be able to stand/walk for 15 minutes without increased symptoms       LONG TERM GOALS: Time for Goal Achievement: 8 weeks  1.  Pt to improve LEFS score > 25% perceived disability   2.  Patient able to ascend/descend steps and prolonged standing & cooking with pain < 2/10  3.  Pt to exhibit functional hip AROM to allow for kneeling, bending squatting as is necessary for ADL's, IADL's and household activities.   4.  Patient will be able to stand/walk for 30 minutes without increased symptoms         Plan: Progress to/Continue with current program. Glute strengthening      Timed:  Manual Therapy:            15_    mins  56531;  Ultrasound:                     0      mins  49154;   Therapeutic Exercise:    20     mins  37128;     Neuromuscular Kiana:   0     mins  63631;    Therapeutic Activity:      0     mins  81239;      Iontophoresis              _0__   mins  Dry Needling               _0____   mins         Untimed:  Work Conditioning: __0__ mins 89894  Electrical Stimulation:    0    mins  27023 ( );  Mechanical Traction:       0        mins  23392;   Paraffin                       __0___  mins   Ice/Heat: __0__ mins      Timed Treatment:   35   mins   Total Treatment:     35   mins          MARJAN Salcedo License # [id number]  Physical Therapist Assistant

## 2025-01-20 ENCOUNTER — TRANSCRIBE ORDERS (OUTPATIENT)
Dept: ADMINISTRATIVE | Facility: HOSPITAL | Age: 85
End: 2025-01-20
Payer: MEDICARE

## 2025-01-20 DIAGNOSIS — Z20.828 EXPOSURE TO INFLUENZA: Primary | ICD-10-CM

## 2025-01-20 DIAGNOSIS — Z13.6 ENCOUNTER FOR SCREENING FOR VASCULAR DISEASE: Primary | ICD-10-CM

## 2025-01-20 RX ORDER — OSELTAMIVIR PHOSPHATE 75 MG/1
CAPSULE ORAL
Qty: 10 CAPSULE | Refills: 0 | Status: SHIPPED | OUTPATIENT
Start: 2025-01-20

## 2025-01-21 ENCOUNTER — TREATMENT (OUTPATIENT)
Dept: PHYSICAL THERAPY | Facility: CLINIC | Age: 85
End: 2025-01-21
Payer: MEDICARE

## 2025-01-21 DIAGNOSIS — Z96.641 H/O TOTAL HIP ARTHROPLASTY, RIGHT: Primary | ICD-10-CM

## 2025-01-21 DIAGNOSIS — Z74.09 IMPAIRED FUNCTIONAL MOBILITY, BALANCE, GAIT, AND ENDURANCE: ICD-10-CM

## 2025-01-21 DIAGNOSIS — M25.551 RIGHT HIP PAIN: ICD-10-CM

## 2025-01-21 PROCEDURE — 97140 MANUAL THERAPY 1/> REGIONS: CPT | Performed by: PHYSICAL THERAPIST

## 2025-01-21 PROCEDURE — 97110 THERAPEUTIC EXERCISES: CPT | Performed by: PHYSICAL THERAPIST

## 2025-01-21 PROCEDURE — 97112 NEUROMUSCULAR REEDUCATION: CPT | Performed by: PHYSICAL THERAPIST

## 2025-01-21 PROCEDURE — 97530 THERAPEUTIC ACTIVITIES: CPT | Performed by: PHYSICAL THERAPIST

## 2025-01-21 NOTE — PROGRESS NOTES
Physical Therapy Daily Treatment Note  [facility] Physical Therapy [facility]  [address], [address]  [place] [address]    Patient: Lachelle Mcclure   : 1940  Referring practitioner: Rico Patterson,*  Today's Date: 2025  Patient seen for 3 sessions    Visit Diagnoses:    ICD-10-CM ICD-9-CM   1. H/O total hip arthroplasty, right  Z96.641 V43.64   2. Right hip pain  M25.551 719.45   3. Impaired functional mobility, balance, gait, and endurance  Z74.09 V49.89       Subjective   Lachelle Mcclure reports: that her groin pain has decreased.  The pain is less intense.        Objective   Presents walking without her cane with a symmetrical yet short stride gait pattern    Tenderness to palpation in hip flexor insertion    See Exercise, Manual, and Modality Logs for complete treatment.     Patient Education: cont w/home stretches    Assessment/Plan  Lachelle is progressing well with her rehab.  Pain is less intense and less frequent.  Able to increased WB activities without pain    Progress strengthening /stabilization /functional activity           Timed:  Manual Therapy:    15     mins  76971;  Therapeutic Exercise:    15/20     mins  84119;     Neuromuscular Kiana:    10    mins  16432;    Therapeutic Activity:     15     mins  17212;     Ultrasound:      0     mins  95734;    Iontophoresis              __0_   mins  Dry Needling               _____   mins        Untimed:  Electrical Stimulation:     0    mins  90868 ( );  Mechanical Traction:             mins  14919;   Paraffin                       _____  mins     Timed Treatment:   55   mins   Total Treatment:     65   mins    Madelaine Meléndez PT  [place] License # [id number]  Physical Therapist    Electronically signed by Madelaine Meléndez PT, 25, 9:09 AM EST

## 2025-01-26 NOTE — PROGRESS NOTES
Physical Therapy Daily Treatment Note  1/27/2025  Visit Diagnoses:    ICD-10-CM ICD-9-CM   1. H/O total hip arthroplasty, right  Z96.641 V43.64   2. Right hip pain  M25.551 719.45   3. Impaired functional mobility, balance, gait, and endurance  Z74.09 V49.89       VISIT#: 4      Lachelle Mcclure reports: She experiences some weakness in her R hip when picking up her leg. It also gets sore in her inguinal area. Her pain has improved since last session and at worst is a 4/10.    Current Pain Level:    none   Affected Area: Right groin - anterior hip skin feels sore, lateral hip soreness from the fall   Quality of Pain: burning, throbbing, dull ache, discomfort and pressure. Decreased sensation in R lateral thigh  Functional Deficits/Irritating Factors:  sleeping, standing, stairs, ambulation, squatting, lifting and prolonged positioning   Progression: improving  Compliance with HEP Reported: Yes    Objective  Presents: normal gait   Increased sets/reps of:  none   Increased resistance on:  BKFO  Added to Program: KT tape, STM to IT band, bridge, quadruped rock backs      Very tight in R hip flexors and quads. R hip anterolateral hip and proximal quadriceps are very tight and tender.   Decreased sensation down to ankle in R LE        See Exercise, Manual, and Modality Logs for complete treatment.     Patient Education: Pt was educated on exercise biomechanical correctness, intensity, and speed.     Assessment:  Lachelle has decreased hip pain since last PT session but inguinal pain has lingered. May benefit from US being added to the POC for this area..  Pt will continue to benefit from skilled PT interventions to address current functional deficits and impairments.     SHORT TERM GOALS: Time for Goal Achievement: 4 weeks    1.  Patient to be compliant with HEP.   2.  Pt able to ascend/descend steps and transfer with less hip pain < 5/10  3.  Patient will be able to sleep without pain interruption  4. Patient will be able to  stand/walk for 15 minutes without increased symptoms       LONG TERM GOALS: Time for Goal Achievement: 8 weeks  1.  Pt to improve LEFS score > 25% perceived disability   2.  Patient able to ascend/descend steps and prolonged standing & cooking with pain < 2/10  3.  Pt to exhibit functional hip AROM to allow for kneeling, bending squatting as is necessary for ADL's, IADL's and household activities.   4.  Patient will be able to stand/walk for 30 minutes without increased symptoms          Plan: Progress to/Continue with current program.       Timed:  Manual Therapy:            15_    mins  32285;  Ultrasound:                     0      mins  54754;   Therapeutic Exercise:    15     mins  69888;     Neuromuscular Kiana:   10     mins  03364;    Therapeutic Activity:      0     mins  55671;      Iontophoresis              _0__   mins  Dry Needling               _0____   mins         Untimed:  Work Conditioning: __0__ mins 34546  Electrical Stimulation:    0    mins  26137 ( );  Mechanical Traction:       0        mins  64192;   Paraffin                       __0___  mins   Ice/Heat: __0__ mins      Timed Treatment:   40   mins   Total Treatment:     40   mins          Jessica Beckwith PTA  [place] License # [id number]  Physical Therapist Assistant

## 2025-01-27 ENCOUNTER — TREATMENT (OUTPATIENT)
Dept: PHYSICAL THERAPY | Facility: CLINIC | Age: 85
End: 2025-01-27
Payer: MEDICARE

## 2025-01-27 DIAGNOSIS — Z96.641 H/O TOTAL HIP ARTHROPLASTY, RIGHT: Primary | ICD-10-CM

## 2025-01-27 DIAGNOSIS — Z74.09 IMPAIRED FUNCTIONAL MOBILITY, BALANCE, GAIT, AND ENDURANCE: ICD-10-CM

## 2025-01-27 DIAGNOSIS — M25.551 RIGHT HIP PAIN: ICD-10-CM

## 2025-01-31 ENCOUNTER — TREATMENT (OUTPATIENT)
Dept: PHYSICAL THERAPY | Facility: CLINIC | Age: 85
End: 2025-01-31
Payer: MEDICARE

## 2025-01-31 DIAGNOSIS — Z74.09 IMPAIRED FUNCTIONAL MOBILITY, BALANCE, GAIT, AND ENDURANCE: ICD-10-CM

## 2025-01-31 DIAGNOSIS — Z96.641 H/O TOTAL HIP ARTHROPLASTY, RIGHT: Primary | ICD-10-CM

## 2025-01-31 DIAGNOSIS — M25.551 RIGHT HIP PAIN: ICD-10-CM

## 2025-01-31 NOTE — PROGRESS NOTES
Physical Therapy Daily Treatment Note  1/31/2025  Visit Diagnoses:    ICD-10-CM ICD-9-CM   1. H/O total hip arthroplasty, right  Z96.641 V43.64   2. Right hip pain  M25.551 719.45   3. Impaired functional mobility, balance, gait, and endurance  Z74.09 V49.89       VISIT#: 5      Lachelle Mcclure reports: she was sore after last PT session but is doing better today. She was hit in her R thigh/hip area by one of those large delivery carts when she was in the grocery store today.  It hurt quite a bit then but also passed. She thinks the tape may  have helped some but not sure. She continues to have a lot of R inguinal/groin area pain. She is not had the decreased sensation down her R leg since last week.   Current Pain Level:    none   Affected Area: Right groin - anterior hip skin feels sore, lateral hip soreness from the fall   Quality of Pain: burning, throbbing, dull ache, discomfort and pressure. Decreased sensation in R lateral thigh  Functional Deficits/Irritating Factors:  sleeping, standing, stairs, ambulation, squatting, lifting and prolonged positioning   Progression: improving  Compliance with HEP Reported: Yes    Objective      Added to Program: HL marching with tap, SB rollouts      Very tight in R hip flexors and quads. R hip anterolateral hip and proximal quadriceps are very tight and tender.     Very tender to pressure in R inguinal area        See Exercise, Manual, and Modality Logs for complete treatment.     Patient Education: Pt was educated on exercise biomechanical correctness, intensity, and speed.     Assessment:  Very difficult to determine what is causing Lachelle's inguinal area pain along with intermittent decreased R leg sensation. Not sure if it is spinal or hip related and maybe both.  She did report she has some stenosis and her symptoms often occur during standing and sitting gives her relief. However, prolonged pressure in her inguinal area  causes irritation too. Avoided extension posture  today and added some core and flexion exercises. Very tender and tight in IT band and lateral quad. Pt will continue to benefit from skilled PT interventions to address current functional deficits and impairments.     SHORT TERM GOALS: Time for Goal Achievement: 4 weeks    1.  Patient to be compliant with HEP.   2.  Pt able to ascend/descend steps and transfer with less hip pain < 5/10  3.  Patient will be able to sleep without pain interruption  4. Patient will be able to stand/walk for 15 minutes without increased symptoms       LONG TERM GOALS: Time for Goal Achievement: 8 weeks  1.  Pt to improve LEFS score > 25% perceived disability   2.  Patient able to ascend/descend steps and prolonged standing & cooking with pain < 2/10  3.  Pt to exhibit functional hip AROM to allow for kneeling, bending squatting as is necessary for ADL's, IADL's and household activities.   4.  Patient will be able to stand/walk for 30 minutes without increased symptoms          Plan: Progress to/Continue with current program. Assess response to today's session      Timed:  Manual Therapy:            20_    mins  20479;  Ultrasound:                     0      mins  79608;   Therapeutic Exercise:    25     mins  84081;     Neuromuscular Kiana:   0     mins  67628;    Therapeutic Activity:      15     mins  15930;      Iontophoresis              _0__   mins  Dry Needling               _0____   mins         Untimed:  Work Conditioning: __0__ mins 14548  Electrical Stimulation:    0    mins  59705 ( );  Mechanical Traction:       0        mins  54419;   Paraffin                       __0___  mins   Ice/Heat: __0__ mins      Timed Treatment:   60   mins   Total Treatment:     60   mins          MARJAN Salcedo License # [id number]  Physical Therapist Assistant

## 2025-02-03 ENCOUNTER — TELEPHONE (OUTPATIENT)
Dept: INTERNAL MEDICINE | Facility: CLINIC | Age: 85
End: 2025-02-03

## 2025-02-03 ENCOUNTER — OFFICE VISIT (OUTPATIENT)
Dept: INTERNAL MEDICINE | Facility: CLINIC | Age: 85
End: 2025-02-03
Payer: MEDICARE

## 2025-02-03 VITALS
BODY MASS INDEX: 28.77 KG/M2 | WEIGHT: 162.4 LBS | HEART RATE: 78 BPM | SYSTOLIC BLOOD PRESSURE: 138 MMHG | DIASTOLIC BLOOD PRESSURE: 76 MMHG | RESPIRATION RATE: 14 BRPM | HEIGHT: 63 IN

## 2025-02-03 DIAGNOSIS — Z78.9 INTOLERANCE OF ORAL BISPHOSPHONATE THERAPY: Chronic | ICD-10-CM

## 2025-02-03 DIAGNOSIS — M81.0 AGE-RELATED OSTEOPOROSIS WITHOUT CURRENT PATHOLOGICAL FRACTURE: Chronic | ICD-10-CM

## 2025-02-03 DIAGNOSIS — E55.9 VITAMIN D DEFICIENCY: Chronic | ICD-10-CM

## 2025-02-03 DIAGNOSIS — T50.905A MEDICATION SIDE EFFECT, INITIAL ENCOUNTER: ICD-10-CM

## 2025-02-03 DIAGNOSIS — I10 BENIGN ESSENTIAL HYPERTENSION: Primary | Chronic | ICD-10-CM

## 2025-02-03 DIAGNOSIS — E83.52 HYPERCALCEMIA: ICD-10-CM

## 2025-02-03 PROBLEM — Z91.81 HISTORY OF RECENT FALL: Status: RESOLVED | Noted: 2025-02-03 | Resolved: 2025-02-03

## 2025-02-03 PROBLEM — Z91.81 HISTORY OF RECENT FALL: Status: ACTIVE | Noted: 2025-02-03

## 2025-02-03 PROCEDURE — 1160F RVW MEDS BY RX/DR IN RCRD: CPT | Performed by: INTERNAL MEDICINE

## 2025-02-03 PROCEDURE — 1126F AMNT PAIN NOTED NONE PRSNT: CPT | Performed by: INTERNAL MEDICINE

## 2025-02-03 PROCEDURE — 3078F DIAST BP <80 MM HG: CPT | Performed by: INTERNAL MEDICINE

## 2025-02-03 PROCEDURE — 1159F MED LIST DOCD IN RCRD: CPT | Performed by: INTERNAL MEDICINE

## 2025-02-03 PROCEDURE — 99214 OFFICE O/P EST MOD 30 MIN: CPT | Performed by: INTERNAL MEDICINE

## 2025-02-03 PROCEDURE — G2211 COMPLEX E/M VISIT ADD ON: HCPCS | Performed by: INTERNAL MEDICINE

## 2025-02-03 PROCEDURE — 3075F SYST BP GE 130 - 139MM HG: CPT | Performed by: INTERNAL MEDICINE

## 2025-02-03 RX ORDER — VALSARTAN 320 MG/1
TABLET ORAL
Qty: 30 TABLET | Refills: 11 | Status: SHIPPED | OUTPATIENT
Start: 2025-02-03

## 2025-02-03 NOTE — PROGRESS NOTES
02/03/2025    Patient Information  Lachelle ESTES Kami                                                                                          [address]  [facility] [address]      1940  [unfilled]  There is no work phone number on file.    Chief Complaint:     Follow-up elevated calcium.    History of Present Illness:    Patient with history of osteoporosis who is intolerant of oral bisphosphonate and who has been on Prolia injections in the past.  She recently had lab work prior to the Prolia injection and this noted an elevated calcium.  I have patient obtain an ionized calcium and intact PTH and she is here today for follow-up.  She also status post right total hip replacement and seems to be making progress now although she did have a couple of falls postoperatively.  Past medical history reviewed and updated were necessary.    Review of Systems   Constitutional: Negative. Negative for chills.   HENT: Negative.     Eyes: Negative.    Cardiovascular: Negative.  Negative for chest pain.   Respiratory: Negative.     Endocrine: Negative.    Hematologic/Lymphatic: Negative.    Skin: Negative.    Musculoskeletal: Negative.    Gastrointestinal: Negative.  Negative for anorexia.   Genitourinary: Negative.    Neurological: Negative.    Psychiatric/Behavioral: Negative.     Allergic/Immunologic: Negative.        Active Problems:    Patient Active Problem List   Diagnosis    Benign essential hypertension    Carotid artery plaque, 5/9/2022-- mild bilateral plaque without stenosis.  4/3/2019--mild bilateral.  10/06/2016--vascular screen is now normal without carotid disease.  Improved.    Gastroesophageal reflux disease without esophagitis    Hyperlipidemia    Microscopic hematuria    Osteoporosis, 3/23/2022--lumbar spine -0.9; left femoral neck -2.4; right femoral neck -2.3.  11/5/2019--lumbar spine -0.3.  Left femoral neck -2.7.  Right femoral neck -2.4.    Tinnitus of right ear    History of  malignant melanoma, 2008--right side of face.  1999--lower back.    Therapeutic drug monitoring    Vitamin D deficiency    Lumbar disc disease    Impaired fasting glucose    Postmenopausal state    History of basal cell carcinoma of skin    Subclinical hypothyroidism    Intolerance of oral bisphosphonate therapy    Primary osteoarthritis of both hips    History of COVID-19    Hypercalcemia    Status post total hip replacement, right    Medication side effect, initial encounter         Past Medical History:   Diagnosis Date    Benign essential hypertension 04/18/2006 04/18/2006--treatment for hypertension begun.    Cancer     Melanoma x 2    Carotid artery plaque, 5/9/2022-- mild bilateral plaque without stenosis.  4/3/2019--mild bilateral.  10/06/2016--vascular screen is now normal without carotid disease.  Improved. 07/30/2010    May 9, 2022--vascular screening reveals mild carotid artery plaque bilaterally without stenosis.  Negative for AAA.  Negative for PAD.  April 3, 2019--vascular screening reveals mild bilateral carotid plaque without stenosis.  Negative for AAA.  Negative for PAD.  10/06/2016--vascular screen reveals no evidence of carotid plaque, negative for AAA, negative for PAD.  04/23/2014--vascular screen rev    Cataract     Cholelithiasis     Chronic right hip pain 09/01/2017 09/01/2017--patient has had a one-year history of intermittent episodes of lateral right hip pain that at times is very severe and debilitating.  She also has tenderness over this area that limits her from sleeping on her right side.  Last year I gave her a cortisone injection for trochanteric bursitis and this provided immediate relief but not long lasting.  X-rays were negative for fracture or even degenerative arthritis.  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  Ordered.    Colon polyp     Gastroesophageal reflux disease without esophagitis 01/14/2013     04/10/2014--patient presented with right upper quadrant/epigastric burning pain and discomfort that was postprandial. H pylori breath test was negative. Empiric trial of Dexilant 60 mg per day was initiated for esophageal reflux/dyspepsia.   01/14/2013--EGD performed for epigastric and right upper quadrant pain. There was some edematous appearing mucosa in the antrum and body of the stomach which was biopsied. No significant erythema. No ulcerations. Normal appearing duodenum and esophagus. Pathology revealed moderate chronic active gastritis. No intestinal metaplasia. Helicobacter pylori positive. Patient was treated with appropriate antibiotic and PPI therapy.    History of basal cell carcinoma of skin 02/12/2019 June 2018--Mohs micrographic surgery of basal cell carcinoma on the tip of the nose.    History of Helicobacter pylori gastritis 01/14/2013    04/10/2014--patient presented with right upper quadrant/epigastric burning pain and discomfort that was postprandial. H pylori breath test was negative. Empiric trial of Dexilant 60 mg per day was initiated for esophageal reflux/dyspepsia.  01/14/2013--EGD performed for epigastric and right upper quadrant pain. There was some edematous appearing mucosa in the antrum and body of the stomach which w    History of malignant melanoma, 2008--right side of face.  1999--lower back. 01/01/2008 2008--melanoma resected from right face.   1999--malignant melanoma resected from lower back.    History of routine gynecologic exam yearly    Patient sees a gynecologist.    History of thrombophlebitis, superficial, left lower extremity. 01/12/2022 January 12, 2022--patient reports a 2-week history of left lower extremity swelling which is just above the ankle and sometimes extends all the way down into the ankle and is associated with redness and tenderness. It tends to get worse towards the end of the day. She has had no shortness of breath or chest pain and there is been  no significant swelling involving the upper part of either leg. On e    Hx of malignant skin melanoma     Hyperlipidemia 08/13/2010 08/13/2010--treatment for hyperlipidemia begun.    Impaired fasting glucose 09/01/2017 09/01/2017--routine physical.  Serum glucose elevated at 101.  Recommend weight loss and decrease carbohydrate intake.    Intolerance of oral bisphosphonate therapy 02/21/2020    Lumbar disc disease 10/19/2016    09/13/2016--x-ray lumbar spine reveals disc space narrowing at L2-L3, L3-L4, L4-L5, and possibly L5-S1.  There is mild anterior listhesis of L4 on L5 measuring 4.5 mm.  No compression deformity, nor other evidence of acute process.    Microscopic hematuria 12/18/2015 12/22/2015--urine cytology negative for malignant cells.  Red blood cells noted.  Transitional epithelial cells noted.  Urine culture revealed less than 10,000 colony forming units of mixed urogenital laurie.  Repeat urinalysis was negative.  Recommend observation.  12/18/2015--routine urinalysis reveals 3-5 WBCs and 3-5 RBCs. 0 epithelial cells. 0 bacteria. Patient is asymptomatic. Repeat urinalysis, urine cytology, and urine culture sent.    Osteopenia     Osteoporosis, 3/23/2022--lumbar spine -0.9; left femoral neck -2.4; right femoral neck -2.3.  11/5/2019--lumbar spine -0.3.  Left femoral neck -2.7.  Right femoral neck -2.4. 07/24/2009 March 23, 2022--DEXA scan reveals lumbar spine T score -0.9 representing a 5.7% interval decrease.  Left femoral neck T score -2.4 which represents a 5.7% increase.  Right femoral neck T score -2.3 which is unchanged.  Impression is osteopenia at the hips with mixed interval change in density values.  November 5, 2019--DEXA scan reveals lumbar spine T score -0.3 representing a 5% increase.  Left f    Positive colorectal cancer screening using Cologuard test     Postmenopausal state 09/01/2017    Primary osteoarthritis of both hips 11/19/2021 November 19, 2021-9 being evaluated  and treated by the orthopedist.  Patient may need bilateral hip replacements at some point in the future.    Subclinical hypothyroidism 02/21/2020 February 21, 2020--TSH slightly elevated at 4.41.  Free T3 and free T4 are normal.  Close observation.    Tinnitus of right ear 12/18/2015 12/18/2015--patient presents with a four-month history of ringing in her right ear. No hearing loss. Examination reveals a cerumen impaction of the right ear canal. This was removed with irrigation and curettage.    Vitamin D deficiency 08/12/2016         Past Surgical History:   Procedure Laterality Date    ADENOIDECTOMY  1946    APPENDECTOMY      BASAL CELL CARCINOMA EXCISION  06/2018 June 2018--Mohs micrographic surgery of basal cell carcinoma on the tip of the nose.    CHOLECYSTECTOMY  05/17/2013 05/17/2013--laparoscopic cholecystectomy.    COLONOSCOPY  07/12/2012 07/12/2012--normal colonoscopy to the cecum with an excellent prep.     COLONOSCOPY  06/20/2003 06/20/2003--normal colonoscopy to the cecum.    COLONOSCOPY N/A 08/30/2023    Procedure: COLONOSCOPY INTO CECUM WITH COLD BIOPSY POLYPECTOMIES AND HOT SNARE POLYPECTOMY;  Surgeon: Ho Goyal MD;  Location: [facility] ENDOSCOPY;  Service: Gastroenterology;  Laterality: N/A;  PRE- POSITIVE COLOGUARD  POST- POLYPS, HEMORRHOIDS    ESOPHAGOSCOPY / EGD  01/14/2013 01/14/2013--EGD performed for epigastric and right upper quadrant pain. There was some edematous appearing mucosa in the antrum and body of the stomach which was biopsied. No significant erythema. No ulcerations. Normal appearing duodenum and esophagus. Pathology revealed moderate chronic active gastritis. No intestinal metaplasia. Helicobacter pylori positive. Patient was treated with appropriate    EYE SURGERY      FOOT SURGERY  1992 1992--orthopedic 10 placed in right great toe.    HYSTERECTOMY  1986 1986--total abdominal hysterectomy.    JOINT REPLACEMENT  2024    Rt Hip    SKIN CANCER  EXCISION  09/14/2009 09/14/2009--excision 1 cm mid scalp cyst. Pathology benign. 2008--melanoma resected from right face. 1999--malignant melanoma resected from lower back.    TONSILLECTOMY AND ADENOIDECTOMY  1946 1946.    TOTAL HIP ARTHROPLASTY Right 08/16/2024    Procedure: TOTAL HIP ARTHROPLASTY ANTERIOR WITH HANA TABLE;  Surgeon: Rico Patterson MD;  [place] [facility] [place] [facility]  Service: Orthopedics;  Laterality: Right;         No Known Allergies        Current Outpatient Medications:     Calcium Carb-Cholecalciferol (CALCIUM 600 + D PO), Take 1 tablet by mouth Daily., Disp: , Rfl:     Cholecalciferol (VITAMIN D) 2000 UNITS capsule, Take 1 capsule by mouth Daily., Disp: , Rfl:     multivitamin with minerals (MULTIVITAMIN ADULT PO), Take 1 tablet by mouth Daily., Disp: , Rfl:     simvastatin (ZOCOR) 40 MG tablet, TAKE ONE TABLET BY MOUTH EVERY DAY FOR HIGH CHOLESTEROL (NEED MD APPOINTMENT), Disp: 90 tablet, Rfl: 1    valsartan (DIOVAN) 320 MG tablet, One by mouth daily for blood pressure., Disp: 30 tablet, Rfl: 11      Family History   Problem Relation Age of Onset    COPD Mother     Hypertension Mother         Essential    Arthritis Mother     Hearing loss Mother     Hyperlipidemia Mother     COPD Father     Hypertension Father     Arthritis Father     Hyperlipidemia Father     Malig Hyperthermia Neg Hx          Social History     Socioeconomic History    Marital status:     Highest education level: Bachelor's degree (e.g., BA, AB, BS)   Tobacco Use    Smoking status: Never    Smokeless tobacco: Never   Vaping Use    Vaping status: Never Used   Substance and Sexual Activity    Alcohol use: Never    Drug use: Never    Sexual activity: Yes     Partners: Male     Birth control/protection: Hysterectomy         Vitals:    02/03/25 1324   BP: 138/76   BP Location: Right arm   Patient Position: Sitting   Cuff Size: Adult   Pulse: 78   Resp: 14   Weight: 73.7 kg (162 lb 6.4 oz)   Height: 159.4 cm  "(62.76\")        Body mass index is 28.99 kg/m².      Physical Exam:    General: Alert and oriented x 3.  No acute distress.  Normal affect.  HEENT: Pupils equal, round, reactive to light; extraocular movements intact; sclerae nonicteric; pharynx, ear canals and TMs normal.  Neck: Without JVD, thyromegaly, bruit, or adenopathy.  Lungs: Clear to auscultation in all fields.  Heart: Regular rate and rhythm without murmur, rub, gallop, or click.  Abdomen: Soft, nontender, without hepatosplenomegaly or hernia.  Bowel sounds normal.  : Deferred.  Rectal: Deferred.  Extremities: Without clubbing, cyanosis, edema, or pulse deficit.  Neurologic: Intact without focal deficit.  Normal station and gait observed during ingress and egress from the examination room.  Skin: Without significant lesion.  Musculoskeletal: Unremarkable.    Lab/other results:    Ionized calcium elevated 1.52.  Intact PTH is normal at 40.7.  CMP revealed an elevated total calcium 11.9.  Magnesium and phosphorus were normal.  Vitamin D was normal at 33.7.  Previous CMP on January 16, 2025 revealed a calcium of 12.0.    Assessment/Plan:     Diagnosis Plan   1. Benign essential hypertension  valsartan (DIOVAN) 320 MG tablet      2. Hypercalcemia        3. Intolerance of oral bisphosphonate therapy        4. Vitamin D deficiency        5. Osteoporosis, 3/23/2022--lumbar spine -0.9; left femoral neck -2.4; right femoral neck -2.3.  11/5/2019--lumbar spine -0.3.  Left femoral neck -2.7.  Right femoral neck -2.4.        6. Medication side effect, initial encounter          Patient presents with hypercalcemia including elevated ionized calcium.  However, she has intact PTH levels are normal and I think the most likely etiology of elevated calcium is the hydrochlorothiazide and the Diovan HCTZ.  Medication adjustment is indicated.  Her blood pressure is currently well-controlled.    Plan is as follows: Discontinue Diovan HCT.  Start plain Diovan 320 mg p.o. " daily.  I have asked patient to wait 2 days of being off of medication for blood pressure before she starts it.  I will have her follow-up in about 3 to 4 weeks to reassess.  We can recheck her labs after that visit.            Procedures

## 2025-02-03 NOTE — TELEPHONE ENCOUNTER
PATIENT CALLED TO MAKE A 3 WEEK FOLLOW UP APPOINTMENT. NO  APPOINTMENTS UNTIL 3/4/25. NEEDS THE WEEK OF 2/24/25    PLEASE CALL AND ADVISE [phone]    ATTEMPTED WARM TRANSFER

## 2025-02-04 ENCOUNTER — TREATMENT (OUTPATIENT)
Dept: PHYSICAL THERAPY | Facility: CLINIC | Age: 85
End: 2025-02-04
Payer: MEDICARE

## 2025-02-04 DIAGNOSIS — Z74.09 IMPAIRED FUNCTIONAL MOBILITY, BALANCE, GAIT, AND ENDURANCE: ICD-10-CM

## 2025-02-04 DIAGNOSIS — M25.551 RIGHT HIP PAIN: ICD-10-CM

## 2025-02-04 DIAGNOSIS — Z96.641 H/O TOTAL HIP ARTHROPLASTY, RIGHT: Primary | ICD-10-CM

## 2025-02-04 NOTE — PROGRESS NOTES
Physical Therapy Daily Treatment Note  [facility] Physical Therapy [facility]  [address], [address]  [place] [address]    Patient: Lachelle Mcclure   : 1940  Referring practitioner: Rico Patterson,*  Today's Date: 2025  Patient seen for 6 sessions    Visit Diagnoses:    ICD-10-CM ICD-9-CM   1. H/O total hip arthroplasty, right  Z96.641 V43.64   2. Right hip pain  M25.551 719.45   3. Impaired functional mobility, balance, gait, and endurance  Z74.09 V49.89       Subjective   Lachelle Mcclure reports: that the groin pain has decreased.  Was very sore after the manual therapy for a couple of days but is better now.  States that she does feel like her mobility is better.  Reports poor balance lately and wants to improve that.       Objective   Tenderness in right hip flexors and rectus femoris muscles    Symmetrical gait pattern with short stride length    See Exercise, Manual, and Modality Logs for complete treatment.     Patient Education: stretch not pain with her exercises    Assessment/Plan  Lachelle has less pain now than upon her initial visit.  Soreness after last session's manual therapy has resolved and her tenderness has decreased because of it.  NO instability in the hip    Progress strengthening /stabilization /functional activity           Timed:  Manual Therapy:    15     mins  16855;  Therapeutic Exercise:    15/25     mins  14380;     Neuromuscular Kiana:    0    mins  31319;    Therapeutic Activity:     10     mins  16476;     Ultrasound:      0     mins  73739;    Iontophoresis              __0_   mins  Dry Needling               _____   mins        Untimed:  Electrical Stimulation:     0    mins  39458 ( );  Mechanical Traction:             mins  54902;   Paraffin                       _____  mins     Timed Treatment:   40   mins   Total Treatment:     60   mins    Madelaine Meléndez, PT  [place] License # [id number]  Physical Therapist    Electronically signed by Madelaine HSEARER  [place] PT, 02/04/25, 9:34 AM EST  15

## 2025-02-06 ENCOUNTER — TREATMENT (OUTPATIENT)
Dept: PHYSICAL THERAPY | Facility: CLINIC | Age: 85
End: 2025-02-06
Payer: MEDICARE

## 2025-02-06 DIAGNOSIS — M25.551 RIGHT HIP PAIN: ICD-10-CM

## 2025-02-06 DIAGNOSIS — Z96.641 H/O TOTAL HIP ARTHROPLASTY, RIGHT: Primary | ICD-10-CM

## 2025-02-06 DIAGNOSIS — Z74.09 IMPAIRED FUNCTIONAL MOBILITY, BALANCE, GAIT, AND ENDURANCE: ICD-10-CM

## 2025-02-06 NOTE — PROGRESS NOTES
Physical Therapy Daily Treatment Note  [facility] Physical Therapy [facility]  [address], [address]  [place] [address]    Patient: Lachelle Mcclure   : 1940  Referring practitioner: Rico Patterson,*  Today's Date: 2025  Patient seen for 7 sessions    Visit Diagnoses:    ICD-10-CM ICD-9-CM   1. H/O total hip arthroplasty, right  Z96.641 V43.64   2. Right hip pain  M25.551 719.45   3. Impaired functional mobility, balance, gait, and endurance  Z74.09 V49.89       Subjective   Lachelle Mcclure reports: that in general she feels better/stronger than she did prior to therapy but does still have some anterior hip pain.  Notes that she occasionally has to lift her leg into the car with her hands as she can not  do it unassisted.      Objective   Presents with a normal gait pattern    Tenderness right medial groin region     See Exercise, Manual, and Modality Logs for complete treatment.     Patient Education: need for both stretching and stabilization     Assessment/Plan  Lachelle is more functional now as her strength has increased but still does have some anterior hip pain.  Weakness persists in hip flexors but not as severe as it was initially.    Progress strengthening /stabilization /functional activity           Timed:  Manual Therapy:    15     mins  42795;  Therapeutic Exercise:    15/25     mins  86995;     Neuromuscular Kiana:    0    mins  36303;    Therapeutic Activity:     10     mins  84581;     Ultrasound:      0     mins  16845;    Iontophoresis              __0_   mins  Dry Needling               _____   mins        Untimed:  Electrical Stimulation:     0    mins  78604 ( );  Mechanical Traction:             mins  97172;   Paraffin                       _____  mins     Timed Treatment:   40   mins   Total Treatment:     60   mins    Madelaine Meléndez PT  [place] License # [id number]  Physical Therapist    Electronically signed by Madelaine Meléndez PT, 25, 3:03 PM EST

## 2025-02-13 ENCOUNTER — TREATMENT (OUTPATIENT)
Dept: PHYSICAL THERAPY | Facility: CLINIC | Age: 85
End: 2025-02-13
Payer: MEDICARE

## 2025-02-13 DIAGNOSIS — Z96.641 H/O TOTAL HIP ARTHROPLASTY, RIGHT: Primary | ICD-10-CM

## 2025-02-13 DIAGNOSIS — M25.551 RIGHT HIP PAIN: ICD-10-CM

## 2025-02-13 DIAGNOSIS — Z74.09 IMPAIRED FUNCTIONAL MOBILITY, BALANCE, GAIT, AND ENDURANCE: ICD-10-CM

## 2025-02-13 NOTE — PROGRESS NOTES
Physical Therapy Daily Treatment Note  [facility] Physical Therapy - [facility]  [address], [address]  [place] [address]    Patient: Lachelle Mcclure   : 1940  Referring practitioner: Rico Patterson,*  Today's Date: 2025  Patient seen for 8 sessions    Visit Diagnoses:    ICD-10-CM ICD-9-CM   1. H/O total hip arthroplasty, right  Z96.641 V43.64   2. Right hip pain  M25.551 719.45   3. Impaired functional mobility, balance, gait, and endurance  Z74.09 V49.89       Subjective   Lachelle Mcclure reports: that the right groin pain has decreased in intensity and frequency.  Today has some lateral hip burning/discomfort and wonders if it is because of that.  States that she is not using her cane most of the time      Objective   Presents with essentially symmetrical gait without use of her cane - still does have short stride length    VEry tender to light palpation of the greater trochanteric region, glut medius and mary    See Exercise, Manual, and Modality Logs for complete treatment.     Patient Education: careful not to over stretch    Assessment/Plan  Lachelle has less groin pain now than upon her initial visit.  Has bee nable to lift the leg more easily into the car.  Has some lateral hip pain which may be due to over stretching.  Very compliant with therapy    Progress strengthening /stabilization /functional activity           Timed:  Manual Therapy:    15     mins  99536;  Therapeutic Exercise:    15     mins  12642;     Neuromuscular Kiana:    0    mins  73272;    Therapeutic Activity:     10     mins  17772;     Ultrasound:      0     mins  37892;    Iontophoresis              __0_   mins  Dry Needling               _____   mins        Untimed:  Electrical Stimulation:     0    mins  67165 ( );  Mechanical Traction:             mins  86430;   Paraffin                       _____  mins     Timed Treatment:   40   mins   Total Treatment:     50   mins    Madelaine Meléndez  PT  [place] License # [id number]  Physical Therapist    Electronically signed by Madelaine Meléndez, PT, 02/13/25, 1:59 PM EST

## 2025-02-17 ENCOUNTER — HOSPITAL ENCOUNTER (OUTPATIENT)
Dept: CARDIOLOGY | Facility: HOSPITAL | Age: 85
Discharge: HOME | End: 2025-02-17
Admitting: INTERNAL MEDICINE
Payer: MEDICARE

## 2025-02-17 DIAGNOSIS — Z13.6 ENCOUNTER FOR SCREENING FOR VASCULAR DISEASE: ICD-10-CM

## 2025-02-17 LAB
BH CV VAS SCREENING CAROTID CCA LEFT: 92.6 CM/SEC
BH CV VAS SCREENING CAROTID CCA RIGHT: 79.5 CM/SEC
BH CV VAS SCREENING CAROTID ICA LEFT: 74.6 CM/SEC
BH CV VAS SCREENING CAROTID ICA RIGHT: 76.4 CM/SEC
BH CV XLRA MEAS - MID AO DIAM: 1.49 CM
BH CV XLRA MEAS - PAD LEFT ABI PT: 1.14
BH CV XLRA MEAS - PAD LEFT ARM: 155 MMHG
BH CV XLRA MEAS - PAD LEFT LEG PT: 189 MMHG
BH CV XLRA MEAS - PAD RIGHT ABI PT: 1.19
BH CV XLRA MEAS - PAD RIGHT ARM: 166 MMHG
BH CV XLRA MEAS - PAD RIGHT LEG PT: 198 MMHG
BH CV XLRA MEAS LEFT DIST CCA EDV: 27.3 CM/SEC
BH CV XLRA MEAS LEFT DIST CCA PSV: 92.6 CM/SEC
BH CV XLRA MEAS LEFT ICA/CCA RATIO: 0.8
BH CV XLRA MEAS LEFT PROX ICA EDV: -28 CM/SEC
BH CV XLRA MEAS LEFT PROX ICA PSV: -74.6 CM/SEC
BH CV XLRA MEAS RIGHT DIST CCA EDV: 23.6 CM/SEC
BH CV XLRA MEAS RIGHT DIST CCA PSV: 79.5 CM/SEC
BH CV XLRA MEAS RIGHT ICA/CCA RATIO: 1
BH CV XLRA MEAS RIGHT PROX ICA EDV: -19.3 CM/SEC
BH CV XLRA MEAS RIGHT PROX ICA PSV: -76.4 CM/SEC

## 2025-02-17 PROCEDURE — 93799 UNLISTED CV SVC/PROCEDURE: CPT

## 2025-02-17 PROCEDURE — VASCULARSCN2 VASCULAR SCREENING (BUNDLE) CAR: Performed by: SURGERY

## 2025-02-20 ENCOUNTER — TREATMENT (OUTPATIENT)
Dept: PHYSICAL THERAPY | Facility: CLINIC | Age: 85
End: 2025-02-20
Payer: MEDICARE

## 2025-02-20 DIAGNOSIS — M25.551 RIGHT HIP PAIN: ICD-10-CM

## 2025-02-20 DIAGNOSIS — Z74.09 IMPAIRED FUNCTIONAL MOBILITY, BALANCE, GAIT, AND ENDURANCE: ICD-10-CM

## 2025-02-20 DIAGNOSIS — Z96.641 H/O TOTAL HIP ARTHROPLASTY, RIGHT: Primary | ICD-10-CM

## 2025-02-20 NOTE — PROGRESS NOTES
Physical Therapy Daily Treatment Note  [facility] Physical Therapy [facility]  [address], [address]  [place] [address]    Patient: Lachelle Mcclure   : 1940  Referring practitioner: Rico Patterson,*  Today's Date: 2025  Patient seen for 9 sessions    Visit Diagnoses:    ICD-10-CM ICD-9-CM   1. H/O total hip arthroplasty, right  Z96.641 V43.64   2. Right hip pain  M25.551 719.45   3. Impaired functional mobility, balance, gait, and endurance  Z74.09 V49.89       Subjective   Lachelle Mcclure reports: that she felt some additional muscle soreness in the right thigh.  Today reports some burning in the lateral hip today which has not been there.  Also had some soreness in the anterior joint area.  Thinks it was because of the new standing hip ext and hamstring curl exercises.        Objective   Tenderness right hip flexor insertion    See Exercise, Manual, and Modality Logs for complete treatment.     Patient Education:    Assessment/Plan  Sore soreness in the lateral hip after doing some standing exercises.  Much less pain after treatment today.  Very cooperative with therapy.  Able to lift right leg into car without assist of hands now.    Progress strengthening /stabilization /functional activity           Timed:  Manual Therapy:    15     mins  75184;  Therapeutic Exercise:    15/25     mins  89245;     Neuromuscular Kiana:    10    mins  67768;    Therapeutic Activity:     0     mins  56900;     Ultrasound:      0     mins  51407;    Iontophoresis              __0_   mins  Dry Needling               _____   mins        Untimed:  Electrical Stimulation:     0    mins  15098 ( );  Mechanical Traction:             mins  79657;   Paraffin                       _____  mins     Timed Treatment:   40   mins   Total Treatment:     60   mins    Madelaine Meléndez PT  [place] License # [id number]  Physical Therapist    Electronically signed by Madelaine Meléndez PT, 25, 1:52 PM EST       English

## 2025-02-25 ENCOUNTER — TREATMENT (OUTPATIENT)
Dept: PHYSICAL THERAPY | Facility: CLINIC | Age: 85
End: 2025-02-25
Payer: MEDICARE

## 2025-02-25 DIAGNOSIS — M25.551 RIGHT HIP PAIN: ICD-10-CM

## 2025-02-25 DIAGNOSIS — Z74.09 IMPAIRED FUNCTIONAL MOBILITY, BALANCE, GAIT, AND ENDURANCE: ICD-10-CM

## 2025-02-25 DIAGNOSIS — Z96.641 H/O TOTAL HIP ARTHROPLASTY, RIGHT: Primary | ICD-10-CM

## 2025-02-25 NOTE — PROGRESS NOTES
Physical Therapy Daily Treatment Note  [facility] Physical Therapy [facility]  [address], [address]  [place] [address]    Patient: Lachelle Mcclure   : 1940  Referring practitioner: Rico Patterson,*  Today's Date: 2025  Patient seen for 10 sessions    Visit Diagnoses:    ICD-10-CM ICD-9-CM   1. H/O total hip arthroplasty, right  Z96.641 V43.64   2. Right hip pain  M25.551 719.45   3. Impaired functional mobility, balance, gait, and endurance  Z74.09 V49.89       Subjective   Lachelle Mcclure reports: that she is having a pretty good day.  States that she had some discomfort with the step up exercises and with the bridges.  The lateral hip pain has decreased but still does have some anterior hip/groin pain.  Not as bad as it was initially but does still have some burning and throbbing.        Objective   Exquisite tenderness in right lower abdominal area along the right pubic bone    See Exercise, Manual, and Modality Logs for complete treatment.     Patient Education: discuss groin/pubic bone tenderness with MD    Assessment/Plan  Patient with persistent anterior hip pain.  Today very tender at right lateral aspect of the pubic bone moreso than the adductor and flexor mm. Some soreness noted with hamstring curl exercise so it was stopped.    Progress strengthening /stabilization /functional activity           Timed:  Manual Therapy:    15     mins  62201;  Therapeutic Exercise:    15/30     mins  50535;     Neuromuscular Kiana:    0    mins  19682;    Therapeutic Activity:     10     mins  88992;     Ultrasound:      0     mins  23113;    Iontophoresis              __0_   mins  Dry Needling               _____   mins        Untimed:  Electrical Stimulation:     0    mins  39812 ( );  Mechanical Traction:             mins  23088;   Paraffin                       _____  mins     Timed Treatment:   40   mins   Total Treatment:     65   mins    Madelaine Meléndez, PT  [place] License #  [id number]  Physical Therapist    Electronically signed by Madelaine Meléndez, PT, 02/25/25, 2:40 PM EST

## 2025-02-27 ENCOUNTER — OFFICE VISIT (OUTPATIENT)
Age: 85
End: 2025-02-27
Payer: MEDICARE

## 2025-02-27 ENCOUNTER — TELEPHONE (OUTPATIENT)
Dept: INTERNAL MEDICINE | Facility: CLINIC | Age: 85
End: 2025-02-27

## 2025-02-27 ENCOUNTER — TREATMENT (OUTPATIENT)
Dept: PHYSICAL THERAPY | Facility: CLINIC | Age: 85
End: 2025-02-27
Payer: MEDICARE

## 2025-02-27 VITALS
WEIGHT: 162 LBS | SYSTOLIC BLOOD PRESSURE: 153 MMHG | BODY MASS INDEX: 28.7 KG/M2 | HEART RATE: 77 BPM | HEIGHT: 63 IN | DIASTOLIC BLOOD PRESSURE: 84 MMHG

## 2025-02-27 DIAGNOSIS — Z74.09 IMPAIRED FUNCTIONAL MOBILITY, BALANCE, GAIT, AND ENDURANCE: ICD-10-CM

## 2025-02-27 DIAGNOSIS — I65.23 ATHEROSCLEROSIS OF BOTH CAROTID ARTERIES: Primary | Chronic | ICD-10-CM

## 2025-02-27 DIAGNOSIS — M25.551 RIGHT HIP PAIN: ICD-10-CM

## 2025-02-27 DIAGNOSIS — I87.8 VENOUS STASIS: ICD-10-CM

## 2025-02-27 DIAGNOSIS — Z96.641 H/O TOTAL HIP ARTHROPLASTY, RIGHT: Primary | ICD-10-CM

## 2025-02-27 DIAGNOSIS — I87.2 VENOUS (PERIPHERAL) INSUFFICIENCY: ICD-10-CM

## 2025-02-27 DIAGNOSIS — I87.332 CHRONIC VENOUS HYPERTENSION WITH ULCER AND INFLAMMATION INVOLVING LEFT SIDE: ICD-10-CM

## 2025-02-27 NOTE — TELEPHONE ENCOUNTER
when I called Pt to cancel her appointment yesterday, she stated that since you have her Only taking Valsartan ( Not with hydrochlorothiazide ) She's been having swollen ankles and BP has been abnormal.  Labs done yesterday 2/27.

## 2025-02-27 NOTE — PROGRESS NOTES
Physical Therapy Daily Treatment Note  [facility] Physical Therapy [facility]  [address], [address]  [place] [address]    Patient: Lachelle Mcclure   : 1940  Referring practitioner: Rico Patterson,*  Today's Date: 2025  Patient seen for 11 sessions    Visit Diagnoses:    ICD-10-CM ICD-9-CM   1. H/O total hip arthroplasty, right  Z96.641 V43.64   2. Right hip pain  M25.551 719.45   3. Impaired functional mobility, balance, gait, and endurance  Z74.09 V49.89       Subjective   Lachelle Mcclure reports: that she has some anterior hip pain yesterday but it has been pretty good today.  Notes that she saw the vascular surgeon today and will be having some surgery on the veins in the left leg      Objective   Presents with symmetrical gait pattern    See Exercise, Manual, and Modality Logs for complete treatment.     Patient Education: avoid over stretching with home exercises    Assessment/Plan  Patient has had a very busy day today with multiple outside appointments sos was already a bit tired so we did much less exercise in the clinic today. Has functional motion in the right hip but does still maintain slight hip flexion with gait.    Progress strengthening /stabilization /functional activity           Timed:  Manual Therapy:    15     mins  15836;  Therapeutic Exercise:    15     mins  81417;     Neuromuscular Kiana:    0    mins  03070;    Therapeutic Activity:     10     mins  34423;     Ultrasound:      0     mins  95204;    Iontophoresis              __0_   mins  Dry Needling               _____   mins        Untimed:  Electrical Stimulation:     0    mins  75207 ( );  Mechanical Traction:             mins  05867;   Paraffin                       _____  mins     Timed Treatment:   40   mins   Total Treatment:     50   mins    Madelaine Meléndez, PT  [place] License # [id number]  Physical Therapist    Electronically signed by Madelaine Meléndez PT, 25, 4:28 PM EST

## 2025-02-27 NOTE — PROGRESS NOTES
Patient Name: Lachelle Mcclure    MRN: [id number] Encounter Date: 02/27/2025      Consulting Service: Vascular Surgery    Referring Provider: Self Referring       CHIEF COMPLAINT:  Chief Complaint   Patient presents with    Leg Swelling       Subjective    HPI: Lachelle Mcclure is a 84 y.o. female is being seen for evaluation/management of complaints of venous insufficiency and lower extremity edema of the left lower extremity.   Symptoms include Osullivan symptoms: Edema/Swelling, Rash/Irritation, Pigmentation, Pain/Aches, Heaviness/Fullness, and Throbbing.  Patient describes the discomfort from the veins as affecting their daily life.   Patient has negative family history of the varicose veins and negative family history of DVT.  At this point time attempts at symptomatic control using elevation, compression and nonsteroidals have been been attempted.  Prior prior venous interventions  include  none .    PAST MEDICAL HISTORY:   Past Medical History:   Diagnosis Date    Benign essential hypertension 04/18/2006 04/18/2006--treatment for hypertension begun.    Cancer     Melanoma x 2    Carotid artery plaque, 5/9/2022-- mild bilateral plaque without stenosis.  4/3/2019--mild bilateral.  10/06/2016--vascular screen is now normal without carotid disease.  Improved. 07/30/2010    May 9, 2022--vascular screening reveals mild carotid artery plaque bilaterally without stenosis.  Negative for AAA.  Negative for PAD.  April 3, 2019--vascular screening reveals mild bilateral carotid plaque without stenosis.  Negative for AAA.  Negative for PAD.  10/06/2016--vascular screen reveals no evidence of carotid plaque, negative for AAA, negative for PAD.  04/23/2014--vascular screen rev    Cataract     Cholelithiasis     Chronic right hip pain 09/01/2017 09/01/2017--patient has had a one-year history of intermittent episodes of lateral right hip pain that at times is very severe and debilitating.  She also has tenderness over this area that  limits her from sleeping on her right side.  Last year I gave her a cortisone injection for trochanteric bursitis and this provided immediate relief but not long lasting.  X-rays were negative for fracture or even degenerative arthritis.  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  Ordered.    Colon polyp     Gastroesophageal reflux disease without esophagitis 01/14/2013    04/10/2014--patient presented with right upper quadrant/epigastric burning pain and discomfort that was postprandial. H pylori breath test was negative. Empiric trial of Dexilant 60 mg per day was initiated for esophageal reflux/dyspepsia.   01/14/2013--EGD performed for epigastric and right upper quadrant pain. There was some edematous appearing mucosa in the antrum and body of the stomach which was biopsied. No significant erythema. No ulcerations. Normal appearing duodenum and esophagus. Pathology revealed moderate chronic active gastritis. No intestinal metaplasia. Helicobacter pylori positive. Patient was treated with appropriate antibiotic and PPI therapy.    History of basal cell carcinoma of skin 02/12/2019 June 2018--Mohs micrographic surgery of basal cell carcinoma on the tip of the nose.    History of Helicobacter pylori gastritis 01/14/2013    04/10/2014--patient presented with right upper quadrant/epigastric burning pain and discomfort that was postprandial. H pylori breath test was negative. Empiric trial of Dexilant 60 mg per day was initiated for esophageal reflux/dyspepsia.  01/14/2013--EGD performed for epigastric and right upper quadrant pain. There was some edematous appearing mucosa in the antrum and body of the stomach which w    History of malignant melanoma, 2008--right side of face.  1999--lower back. 01/01/2008 2008--melanoma resected from right face.   1999--malignant melanoma resected from lower back.    History of routine gynecologic exam yearly     Patient sees a gynecologist.    History of thrombophlebitis, superficial, left lower extremity. 01/12/2022 January 12, 2022--patient reports a 2-week history of left lower extremity swelling which is just above the ankle and sometimes extends all the way down into the ankle and is associated with redness and tenderness. It tends to get worse towards the end of the day. She has had no shortness of breath or chest pain and there is been no significant swelling involving the upper part of either leg. On e    Hx of malignant skin melanoma     Hyperlipidemia 08/13/2010 08/13/2010--treatment for hyperlipidemia begun.    Impaired fasting glucose 09/01/2017 09/01/2017--routine physical.  Serum glucose elevated at 101.  Recommend weight loss and decrease carbohydrate intake.    Intolerance of oral bisphosphonate therapy 02/21/2020    Lumbar disc disease 10/19/2016    09/13/2016--x-ray lumbar spine reveals disc space narrowing at L2-L3, L3-L4, L4-L5, and possibly L5-S1.  There is mild anterior listhesis of L4 on L5 measuring 4.5 mm.  No compression deformity, nor other evidence of acute process.    Microscopic hematuria 12/18/2015 12/22/2015--urine cytology negative for malignant cells.  Red blood cells noted.  Transitional epithelial cells noted.  Urine culture revealed less than 10,000 colony forming units of mixed urogenital laurie.  Repeat urinalysis was negative.  Recommend observation.  12/18/2015--routine urinalysis reveals 3-5 WBCs and 3-5 RBCs. 0 epithelial cells. 0 bacteria. Patient is asymptomatic. Repeat urinalysis, urine cytology, and urine culture sent.    Osteopenia     Osteoporosis, 3/23/2022--lumbar spine -0.9; left femoral neck -2.4; right femoral neck -2.3.  11/5/2019--lumbar spine -0.3.  Left femoral neck -2.7.  Right femoral neck -2.4. 07/24/2009 March 23, 2022--DEXA scan reveals lumbar spine T score -0.9 representing a 5.7% interval decrease.  Left femoral neck T score -2.4 which represents  a 5.7% increase.  Right femoral neck T score -2.3 which is unchanged.  Impression is osteopenia at the hips with mixed interval change in density values.  November 5, 2019--DEXA scan reveals lumbar spine T score -0.3 representing a 5% increase.  Left f    Positive colorectal cancer screening using Cologuard test     Postmenopausal state 09/01/2017    Primary osteoarthritis of both hips 11/19/2021 November 19, 2021-9 being evaluated and treated by the orthopedist.  Patient may need bilateral hip replacements at some point in the future.    Subclinical hypothyroidism 02/21/2020 February 21, 2020--TSH slightly elevated at 4.41.  Free T3 and free T4 are normal.  Close observation.    Tinnitus of right ear 12/18/2015 12/18/2015--patient presents with a four-month history of ringing in her right ear. No hearing loss. Examination reveals a cerumen impaction of the right ear canal. This was removed with irrigation and curettage.    Vitamin D deficiency 08/12/2016      PAST SURGICAL HISTORY:   Past Surgical History:   Procedure Laterality Date    ADENOIDECTOMY  1946    APPENDECTOMY      BASAL CELL CARCINOMA EXCISION  06/2018 June 2018--Mohs micrographic surgery of basal cell carcinoma on the tip of the nose.    CHOLECYSTECTOMY  05/17/2013 05/17/2013--laparoscopic cholecystectomy.    COLONOSCOPY  07/12/2012 07/12/2012--normal colonoscopy to the cecum with an excellent prep.     COLONOSCOPY  06/20/2003 06/20/2003--normal colonoscopy to the cecum.    COLONOSCOPY N/A 08/30/2023    Procedure: COLONOSCOPY INTO CECUM WITH COLD BIOPSY POLYPECTOMIES AND HOT SNARE POLYPECTOMY;  Surgeon: Ho Goyal MD;  Location: [facility] ENDOSCOPY;  Service: Gastroenterology;  Laterality: N/A;  PRE- POSITIVE COLOGUARD  POST- POLYPS, HEMORRHOIDS    ESOPHAGOSCOPY / EGD  01/14/2013 01/14/2013--EGD performed for epigastric and right upper quadrant pain. There was some edematous appearing mucosa in the antrum and body of the  stomach which was biopsied. No significant erythema. No ulcerations. Normal appearing duodenum and esophagus. Pathology revealed moderate chronic active gastritis. No intestinal metaplasia. Helicobacter pylori positive. Patient was treated with appropriate    EYE SURGERY      FOOT SURGERY  1992 1992--orthopedic 10 placed in right great toe.    HYSTERECTOMY  1986 1986--total abdominal hysterectomy.    JOINT REPLACEMENT  2024    Rt Hip    SKIN CANCER EXCISION  09/14/2009 09/14/2009--excision 1 cm mid scalp cyst. Pathology benign. 2008--melanoma resected from right face. 1999--malignant melanoma resected from lower back.    TONSILLECTOMY AND ADENOIDECTOMY  1946 1946.    TOTAL HIP ARTHROPLASTY Right 08/16/2024    Procedure: TOTAL HIP ARTHROPLASTY ANTERIOR WITH HANA TABLE;  Surgeon: Rico Patterson MD;  Location[place] [facility];  Service: Orthopedics;  Laterality: Right;      FAMILY HISTORY:   Family History   Problem Relation Age of Onset    COPD Mother     Hypertension Mother         Essential    Arthritis Mother     Hearing loss Mother     Hyperlipidemia Mother     COPD Father     Hypertension Father     Arthritis Father     Hyperlipidemia Father     Malig Hyperthermia Neg Hx       SOCIAL HISTORY:   Social History     Tobacco Use    Smoking status: Never    Smokeless tobacco: Never   Vaping Use    Vaping status: Never Used   Substance Use Topics    Alcohol use: Never    Drug use: Never      MEDICATIONS:   Current Outpatient Medications on File Prior to Visit   Medication Sig Dispense Refill    Calcium Carb-Cholecalciferol (CALCIUM 600 + D PO) Take 1 tablet by mouth Daily.      Cholecalciferol (VITAMIN D) 2000 UNITS capsule Take 1 capsule by mouth Daily.      multivitamin with minerals (MULTIVITAMIN ADULT PO) Take 1 tablet by mouth Daily.      simvastatin (ZOCOR) 40 MG tablet TAKE ONE TABLET BY MOUTH EVERY DAY FOR HIGH CHOLESTEROL (NEED MD APPOINTMENT) 90 tablet 1    valsartan (DIOVAN) 320 MG tablet  "One by mouth daily for blood pressure. 30 tablet 11     No current facility-administered medications on file prior to visit.       ALLERGIES: Patient has no known allergies.       Objective   Vitals:    02/27/25 1421   BP: 153/84   Pulse: 77   Weight: 73.5 kg (162 lb)   Height: 159.4 cm (62.76\")     Body mass index is 28.92 kg/m².          PHYSICAL EXAM:   Physical Exam     Result Review   LABS:    CBC          7/24/2024    09:06 8/17/2024    03:53   CBC   WBC 4.15  11.05    RBC 4.69  3.89    Hemoglobin 13.6  11.6    Hematocrit 42.9  35.7    MCV 91.5  91.8    MCH 29.0  29.8    MCHC 31.7  32.5    RDW 12.8  12.3    Platelets 189  152      BMP          8/17/2024    03:53 9/16/2024    13:58 1/16/2025    13:46 1/16/2025    15:46   BMP   BUN 9  14  18  18    Creatinine 0.58  0.61  0.69  0.74    Sodium 141  142  141  140    Potassium 4.1  4.2  4.0  4.0    Chloride 108  105  102  102    CO2 26.2  24  30.9  30.0    Calcium 9.5  10.4  12.0  11.9      Lipid Panel          7/24/2024    09:06   Lipid Panel   Total Cholesterol 132    Triglycerides 70       INR          8/7/2024    14:54   Common Labs   INR 1.05       A1C Last 3 Results          7/24/2024    09:06   HGBA1C Last 3 Results   Hemoglobin A1C 5.20         Results Review:       I reviewed the patient's new clinical results.    The following radiologic or non-invasive studies have been reviewed by me: Prior study reviewed from January without any DVT but really not checked for insufficiency.  Vascular screening (bundle) CAR 02/17/2025    Interpretation Summary    Carotid Artery Disease and Stroke Screening: The right carotid artery is normal. The left carotid artery is normal.    Abdominal Aortic Aneurysm (AAA) Screening: Maximum mid diameter of 1.49 cm. The artery was normal.    Peripheral Artery Disease (P.A.D.) Screening: The right has normal arterial pressures. The left has normal arterial pressures.     No radiology results for the last 30 days.              "   ASSESSMENT/PLAN:   Diagnoses and all orders for this visit:    1. Carotid artery plaque, 5/9/2022-- mild bilateral plaque without stenosis.  4/3/2019--mild bilateral.  10/06/2016--vascular screen is now normal without carotid disease.  Improved. (Primary)    2. Venous stasis  -     Venous w Reflux Lower Extremity - Unilateral CAR; Future    3. Venous (peripheral) insufficiency  -     Venous w Reflux Lower Extremity - Unilateral CAR; Future    4. Chronic venous hypertension with ulcer and inflammation involving left side  -     Venous w Reflux Lower Extremity - Unilateral CAR; Future       84 y.o. female with what appears to be stasis dermatitis and swelling and injury to the calf from venous what I believed to be venous insufficiency.  It stops at the ankle mostly and there is skin injury on the medial side.  Organ to pursue a class I mapping to determine if there is something that is treatable in this area.  She is status post hip surgery from August of last year and some of the swelling is expected.  Dr. Frausto did her surgery.  She is also had shrinking round up as reviewed above with no pathology in the aorta carotid or ABIs.  We will get her set up for the appropriate left class I mapping.  Will also try to get her an extra pair of thigh-high compression if she wants them.    I discussed the plan with the patient who is agreeable to the plan of care at this point. Thank you for this consult.   Follow Up  Return in about 6 weeks (around 4/10/2025).    Jeremie Osullivan MD   02/27/25

## 2025-02-27 NOTE — TELEPHONE ENCOUNTER
Dr. Vega when I called Pt to cancel her appointment yesterday, she stated that since you have her Only taking Valsartan ( Not with hydrochlorothiazide ) She's been having swollen ankles and BP has been abnormal.  Labs done yesterday 2/27.

## 2025-03-04 ENCOUNTER — HOSPITAL ENCOUNTER (EMERGENCY)
Facility: HOSPITAL | Age: 85
Discharge: HOME | End: 2025-03-04
Attending: EMERGENCY MEDICINE | Admitting: EMERGENCY MEDICINE
Payer: MEDICARE

## 2025-03-04 VITALS
OXYGEN SATURATION: 98 % | DIASTOLIC BLOOD PRESSURE: 98 MMHG | RESPIRATION RATE: 16 BRPM | SYSTOLIC BLOOD PRESSURE: 161 MMHG | TEMPERATURE: 97.9 F | HEART RATE: 107 BPM

## 2025-03-04 DIAGNOSIS — R89.9 ABNORMAL LABORATORY TEST: Primary | ICD-10-CM

## 2025-03-04 LAB
ALBUMIN SERPL-MCNC: 4 G/DL (ref 3.5–5.2)
ALBUMIN/GLOB SERPL: 1.6 G/DL
ALP SERPL-CCNC: 60 U/L (ref 39–117)
ALT SERPL W P-5'-P-CCNC: 14 U/L (ref 1–33)
ANION GAP SERPL CALCULATED.3IONS-SCNC: 9.9 MMOL/L (ref 5–15)
AST SERPL-CCNC: 17 U/L (ref 1–32)
BASOPHILS # BLD AUTO: 0.05 10*3/MM3 (ref 0–0.2)
BASOPHILS NFR BLD AUTO: 1 % (ref 0–1.5)
BILIRUB SERPL-MCNC: 0.4 MG/DL (ref 0–1.2)
BUN SERPL-MCNC: 14 MG/DL (ref 8–23)
BUN/CREAT SERPL: 20.3 (ref 7–25)
CALCIUM SPEC-SCNC: 10.7 MG/DL (ref 8.6–10.5)
CHLORIDE SERPL-SCNC: 108 MMOL/L (ref 98–107)
CO2 SERPL-SCNC: 26.1 MMOL/L (ref 22–29)
CREAT SERPL-MCNC: 0.69 MG/DL (ref 0.57–1)
DEPRECATED RDW RBC AUTO: 41.2 FL (ref 37–54)
EGFRCR SERPLBLD CKD-EPI 2021: 86.8 ML/MIN/1.73
EOSINOPHIL # BLD AUTO: 0.17 10*3/MM3 (ref 0–0.4)
EOSINOPHIL NFR BLD AUTO: 3.4 % (ref 0.3–6.2)
ERYTHROCYTE [DISTWIDTH] IN BLOOD BY AUTOMATED COUNT: 12.5 % (ref 12.3–15.4)
GLOBULIN UR ELPH-MCNC: 2.5 GM/DL
GLUCOSE SERPL-MCNC: 93 MG/DL (ref 65–99)
HCT VFR BLD AUTO: 40.9 % (ref 34–46.6)
HGB BLD-MCNC: 13.3 G/DL (ref 12–15.9)
HOLD SPECIMEN: NORMAL
HOLD SPECIMEN: NORMAL
IMM GRANULOCYTES # BLD AUTO: 0.01 10*3/MM3 (ref 0–0.05)
IMM GRANULOCYTES NFR BLD AUTO: 0.2 % (ref 0–0.5)
LYMPHOCYTES # BLD AUTO: 1.32 10*3/MM3 (ref 0.7–3.1)
LYMPHOCYTES NFR BLD AUTO: 26.1 % (ref 19.6–45.3)
MCH RBC QN AUTO: 29.2 PG (ref 26.6–33)
MCHC RBC AUTO-ENTMCNC: 32.5 G/DL (ref 31.5–35.7)
MCV RBC AUTO: 89.9 FL (ref 79–97)
MONOCYTES # BLD AUTO: 0.49 10*3/MM3 (ref 0.1–0.9)
MONOCYTES NFR BLD AUTO: 9.7 % (ref 5–12)
NEUTROPHILS NFR BLD AUTO: 3.01 10*3/MM3 (ref 1.7–7)
NEUTROPHILS NFR BLD AUTO: 59.6 % (ref 42.7–76)
NRBC BLD AUTO-RTO: 0 /100 WBC (ref 0–0.2)
PLATELET # BLD AUTO: 186 10*3/MM3 (ref 140–450)
PMV BLD AUTO: 9.2 FL (ref 6–12)
POTASSIUM SERPL-SCNC: 4.5 MMOL/L (ref 3.5–5.2)
PROT SERPL-MCNC: 6.5 G/DL (ref 6–8.5)
RBC # BLD AUTO: 4.55 10*6/MM3 (ref 3.77–5.28)
SODIUM SERPL-SCNC: 144 MMOL/L (ref 136–145)
WBC NRBC COR # BLD AUTO: 5.05 10*3/MM3 (ref 3.4–10.8)
WHOLE BLOOD HOLD COAG: NORMAL
WHOLE BLOOD HOLD SPECIMEN: NORMAL

## 2025-03-04 PROCEDURE — 99283 EMERGENCY DEPT VISIT LOW MDM: CPT

## 2025-03-04 PROCEDURE — 80053 COMPREHEN METABOLIC PANEL: CPT

## 2025-03-04 PROCEDURE — 36415 COLL VENOUS BLD VENIPUNCTURE: CPT

## 2025-03-04 PROCEDURE — 85025 COMPLETE CBC W/AUTO DIFF WBC: CPT

## 2025-03-04 NOTE — ED PROVIDER NOTES
MD ATTESTATION NOTE    The JHON and I have discussed this patient's history, physical exam, and treatment plan.  I have reviewed the documentation and personally had a face to face interaction with the patient. I affirm the documentation and agree with the treatment and plan.  The attached note describes my personal findings.        SHARED APC FACE TO FACE: I performed a substantive part of the MDM during the patient's E/M visit. I personally evaluated and examined the patient. I personally made or approved the documented management plan and acknowledge its risk of complications.      Brief HPI: Patient presents for evaluation of abnormal labs.  Patient had lab work done and was told sodium and potassium were sent here for evaluation.  Patient states he is worse with routine labs.  Has had no chest pain shortness of breath no abdominal pain.  No medication changes.    PHYSICAL EXAM  ED Triage Vitals   Temp Heart Rate Resp BP SpO2   03/04/25 0932 03/04/25 0932 03/04/25 0932 03/04/25 0937 03/04/25 0932   97.9 °F (36.6 °C) 107 16 161/98 98 %      Temp src Heart Rate Source Patient Position BP Location FiO2 (%)   -- -- 03/04/25 0937 -- --     Sitting           GENERAL: no acute distress  HENT: nares patent  EYES: no scleral icterus  RESPIRATORY: normal effort    MUSCULOSKELETAL: no deformity  NEURO: alert, moves all extremities, follows commands  PSYCH:  calm, cooperative  SKIN: warm, dry    Vital signs and nursing notes reviewed.           Plan: discharge       Rios Méndez MD  03/04/25 1874

## 2025-03-04 NOTE — ED PROVIDER NOTES
EMERGENCY DEPARTMENT ENCOUNTER      Room Number:  S02/02  PCP: Juan Vega MD  Independent Historians: Patient  Patient Care Team:  Juan Vega MD as PCP - General (Internal Medicine)  Ho Goyal MD as Consulting Physician (Gastroenterology)  Juan Vega MD as Referring Physician (Internal Medicine)       HPI:  Chief Complaint: Abnormal labs    A complete HPI/ROS/PMH/PSH/SH/FH are unobtainable due to: None    Chronic or social conditions impacting patient care (Social Determinants of Health): None      Context: Lachelle Mcclure is a 84 y.o. female with a PMH significant for osteoporosis, GERD who presents to the ED c/o acute abnormal labs.  The patient reports that she had routine lab work drawn 5 days ago by her PCP and was called today with abnormalities and encouraged to come to the ER for further workup.  She reports that she had an elevated potassium and sodium level on her labs from that day.  She has no symptoms currently on exam.  Here only for laboratory recheck.      Upon review of prior external notes (non-ED) -and- Review of prior external test results outside of this encounter it appears the patient was evaluated in the office with internal medicine for hypertension on 2/3/2025.  The patient had a normal phosphorus and magnesium on 1/16/2025.      PAST MEDICAL HISTORY  Active Ambulatory Problems     Diagnosis Date Noted    Benign essential hypertension 04/18/2006    Carotid artery plaque, 5/9/2022-- mild bilateral plaque without stenosis.  4/3/2019--mild bilateral.  10/06/2016--vascular screen is now normal without carotid disease.  Improved. 07/30/2010    Gastroesophageal reflux disease without esophagitis 01/14/2013    Hyperlipidemia 08/13/2010    Microscopic hematuria 12/18/2015    Osteoporosis, 3/23/2022--lumbar spine -0.9; left femoral neck -2.4; right femoral neck -2.3.  11/5/2019--lumbar spine -0.3.  Left femoral neck -2.7.  Right femoral neck -2.4. 07/24/2009    Tinnitus  of right ear 12/18/2015    History of malignant melanoma, 2008--right side of face.  1999--lower back. 01/01/2008    Therapeutic drug monitoring 08/12/2016    Vitamin D deficiency 08/12/2016    Lumbar disc disease 10/19/2016    Impaired fasting glucose 09/01/2017    Postmenopausal state 09/01/2017    History of basal cell carcinoma of skin 02/12/2019    Subclinical hypothyroidism 02/21/2020    Intolerance of oral bisphosphonate therapy 02/21/2020    Primary osteoarthritis of both hips 11/19/2021    History of COVID-19 12/02/2021    Hypercalcemia 07/17/2023    Status post total hip replacement, right 08/16/2024    Medication side effect, initial encounter 02/03/2025    Venous (peripheral) insufficiency 02/27/2025    Venous stasis 02/27/2025    Chronic venous hypertension with ulcer and inflammation involving left side 02/27/2025    Chronic venous hypertension with ulcer and inflammation involving left side 02/27/2025     Resolved Ambulatory Problems     Diagnosis Date Noted    History of Impacted cerumen 12/18/2015    Pyuria 12/18/2015    History of routine gynecologic exam 06/30/2016    History of bone density study 03/26/2015    History of carotid Doppler/vascular screen 10/06/2016    History of mammogram 07/22/2015    History of Helicobacter pylori gastritis 01/14/2013    History of pneumococcal vaccination 12/18/2015    History of right lateral epicondylitis 12/07/2009    History of Right-sided chest wall pain 05/29/2014    History Trochanteric bursitis of right hip 08/12/2016    Nausea 08/12/2016    Right lumbar radiculopathy 09/13/2016    Acute low back pain 09/13/2016    Routine physical examination 09/01/2017    Chronic right hip pain 09/01/2017    Nocturnal muscle cramps 02/12/2019    Ganglion cyst of volar aspect of right wrist 04/09/2019    Abnormal mammogram 08/21/2019    Abnormal mammogram of right breast 08/21/2019    Acute right-sided low back pain with right-sided sciatica 12/04/2020    Grief reaction  12/04/2020    Pain and swelling of lower extremity, left 01/12/2022    Thrombophlebitis of superficial veins of left lower extremity 01/12/2022    Varicose veins of leg with pain, left 03/07/2022    Contusion of right wrist 05/01/2023    Cough, persistent 05/01/2023    Scapholunate ligament injury with no instability 05/02/2023    Primary osteoarthritis of right hip 07/15/2024    History of recent fall 02/03/2025     Past Medical History:   Diagnosis Date    Cancer     Cataract     Cholelithiasis     Colon polyp     History of thrombophlebitis, superficial, left lower extremity. 01/12/2022    Hx of malignant skin melanoma     Hyperlipidemia 08/13/2010    Osteopenia     Positive colorectal cancer screening using Cologuard test          PAST SURGICAL HISTORY  Past Surgical History:   Procedure Laterality Date    ADENOIDECTOMY  1946    APPENDECTOMY      BASAL CELL CARCINOMA EXCISION  06/2018 June 2018--Mohs micrographic surgery of basal cell carcinoma on the tip of the nose.    CHOLECYSTECTOMY  05/17/2013 05/17/2013--laparoscopic cholecystectomy.    COLONOSCOPY  07/12/2012 07/12/2012--normal colonoscopy to the cecum with an excellent prep.     COLONOSCOPY  06/20/2003 06/20/2003--normal colonoscopy to the cecum.    COLONOSCOPY N/A 08/30/2023    Procedure: COLONOSCOPY INTO CECUM WITH COLD BIOPSY POLYPECTOMIES AND HOT SNARE POLYPECTOMY;  Surgeon: Ho Goyal MD;  Location: [facility] ENDOSCOPY;  Service: Gastroenterology;  Laterality: N/A;  PRE- POSITIVE COLOGUARD  POST- POLYPS, HEMORRHOIDS    ESOPHAGOSCOPY / EGD  01/14/2013 01/14/2013--EGD performed for epigastric and right upper quadrant pain. There was some edematous appearing mucosa in the antrum and body of the stomach which was biopsied. No significant erythema. No ulcerations. Normal appearing duodenum and esophagus. Pathology revealed moderate chronic active gastritis. No intestinal metaplasia. Helicobacter pylori positive. Patient was treated with  appropriate    EYE SURGERY      FOOT SURGERY  1992 1992--orthopedic 10 placed in right great toe.    HYSTERECTOMY  1986 1986--total abdominal hysterectomy.    JOINT REPLACEMENT  2024    Rt Hip    SKIN CANCER EXCISION  09/14/2009 09/14/2009--excision 1 cm mid scalp cyst. Pathology benign. 2008--melanoma resected from right face. 1999--malignant melanoma resected from lower back.    TONSILLECTOMY AND ADENOIDECTOMY  1946 1946.    TOTAL HIP ARTHROPLASTY Right 08/16/2024    Procedure: TOTAL HIP ARTHROPLASTY ANTERIOR WITH HANA TABLE;  Surgeon: Rico Patterson MD;  [place] [facility] [place] [facility] [place]  Service: Orthopedics;  Laterality: Right;         FAMILY HISTORY  Family History   Problem Relation Age of Onset    COPD Mother     Hypertension Mother         Essential    Arthritis Mother     Hearing loss Mother     Hyperlipidemia Mother     COPD Father     Hypertension Father     Arthritis Father     Hyperlipidemia Father     Malig Hyperthermia Neg Hx          SOCIAL HISTORY  Social History     Socioeconomic History    Marital status:     Highest education level: Bachelor's degree (e.g., BA, AB, BS)   Tobacco Use    Smoking status: Never    Smokeless tobacco: Never   Vaping Use    Vaping status: Never Used   Substance and Sexual Activity    Alcohol use: Never    Drug use: Never    Sexual activity: Yes     Partners: Male     Birth control/protection: Hysterectomy         ALLERGIES  Patient has no known allergies.      REVIEW OF SYSTEMS  Included in HPI  All systems reviewed and negative except for those discussed in HPI.      PHYSICAL EXAM    I have reviewed the triage vital signs and nursing notes.    ED Triage Vitals   Temp Heart Rate Resp BP SpO2   03/04/25 0932 03/04/25 0932 03/04/25 0932 03/04/25 0937 03/04/25 0932   97.9 °F (36.6 °C) 107 16 161/98 98 %      Temp src Heart Rate Source Patient Position BP Location FiO2 (%)   -- -- 03/04/25 0937 -- --     Sitting         Physical Exam  Constitutional:        General: She is not in acute distress.     Appearance: She is not ill-appearing.   HENT:      Head: Normocephalic and atraumatic.   Eyes:      General: No scleral icterus.  Cardiovascular:      Rate and Rhythm: Normal rate and regular rhythm.   Pulmonary:      Effort: Pulmonary effort is normal.      Breath sounds: Normal breath sounds.   Skin:     General: Skin is warm and dry.   Neurological:      Mental Status: She is alert and oriented to person, place, and time.   Psychiatric:         Mood and Affect: Mood normal.         Speech: Speech normal.         Behavior: Behavior normal.         Vital signs and nursing notes reviewed.      PPE: I wore a surgical mask throughout my encounters with the pt. I performed hand hygiene on entry into the pt room and upon exit.     LAB RESULTS  Recent Results (from the past 24 hours)   Comprehensive Metabolic Panel    Collection Time: 03/04/25  9:41 AM    Specimen: Arm, Right; Blood   Result Value Ref Range    Glucose 93 65 - 99 mg/dL    BUN 14 8 - 23 mg/dL    Creatinine 0.69 0.57 - 1.00 mg/dL    Sodium 144 136 - 145 mmol/L    Potassium 4.5 3.5 - 5.2 mmol/L    Chloride 108 (H) 98 - 107 mmol/L    CO2 26.1 22.0 - 29.0 mmol/L    Calcium 10.7 (H) 8.6 - 10.5 mg/dL    Total Protein 6.5 6.0 - 8.5 g/dL    Albumin 4.0 3.5 - 5.2 g/dL    ALT (SGPT) 14 1 - 33 U/L    AST (SGOT) 17 1 - 32 U/L    Alkaline Phosphatase 60 39 - 117 U/L    Total Bilirubin 0.4 0.0 - 1.2 mg/dL    Globulin 2.5 gm/dL    A/G Ratio 1.6 g/dL    BUN/Creatinine Ratio 20.3 7.0 - 25.0    Anion Gap 9.9 5.0 - 15.0 mmol/L    eGFR 86.8 >60.0 mL/min/1.73   Green Top (Gel)    Collection Time: 03/04/25  9:41 AM   Result Value Ref Range    Extra Tube Hold for add-ons.    Lavender Top    Collection Time: 03/04/25  9:41 AM   Result Value Ref Range    Extra Tube hold for add-on    Gold Top - SST    Collection Time: 03/04/25  9:41 AM   Result Value Ref Range    Extra Tube Hold for add-ons.    Light Blue Top    Collection Time:  03/04/25  9:41 AM   Result Value Ref Range    Extra Tube Hold for add-ons.    CBC Auto Differential    Collection Time: 03/04/25  9:41 AM    Specimen: Arm, Right; Blood   Result Value Ref Range    WBC 5.05 3.40 - 10.80 10*3/mm3    RBC 4.55 3.77 - 5.28 10*6/mm3    Hemoglobin 13.3 12.0 - 15.9 g/dL    Hematocrit 40.9 34.0 - 46.6 %    MCV 89.9 79.0 - 97.0 fL    MCH 29.2 26.6 - 33.0 pg    MCHC 32.5 31.5 - 35.7 g/dL    RDW 12.5 12.3 - 15.4 %    RDW-SD 41.2 37.0 - 54.0 fl    MPV 9.2 6.0 - 12.0 fL    Platelets 186 140 - 450 10*3/mm3    Neutrophil % 59.6 42.7 - 76.0 %    Lymphocyte % 26.1 19.6 - 45.3 %    Monocyte % 9.7 5.0 - 12.0 %    Eosinophil % 3.4 0.3 - 6.2 %    Basophil % 1.0 0.0 - 1.5 %    Immature Grans % 0.2 0.0 - 0.5 %    Neutrophils, Absolute 3.01 1.70 - 7.00 10*3/mm3    Lymphocytes, Absolute 1.32 0.70 - 3.10 10*3/mm3    Monocytes, Absolute 0.49 0.10 - 0.90 10*3/mm3    Eosinophils, Absolute 0.17 0.00 - 0.40 10*3/mm3    Basophils, Absolute 0.05 0.00 - 0.20 10*3/mm3    Immature Grans, Absolute 0.01 0.00 - 0.05 10*3/mm3    nRBC 0.0 0.0 - 0.2 /100 WBC         RADIOLOGY  No Radiology Exams Resulted Within Past 24 Hours      MEDICATIONS GIVEN IN ER  Medications - No data to display      ORDERS PLACED DURING THIS VISIT:  Orders Placed This Encounter   Procedures    [place] Draw    Comprehensive Metabolic Panel    CBC Auto Differential    CBC & Differential    Green Top (Gel)    Lavender Top    Gold Top - SST    Light Blue Top         OUTPATIENT MEDICATION MANAGEMENT:  No current Epic-ordered facility-administered medications on file.     Current Outpatient Medications Ordered in Epic   Medication Sig Dispense Refill    Calcium Carb-Cholecalciferol (CALCIUM 600 + D PO) Take 1 tablet by mouth Daily.      Cholecalciferol (VITAMIN D) 2000 UNITS capsule Take 1 capsule by mouth Daily.      multivitamin with minerals (MULTIVITAMIN ADULT PO) Take 1 tablet by mouth Daily.      simvastatin (ZOCOR) 40 MG tablet TAKE ONE TABLET BY  MOUTH EVERY DAY FOR HIGH CHOLESTEROL (NEED MD APPOINTMENT) 90 tablet 1    valsartan (DIOVAN) 320 MG tablet One by mouth daily for blood pressure. 30 tablet 11              PROGRESS, DATA ANALYSIS, CONSULTS, AND MEDICAL DECISION MAKING  All labs have been independently interpreted by me.  All radiology studies have been reviewed by me. All EKG's have been independently viewed and interpreted by me.  Discussion below represents my analysis of pertinent findings related to patient's condition, differential diagnosis, treatment plan and final disposition.    Patient presentation and evaluation most consistent with abnormal labs that have been rechecked and subsequently normal today.  Likely related to laboratory error.  Appropriate for outpatient management.  No further concerns.    DIFFERENTIAL DIAGNOSIS INCLUDE BUT NOT LIMITED TO:     Hyperkalemia, hyponatremia, laboratory error    Clinical Scores: N/A             1225 I rechecked the patient.  I discussed the patient's labs, diagnosis, and plan for discharge.  A repeat exam reveals no new worrisome changes from my initial exam findings.  The patient understands that the fact that they are being discharged does not denote that nothing is abnormal, it indicates that no clinical emergency is present and that they must follow-up as directed in order to properly maintain their health.  Follow-up instructions (specifically listed below) and return to ER precautions were given at this time.  I specifically instructed the patient to follow-up with their PCP.  The patient understands and agrees with the plan, and is ready for discharge.  All questions answered.         AS OF 12:23 EST VITALS:    BP - 161/98  HR - 107  TEMP - 97.9 °F (36.6 °C)  O2 SATS - 98%    COMPLEXITY OF CARE  Admission was considered but after careful review of the patient's presentation, physical examination, diagnostic results, and response to treatment the patient may be safely discharged with  outpatient follow-up.      DIAGNOSIS  Final diagnoses:   Abnormal laboratory test         DISPOSITION  ED Disposition       ED Disposition   Discharge    Condition   Stable    Comment   --                Please note that portions of this document were completed with a voice recognition program.    Note Disclaimer: At [facility], we believe that sharing information builds trust and better relationships. You are receiving this note because you recently visited [facility]. It is possible you will see health information before a provider has talked with you about it. This kind of information can be easy to misunderstand. To help you fully understand what it means for your health, we urge you to discuss this note with your provider.         Nghia Lees PA  03/04/25 4801

## 2025-03-04 NOTE — ED TRIAGE NOTES
Patient reported her pcp instructed her to come to ER related to abnormal sodium and potassium level on blood work.

## 2025-03-05 ENCOUNTER — TELEPHONE (OUTPATIENT)
Dept: INTERNAL MEDICINE | Facility: CLINIC | Age: 85
End: 2025-03-05

## 2025-03-05 NOTE — TELEPHONE ENCOUNTER
Caller: [facility] - [facility]    Relationship: Other    Best call back number: [phone]    What was the call regarding: [facility] STATES THAT THEY ARE TRYING TO REACH DR. KO FOR CORRECTED REPORT FROM 02/27 FOR THE PATIENT.

## 2025-03-06 ENCOUNTER — TREATMENT (OUTPATIENT)
Dept: PHYSICAL THERAPY | Facility: CLINIC | Age: 85
End: 2025-03-06
Payer: MEDICARE

## 2025-03-06 DIAGNOSIS — Z74.09 IMPAIRED FUNCTIONAL MOBILITY, BALANCE, GAIT, AND ENDURANCE: ICD-10-CM

## 2025-03-06 DIAGNOSIS — Z96.641 H/O TOTAL HIP ARTHROPLASTY, RIGHT: Primary | ICD-10-CM

## 2025-03-06 DIAGNOSIS — M25.551 RIGHT HIP PAIN: ICD-10-CM

## 2025-03-06 NOTE — PROGRESS NOTES
Physical Therapy Daily Treatment Note - Reassessment  [facility] Physical Therapy [facility]  [address], [address]  [place] [address]    Patient: Lachelle Mcclure   : 1940  Referring practitioner: Rico Patterson,*  Today's Date: 3/6/2025  Patient seen for 12 sessions    Visit Diagnoses:    ICD-10-CM ICD-9-CM   1. H/O total hip arthroplasty, right  Z96.641 V43.64   2. Right hip pain  M25.551 719.45   3. Impaired functional mobility, balance, gait, and endurance  Z74.09 V49.89       Subjective   Lachelle Mcclure reports: that her hip is feeling better than it did prior to therapy.  Still does have some burning type pain in the anterior hip with prolonged sitting in an upright type position with her knees extended.        Objective   Wants with nearly symmetrical gait pattern    Functional right hip AROM except for IR which is limited due to pain    Palpation - tenderness in pubic bone as well as inguinal ligament area    See Exercise, Manual, and Modality Logs for complete treatment.     Patient Education: Cont HEP    Assessment/Plan  Patient has demonstrated minimal/moderate  improvement since the initiation of therapy.  The pain has  decreased slightly.  The motion has increased.  The activity tolerances have increased slightly but not to desired levels. I feel that the patient would benefit from urther medical management.  If you have any questions concerning the care, please do not hesitate to call me.      Awaiting MD orders           Timed:  Manual Therapy:    15     mins  67031;  Therapeutic Exercise:    15     mins  33823;     Neuromuscular Kiana:    10    mins  03015;    Therapeutic Activity:     0     mins  79176;     Ultrasound:           mins  52372;    Iontophoresis              ___   mins  Dry Needling               _____   mins        Untimed:  Electrical Stimulation:         mins  63778 ( );  Mechanical Traction:             mins  75278;   Paraffin                        _____  mins     Timed Treatment:   40   mins   Total Treatment:     50   mins    Madelaine Meléndez, PT  [place] License # [id number]  Physical Therapist    Electronically signed by Madelaine Meléndez, PT, 03/06/25, 2:38 PM EST

## 2025-03-10 ENCOUNTER — OFFICE VISIT (OUTPATIENT)
Dept: INTERNAL MEDICINE | Facility: CLINIC | Age: 85
End: 2025-03-10
Payer: MEDICARE

## 2025-03-10 VITALS
RESPIRATION RATE: 16 BRPM | BODY MASS INDEX: 28.7 KG/M2 | HEART RATE: 81 BPM | DIASTOLIC BLOOD PRESSURE: 80 MMHG | SYSTOLIC BLOOD PRESSURE: 138 MMHG | TEMPERATURE: 98.3 F | HEIGHT: 63 IN | WEIGHT: 162 LBS | OXYGEN SATURATION: 96 %

## 2025-03-10 DIAGNOSIS — Z85.828 HISTORY OF BASAL CELL CARCINOMA OF SKIN: Chronic | ICD-10-CM

## 2025-03-10 DIAGNOSIS — Z78.0 POSTMENOPAUSAL STATE: Chronic | ICD-10-CM

## 2025-03-10 DIAGNOSIS — Z78.9 INTOLERANCE OF ORAL BISPHOSPHONATE THERAPY: Chronic | ICD-10-CM

## 2025-03-10 DIAGNOSIS — E21.0 PRIMARY HYPERPARATHYROIDISM: ICD-10-CM

## 2025-03-10 DIAGNOSIS — I87.2 CHRONIC VENOUS INSUFFICIENCY OF LOWER EXTREMITY: ICD-10-CM

## 2025-03-10 DIAGNOSIS — R73.01 IMPAIRED FASTING GLUCOSE: Primary | Chronic | ICD-10-CM

## 2025-03-10 DIAGNOSIS — E55.9 VITAMIN D DEFICIENCY: Chronic | ICD-10-CM

## 2025-03-10 DIAGNOSIS — Z85.820 HISTORY OF MALIGNANT MELANOMA: Chronic | ICD-10-CM

## 2025-03-10 DIAGNOSIS — E03.8 SUBCLINICAL HYPOTHYROIDISM: Chronic | ICD-10-CM

## 2025-03-10 DIAGNOSIS — K21.9 GASTROESOPHAGEAL REFLUX DISEASE WITHOUT ESOPHAGITIS: Chronic | ICD-10-CM

## 2025-03-10 DIAGNOSIS — I87.332 CHRONIC VENOUS HYPERTENSION WITH ULCER AND INFLAMMATION INVOLVING LEFT SIDE: ICD-10-CM

## 2025-03-10 DIAGNOSIS — I10 BENIGN ESSENTIAL HYPERTENSION: Chronic | ICD-10-CM

## 2025-03-10 DIAGNOSIS — E83.52 HYPERCALCEMIA: ICD-10-CM

## 2025-03-10 DIAGNOSIS — R31.29 MICROSCOPIC HEMATURIA: Chronic | ICD-10-CM

## 2025-03-10 DIAGNOSIS — Z86.16 HISTORY OF COVID-19: Chronic | ICD-10-CM

## 2025-03-10 DIAGNOSIS — M81.0 AGE-RELATED OSTEOPOROSIS WITHOUT CURRENT PATHOLOGICAL FRACTURE: Chronic | ICD-10-CM

## 2025-03-10 DIAGNOSIS — Z51.81 THERAPEUTIC DRUG MONITORING: ICD-10-CM

## 2025-03-10 DIAGNOSIS — I65.23 ATHEROSCLEROSIS OF BOTH CAROTID ARTERIES: Chronic | ICD-10-CM

## 2025-03-10 DIAGNOSIS — E78.2 MIXED HYPERLIPIDEMIA: Chronic | ICD-10-CM

## 2025-03-10 PROBLEM — I87.8 VENOUS STASIS: Status: RESOLVED | Noted: 2025-02-27 | Resolved: 2025-03-10

## 2025-03-10 PROBLEM — Z96.641 STATUS POST TOTAL HIP REPLACEMENT, RIGHT: Status: RESOLVED | Noted: 2024-08-16 | Resolved: 2025-03-10

## 2025-03-10 PROBLEM — T50.905A MEDICATION SIDE EFFECT, INITIAL ENCOUNTER: Status: RESOLVED | Noted: 2025-02-03 | Resolved: 2025-03-10

## 2025-03-10 PROCEDURE — 1160F RVW MEDS BY RX/DR IN RCRD: CPT | Performed by: INTERNAL MEDICINE

## 2025-03-10 PROCEDURE — 1159F MED LIST DOCD IN RCRD: CPT | Performed by: INTERNAL MEDICINE

## 2025-03-10 PROCEDURE — 3075F SYST BP GE 130 - 139MM HG: CPT | Performed by: INTERNAL MEDICINE

## 2025-03-10 PROCEDURE — G2211 COMPLEX E/M VISIT ADD ON: HCPCS | Performed by: INTERNAL MEDICINE

## 2025-03-10 PROCEDURE — 99214 OFFICE O/P EST MOD 30 MIN: CPT | Performed by: INTERNAL MEDICINE

## 2025-03-10 PROCEDURE — 3079F DIAST BP 80-89 MM HG: CPT | Performed by: INTERNAL MEDICINE

## 2025-03-10 PROCEDURE — 1126F AMNT PAIN NOTED NONE PRSNT: CPT | Performed by: INTERNAL MEDICINE

## 2025-03-10 RX ORDER — VALSARTAN AND HYDROCHLOROTHIAZIDE 160; 25 MG/1; MG/1
TABLET ORAL
Qty: 90 TABLET | Refills: 1 | Status: SHIPPED | OUTPATIENT
Start: 2025-03-10 | End: 2025-03-17 | Stop reason: SDUPTHER

## 2025-03-10 NOTE — PROGRESS NOTES
03/10/2025    Patient Information  Lachelle ESTES Kami                                                                                          [address]  [facility] [id number]      1940  [unfilled]  There is no work phone number on file.    Chief Complaint:     Follow-up chronic medical problems and recent lab work.    History of Present Illness:    Patient with chronic medical problems as noted below in assessment and plan presents today for follow-up.  She had lab work not long ago which was markedly abnormal showing a very high calcium and potassium level.  She was referred to the emergency room and repeat studies returned much less severe and she was discharged home.  Currently is not having any symptoms.  Her past medical history reviewed and updated were necessary including health maintenance parameters.  This reveals she will be up-to-date or else accounted for after today's visit.    Patient also has lower extremity edema and was referred to vascular surgery for this and carotid artery plaque.  She was previously on hydrochlorothiazide which was discontinued for concern that her calcium was elevated due to this and she was placed on just plain Diovan 320 mg/day.    Review of Systems   Constitutional: Negative.   HENT: Negative.     Eyes: Negative.    Cardiovascular: Negative.    Respiratory: Negative.     Endocrine: Negative.    Hematologic/Lymphatic: Negative.    Skin: Negative.    Musculoskeletal:  Positive for arthritis and joint pain.   Gastrointestinal: Negative.    Genitourinary: Negative.    Neurological: Negative.    Psychiatric/Behavioral: Negative.     Allergic/Immunologic: Negative.        Active Problems:    Patient Active Problem List   Diagnosis    Benign essential hypertension    Carotid artery plaque, 5/9/2022-- mild bilateral plaque without stenosis.  4/3/2019--mild bilateral.  10/06/2016--vascular screen is now normal without carotid disease.  Improved.     Gastroesophageal reflux disease without esophagitis    Hyperlipidemia    Microscopic hematuria    Osteoporosis, 3/23/2022--lumbar spine -0.9; left femoral neck -2.4; right femoral neck -2.3.  11/5/2019--lumbar spine -0.3.  Left femoral neck -2.7.  Right femoral neck -2.4.    Tinnitus of right ear    History of malignant melanoma, 2008--right side of face.  1999--lower back.    Therapeutic drug monitoring    Vitamin D deficiency    Lumbar disc disease    Impaired fasting glucose    Postmenopausal state    History of basal cell carcinoma of skin    Subclinical hypothyroidism    Intolerance of oral bisphosphonate therapy    Primary osteoarthritis of both hips    History of COVID-19    Hypercalcemia    Chronic venous insufficiency of lower extremity    Chronic venous hypertension with ulcer and inflammation involving left side    Primary hyperparathyroidism         Past Medical History:   Diagnosis Date    Benign essential hypertension 04/18/2006 04/18/2006--treatment for hypertension begun.    Carotid artery plaque, 5/9/2022-- mild bilateral plaque without stenosis.  4/3/2019--mild bilateral.  10/06/2016--vascular screen is now normal without carotid disease.  Improved. 07/30/2010    May 9, 2022--vascular screening reveals mild carotid artery plaque bilaterally without stenosis.  Negative for AAA.  Negative for PAD.  April 3, 2019--vascular screening reveals mild bilateral carotid plaque without stenosis.  Negative for AAA.  Negative for PAD.  10/06/2016--vascular screen reveals no evidence of carotid plaque, negative for AAA, negative for PAD.  04/23/2014--vascular screen rev    Chronic right hip pain 09/01/2017 09/01/2017--patient has had a one-year history of intermittent episodes of lateral right hip pain that at times is very severe and debilitating.  She also has tenderness over this area that limits her from sleeping on her right side.  Last year I gave her a cortisone injection for trochanteric bursitis  and this provided immediate relief but not long lasting.  X-rays were negative for fracture or even degenerative arthritis.  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  Ordered.    Gastroesophageal reflux disease without esophagitis 01/14/2013    04/10/2014--patient presented with right upper quadrant/epigastric burning pain and discomfort that was postprandial. H pylori breath test was negative. Empiric trial of Dexilant 60 mg per day was initiated for esophageal reflux/dyspepsia.   01/14/2013--EGD performed for epigastric and right upper quadrant pain. There was some edematous appearing mucosa in the antrum and body of the stomach which was biopsied. No significant erythema. No ulcerations. Normal appearing duodenum and esophagus. Pathology revealed moderate chronic active gastritis. No intestinal metaplasia. Helicobacter pylori positive. Patient was treated with appropriate antibiotic and PPI therapy.    History of basal cell carcinoma of skin 02/12/2019 June 2018--Mohs micrographic surgery of basal cell carcinoma on the tip of the nose.    History of Helicobacter pylori gastritis 01/14/2013    04/10/2014--patient presented with right upper quadrant/epigastric burning pain and discomfort that was postprandial. H pylori breath test was negative. Empiric trial of Dexilant 60 mg per day was initiated for esophageal reflux/dyspepsia.  01/14/2013--EGD performed for epigastric and right upper quadrant pain. There was some edematous appearing mucosa in the antrum and body of the stomach which w    History of malignant melanoma, 2008--right side of face.  1999--lower back. 01/01/2008 2008--melanoma resected from right face.   1999--malignant melanoma resected from lower back.    History of routine gynecologic exam yearly    Patient sees a gynecologist.    History of thrombophlebitis, superficial, left lower extremity. 01/12/2022 January 12,  2022--patient reports a 2-week history of left lower extremity swelling which is just above the ankle and sometimes extends all the way down into the ankle and is associated with redness and tenderness. It tends to get worse towards the end of the day. She has had no shortness of breath or chest pain and there is been no significant swelling involving the upper part of either leg. On e    Hx of malignant skin melanoma     Hyperlipidemia 08/13/2010 08/13/2010--treatment for hyperlipidemia begun.    Impaired fasting glucose 09/01/2017 09/01/2017--routine physical.  Serum glucose elevated at 101.  Recommend weight loss and decrease carbohydrate intake.    Intolerance of oral bisphosphonate therapy 02/21/2020    Lumbar disc disease 10/19/2016    09/13/2016--x-ray lumbar spine reveals disc space narrowing at L2-L3, L3-L4, L4-L5, and possibly L5-S1.  There is mild anterior listhesis of L4 on L5 measuring 4.5 mm.  No compression deformity, nor other evidence of acute process.    Microscopic hematuria 12/18/2015 12/22/2015--urine cytology negative for malignant cells.  Red blood cells noted.  Transitional epithelial cells noted.  Urine culture revealed less than 10,000 colony forming units of mixed urogenital laurie.  Repeat urinalysis was negative.  Recommend observation.  12/18/2015--routine urinalysis reveals 3-5 WBCs and 3-5 RBCs. 0 epithelial cells. 0 bacteria. Patient is asymptomatic. Repeat urinalysis, urine cytology, and urine culture sent.    Osteoporosis, 3/23/2022--lumbar spine -0.9; left femoral neck -2.4; right femoral neck -2.3.  11/5/2019--lumbar spine -0.3.  Left femoral neck -2.7.  Right femoral neck -2.4. 07/24/2009 March 23, 2022--DEXA scan reveals lumbar spine T score -0.9 representing a 5.7% interval decrease.  Left femoral neck T score -2.4 which represents a 5.7% increase.  Right femoral neck T score -2.3 which is unchanged.  Impression is osteopenia at the hips with mixed interval change  in density values.  November 5, 2019--DEXA scan reveals lumbar spine T score -0.3 representing a 5% increase.  Left f    Positive colorectal cancer screening using Cologuard test     Postmenopausal state 09/01/2017    Primary osteoarthritis of both hips 11/19/2021 November 19, 2021-9 being evaluated and treated by the orthopedist.  Patient may need bilateral hip replacements at some point in the future.    Status post total hip replacement, right 08/16/2024    Subclinical hypothyroidism 02/21/2020 February 21, 2020--TSH slightly elevated at 4.41.  Free T3 and free T4 are normal.  Close observation.    Tinnitus of right ear 12/18/2015 12/18/2015--patient presents with a four-month history of ringing in her right ear. No hearing loss. Examination reveals a cerumen impaction of the right ear canal. This was removed with irrigation and curettage.    Vitamin D deficiency 08/12/2016         Past Surgical History:   Procedure Laterality Date    ADENOIDECTOMY  1946    APPENDECTOMY      BASAL CELL CARCINOMA EXCISION  06/2018 June 2018--Mohs micrographic surgery of basal cell carcinoma on the tip of the nose.    CHOLECYSTECTOMY  05/17/2013 05/17/2013--laparoscopic cholecystectomy.    COLONOSCOPY  07/12/2012 07/12/2012--normal colonoscopy to the cecum with an excellent prep.     COLONOSCOPY  06/20/2003 06/20/2003--normal colonoscopy to the cecum.    COLONOSCOPY N/A 08/30/2023    Procedure: COLONOSCOPY INTO CECUM WITH COLD BIOPSY POLYPECTOMIES AND HOT SNARE POLYPECTOMY;  Surgeon: Ho Goyal MD;  Location: [facility] ENDOSCOPY;  Service: Gastroenterology;  Laterality: N/A;  PRE- POSITIVE COLOGUARD  POST- POLYPS, HEMORRHOIDS    ESOPHAGOSCOPY / EGD  01/14/2013 01/14/2013--EGD performed for epigastric and right upper quadrant pain. There was some edematous appearing mucosa in the antrum and body of the stomach which was biopsied. No significant erythema. No ulcerations. Normal appearing duodenum and  esophagus. Pathology revealed moderate chronic active gastritis. No intestinal metaplasia. Helicobacter pylori positive. Patient was treated with appropriate    EYE SURGERY      FOOT SURGERY  1992 1992--orthopedic 10 placed in right great toe.    HYSTERECTOMY  1986 1986--total abdominal hysterectomy.    JOINT REPLACEMENT  2024    Rt Hip    SKIN CANCER EXCISION  09/14/2009 09/14/2009--excision 1 cm mid scalp cyst. Pathology benign. 2008--melanoma resected from right face. 1999--malignant melanoma resected from lower back.    TONSILLECTOMY AND ADENOIDECTOMY  1946 1946.    TOTAL HIP ARTHROPLASTY Right 08/16/2024    Procedure: TOTAL HIP ARTHROPLASTY ANTERIOR WITH HANA TABLE;  Surgeon: Rico Patterson MD;  Location: [facility] OR [facility] [place], [place];  Service: Orthopedics;  Laterality: Right;         No Known Allergies        Current Outpatient Medications:     Calcium Carb-Cholecalciferol (CALCIUM 600 + D PO), Take 1 tablet by mouth Daily., Disp: , Rfl:     Cholecalciferol (VITAMIN D) 2000 UNITS capsule, Take 1 capsule by mouth Daily., Disp: , Rfl:     multivitamin with minerals (MULTIVITAMIN ADULT PO), Take 1 tablet by mouth Daily., Disp: , Rfl:     simvastatin (ZOCOR) 40 MG tablet, TAKE ONE TABLET BY MOUTH EVERY DAY FOR HIGH CHOLESTEROL (NEED MD APPOINTMENT), Disp: 90 tablet, Rfl: 1    valsartan-hydrochlorothiazide (DIOVAN-HCT) 160-25 MG per tablet, TAKE ONE TABLET BY MOUTH EVERY DAY FOR BLOOD PRESSURE, Disp: 90 tablet, Rfl: 1      Family History   Problem Relation Age of Onset    COPD Mother     Hypertension Mother         Essential    Arthritis Mother     Hearing loss Mother     Hyperlipidemia Mother     Asthma Mother     COPD Father     Hypertension Father     Arthritis Father     Hyperlipidemia Father     Alcohol abuse Father     Malig Hyperthermia Neg Hx          Social History     Socioeconomic History    Marital status:     Highest education level: Bachelor's degree (e.g., BA, AB, BS)   Tobacco Use     "Smoking status: Never    Smokeless tobacco: Never   Vaping Use    Vaping status: Never Used   Substance and Sexual Activity    Alcohol use: Never    Drug use: Never    Sexual activity: Yes     Partners: Male     Birth control/protection: Hysterectomy         Vitals:    03/10/25 1404   BP: 138/80   Pulse: 81   Resp: 16   Temp: 98.3 °F (36.8 °C)   TempSrc: Oral   SpO2: 96%   Weight: 73.5 kg (162 lb)   Height: 159.4 cm (62.76\")        Body mass index is 28.92 kg/m².      Physical Exam:    General: Alert and oriented x 3.  No acute distress.  Normal affect.  HEENT: Pupils equal, round, reactive to light; extraocular movements intact; sclerae nonicteric; pharynx, ear canals and TMs normal.  Neck: Without JVD, thyromegaly, bruit, or adenopathy.  Lungs: Clear to auscultation in all fields.  Heart: Regular rate and rhythm without murmur, rub, gallop, or click.  Abdomen: Soft, nontender, without hepatosplenomegaly or hernia.  Bowel sounds normal.  : Deferred.  Rectal: Deferred.  Extremities: Without clubbing, cyanosis, edema, or pulse deficit.  Neurologic: Intact without focal deficit.  Normal station and gait observed during ingress and egress from the examination room.  Skin: Without significant lesion.  Musculoskeletal: Unremarkable.    Lab/other results:    CMP normal except chloride 108, calcium 10.7.  Albumin normal at 4.0.  CBC normal.  SARS antibodies are positive.  CK normal at 75.  The CMP prior to the emergency room visit revealed a sodium of 147, chloride 112, CO2 18, calcium 11.2.  Hemoglobin A1c 5.6.  Total cholesterol 122, triglyceride 91, LDL particle #598, HDL particle #32.3.  Thyroid function tests are normal.  Intact PTH elevated at 78.  Ionized calcium elevated at 5.3.  Vitamin D is low at 28.    Assessment/Plan:     Diagnosis Plan   1. Impaired fasting glucose        2. Hyperlipidemia        3. Benign essential hypertension  valsartan-hydrochlorothiazide (DIOVAN-HCT) 160-25 MG per tablet    " Comprehensive Metabolic Panel    Calcium, Ionized    PTH, Intact      4. Vitamin D deficiency        5. Subclinical hypothyroidism        6. History of COVID-19        7. Hypercalcemia  NM Parathyroid Scan w SPECT CT    Calcium, Ionized    PTH, Intact      8. Microscopic hematuria        9. Primary hyperparathyroidism  NM Parathyroid Scan w SPECT CT    Calcium, Ionized    PTH, Intact      10. Carotid artery plaque, 5/9/2022-- mild bilateral plaque without stenosis.  4/3/2019--mild bilateral.  10/06/2016--vascular screen is now normal without carotid disease.  Improved.        11. Chronic venous insufficiency of lower extremity        12. Chronic venous hypertension with ulcer and inflammation involving left side        13. Postmenopausal state        14. Intolerance of oral bisphosphonate therapy        15. Osteoporosis, 3/23/2022--lumbar spine -0.9; left femoral neck -2.4; right femoral neck -2.3.  11/5/2019--lumbar spine -0.3.  Left femoral neck -2.7.  Right femoral neck -2.4.        16. History of malignant melanoma, 2008--right side of face.  1999--lower back.        17. History of basal cell carcinoma of skin        18. Gastroesophageal reflux disease without esophagitis        19. Therapeutic drug monitoring          Patient has impaired fasting glucose that is mild and does not require medication.  Hyperlipidemia is under reasonable control with simvastatin.  Her blood pressure appears to be a little borderline but we will monitor.  Her vitamin D is low but given her electrolyte abnormalities including elevated calcium and intact PTH we will hold off on supplementation.  Patient was previously on more calcium with vitamin D and this has been cut back to 1 pill/day.  She has a history of COVID in the past and has had positive antibodies.  No evidence of subclinical hypothyroidism.  Microscopic hematuria workup in the past believed to be negative.  It appears the patient has developed hyperparathyroidism and  that needs further evaluation.  Her swelling is related to chronic venous insufficiency and this is being managed by the vascular surgeon who also did a carotid Doppler study.  She has osteoporosis and is intolerant of oral bisphosphonates.  She has a history of malignant melanoma and is followed by dermatology.  She is also had a basal cell carcinoma.  Esophageal reflux has not required medication.    Plan is as follows: Nuclear medicine parathyroid scan as soon as possible.  Discontinue plain valsartan 320 mg and restart valsartan 160/25, 1 p.o. daily.  This should help with her lower extremity edema.  Will hold off on any further Prolia injections until we get this parathyroid situation straightened out.  I have patient follow-up just as soon as the results of the parathyroid scan are known.  I have patient follow-up in about 3 weeks to reassess the blood pressure and I can check her electrolyte status and parathyroid hormone status at that time.  With any luck the parathyroid scan will be back by then but I doubt it.        Procedures

## 2025-03-17 DIAGNOSIS — I10 BENIGN ESSENTIAL HYPERTENSION: Chronic | ICD-10-CM

## 2025-03-17 DIAGNOSIS — E78.2 MIXED HYPERLIPIDEMIA: Chronic | ICD-10-CM

## 2025-03-17 RX ORDER — SIMVASTATIN 40 MG
TABLET ORAL
Qty: 90 TABLET | Refills: 3 | Status: SHIPPED | OUTPATIENT
Start: 2025-03-17

## 2025-03-17 RX ORDER — VALSARTAN AND HYDROCHLOROTHIAZIDE 160; 25 MG/1; MG/1
TABLET ORAL
Qty: 90 TABLET | Refills: 3 | Status: SHIPPED | OUTPATIENT
Start: 2025-03-17

## 2025-03-19 ENCOUNTER — HOSPITAL ENCOUNTER (OUTPATIENT)
Dept: NUCLEAR MEDICINE | Facility: HOSPITAL | Age: 85
Discharge: HOME | End: 2025-03-19
Payer: MEDICARE

## 2025-03-19 DIAGNOSIS — E21.0 PRIMARY HYPERPARATHYROIDISM: ICD-10-CM

## 2025-03-19 DIAGNOSIS — E83.52 HYPERCALCEMIA: ICD-10-CM

## 2025-03-19 PROCEDURE — 78072 PARATHYRD PLANAR W/SPECT&CT: CPT

## 2025-03-19 PROCEDURE — A9500 TC99M SESTAMIBI: HCPCS | Performed by: INTERNAL MEDICINE

## 2025-03-19 PROCEDURE — 34310000005 TECHNETIUM SESTAMIBI: Performed by: INTERNAL MEDICINE

## 2025-03-19 RX ADMIN — TECHNETIUM TC 99M SESTAMIBI 1 DOSE: 1 INJECTION INTRAVENOUS at 11:36

## 2025-03-24 ENCOUNTER — TELEPHONE (OUTPATIENT)
Dept: INTERNAL MEDICINE | Facility: CLINIC | Age: 85
End: 2025-03-24

## 2025-03-24 NOTE — TELEPHONE ENCOUNTER
Caller: Lachelle Mcclure    Relationship to patient: Self    Best call back number:      Patient is needing: PATIENT WOULD LIKE A CALLBACK WITH HER RESULTS FROM THE 3/19 PARATHYROID SCAN SHE HAD DONE.  PLEASE PROVIDE RESULTS TO PATIENT.

## 2025-03-31 ENCOUNTER — LAB (OUTPATIENT)
Dept: LAB | Facility: HOSPITAL | Age: 85
End: 2025-03-31
Payer: MEDICARE

## 2025-03-31 DIAGNOSIS — E21.0 PRIMARY HYPERPARATHYROIDISM: ICD-10-CM

## 2025-03-31 DIAGNOSIS — E83.52 HYPERCALCEMIA: ICD-10-CM

## 2025-03-31 DIAGNOSIS — I10 BENIGN ESSENTIAL HYPERTENSION: Chronic | ICD-10-CM

## 2025-03-31 LAB
ALBUMIN SERPL-MCNC: 4.1 G/DL (ref 3.5–5.2)
ALBUMIN/GLOB SERPL: 1.6 G/DL
ALP SERPL-CCNC: 55 U/L (ref 39–117)
ALT SERPL W P-5'-P-CCNC: 15 U/L (ref 1–33)
ANION GAP SERPL CALCULATED.3IONS-SCNC: 9.7 MMOL/L (ref 5–15)
AST SERPL-CCNC: 19 U/L (ref 1–32)
BILIRUB SERPL-MCNC: 0.4 MG/DL (ref 0–1.2)
BUN SERPL-MCNC: 16 MG/DL (ref 8–23)
BUN/CREAT SERPL: 22.2 (ref 7–25)
CA-I SERPL ISE-MCNC: 1.43 MMOL/L (ref 1.15–1.35)
CALCIUM SPEC-SCNC: 10.9 MG/DL (ref 8.6–10.5)
CHLORIDE SERPL-SCNC: 108 MMOL/L (ref 98–107)
CO2 SERPL-SCNC: 27.3 MMOL/L (ref 22–29)
CREAT SERPL-MCNC: 0.72 MG/DL (ref 0.57–1)
EGFRCR SERPLBLD CKD-EPI 2021: 83.6 ML/MIN/1.73
GLOBULIN UR ELPH-MCNC: 2.5 GM/DL
GLUCOSE SERPL-MCNC: 100 MG/DL (ref 65–99)
POTASSIUM SERPL-SCNC: 4.1 MMOL/L (ref 3.5–5.2)
PROT SERPL-MCNC: 6.6 G/DL (ref 6–8.5)
PTH-INTACT SERPL-MCNC: 68.5 PG/ML (ref 15–65)
SODIUM SERPL-SCNC: 145 MMOL/L (ref 136–145)

## 2025-03-31 PROCEDURE — 83970 ASSAY OF PARATHORMONE: CPT

## 2025-03-31 PROCEDURE — 80053 COMPREHEN METABOLIC PANEL: CPT

## 2025-03-31 PROCEDURE — 82330 ASSAY OF CALCIUM: CPT

## 2025-03-31 PROCEDURE — 36415 COLL VENOUS BLD VENIPUNCTURE: CPT

## 2025-04-04 ENCOUNTER — OFFICE VISIT (OUTPATIENT)
Dept: INTERNAL MEDICINE | Facility: CLINIC | Age: 85
End: 2025-04-04
Payer: MEDICARE

## 2025-04-04 ENCOUNTER — TELEPHONE (OUTPATIENT)
Dept: INTERNAL MEDICINE | Facility: CLINIC | Age: 85
End: 2025-04-04

## 2025-04-04 VITALS
HEART RATE: 74 BPM | DIASTOLIC BLOOD PRESSURE: 74 MMHG | OXYGEN SATURATION: 98 % | WEIGHT: 157 LBS | RESPIRATION RATE: 16 BRPM | SYSTOLIC BLOOD PRESSURE: 130 MMHG | HEIGHT: 62 IN | BODY MASS INDEX: 28.89 KG/M2 | TEMPERATURE: 98 F

## 2025-04-04 DIAGNOSIS — E21.0 PRIMARY HYPERPARATHYROIDISM: Primary | Chronic | ICD-10-CM

## 2025-04-04 DIAGNOSIS — G89.29 CHRONIC GROIN PAIN, RIGHT: ICD-10-CM

## 2025-04-04 DIAGNOSIS — I87.2 CHRONIC VENOUS INSUFFICIENCY OF LOWER EXTREMITY: Chronic | ICD-10-CM

## 2025-04-04 DIAGNOSIS — R60.0 BILATERAL LOWER EXTREMITY EDEMA: ICD-10-CM

## 2025-04-04 DIAGNOSIS — E83.52 HYPERCALCEMIA: Chronic | ICD-10-CM

## 2025-04-04 DIAGNOSIS — I10 BENIGN ESSENTIAL HYPERTENSION: Chronic | ICD-10-CM

## 2025-04-04 DIAGNOSIS — E03.8 SUBCLINICAL HYPOTHYROIDISM: Chronic | ICD-10-CM

## 2025-04-04 DIAGNOSIS — Z51.81 THERAPEUTIC DRUG MONITORING: ICD-10-CM

## 2025-04-04 DIAGNOSIS — R10.31 CHRONIC GROIN PAIN, RIGHT: ICD-10-CM

## 2025-04-04 DIAGNOSIS — E78.2 MIXED HYPERLIPIDEMIA: Chronic | ICD-10-CM

## 2025-04-04 PROBLEM — I87.332 CHRONIC VENOUS HYPERTENSION WITH ULCER AND INFLAMMATION INVOLVING LEFT SIDE: Chronic | Status: ACTIVE | Noted: 2025-02-27

## 2025-04-04 PROBLEM — I87.332 CHRONIC VENOUS HYPERTENSION WITH ULCER AND INFLAMMATION INVOLVING LEFT SIDE: Chronic | Status: RESOLVED | Noted: 2025-02-27 | Resolved: 2025-04-04

## 2025-04-04 NOTE — TELEPHONE ENCOUNTER
Caller: Lachelle Mcclure    Relationship: Self    Best call back number: [phone]    What medication are you requesting: PATIENT THOUGHT DR KO WAS GIVING HER A NEW MEDICATION TODAY    If a prescription is needed, what is your preferred pharmacy and phone number:  [id number] - [place], [place] - [address] AT [facility] [address] - [phone]  - [phone]      Additional notes: PATIENT THOUGHT DR KO WAS PRESCRIBING HER A NEW MEDICATION TO TRY FOR THE NEXT MONTH.  PATIENT IS RETURNING IN FOUR WEEKS FOR LABS AND SHE ALSO THOUGHT DR KO STATED HE WANTED TO CHECK TO SEE IF THE MEDICATION WAS WORKING.    HYPERPARATHYROIDISM

## 2025-04-04 NOTE — PROGRESS NOTES
04/04/2025    Patient Information  Lachelle ESTES Kami                                                                                          [address]  [facility] [address]      1940  [unfilled]  There is no work phone number on file.    Chief Complaint:     Follow-up medical problems as noted below and recent medication adjustment/changes.  No new acute complaints.    History of Present Illness:    Patient with several chronic medical problems as noted below in assessment and plan presents today for a follow-up.  Patient has been having worsening bilateral lower extremity edema due to chronic venous insufficiency and has difficulty with compression stockings.  She has high blood pressure and although her hypertension has been controlled I stopped her plain valsartan and put her on valsartan /25, 1 p.o. daily in hopes of helping with the edema and still controlling her blood pressure.  She also has suspected primary hyperparathyroidism and had a parathyroid scan that we need to review.  Hopefully the hydrochlorothiazide will not adversely affect her elevated calcium levels.  Her past medical history reviewed and updated were necessary including health maintenance parameters.  This reveals she is currently up-to-date or else accounted for.    Review of Systems   Constitutional: Positive for chills. Negative for diaphoresis and fever.   HENT: Negative.  Negative for congestion and sore throat.    Eyes: Negative.    Cardiovascular:  Positive for leg swelling. Negative for chest pain.   Respiratory: Negative.  Negative for cough.    Endocrine: Negative.    Hematologic/Lymphatic: Negative.    Skin: Negative.  Negative for rash.   Musculoskeletal:  Positive for joint pain. Negative for myalgias and neck pain.   Gastrointestinal: Negative.  Negative for anorexia, nausea and vomiting.   Genitourinary: Negative.  Negative for dysuria.   Neurological:  Positive for numbness. Negative for vertigo  and weakness.   Psychiatric/Behavioral: Negative.     Allergic/Immunologic: Negative.        Active Problems:    Patient Active Problem List   Diagnosis    Benign essential hypertension    Carotid artery plaque, 5/9/2022-- mild bilateral plaque without stenosis.  4/3/2019--mild bilateral.  10/06/2016--vascular screen is now normal without carotid disease.  Improved.    Gastroesophageal reflux disease without esophagitis    Hyperlipidemia    Microscopic hematuria    Osteoporosis, 3/23/2022--lumbar spine -0.9; left femoral neck -2.4; right femoral neck -2.3.  11/5/2019--lumbar spine -0.3.  Left femoral neck -2.7.  Right femoral neck -2.4.    Tinnitus of right ear    History of malignant melanoma, 2008--right side of face.  1999--lower back.    Therapeutic drug monitoring    Vitamin D deficiency    Lumbar disc disease    Impaired fasting glucose    Postmenopausal state    History of basal cell carcinoma of skin    Subclinical hypothyroidism    Intolerance of oral bisphosphonate therapy    Primary osteoarthritis of both hips    History of COVID-19    Hypercalcemia    Chronic venous insufficiency of lower extremity    Primary hyperparathyroidism    Bilateral lower extremity edema    Chronic groin pain, right         Past Medical History:   Diagnosis Date    Benign essential hypertension 04/18/2006 04/18/2006--treatment for hypertension begun.    Bilateral lower extremity edema 04/04/2025    Carotid artery plaque, 5/9/2022-- mild bilateral plaque without stenosis.  4/3/2019--mild bilateral.  10/06/2016--vascular screen is now normal without carotid disease.  Improved. 07/30/2010    May 9, 2022--vascular screening reveals mild carotid artery plaque bilaterally without stenosis.  Negative for AAA.  Negative for PAD.  April 3, 2019--vascular screening reveals mild bilateral carotid plaque without stenosis.  Negative for AAA.  Negative for PAD.  10/06/2016--vascular screen reveals no evidence of carotid plaque, negative  for AAA, negative for PAD.  04/23/2014--vascular screen rev    Chronic right hip pain 09/01/2017 09/01/2017--patient has had a one-year history of intermittent episodes of lateral right hip pain that at times is very severe and debilitating.  She also has tenderness over this area that limits her from sleeping on her right side.  Last year I gave her a cortisone injection for trochanteric bursitis and this provided immediate relief but not long lasting.  X-rays were negative for fracture or even degenerative arthritis.  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  Ordered.    Chronic venous hypertension with ulcer and inflammation involving left side 02/27/2025    Chronic venous insufficiency of lower extremity 02/27/2025    Gastroesophageal reflux disease without esophagitis 01/14/2013    04/10/2014--patient presented with right upper quadrant/epigastric burning pain and discomfort that was postprandial. H pylori breath test was negative. Empiric trial of Dexilant 60 mg per day was initiated for esophageal reflux/dyspepsia.   01/14/2013--EGD performed for epigastric and right upper quadrant pain. There was some edematous appearing mucosa in the antrum and body of the stomach which was biopsied. No significant erythema. No ulcerations. Normal appearing duodenum and esophagus. Pathology revealed moderate chronic active gastritis. No intestinal metaplasia. Helicobacter pylori positive. Patient was treated with appropriate antibiotic and PPI therapy.    History of basal cell carcinoma of skin 02/12/2019 June 2018--Mohs micrographic surgery of basal cell carcinoma on the tip of the nose.    History of Helicobacter pylori gastritis 01/14/2013    04/10/2014--patient presented with right upper quadrant/epigastric burning pain and discomfort that was postprandial. H pylori breath test was negative. Empiric trial of Dexilant 60 mg per day was initiated for  esophageal reflux/dyspepsia.  01/14/2013--EGD performed for epigastric and right upper quadrant pain. There was some edematous appearing mucosa in the antrum and body of the stomach which w    History of malignant melanoma, 2008--right side of face.  1999--lower back. 01/01/2008 2008--melanoma resected from right face.   1999--malignant melanoma resected from lower back.    History of routine gynecologic exam yearly    Patient sees a gynecologist.    History of thrombophlebitis, superficial, left lower extremity. 01/12/2022 January 12, 2022--patient reports a 2-week history of left lower extremity swelling which is just above the ankle and sometimes extends all the way down into the ankle and is associated with redness and tenderness. It tends to get worse towards the end of the day. She has had no shortness of breath or chest pain and there is been no significant swelling involving the upper part of either leg. On e    Hx of malignant skin melanoma     Hypercalcemia 07/17/2023 March 19, 2025--nuclear medicine parathyroid SPECT-CT revealed no findings to localize a parathyroid lesion within the neck or upper mediastinum.     CMP reveals glucose of 100, calcium elevated 10.9, ionized calcium elevated at 1.43, intact PTH 68.5.      Hyperlipidemia 08/13/2010 08/13/2010--treatment for hyperlipidemia begun.    Impaired fasting glucose 09/01/2017 09/01/2017--routine physical.  Serum glucose elevated at 101.  Recommend weight loss and decrease carbohydrate intake.    Intolerance of oral bisphosphonate therapy 02/21/2020    Lumbar disc disease 10/19/2016    09/13/2016--x-ray lumbar spine reveals disc space narrowing at L2-L3, L3-L4, L4-L5, and possibly L5-S1.  There is mild anterior listhesis of L4 on L5 measuring 4.5 mm.  No compression deformity, nor other evidence of acute process.    Microscopic hematuria 12/18/2015 12/22/2015--urine cytology negative for malignant cells.  Red blood cells noted.   Transitional epithelial cells noted.  Urine culture revealed less than 10,000 colony forming units of mixed urogenital laurie.  Repeat urinalysis was negative.  Recommend observation.  12/18/2015--routine urinalysis reveals 3-5 WBCs and 3-5 RBCs. 0 epithelial cells. 0 bacteria. Patient is asymptomatic. Repeat urinalysis, urine cytology, and urine culture sent.    Osteoporosis, 3/23/2022--lumbar spine -0.9; left femoral neck -2.4; right femoral neck -2.3.  11/5/2019--lumbar spine -0.3.  Left femoral neck -2.7.  Right femoral neck -2.4. 07/24/2009 March 23, 2022--DEXA scan reveals lumbar spine T score -0.9 representing a 5.7% interval decrease.  Left femoral neck T score -2.4 which represents a 5.7% increase.  Right femoral neck T score -2.3 which is unchanged.  Impression is osteopenia at the hips with mixed interval change in density values.  November 5, 2019--DEXA scan reveals lumbar spine T score -0.3 representing a 5% increase.  Left f    Positive colorectal cancer screening using Cologuard test     Postmenopausal state 09/01/2017    Primary hyperparathyroidism 03/10/2025    March 19, 2025--nuclear medicine parathyroid SPECT-CT revealed no findings to localize a parathyroid lesion within the neck or upper mediastinum.     CMP reveals glucose of 100, calcium elevated 10.9, ionized calcium elevated at 1.43, intact PTH 68.5.      Primary osteoarthritis of both hips 11/19/2021 November 19, 2021-9 being evaluated and treated by the orthopedist.  Patient may need bilateral hip replacements at some point in the future.    Status post total hip replacement, right 08/16/2024    Subclinical hypothyroidism 02/21/2020 February 21, 2020--TSH slightly elevated at 4.41.  Free T3 and free T4 are normal.  Close observation.    Tinnitus of right ear 12/18/2015 12/18/2015--patient presents with a four-month history of ringing in her right ear. No hearing loss. Examination reveals a cerumen impaction of the right ear  canal. This was removed with irrigation and curettage.    Vitamin D deficiency 08/12/2016         Past Surgical History:   Procedure Laterality Date    ADENOIDECTOMY  1946    APPENDECTOMY      BASAL CELL CARCINOMA EXCISION  06/2018 June 2018--Mohs micrographic surgery of basal cell carcinoma on the tip of the nose.    CHOLECYSTECTOMY  05/17/2013 05/17/2013--laparoscopic cholecystectomy.    COLONOSCOPY  07/12/2012 07/12/2012--normal colonoscopy to the cecum with an excellent prep.     COLONOSCOPY  06/20/2003 06/20/2003--normal colonoscopy to the cecum.    COLONOSCOPY N/A 08/30/2023    Procedure: COLONOSCOPY INTO CECUM WITH COLD BIOPSY POLYPECTOMIES AND HOT SNARE POLYPECTOMY;  Surgeon: Ho Goyal MD;  Location: [facility] ENDOSCOPY;  Service: Gastroenterology;  Laterality: N/A;  PRE- POSITIVE COLOGUARD  POST- POLYPS, HEMORRHOIDS    ESOPHAGOSCOPY / EGD  01/14/2013 01/14/2013--EGD performed for epigastric and right upper quadrant pain. There was some edematous appearing mucosa in the antrum and body of the stomach which was biopsied. No significant erythema. No ulcerations. Normal appearing duodenum and esophagus. Pathology revealed moderate chronic active gastritis. No intestinal metaplasia. Helicobacter pylori positive. Patient was treated with appropriate    EYE SURGERY      FOOT SURGERY  1992 1992--orthopedic 10 placed in right great toe.    HYSTERECTOMY  1986 1986--total abdominal hysterectomy.    SKIN CANCER EXCISION  09/14/2009 09/14/2009--excision 1 cm mid scalp cyst. Pathology benign. 2008--melanoma resected from right face. 1999--malignant melanoma resected from lower back.    TONSILLECTOMY AND ADENOIDECTOMY  1946    1946.    TOTAL HIP ARTHROPLASTY Right 08/16/2024    Procedure: TOTAL HIP ARTHROPLASTY ANTERIOR WITH HANA TABLE;  Surgeon: Rico Patterson MD;  Location: [facility] OR [facility] [place];  Service: Orthopedics;  Laterality: Right;         No Known Allergies        Current Outpatient  "Medications:     Calcium Carb-Cholecalciferol (CALCIUM 600 + D PO), Take 1 tablet by mouth Daily., Disp: , Rfl:     Cholecalciferol (VITAMIN D) 2000 UNITS capsule, Take 1 capsule by mouth Daily., Disp: , Rfl:     multivitamin with minerals (MULTIVITAMIN ADULT PO), Take 1 tablet by mouth Daily., Disp: , Rfl:     simvastatin (ZOCOR) 40 MG tablet, TAKE ONE TABLET BY MOUTH EVERY DAY FOR HIGH CHOLESTEROL (NEED MD APPOINTMENT), Disp: 90 tablet, Rfl: 3    valsartan-hydrochlorothiazide (DIOVAN-HCT) 160-25 MG per tablet, TAKE ONE TABLET BY MOUTH EVERY DAY FOR BLOOD PRESSURE, Disp: 90 tablet, Rfl: 3      Family History   Problem Relation Age of Onset    COPD Mother     Hypertension Mother         Essential    Arthritis Mother     Hearing loss Mother     Hyperlipidemia Mother     Asthma Mother     COPD Father     Hypertension Father     Arthritis Father     Hyperlipidemia Father     Alcohol abuse Father     Malig Hyperthermia Neg Hx          Social History     Socioeconomic History    Marital status:     Highest education level: Bachelor's degree (e.g., BA, AB, BS)   Tobacco Use    Smoking status: Never    Smokeless tobacco: Never   Vaping Use    Vaping status: Never Used   Substance and Sexual Activity    Alcohol use: Never    Drug use: Never    Sexual activity: Yes     Partners: Male     Birth control/protection: Hysterectomy         Vitals:    04/04/25 1002   BP: 130/74   Pulse: 74   Resp: 16   Temp: 98 °F (36.7 °C)   TempSrc: Oral   SpO2: 98%   Weight: 71.2 kg (157 lb)   Height: 157.4 cm (61.97\")        Body mass index is 28.74 kg/m².      Physical Exam:    General: Alert and oriented x 3.  No acute distress.  Normal affect.  HEENT: Pupils equal, round, reactive to light; extraocular movements intact; sclerae nonicteric; pharynx, ear canals and TMs normal.  Neck: Without JVD, thyromegaly, bruit, or adenopathy.  Lungs: Clear to auscultation in all fields.  Heart: Regular rate and rhythm without murmur, rub, gallop, " or click.  Abdomen: Soft, nontender, without hepatosplenomegaly or hernia.  Bowel sounds normal.  : Deferred.  Rectal: Deferred.  Extremities: Without clubbing, cyanosis, or pulse deficit.  She has a little more than 1+ bilateral lower extremity edema and varicose veins with venous stasis skin changes and no obvious cellulitis bilaterally.  Looks much better.  She is wearing below the knee compression stockings that are easy to get on and off.  Neurologic: Intact without focal deficit.  Normal station and gait observed during ingress and egress from the examination room.  Skin: Without significant lesion.  Musculoskeletal: Unremarkable.    Lab/other results:    March 19, 2025--nuclear medicine parathyroid SPECT-CT revealed no findings to localize a parathyroid lesion within the neck or upper mediastinum.    CMP reveals glucose of 100, calcium elevated 10.9, ionized calcium elevated at 1.43, intact PTH 68.5.    Assessment/Plan:     Diagnosis Plan   1. Primary hyperparathyroidism  Comprehensive Metabolic Panel    Calcium, Ionized    PTH, Intact      2. Hypercalcemia  Comprehensive Metabolic Panel    Calcium, Ionized    PTH, Intact      3. Benign essential hypertension  Comprehensive Metabolic Panel      4. Chronic venous insufficiency of lower extremity        5. Bilateral lower extremity edema        6. Subclinical hypothyroidism        7. Hyperlipidemia        8. Therapeutic drug monitoring        9. Chronic groin pain, right  Ambulatory Referral to General Surgery        Patient has primary hyperparathyroidism and recent scan failed to reveal a parathyroid adenoma.  No definite evidence for diffuse parathyroid hyperplasia either.  I think the best course of action is continued monitoring and repeat scan in about a year.  This is realizing that the scan may not  diffuse hypertrophy of the all of the glands.  This was discussed.  Of course this would require removing 3-1/2 of the glands which is sometimes  risky.  It is more common to have a solitary or perhaps 2 parathyroid adenomas and given the course of time we have been having this problem I think it is reasonable to wait and see if that declares itself.  Patient understands this.  Blood pressure appears to be controlled after medication change and her lower extremity edema seems better.  Her weight is down a few pounds.  She has subclinical hypothyroidism which we are monitoring to make sure this is not contributing to the lower extremity edema.  Has hyperlipidemia and seems to be tolerating simvastatin well.    Plan is as follows: No changes to current medical regimen.  Patient will follow-up on or after July 31, 2025 for subsequent Medicare wellness visit.  Also I will have her come do lab work to check her electrolyte status and renal function in about 4 weeks and we can follow-up on the phone for the results.    Addendum: Patient has a history of chronic and intermittent right groin pain.  I had her evaluated a while back by Dr. Goodwin for possible hernia and he did not feel that she had 1.  She had her hip replaced and the surgeon felt like this would probably help with the groin pain but it did not.  I am going to refer patient back to Dr. Goodwin to have him reevaluated and see if perhaps she does have a small hernia.    Procedures

## 2025-04-23 ENCOUNTER — HOSPITAL ENCOUNTER (OUTPATIENT)
Facility: HOSPITAL | Age: 85
Discharge: HOME | End: 2025-04-23
Admitting: SURGERY
Payer: MEDICARE

## 2025-04-23 DIAGNOSIS — I87.2 VENOUS (PERIPHERAL) INSUFFICIENCY: ICD-10-CM

## 2025-04-23 DIAGNOSIS — I87.332 CHRONIC VENOUS HYPERTENSION WITH ULCER AND INFLAMMATION INVOLVING LEFT SIDE: ICD-10-CM

## 2025-04-23 DIAGNOSIS — I87.8 VENOUS STASIS: ICD-10-CM

## 2025-04-23 LAB
BH CV LEFT LOWER VAS COMMON FEMORAL REFLUX TIME: 1.94 SEC
BH CV LEFT LOWER VAS EXT ILIAC REFLUX COLOR FLOW TIME: 2.16 SEC
BH CV LEFT LOWER VAS GSV KNEE TRANSVERSE DIAMETER: 0.3 CM
BH CV LEFT LOWER VAS GSV MID CALF REFLUX TIME: 3.89 SEC
BH CV LEFT LOWER VAS GSV MID CALF TRANS DIAMETER: 0.3 CM
BH CV LEFT LOWER VAS GSV MID TRANSVERSE DIAMETER: 0.3 CM
BH CV LEFT LOWER VAS GSV PROX TRANSVERSE DIAMETER: 0.5 CM
BH CV LEFT LOWER VAS GSVBELOW KNEE TRANSVERSE DIAMETER: 0.2 CM
BH CV LEFT LOWER VAS SAPHENOFEMORAL JUNCTION REFLUX TIME: 0.6 SEC
BH CV LEFT LOWER VAS SAPHENOFEMORAL JUNCTION TRANSVERSE DIAMETER: 0.7 CM
BH CV LEFT LOWER VAS SSV MID CALF TRANS DIAMETER: 0.2 CM
BH CV LEFT LOWER VAS SSV PROX CALF TRANS DIAMETER: 0.2 CM
BH CV LEFT LOWER VAS VARICOSITY BK TRANS DIAMETER: 0.3 CM
BH CV LOW VAS LEFT EXTERNAL ILIAC AUGMENT: NORMAL
BH CV LOWER VAS LEFT GSV DIST THIGH COMPRESSIBILTY: NORMAL
BH CV LOWER VAS LEFT GSV MID CALF COMPRESSIBILTY: NORMAL
BH CV LOWER VAS LEFT GSV MID THIGH COMPRESSIBILTY: NORMAL
BH CV LOWER VASCULAR LEFT COMMON FEMORAL AUGMENT: NORMAL
BH CV LOWER VASCULAR LEFT COMMON FEMORAL COMPETENT: NORMAL
BH CV LOWER VASCULAR LEFT COMMON FEMORAL PHASIC: NORMAL
BH CV LOWER VASCULAR LEFT COMMON FEMORAL SPONT: NORMAL
BH CV LOWER VASCULAR LEFT EXTERNAL ILIAC COMPETENT: NORMAL
BH CV LOWER VASCULAR LEFT EXTERNAL ILIAC PHASIC: NORMAL
BH CV LOWER VASCULAR LEFT EXTERNAL ILIAC SPONT: NORMAL
BH CV LOWER VASCULAR LEFT GREATER SAPH AK COMPETENT: NORMAL
BH CV LOWER VASCULAR LEFT GREATER SAPH AK COMPRESS: NORMAL
BH CV LOWER VASCULAR LEFT GREATER SAPH BK COMPRESS: NORMAL
BH CV LOWER VASCULAR LEFT GSV DIST THIGH COMPETENT: NORMAL
BH CV LOWER VASCULAR LEFT GSV MID CALF COMPETENT: NORMAL
BH CV LOWER VASCULAR LEFT LESSER SAPH COMPETENT: NORMAL
BH CV LOWER VASCULAR LEFT LESSER SAPH COMPRESS: NORMAL
BH CV LOWER VASCULAR LEFT MID FEMORAL AUGMENT: NORMAL
BH CV LOWER VASCULAR LEFT MID FEMORAL COMPETENT: NORMAL
BH CV LOWER VASCULAR LEFT PERONEAL AUGMENT: NORMAL
BH CV LOWER VASCULAR LEFT POPLITEAL AUGMENT: NORMAL
BH CV LOWER VASCULAR LEFT POPLITEAL COMPETENT: NORMAL
BH CV LOWER VASCULAR LEFT POSTERIOR TIBIAL AUGMENT: NORMAL
BH CV LOWER VASCULAR LEFT SAPHENOFEMORAL JUNCTION AUGMENT: NORMAL
BH CV LOWER VASCULAR LEFT SAPHENOFEMORAL JUNCTION COMPETENT: NORMAL
BH CV LOWER VASCULAR LEFT SAPHENOFEMORAL JUNCTION COMPRESS: NORMAL
BH CV LOWER VASCULAR LEFT SAPHENOFEMORAL JUNCTION PHASIC: NORMAL
BH CV LOWER VASCULAR LEFT SAPHENOFEMORAL JUNCTION SPONT: NORMAL
BH CV LOWER VASCULAR LEFT SAPHENOPOP JX PHASIC: NORMAL
BH CV LOWER VASCULAR LEFT SSV MID CALF COMPRESS: NORMAL
BH CV LOWER VASCULAR RIGHT COMMON FEMORAL AUGMENT: NORMAL
BH CV LOWER VASCULAR RIGHT COMMON FEMORAL COMPETENT: NORMAL
BH CV LOWER VASCULAR RIGHT COMMON FEMORAL COMPRESS: NORMAL
BH CV LOWER VASCULAR RIGHT COMMON FEMORAL PHASIC: NORMAL
BH CV LOWER VASCULAR RIGHT COMMON FEMORAL SPONT: NORMAL

## 2025-04-23 PROCEDURE — 93971 EXTREMITY STUDY: CPT

## 2025-04-24 ENCOUNTER — OFFICE (OUTPATIENT)
Dept: URBAN - METROPOLITAN AREA CLINIC 76 | Facility: CLINIC | Age: 85
End: 2025-04-24
Payer: MEDICARE

## 2025-04-24 VITALS
SYSTOLIC BLOOD PRESSURE: 118 MMHG | HEIGHT: 64 IN | OXYGEN SATURATION: 94 % | DIASTOLIC BLOOD PRESSURE: 75 MMHG | WEIGHT: 162 LBS | HEART RATE: 66 BPM

## 2025-04-24 DIAGNOSIS — R10.31 RIGHT LOWER QUADRANT PAIN: ICD-10-CM

## 2025-04-24 PROCEDURE — 99213 OFFICE O/P EST LOW 20 MIN: CPT

## 2025-04-30 ENCOUNTER — OFFICE VISIT (OUTPATIENT)
Age: 85
End: 2025-04-30
Payer: MEDICARE

## 2025-04-30 VITALS
BODY MASS INDEX: 28.89 KG/M2 | DIASTOLIC BLOOD PRESSURE: 67 MMHG | WEIGHT: 157 LBS | SYSTOLIC BLOOD PRESSURE: 115 MMHG | HEIGHT: 62 IN

## 2025-04-30 DIAGNOSIS — I65.23 ATHEROSCLEROSIS OF BOTH CAROTID ARTERIES: Primary | Chronic | ICD-10-CM

## 2025-04-30 DIAGNOSIS — I87.321 CHRONIC VENOUS HYPERTENSION WITH INFLAMMATION INVOLVING RIGHT SIDE: ICD-10-CM

## 2025-04-30 DIAGNOSIS — I87.8 VENOUS STASIS: ICD-10-CM

## 2025-04-30 DIAGNOSIS — I87.2 VENOUS (PERIPHERAL) INSUFFICIENCY: ICD-10-CM

## 2025-04-30 PROBLEM — I87.329 CHRONIC VENOUS HYPERTENSION WITH INFLAMMATION: Status: ACTIVE | Noted: 2025-02-27

## 2025-04-30 NOTE — PROGRESS NOTES
Patient Name: Lachelle Mcclure    MRN: [id number] Encounter Date: 04/30/2025      Consulting Service: Vascular Surgery    Referring Provider: No ref. provider found       CHIEF COMPLAINT:  Chief Complaint   Patient presents with    Carotid Artery Disease     Mapping       Subjective    HPI: Lachelle Mcclure is a 84 y.o. female is being seen for evaluation/management of follow up for vein mapping of the left lower extremity.   Patient has been compliant with medical grade compression stocking use as well as elevation.   Despite best medical therapy symptoms persist.  At this point in time I discussed with the patient the options for intervention versus continued medical therapy.  Based on current anatomy and covered endovenous options I have recommended Venaseal.    While the patient was then I discussed in detail at length the procedure itself as well as the risk benefits and complications thereof.  Risks include but are not limited to bleeding, infection, nerve injury, skin injury, failure to clear the veins, failure to clear the pain, deep vein thrombosis and associated pulmonary emboli. For Venoseal delayed hypersensitivity patient was discussed. Patient understands and will schedule at their convenience.      PAST MEDICAL HISTORY:   Past Medical History:   Diagnosis Date    Benign essential hypertension 04/18/2006 04/18/2006--treatment for hypertension begun.    Bilateral lower extremity edema 04/04/2025    Carotid artery plaque, 5/9/2022-- mild bilateral plaque without stenosis.  4/3/2019--mild bilateral.  10/06/2016--vascular screen is now normal without carotid disease.  Improved. 07/30/2010    May 9, 2022--vascular screening reveals mild carotid artery plaque bilaterally without stenosis.  Negative for AAA.  Negative for PAD.  April 3, 2019--vascular screening reveals mild bilateral carotid plaque without stenosis.  Negative for AAA.  Negative for PAD.  10/06/2016--vascular screen reveals no evidence of carotid  plaque, negative for AAA, negative for PAD.  04/23/2014--vascular screen rev    Chronic right hip pain 09/01/2017 09/01/2017--patient has had a one-year history of intermittent episodes of lateral right hip pain that at times is very severe and debilitating.  She also has tenderness over this area that limits her from sleeping on her right side.  Last year I gave her a cortisone injection for trochanteric bursitis and this provided immediate relief but not long lasting.  X-rays were negative for fracture or even degenerative arthritis.  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  Ordered.    Chronic venous hypertension with ulcer and inflammation involving left side 02/27/2025    Chronic venous insufficiency of lower extremity 02/27/2025    Gastroesophageal reflux disease without esophagitis 01/14/2013    04/10/2014--patient presented with right upper quadrant/epigastric burning pain and discomfort that was postprandial. H pylori breath test was negative. Empiric trial of Dexilant 60 mg per day was initiated for esophageal reflux/dyspepsia.   01/14/2013--EGD performed for epigastric and right upper quadrant pain. There was some edematous appearing mucosa in the antrum and body of the stomach which was biopsied. No significant erythema. No ulcerations. Normal appearing duodenum and esophagus. Pathology revealed moderate chronic active gastritis. No intestinal metaplasia. Helicobacter pylori positive. Patient was treated with appropriate antibiotic and PPI therapy.    History of basal cell carcinoma of skin 02/12/2019 June 2018--Mohs micrographic surgery of basal cell carcinoma on the tip of the nose.    History of Helicobacter pylori gastritis 01/14/2013    04/10/2014--patient presented with right upper quadrant/epigastric burning pain and discomfort that was postprandial. H pylori breath test was negative. Empiric trial of Dexilant 60 mg per day was  initiated for esophageal reflux/dyspepsia.  01/14/2013--EGD performed for epigastric and right upper quadrant pain. There was some edematous appearing mucosa in the antrum and body of the stomach which w    History of malignant melanoma, 2008--right side of face.  1999--lower back. 01/01/2008 2008--melanoma resected from right face.   1999--malignant melanoma resected from lower back.    History of routine gynecologic exam yearly    Patient sees a gynecologist.    History of thrombophlebitis, superficial, left lower extremity. 01/12/2022 January 12, 2022--patient reports a 2-week history of left lower extremity swelling which is just above the ankle and sometimes extends all the way down into the ankle and is associated with redness and tenderness. It tends to get worse towards the end of the day. She has had no shortness of breath or chest pain and there is been no significant swelling involving the upper part of either leg. On e    Hx of malignant skin melanoma     Hypercalcemia 07/17/2023 March 19, 2025--nuclear medicine parathyroid SPECT-CT revealed no findings to localize a parathyroid lesion within the neck or upper mediastinum.     CMP reveals glucose of 100, calcium elevated 10.9, ionized calcium elevated at 1.43, intact PTH 68.5.      Hyperlipidemia 08/13/2010 08/13/2010--treatment for hyperlipidemia begun.    Impaired fasting glucose 09/01/2017 09/01/2017--routine physical.  Serum glucose elevated at 101.  Recommend weight loss and decrease carbohydrate intake.    Intolerance of oral bisphosphonate therapy 02/21/2020    Lumbar disc disease 10/19/2016    09/13/2016--x-ray lumbar spine reveals disc space narrowing at L2-L3, L3-L4, L4-L5, and possibly L5-S1.  There is mild anterior listhesis of L4 on L5 measuring 4.5 mm.  No compression deformity, nor other evidence of acute process.    Microscopic hematuria 12/18/2015 12/22/2015--urine cytology negative for malignant cells.  Red blood cells  noted.  Transitional epithelial cells noted.  Urine culture revealed less than 10,000 colony forming units of mixed urogenital laurie.  Repeat urinalysis was negative.  Recommend observation.  12/18/2015--routine urinalysis reveals 3-5 WBCs and 3-5 RBCs. 0 epithelial cells. 0 bacteria. Patient is asymptomatic. Repeat urinalysis, urine cytology, and urine culture sent.    Osteoporosis, 3/23/2022--lumbar spine -0.9; left femoral neck -2.4; right femoral neck -2.3.  11/5/2019--lumbar spine -0.3.  Left femoral neck -2.7.  Right femoral neck -2.4. 07/24/2009 March 23, 2022--DEXA scan reveals lumbar spine T score -0.9 representing a 5.7% interval decrease.  Left femoral neck T score -2.4 which represents a 5.7% increase.  Right femoral neck T score -2.3 which is unchanged.  Impression is osteopenia at the hips with mixed interval change in density values.  November 5, 2019--DEXA scan reveals lumbar spine T score -0.3 representing a 5% increase.  Left f    Positive colorectal cancer screening using Cologuard test     Postmenopausal state 09/01/2017    Primary hyperparathyroidism 03/10/2025    March 19, 2025--nuclear medicine parathyroid SPECT-CT revealed no findings to localize a parathyroid lesion within the neck or upper mediastinum.     CMP reveals glucose of 100, calcium elevated 10.9, ionized calcium elevated at 1.43, intact PTH 68.5.      Primary osteoarthritis of both hips 11/19/2021 November 19, 2021-9 being evaluated and treated by the orthopedist.  Patient may need bilateral hip replacements at some point in the future.    Status post total hip replacement, right 08/16/2024    Subclinical hypothyroidism 02/21/2020 February 21, 2020--TSH slightly elevated at 4.41.  Free T3 and free T4 are normal.  Close observation.    Tinnitus of right ear 12/18/2015 12/18/2015--patient presents with a four-month history of ringing in her right ear. No hearing loss. Examination reveals a cerumen impaction of the right  ear canal. This was removed with irrigation and curettage.    Vitamin D deficiency 08/12/2016      PAST SURGICAL HISTORY:   Past Surgical History:   Procedure Laterality Date    ADENOIDECTOMY  1946    APPENDECTOMY      BASAL CELL CARCINOMA EXCISION  06/2018 June 2018--Mohs micrographic surgery of basal cell carcinoma on the tip of the nose.    CHOLECYSTECTOMY  05/17/2013 05/17/2013--laparoscopic cholecystectomy.    COLONOSCOPY  07/12/2012 07/12/2012--normal colonoscopy to the cecum with an excellent prep.     COLONOSCOPY  06/20/2003 06/20/2003--normal colonoscopy to the cecum.    COLONOSCOPY N/A 08/30/2023    Procedure: COLONOSCOPY INTO CECUM WITH COLD BIOPSY POLYPECTOMIES AND HOT SNARE POLYPECTOMY;  Surgeon: Ho Goyal MD;  Location: [facility] ENDOSCOPY;  Service: Gastroenterology;  Laterality: N/A;  PRE- POSITIVE COLOGUARD  POST- POLYPS, HEMORRHOIDS    ESOPHAGOSCOPY / EGD  01/14/2013 01/14/2013--EGD performed for epigastric and right upper quadrant pain. There was some edematous appearing mucosa in the antrum and body of the stomach which was biopsied. No significant erythema. No ulcerations. Normal appearing duodenum and esophagus. Pathology revealed moderate chronic active gastritis. No intestinal metaplasia. Helicobacter pylori positive. Patient was treated with appropriate    EYE SURGERY      FOOT SURGERY  1992 1992--orthopedic 10 placed in right great toe.    HYSTERECTOMY  1986 1986--total abdominal hysterectomy.    SKIN CANCER EXCISION  09/14/2009 09/14/2009--excision 1 cm mid scalp cyst. Pathology benign. 2008--melanoma resected from right face. 1999--malignant melanoma resected from lower back.    TONSILLECTOMY AND ADENOIDECTOMY  1946    1946.    TOTAL HIP ARTHROPLASTY Right 08/16/2024    Procedure: TOTAL HIP ARTHROPLASTY ANTERIOR WITH HANA TABLE;  Surgeon: Rico Patterson MD;  Location: [facility] OR [facility] [place];  Service: Orthopedics;  Laterality: Right;      FAMILY HISTORY:  "  Family History   Problem Relation Age of Onset    COPD Mother     Hypertension Mother         Essential    Arthritis Mother     Hearing loss Mother     Hyperlipidemia Mother     Asthma Mother     COPD Father     Hypertension Father     Arthritis Father     Hyperlipidemia Father     Alcohol abuse Father     Malig Hyperthermia Neg Hx       SOCIAL HISTORY:   Social History     Tobacco Use    Smoking status: Never     Passive exposure: Never    Smokeless tobacco: Never   Vaping Use    Vaping status: Never Used   Substance Use Topics    Alcohol use: Never    Drug use: Never      MEDICATIONS:   Current Outpatient Medications on File Prior to Visit   Medication Sig Dispense Refill    Calcium Carb-Cholecalciferol (CALCIUM 600 + D PO) Take 1 tablet by mouth Daily.      Cholecalciferol (VITAMIN D) 2000 UNITS capsule Take 1 capsule by mouth Daily.      multivitamin with minerals (MULTIVITAMIN ADULT PO) Take 1 tablet by mouth Daily.      simvastatin (ZOCOR) 40 MG tablet TAKE ONE TABLET BY MOUTH EVERY DAY FOR HIGH CHOLESTEROL (NEED MD APPOINTMENT) 90 tablet 3    valsartan-hydrochlorothiazide (DIOVAN-HCT) 160-25 MG per tablet TAKE ONE TABLET BY MOUTH EVERY DAY FOR BLOOD PRESSURE 90 tablet 3     No current facility-administered medications on file prior to visit.       ALLERGIES: Patient has no known allergies.       Objective   Vitals:    04/30/25 0914   BP: 115/67   BP Location: Right arm   Weight: 71.2 kg (157 lb)   Height: 157.4 cm (61.97\")     Body mass index is 28.74 kg/m².          PHYSICAL EXAM:   Physical Exam  Constitutional:       Appearance: Normal appearance.   HENT:      Head: Normocephalic and atraumatic.      Nose: Nose normal.   Eyes:      Extraocular Movements: Extraocular movements intact.      Pupils: Pupils are equal, round, and reactive to light.   Cardiovascular:      Rate and Rhythm: Normal rate.      Pulses: Normal pulses.      Heart sounds: Normal heart sounds.      Comments: Left medial stasis at the " ankle without much change.  Pulmonary:      Effort: Pulmonary effort is normal.      Breath sounds: Normal breath sounds.   Abdominal:      General: Abdomen is flat. Bowel sounds are normal.      Palpations: Abdomen is soft.   Musculoskeletal:         General: Normal range of motion.      Cervical back: Normal range of motion and neck supple.      Right lower leg: No edema.      Left lower le+ Edema present.   Skin:     General: Skin is warm and dry.   Neurological:      General: No focal deficit present.      Mental Status: She is alert and oriented to person, place, and time. Mental status is at baseline.   Psychiatric:         Mood and Affect: Mood normal.         Thought Content: Thought content normal.          Result Review   LABS:    CBC          2024    03:53 2025    10:03 3/4/2025    09:41   CBC   WBC 11.05  4.5  5.05    RBC 3.89  4.68  4.55    Hemoglobin 11.6  13.5  13.3    Hematocrit 35.7  42.9  40.9    MCV 91.8  92  89.9    MCH 29.8  28.8  29.2    MCHC 32.5  31.5  32.5    RDW 12.3  12.8  12.5    Platelets 152  199  186      BMP          2025    10:03 3/4/2025    09:41 3/31/2025    09:10   BMP   BUN 15  14  16    Creatinine 0.77  0.69  0.72    Sodium 147  144  145    Potassium 5.1  4.5  4.1    Chloride 112  108  108    CO2 18  26.1  27.3    Calcium 11.2  10.7  10.9      Lipid Panel          2024    09:06 2025    10:03   Lipid Panel   Total Cholesterol 132  122    Triglycerides 70  91       INR          2024    14:54   Common Labs   INR 1.05       A1C Last 3 Results          2024    09:06 2025    10:03   HGBA1C Last 3 Results   Hemoglobin A1C 5.20  5.6         Results Review:       I reviewed the patient's new clinical results.    The following radiologic or non-invasive studies have been reviewed by me: vein map as below  Venous w Reflux Lower Extremity CAR - LEFT 2025    Interpretation Summary    Left iliofemoral deep venous insufficiency noted.  Mild  reflux noted at the left saphenofemoral junction with severe reflux noted in the greater saphenous vein below the knee.     No radiology results for the last 30 days.                ASSESSMENT/PLAN:   Diagnoses and all orders for this visit:    1. Carotid artery plaque, 5/9/2022-- mild bilateral plaque without stenosis.  4/3/2019--mild bilateral.  10/06/2016--vascular screen is now normal without carotid disease.  Improved. (Primary)    2. Venous (peripheral) insufficiency  -     Duplex Venous Lower Extremity - Left CAR; Future  -     Chemical Adhesive Venous Ablation Unilateral (Venaseal); Future    3. Venous stasis  -     Duplex Venous Lower Extremity - Left CAR; Future  -     Chemical Adhesive Venous Ablation Unilateral (Venaseal); Future    4. Chronic venous hypertension with inflammation involving right side  -     Duplex Venous Lower Extremity - Left CAR; Future  -     Chemical Adhesive Venous Ablation Unilateral (Venaseal); Future       84 y.o. female with what appears to be stasis changes and some moderately symptomatic veins to the medial calf that do get better when she wears compression.  This is stasis irritation that bothers her the most and I think that ablation of the greater saphenous with VenaSeal closure is going to be her best choice.  I think we can do this safely and without much discomfort for her.  She will keep her thigh-high stockings till she is completed the course of therapy with the venous seal and then switch to knee-high over-the-counter compression hose and I think this will make a big difference for her long-term.  She understands risk benefits complications and will wishes to proceed.    I discussed the plan with the patient who is agreeable to the plan of care at this point. Thank you for this consult.   Follow Up  Return in about 6 weeks (around 6/11/2025).    Jeremie Osullivan MD   04/30/25

## 2025-05-21 ENCOUNTER — LAB (OUTPATIENT)
Dept: LAB | Facility: HOSPITAL | Age: 85
End: 2025-05-21
Payer: MEDICARE

## 2025-05-21 PROCEDURE — 80053 COMPREHEN METABOLIC PANEL: CPT | Performed by: INTERNAL MEDICINE

## 2025-05-21 PROCEDURE — 83970 ASSAY OF PARATHORMONE: CPT | Performed by: INTERNAL MEDICINE

## 2025-05-21 PROCEDURE — 82330 ASSAY OF CALCIUM: CPT | Performed by: INTERNAL MEDICINE

## 2025-05-23 ENCOUNTER — TELEPHONE (OUTPATIENT)
Dept: INTERNAL MEDICINE | Facility: CLINIC | Age: 85
End: 2025-05-23

## 2025-05-23 NOTE — TELEPHONE ENCOUNTER
Caller: Lachelle Mcclure    Relationship: Self    Best call back number: [phone]     What test was performed: LABS    When was the test performed: 05/21/2025    Where was the test performed: IN OFFICE    Additional notes: PATIENT STATES THAT SHE HAD LAB WORK DONE ON 05/21/2025 AND WOULD LIKE FOR SOMEONE TO CALL HER TO DISCUSS THE RESULTS OF THIS.     PLEASE CALL PATIENT TO DISCUSS.

## 2025-06-16 ENCOUNTER — OFFICE VISIT (OUTPATIENT)
Dept: INTERNAL MEDICINE | Facility: CLINIC | Age: 85
End: 2025-06-16
Payer: MEDICARE

## 2025-06-16 ENCOUNTER — TELEPHONE (OUTPATIENT)
Dept: INTERNAL MEDICINE | Facility: CLINIC | Age: 85
End: 2025-06-16

## 2025-06-16 ENCOUNTER — LAB (OUTPATIENT)
Dept: LAB | Facility: HOSPITAL | Age: 85
End: 2025-06-16
Payer: MEDICARE

## 2025-06-16 VITALS
DIASTOLIC BLOOD PRESSURE: 82 MMHG | WEIGHT: 162 LBS | HEART RATE: 82 BPM | TEMPERATURE: 97.7 F | BODY MASS INDEX: 29.81 KG/M2 | RESPIRATION RATE: 16 BRPM | OXYGEN SATURATION: 95 % | HEIGHT: 62 IN | SYSTOLIC BLOOD PRESSURE: 126 MMHG

## 2025-06-16 DIAGNOSIS — R73.01 IMPAIRED FASTING GLUCOSE: Chronic | ICD-10-CM

## 2025-06-16 DIAGNOSIS — I10 BENIGN ESSENTIAL HYPERTENSION: Chronic | ICD-10-CM

## 2025-06-16 DIAGNOSIS — R35.1 NOCTURIA: ICD-10-CM

## 2025-06-16 DIAGNOSIS — G89.29 CHRONIC PAIN OF RIGHT INGUINAL REGION: ICD-10-CM

## 2025-06-16 DIAGNOSIS — Z51.81 THERAPEUTIC DRUG MONITORING: ICD-10-CM

## 2025-06-16 DIAGNOSIS — E03.8 SUBCLINICAL HYPOTHYROIDISM: Chronic | ICD-10-CM

## 2025-06-16 DIAGNOSIS — Z85.820 HISTORY OF MALIGNANT MELANOMA: Chronic | ICD-10-CM

## 2025-06-16 DIAGNOSIS — R53.82 CHRONIC FATIGUE: ICD-10-CM

## 2025-06-16 DIAGNOSIS — E21.0 PRIMARY HYPERPARATHYROIDISM: Chronic | ICD-10-CM

## 2025-06-16 DIAGNOSIS — R31.29 MICROSCOPIC HEMATURIA: Chronic | ICD-10-CM

## 2025-06-16 DIAGNOSIS — R68.83 CHILLS (WITHOUT FEVER): Primary | ICD-10-CM

## 2025-06-16 DIAGNOSIS — R10.31 CHRONIC PAIN OF RIGHT INGUINAL REGION: ICD-10-CM

## 2025-06-16 DIAGNOSIS — E83.52 HYPERCALCEMIA: Chronic | ICD-10-CM

## 2025-06-16 DIAGNOSIS — E78.2 MIXED HYPERLIPIDEMIA: Chronic | ICD-10-CM

## 2025-06-16 PROBLEM — I87.8 VENOUS STASIS: Chronic | Status: ACTIVE | Noted: 2025-02-27

## 2025-06-16 PROBLEM — I87.329 CHRONIC VENOUS HYPERTENSION WITH INFLAMMATION: Status: RESOLVED | Noted: 2025-02-27 | Resolved: 2025-06-16

## 2025-06-16 LAB
ALBUMIN SERPL-MCNC: 4.3 G/DL (ref 3.5–5.2)
ALBUMIN/GLOB SERPL: 1.8 G/DL
ALP SERPL-CCNC: 57 U/L (ref 39–117)
ALT SERPL W P-5'-P-CCNC: 15 U/L (ref 1–33)
ANION GAP SERPL CALCULATED.3IONS-SCNC: 9 MMOL/L (ref 5–15)
AST SERPL-CCNC: 17 U/L (ref 1–32)
BACTERIA UR QL AUTO: NORMAL /HPF
BASOPHILS # BLD AUTO: 0.05 10*3/MM3 (ref 0–0.2)
BASOPHILS NFR BLD AUTO: 1.1 % (ref 0–1.5)
BILIRUB SERPL-MCNC: 0.6 MG/DL (ref 0–1.2)
BILIRUB UR QL STRIP: NEGATIVE
BUN SERPL-MCNC: 12 MG/DL (ref 8–23)
BUN/CREAT SERPL: 18.2 (ref 7–25)
CA-I SERPL ISE-MCNC: 1.45 MMOL/L (ref 1.15–1.35)
CALCIUM SPEC-SCNC: 11 MG/DL (ref 8.6–10.5)
CHLORIDE SERPL-SCNC: 106 MMOL/L (ref 98–107)
CLARITY UR: CLEAR
CO2 SERPL-SCNC: 25 MMOL/L (ref 22–29)
COLOR UR: YELLOW
CREAT SERPL-MCNC: 0.66 MG/DL (ref 0.57–1)
CRP SERPL-MCNC: <0.3 MG/DL (ref 0–0.5)
DEPRECATED RDW RBC AUTO: 42 FL (ref 37–54)
EGFRCR SERPLBLD CKD-EPI 2021: 87.7 ML/MIN/1.73
EOSINOPHIL # BLD AUTO: 0.29 10*3/MM3 (ref 0–0.4)
EOSINOPHIL NFR BLD AUTO: 6.1 % (ref 0.3–6.2)
ERYTHROCYTE [DISTWIDTH] IN BLOOD BY AUTOMATED COUNT: 13 % (ref 12.3–15.4)
ERYTHROCYTE [SEDIMENTATION RATE] IN BLOOD: 15 MM/HR (ref 0–30)
GLOBULIN UR ELPH-MCNC: 2.4 GM/DL
GLUCOSE SERPL-MCNC: 92 MG/DL (ref 65–99)
GLUCOSE UR STRIP-MCNC: NEGATIVE MG/DL
HBA1C MFR BLD: 5.4 % (ref 4.8–5.6)
HCT VFR BLD AUTO: 40.5 % (ref 34–46.6)
HGB BLD-MCNC: 12.9 G/DL (ref 12–15.9)
HGB UR QL STRIP.AUTO: NEGATIVE
HYALINE CASTS UR QL AUTO: NORMAL /LPF
IMM GRANULOCYTES # BLD AUTO: 0.01 10*3/MM3 (ref 0–0.05)
IMM GRANULOCYTES NFR BLD AUTO: 0.2 % (ref 0–0.5)
KETONES UR QL STRIP: NEGATIVE
LEUKOCYTE ESTERASE UR QL STRIP.AUTO: ABNORMAL
LYMPHOCYTES # BLD AUTO: 1.02 10*3/MM3 (ref 0.7–3.1)
LYMPHOCYTES NFR BLD AUTO: 21.5 % (ref 19.6–45.3)
MCH RBC QN AUTO: 28.9 PG (ref 26.6–33)
MCHC RBC AUTO-ENTMCNC: 31.9 G/DL (ref 31.5–35.7)
MCV RBC AUTO: 90.6 FL (ref 79–97)
MONOCYTES # BLD AUTO: 0.54 10*3/MM3 (ref 0.1–0.9)
MONOCYTES NFR BLD AUTO: 11.4 % (ref 5–12)
NEUTROPHILS NFR BLD AUTO: 2.83 10*3/MM3 (ref 1.7–7)
NEUTROPHILS NFR BLD AUTO: 59.7 % (ref 42.7–76)
NITRITE UR QL STRIP: NEGATIVE
NRBC BLD AUTO-RTO: 0 /100 WBC (ref 0–0.2)
PH UR STRIP.AUTO: 6.5 [PH] (ref 5–8)
PLATELET # BLD AUTO: 174 10*3/MM3 (ref 140–450)
PMV BLD AUTO: 9.5 FL (ref 6–12)
POTASSIUM SERPL-SCNC: 4.1 MMOL/L (ref 3.5–5.2)
PROT SERPL-MCNC: 6.7 G/DL (ref 6–8.5)
PROT UR QL STRIP: NEGATIVE
PTH-INTACT SERPL-MCNC: 52.5 PG/ML (ref 15–65)
RBC # BLD AUTO: 4.47 10*6/MM3 (ref 3.77–5.28)
RBC # UR STRIP: NORMAL /HPF
REF LAB TEST METHOD: NORMAL
SODIUM SERPL-SCNC: 140 MMOL/L (ref 136–145)
SP GR UR STRIP: 1.01 (ref 1–1.03)
SQUAMOUS #/AREA URNS HPF: NORMAL /HPF
T3FREE SERPL-MCNC: 3.15 PG/ML (ref 2–4.4)
T4 FREE SERPL-MCNC: 1.05 NG/DL (ref 0.92–1.68)
TSH SERPL DL<=0.05 MIU/L-ACNC: 1.9 UIU/ML (ref 0.27–4.2)
UROBILINOGEN UR QL STRIP: ABNORMAL
WBC # UR STRIP: NORMAL /HPF
WBC NRBC COR # BLD AUTO: 4.74 10*3/MM3 (ref 3.4–10.8)

## 2025-06-16 PROCEDURE — 36415 COLL VENOUS BLD VENIPUNCTURE: CPT | Performed by: INTERNAL MEDICINE

## 2025-06-16 PROCEDURE — 3079F DIAST BP 80-89 MM HG: CPT | Performed by: INTERNAL MEDICINE

## 2025-06-16 PROCEDURE — 86140 C-REACTIVE PROTEIN: CPT | Performed by: INTERNAL MEDICINE

## 2025-06-16 PROCEDURE — 3074F SYST BP LT 130 MM HG: CPT | Performed by: INTERNAL MEDICINE

## 2025-06-16 PROCEDURE — 84443 ASSAY THYROID STIM HORMONE: CPT | Performed by: INTERNAL MEDICINE

## 2025-06-16 PROCEDURE — 80053 COMPREHEN METABOLIC PANEL: CPT | Performed by: INTERNAL MEDICINE

## 2025-06-16 PROCEDURE — 1126F AMNT PAIN NOTED NONE PRSNT: CPT | Performed by: INTERNAL MEDICINE

## 2025-06-16 PROCEDURE — 1160F RVW MEDS BY RX/DR IN RCRD: CPT | Performed by: INTERNAL MEDICINE

## 2025-06-16 PROCEDURE — 82330 ASSAY OF CALCIUM: CPT | Performed by: INTERNAL MEDICINE

## 2025-06-16 PROCEDURE — 83970 ASSAY OF PARATHORMONE: CPT | Performed by: INTERNAL MEDICINE

## 2025-06-16 PROCEDURE — 85025 COMPLETE CBC W/AUTO DIFF WBC: CPT | Performed by: INTERNAL MEDICINE

## 2025-06-16 PROCEDURE — 83036 HEMOGLOBIN GLYCOSYLATED A1C: CPT | Performed by: INTERNAL MEDICINE

## 2025-06-16 PROCEDURE — 84481 FREE ASSAY (FT-3): CPT | Performed by: INTERNAL MEDICINE

## 2025-06-16 PROCEDURE — 87086 URINE CULTURE/COLONY COUNT: CPT | Performed by: INTERNAL MEDICINE

## 2025-06-16 PROCEDURE — 85652 RBC SED RATE AUTOMATED: CPT | Performed by: INTERNAL MEDICINE

## 2025-06-16 PROCEDURE — 1159F MED LIST DOCD IN RCRD: CPT | Performed by: INTERNAL MEDICINE

## 2025-06-16 PROCEDURE — 84439 ASSAY OF FREE THYROXINE: CPT | Performed by: INTERNAL MEDICINE

## 2025-06-16 PROCEDURE — G2211 COMPLEX E/M VISIT ADD ON: HCPCS | Performed by: INTERNAL MEDICINE

## 2025-06-16 PROCEDURE — 99214 OFFICE O/P EST MOD 30 MIN: CPT | Performed by: INTERNAL MEDICINE

## 2025-06-16 PROCEDURE — 81001 URINALYSIS AUTO W/SCOPE: CPT | Performed by: INTERNAL MEDICINE

## 2025-06-16 NOTE — TELEPHONE ENCOUNTER
Caller: RAIZA    Relationship:     Best call back number:     [phone]       What was the call regarding:   NEED DIAGNOSE CODE FOR DATE  OF  SERVICE  2/27/25

## 2025-06-16 NOTE — PROGRESS NOTES
06/16/2025    Patient Information  Lachelle MEERA Mcclure                                                                                          [address]  [facility] [address]      1940  [unfilled]  There is no work phone number on file.    Chief Complaint:     Complaining of chills without fever.  Fatigue.  Right groin pain after right hip replacement.    History of Present Illness:    Patient with several chronic medical problems as noted in the problem list presents today with a 2-month history of chills without fever.  This is associated with worsening chronic fatigue.  Patient had her right hip replaced almost a year ago and since that time she has been evaluated for persistent right groin pain.  She had a CT scan of the right lower quadrant which was reportedly negative.  This was done at an outlying facility and I cannot find that documentation as of yet.  See below.  Patient reports no problems with urination, abnormal weight loss, itching or rash, respiratory symptoms, change in bowel habits, nausea or vomiting.  Patient has noted she is getting up at nighttime to urinate which is unusual for her.    Review of Systems   Constitutional: Positive for chills and malaise/fatigue. Negative for fever, night sweats and weight loss.   HENT: Negative.     Eyes: Negative.    Cardiovascular: Negative.    Respiratory: Negative.     Endocrine: Negative.    Hematologic/Lymphatic: Negative.    Skin: Negative.    Musculoskeletal: Negative.    Gastrointestinal: Negative.    Genitourinary:  Positive for nocturia. Negative for dysuria, flank pain, hematuria and urgency.   Neurological: Negative.    Psychiatric/Behavioral: Negative.     Allergic/Immunologic: Negative.        Active Problems:    Patient Active Problem List   Diagnosis   • Benign essential hypertension   • Carotid artery plaque, 5/9/2022-- mild bilateral plaque without stenosis.  4/3/2019--mild bilateral.  10/06/2016--vascular screen is now  normal without carotid disease.  Improved.   • Gastroesophageal reflux disease without esophagitis   • Hyperlipidemia   • Microscopic hematuria   • Osteoporosis, 3/23/2022--lumbar spine -0.9; left femoral neck -2.4; right femoral neck -2.3.  11/5/2019--lumbar spine -0.3.  Left femoral neck -2.7.  Right femoral neck -2.4.   • Tinnitus of right ear   • History of malignant melanoma, 2008--right side of face.  1999--lower back.   • Therapeutic drug monitoring   • Vitamin D deficiency   • Lumbar disc disease   • Impaired fasting glucose   • Postmenopausal state   • History of basal cell carcinoma of skin   • Subclinical hypothyroidism   • Intolerance of oral bisphosphonate therapy   • Primary osteoarthritis of both hips   • History of COVID-19   • Hypercalcemia   • Venous (peripheral) insufficiency   • Venous stasis   • Primary hyperparathyroidism   • Bilateral lower extremity edema   • Chronic pain of right inguinal region   • Chills (without fever)   • Chronic fatigue   • Nocturia         Past Medical History:   Diagnosis Date   • Benign essential hypertension 04/18/2006 04/18/2006--treatment for hypertension begun.   • Bilateral lower extremity edema 04/04/2025   • Cancer     Melanoma x 2   • Carotid artery plaque, 5/9/2022-- mild bilateral plaque without stenosis.  4/3/2019--mild bilateral.  10/06/2016--vascular screen is now normal without carotid disease.  Improved. 07/30/2010    May 9, 2022--vascular screening reveals mild carotid artery plaque bilaterally without stenosis.  Negative for AAA.  Negative for PAD.  April 3, 2019--vascular screening reveals mild bilateral carotid plaque without stenosis.  Negative for AAA.  Negative for PAD.  10/06/2016--vascular screen reveals no evidence of carotid plaque, negative for AAA, negative for PAD.  04/23/2014--vascular screen rev   • Cataract    • Cholelithiasis    • Chronic right hip pain 09/01/2017 09/01/2017--patient has had a one-year history of intermittent  episodes of lateral right hip pain that at times is very severe and debilitating.  She also has tenderness over this area that limits her from sleeping on her right side.  Last year I gave her a cortisone injection for trochanteric bursitis and this provided immediate relief but not long lasting.  X-rays were negative for fracture or even degenerative arthritis.  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  Ordered.   • Chronic venous hypertension with ulcer and inflammation involving left side 02/27/2025   • Chronic venous insufficiency of lower extremity 02/27/2025   • Colon polyp    • Gastroesophageal reflux disease without esophagitis 01/14/2013    04/10/2014--patient presented with right upper quadrant/epigastric burning pain and discomfort that was postprandial. H pylori breath test was negative. Empiric trial of Dexilant 60 mg per day was initiated for esophageal reflux/dyspepsia.   01/14/2013--EGD performed for epigastric and right upper quadrant pain. There was some edematous appearing mucosa in the antrum and body of the stomach which was biopsied. No significant erythema. No ulcerations. Normal appearing duodenum and esophagus. Pathology revealed moderate chronic active gastritis. No intestinal metaplasia. Helicobacter pylori positive. Patient was treated with appropriate antibiotic and PPI therapy.   • History of basal cell carcinoma of skin 02/12/2019 June 2018--Mohs micrographic surgery of basal cell carcinoma on the tip of the nose.   • History of Helicobacter pylori gastritis 01/14/2013    04/10/2014--patient presented with right upper quadrant/epigastric burning pain and discomfort that was postprandial. H pylori breath test was negative. Empiric trial of Dexilant 60 mg per day was initiated for esophageal reflux/dyspepsia.  01/14/2013--EGD performed for epigastric and right upper quadrant pain. There was some edematous appearing mucosa  in the antrum and body of the stomach which w   • History of malignant melanoma, 2008--right side of face.  1999--lower back. 01/01/2008 2008--melanoma resected from right face.   1999--malignant melanoma resected from lower back.   • History of routine gynecologic exam yearly    Patient sees a gynecologist.   • History of thrombophlebitis, superficial, left lower extremity. 01/12/2022 January 12, 2022--patient reports a 2-week history of left lower extremity swelling which is just above the ankle and sometimes extends all the way down into the ankle and is associated with redness and tenderness. It tends to get worse towards the end of the day. She has had no shortness of breath or chest pain and there is been no significant swelling involving the upper part of either leg. On e   • Hx of malignant skin melanoma    • Hypercalcemia 07/17/2023 March 19, 2025--nuclear medicine parathyroid SPECT-CT revealed no findings to localize a parathyroid lesion within the neck or upper mediastinum.     CMP reveals glucose of 100, calcium elevated 10.9, ionized calcium elevated at 1.43, intact PTH 68.5.     • Hyperlipidemia 08/13/2010 08/13/2010--treatment for hyperlipidemia begun.   • Impaired fasting glucose 09/01/2017 09/01/2017--routine physical.  Serum glucose elevated at 101.  Recommend weight loss and decrease carbohydrate intake.   • Intolerance of oral bisphosphonate therapy 02/21/2020   • Lumbar disc disease 10/19/2016    09/13/2016--x-ray lumbar spine reveals disc space narrowing at L2-L3, L3-L4, L4-L5, and possibly L5-S1.  There is mild anterior listhesis of L4 on L5 measuring 4.5 mm.  No compression deformity, nor other evidence of acute process.   • Microscopic hematuria 12/18/2015 12/22/2015--urine cytology negative for malignant cells.  Red blood cells noted.  Transitional epithelial cells noted.  Urine culture revealed less than 10,000 colony forming units of mixed urogenital laurie.  Repeat  urinalysis was negative.  Recommend observation.  12/18/2015--routine urinalysis reveals 3-5 WBCs and 3-5 RBCs. 0 epithelial cells. 0 bacteria. Patient is asymptomatic. Repeat urinalysis, urine cytology, and urine culture sent.   • Osteopenia    • Osteoporosis, 3/23/2022--lumbar spine -0.9; left femoral neck -2.4; right femoral neck -2.3.  11/5/2019--lumbar spine -0.3.  Left femoral neck -2.7.  Right femoral neck -2.4. 07/24/2009 March 23, 2022--DEXA scan reveals lumbar spine T score -0.9 representing a 5.7% interval decrease.  Left femoral neck T score -2.4 which represents a 5.7% increase.  Right femoral neck T score -2.3 which is unchanged.  Impression is osteopenia at the hips with mixed interval change in density values.  November 5, 2019--DEXA scan reveals lumbar spine T score -0.3 representing a 5% increase.  Left f   • Positive colorectal cancer screening using Cologuard test    • Postmenopausal state 09/01/2017   • Primary hyperparathyroidism 03/10/2025    March 19, 2025--nuclear medicine parathyroid SPECT-CT revealed no findings to localize a parathyroid lesion within the neck or upper mediastinum.     CMP reveals glucose of 100, calcium elevated 10.9, ionized calcium elevated at 1.43, intact PTH 68.5.     • Primary osteoarthritis of both hips 11/19/2021 November 19, 2021-9 being evaluated and treated by the orthopedist.  Patient may need bilateral hip replacements at some point in the future.   • Status post total hip replacement, right 08/16/2024   • Subclinical hypothyroidism 02/21/2020 February 21, 2020--TSH slightly elevated at 4.41.  Free T3 and free T4 are normal.  Close observation.   • Tinnitus of right ear 12/18/2015 12/18/2015--patient presents with a four-month history of ringing in her right ear. No hearing loss. Examination reveals a cerumen impaction of the right ear canal. This was removed with irrigation and curettage.   • Venous (peripheral) insufficiency 02/27/2025   • Venous  stasis 02/27/2025   • Visual impairment    • Vitamin D deficiency 08/12/2016         Past Surgical History:   Procedure Laterality Date   • ADENOIDECTOMY  1946   • APPENDECTOMY     • BASAL CELL CARCINOMA EXCISION  06/2018 June 2018--Mohs micrographic surgery of basal cell carcinoma on the tip of the nose.   • CHOLECYSTECTOMY  05/17/2013 05/17/2013--laparoscopic cholecystectomy.   • COLONOSCOPY  07/12/2012 07/12/2012--normal colonoscopy to the cecum with an excellent prep.    • COLONOSCOPY  06/20/2003 06/20/2003--normal colonoscopy to the cecum.   • COLONOSCOPY N/A 08/30/2023    Procedure: COLONOSCOPY INTO CECUM WITH COLD BIOPSY POLYPECTOMIES AND HOT SNARE POLYPECTOMY;  Surgeon: Ho Goyal MD;  Location: [facility] ENDOSCOPY;  Service: Gastroenterology;  Laterality: N/A;  PRE- POSITIVE COLOGUARD  POST- POLYPS, HEMORRHOIDS   • ESOPHAGOSCOPY / EGD  01/14/2013 01/14/2013--EGD performed for epigastric and right upper quadrant pain. There was some edematous appearing mucosa in the antrum and body of the stomach which was biopsied. No significant erythema. No ulcerations. Normal appearing duodenum and esophagus. Pathology revealed moderate chronic active gastritis. No intestinal metaplasia. Helicobacter pylori positive. Patient was treated with appropriate   • EYE SURGERY     • FOOT SURGERY  1992 1992--orthopedic 10 placed in right great toe.   • FRACTURE SURGERY      Rt great toe   • HYSTERECTOMY  1986 1986--total abdominal hysterectomy.   • JOINT REPLACEMENT  08/16/2024    Rt hip   • SKIN CANCER EXCISION  09/14/2009 09/14/2009--excision 1 cm mid scalp cyst. Pathology benign. 2008--melanoma resected from right face. 1999--malignant melanoma resected from lower back.   • TONSILLECTOMY AND ADENOIDECTOMY  1946 1946.   • TOTAL HIP ARTHROPLASTY Right 08/16/2024    Procedure: TOTAL HIP ARTHROPLASTY ANTERIOR WITH HANA TABLE;  Surgeon: Rico Patterson MD;  Location: [facility] OR [facility];  Service:  "Orthopedics;  Laterality: Right;         No Known Allergies        Current Outpatient Medications:   •  Calcium Carb-Cholecalciferol (CALCIUM 600 + D PO), Take 1 tablet by mouth Daily., Disp: , Rfl:   •  Cholecalciferol (VITAMIN D) 2000 UNITS capsule, Take 1 capsule by mouth Daily., Disp: , Rfl:   •  multivitamin with minerals (MULTIVITAMIN ADULT PO), Take 1 tablet by mouth Daily., Disp: , Rfl:   •  simvastatin (ZOCOR) 40 MG tablet, TAKE ONE TABLET BY MOUTH EVERY DAY FOR HIGH CHOLESTEROL (NEED MD APPOINTMENT), Disp: 90 tablet, Rfl: 3  •  valsartan-hydrochlorothiazide (DIOVAN-HCT) 160-25 MG per tablet, TAKE ONE TABLET BY MOUTH EVERY DAY FOR BLOOD PRESSURE, Disp: 90 tablet, Rfl: 3      Family History   Problem Relation Age of Onset   • COPD Mother    • Hypertension Mother         Essential   • Arthritis Mother    • Hearing loss Mother    • Hyperlipidemia Mother    • Asthma Mother    • COPD Father    • Hypertension Father    • Arthritis Father    • Hyperlipidemia Father    • Alcohol abuse Father    • Malig Hyperthermia Neg Hx          Social History     Socioeconomic History   • Marital status:    • Highest education level: Bachelor's degree (e.g., BA, AB, BS)   Tobacco Use   • Smoking status: Never     Passive exposure: Never   • Smokeless tobacco: Never   Vaping Use   • Vaping status: Never Used   Substance and Sexual Activity   • Alcohol use: Never   • Drug use: Never   • Sexual activity: Yes     Partners: Male     Birth control/protection: Hysterectomy         Vitals:    06/16/25 1140   BP: 126/82   Pulse: 82   Resp: 16   Temp: 97.7 °F (36.5 °C)   TempSrc: Oral   SpO2: 95%   Weight: 73.5 kg (162 lb)   Height: 157.4 cm (61.97\")        Body mass index is 29.66 kg/m².      Physical Exam:    General: Alert and oriented x 3.  No acute distress.  Normal affect.  HEENT: Pupils equal, round, reactive to light; extraocular movements intact; sclerae nonicteric; pharynx, ear canals and TMs normal.  Neck: Without JVD, " thyromegaly, bruit, or adenopathy.  Lungs: Clear to auscultation in all fields.  Heart: Regular rate and rhythm without murmur, rub, gallop, or click.  Abdomen: Soft, nontender, without hepatosplenomegaly or hernia.  Bowel sounds normal.  : Deferred.  Rectal: Deferred.  Extremities: Without clubbing, cyanosis, edema, or pulse deficit.  Neurologic: Intact without focal deficit.  Normal station and gait observed during ingress and egress from the examination room.  Skin: Without significant lesion.  Musculoskeletal: Unremarkable.    Lab/other results:      Assessment/Plan:     Diagnosis Plan   1. Chills (without fever)  CBC & Differential    Urine Culture - , Urine, Clean Catch    Urinalysis With Microscopic If Indicated (No Culture) - Urine, Clean Catch    C-reactive Protein    Sedimentation Rate      2. Chronic pain of right inguinal region  C-reactive Protein    Sedimentation Rate      3. Chronic fatigue  TSH    T4, Free    T3, Free      4. Nocturia  Urine Culture - , Urine, Clean Catch    Urinalysis With Microscopic If Indicated (No Culture) - Urine, Clean Catch      5. Primary hyperparathyroidism  PTH, Intact    Calcium, Ionized      6. Hypercalcemia  Calcium, Ionized      7. Subclinical hypothyroidism  TSH    T4, Free    T3, Free      8. Impaired fasting glucose  Comprehensive Metabolic Panel    Hemoglobin A1c      9. Microscopic hematuria        10. Hyperlipidemia  Comprehensive Metabolic Panel      11. Benign essential hypertension        12. History of malignant melanoma, 2008--right side of face.  1999--lower back.        13. Therapeutic drug monitoring          Patient with chronic medical problems as noted above presents with chills without fever and associated with fatigue and nocturia.  She has hyperparathyroidism and I do not think this would have been anything to do with her symptoms.  She has a history of subclinical hypothyroidism and certainly we need to reassess that.  She also has impaired  fasting glucose but no diabetes.  She has hyperlipidemia is on simvastatin which she had tolerated in the past.  Her blood pressure been controlled.  She has a history of malignant melanoma at least on 2 occasions and the last one being 2008 on the right side of her face.  Her blood pressure seems to be controlled.  She has had right groin pain after right hip replacement which is likely related to the hip replacement although it has been present now going on almost a year.  Orthopedic worked this up for a CT scan of the right lower quadrant which was reportedly negative but I need to obtain that formal report.      Plan is as follows: Check extensive lab work and then I will contact patient by phone or the computer with the results once they are known and possible further instructions.  I will have her keep her wellness visit appointment in August but I may need to see her sooner depending upon results.      Procedures

## 2025-06-18 LAB
APPEARANCE UR: CLEAR
BACTERIA SPEC AEROBE CULT: NO GROWTH
BACTERIA UR CULT: NO GROWTH
BACTERIA UR CULT: NORMAL
BILIRUB UR QL STRIP: NEGATIVE
COLOR UR: YELLOW
GLUCOSE UR QL STRIP: NEGATIVE
HGB UR QL STRIP: NEGATIVE
KETONES UR QL STRIP: NEGATIVE
LEUKOCYTE ESTERASE UR QL STRIP: NEGATIVE
MICRO URNS: NORMAL
NITRITE UR QL STRIP: NEGATIVE
PH UR STRIP: 6.5 [PH] (ref 5–7.5)
PROT UR QL STRIP: NEGATIVE
SP GR UR STRIP: 1.01 (ref 1–1.03)
UROBILINOGEN UR STRIP-MCNC: 0.2 MG/DL (ref 0.2–1)

## 2025-07-23 ENCOUNTER — HOSPITAL ENCOUNTER (OUTPATIENT)
Facility: HOSPITAL | Age: 85
Discharge: HOME | End: 2025-07-23
Admitting: SURGERY
Payer: MEDICARE

## 2025-07-23 ENCOUNTER — OFFICE VISIT (OUTPATIENT)
Age: 85
End: 2025-07-23
Payer: MEDICARE

## 2025-07-23 VITALS
BODY MASS INDEX: 29.81 KG/M2 | SYSTOLIC BLOOD PRESSURE: 124 MMHG | WEIGHT: 162 LBS | HEIGHT: 62 IN | DIASTOLIC BLOOD PRESSURE: 80 MMHG

## 2025-07-23 DIAGNOSIS — I87.2 VENOUS (PERIPHERAL) INSUFFICIENCY: ICD-10-CM

## 2025-07-23 DIAGNOSIS — I87.321 CHRONIC VENOUS HYPERTENSION WITH INFLAMMATION INVOLVING RIGHT SIDE: ICD-10-CM

## 2025-07-23 DIAGNOSIS — I87.8 VENOUS STASIS: Primary | ICD-10-CM

## 2025-07-23 DIAGNOSIS — I87.8 VENOUS STASIS: ICD-10-CM

## 2025-07-23 LAB
BH CV LOW VAS LEFT EXTERNAL ILIAC COMPRESS: NORMAL
BH CV LOW VAS LEFT GREATER SAPH AK VESSEL: 1
BH CV LOW VAS LEFT GREATER SAPH BK VESSEL: 1
BH CV LOW VAS LEFT SAPHENOFEMORAL JUNCTION SPONT: 1
BH CV LOWER VASCULAR LEFT COMMON FEMORAL COMPRESS: NORMAL
BH CV LOWER VASCULAR LEFT DISTAL FEMORAL COMPRESS: NORMAL
BH CV LOWER VASCULAR LEFT GASTRONEMIUS COMPRESS: NORMAL
BH CV LOWER VASCULAR LEFT GREATER SAPH AK COMPRESS: NORMAL
BH CV LOWER VASCULAR LEFT GREATER SAPH BK COMPRESS: NORMAL
BH CV LOWER VASCULAR LEFT MID FEMORAL COMPRESS: NORMAL
BH CV LOWER VASCULAR LEFT POPLITEAL COMPRESS: NORMAL
BH CV LOWER VASCULAR LEFT PROFUNDA FEMORAL COMPRESS: NORMAL
BH CV LOWER VASCULAR LEFT PROXIMAL FEMORAL COMPRESS: NORMAL
BH CV LOWER VASCULAR LEFT SAPHENOFEMORAL JUNCTION COMPRESS: NORMAL

## 2025-07-23 PROCEDURE — 93971 EXTREMITY STUDY: CPT

## 2025-07-23 NOTE — PROGRESS NOTES
Chief Complaint  Post op (Venaseal/Left outside leg is red and hurts)    Subjective        Lachelle Mcclure presents to [facility] VASCULAR SURGERY  HPI   Lachelle Mcclure is a 84 y.o. female that has been followed in our office for venous insufficiency and varicose veins. She under went a Left leg Endovenous chemical ablation greater saphenous vein using VenaSeal on 8/21/2025 with Dr. Osullivan. She returns today in follow up along with a spot check. She has been doing well since the procedure with minimal pain. She has been compliant with compression stockings.    Lachelle Mcclure  reports that she has never smoked. She has never been exposed to tobacco smoke. She has never used smokeless tobacco..        Objective   Vital Signs:  Vitals:    07/23/25 0906   BP: 124/80      Body mass index is 29.62 kg/m².           Physical Exam  Vitals reviewed.   Cardiovascular:      Rate and Rhythm: Normal rate and regular rhythm.      Pulses: Normal pulses.           Dorsalis pedis pulses are 2+ on the left side.        Posterior tibial pulses are 2+ on the left side.      Heart sounds: Normal heart sounds.   Pulmonary:      Effort: Pulmonary effort is normal.      Breath sounds: Normal breath sounds.   Musculoskeletal:      Right lower leg: No edema.      Left lower leg: No edema.   Neurological:      Mental Status: She is alert and oriented to person, place, and time.          Result Review :      Spot Check: Duplex Venous Lower Extremity - Left CAR (07/23/2025 08:53)                    Assessment and Plan     Diagnoses and all orders for this visit:    1. Venous stasis (Primary)  -     Duplex Venous Lower Extremity - Left CAR; Future    2. Venous (peripheral) insufficiency  -     Duplex Venous Lower Extremity - Left CAR; Future              Lachelle Mcclure is a 84 y.o. female that has been followed in our office for venous insufficiency and venous stasis. She is doing well status post endovenous chemical ablation with  VenaSeal.  She returns today in follow up along with a spot check. Today's scan showed successful ablation without acute DVT.  We discussed that it will take time for these veins to decompress.  We discussed postoperative care instructions including wearing stockings for a total of 2 weeks. She may resume exercise in 2 weeks.  We will plan to follow-up in 12 weeks with a repeat venous duplex and to see the surgeon.    Follow Up     Return in about 3 months (around 10/23/2025) for Left lower extremity LEV and follow-up with Dr. Osullivan.      INDIRA Gil

## 2025-08-06 ENCOUNTER — HOSPITAL ENCOUNTER (OUTPATIENT)
Facility: HOSPITAL | Age: 85
Setting detail: HOSPITAL OUTPATIENT SURGERY
Discharge: HOME | End: 2025-08-06
Attending: INTERNAL MEDICINE | Admitting: INTERNAL MEDICINE
Payer: MEDICARE

## 2025-08-06 ENCOUNTER — ANESTHESIA EVENT (OUTPATIENT)
Dept: GASTROENTEROLOGY | Facility: HOSPITAL | Age: 85
End: 2025-08-06
Payer: MEDICARE

## 2025-08-06 ENCOUNTER — ON CAMPUS - OUTPATIENT (OUTPATIENT)
Dept: URBAN - METROPOLITAN AREA HOSPITAL 114 | Facility: HOSPITAL | Age: 85
End: 2025-08-06
Payer: MEDICARE

## 2025-08-06 ENCOUNTER — ANESTHESIA (OUTPATIENT)
Dept: GASTROENTEROLOGY | Facility: HOSPITAL | Age: 85
End: 2025-08-06
Payer: MEDICARE

## 2025-08-06 VITALS
BODY MASS INDEX: 26.26 KG/M2 | DIASTOLIC BLOOD PRESSURE: 82 MMHG | RESPIRATION RATE: 16 BRPM | HEIGHT: 64 IN | HEART RATE: 69 BPM | SYSTOLIC BLOOD PRESSURE: 130 MMHG | OXYGEN SATURATION: 100 % | WEIGHT: 153.8 LBS

## 2025-08-06 DIAGNOSIS — K64.1 SECOND DEGREE HEMORRHOIDS: ICD-10-CM

## 2025-08-06 DIAGNOSIS — K63.5 POLYP OF COLON: ICD-10-CM

## 2025-08-06 DIAGNOSIS — Z86.0100 HX OF COLONIC POLYPS: ICD-10-CM

## 2025-08-06 DIAGNOSIS — Z86.0101 PERSONAL HISTORY OF ADENOMATOUS AND SERRATED COLON POLYPS: ICD-10-CM

## 2025-08-06 DIAGNOSIS — Z09 ENCOUNTER FOR FOLLOW-UP EXAMINATION AFTER COMPLETED TREATMEN: ICD-10-CM

## 2025-08-06 PROCEDURE — 25010000002 LIDOCAINE 2% SOLUTION: Performed by: NURSE ANESTHETIST, CERTIFIED REGISTERED

## 2025-08-06 PROCEDURE — 25010000002 PROPOFOL 10 MG/ML EMULSION: Performed by: NURSE ANESTHETIST, CERTIFIED REGISTERED

## 2025-08-06 PROCEDURE — 25810000003 LACTATED RINGERS PER 1000 ML: Performed by: INTERNAL MEDICINE

## 2025-08-06 PROCEDURE — 88305 TISSUE EXAM BY PATHOLOGIST: CPT | Performed by: INTERNAL MEDICINE

## 2025-08-06 PROCEDURE — 45380 COLONOSCOPY AND BIOPSY: CPT | Mod: PT | Performed by: INTERNAL MEDICINE

## 2025-08-06 RX ORDER — SODIUM CHLORIDE, SODIUM LACTATE, POTASSIUM CHLORIDE, CALCIUM CHLORIDE 600; 310; 30; 20 MG/100ML; MG/100ML; MG/100ML; MG/100ML
20 INJECTION, SOLUTION INTRAVENOUS CONTINUOUS
Status: DISCONTINUED | OUTPATIENT
Start: 2025-08-06 | End: 2025-08-06 | Stop reason: HOSPADM

## 2025-08-06 RX ORDER — PROPOFOL 10 MG/ML
VIAL (ML) INTRAVENOUS AS NEEDED
Status: DISCONTINUED | OUTPATIENT
Start: 2025-08-06 | End: 2025-08-06 | Stop reason: SURG

## 2025-08-06 RX ORDER — LIDOCAINE HYDROCHLORIDE 20 MG/ML
INJECTION, SOLUTION INFILTRATION; PERINEURAL AS NEEDED
Status: DISCONTINUED | OUTPATIENT
Start: 2025-08-06 | End: 2025-08-06 | Stop reason: SURG

## 2025-08-06 RX ADMIN — SODIUM CHLORIDE, POTASSIUM CHLORIDE, SODIUM LACTATE AND CALCIUM CHLORIDE 20 ML/HR: 600; 310; 30; 20 INJECTION, SOLUTION INTRAVENOUS at 07:36

## 2025-08-06 RX ADMIN — PROPOFOL 150 MCG/KG/MIN: 10 INJECTION, EMULSION INTRAVENOUS at 08:08

## 2025-08-06 RX ADMIN — PROPOFOL 100 MG: 10 INJECTION, EMULSION INTRAVENOUS at 08:07

## 2025-08-06 RX ADMIN — LIDOCAINE HYDROCHLORIDE 60 MG: 20 INJECTION, SOLUTION INFILTRATION; PERINEURAL at 08:07

## 2025-08-08 LAB
CYTO UR: NORMAL
LAB AP CASE REPORT: NORMAL
PATH REPORT.FINAL DX SPEC: NORMAL
PATH REPORT.GROSS SPEC: NORMAL

## 2025-08-11 ENCOUNTER — LAB (OUTPATIENT)
Dept: LAB | Facility: HOSPITAL | Age: 85
End: 2025-08-11
Payer: MEDICARE

## 2025-08-11 ENCOUNTER — OFFICE VISIT (OUTPATIENT)
Dept: INTERNAL MEDICINE | Facility: CLINIC | Age: 85
End: 2025-08-11
Payer: MEDICARE

## 2025-08-11 VITALS
TEMPERATURE: 97.9 F | HEART RATE: 67 BPM | OXYGEN SATURATION: 98 % | DIASTOLIC BLOOD PRESSURE: 76 MMHG | BODY MASS INDEX: 27.46 KG/M2 | RESPIRATION RATE: 16 BRPM | SYSTOLIC BLOOD PRESSURE: 124 MMHG | WEIGHT: 155 LBS | HEIGHT: 63 IN

## 2025-08-11 DIAGNOSIS — M16.0 PRIMARY OSTEOARTHRITIS OF BOTH HIPS: Chronic | ICD-10-CM

## 2025-08-11 DIAGNOSIS — Z86.0100 HISTORY OF COLON POLYPS: Chronic | ICD-10-CM

## 2025-08-11 DIAGNOSIS — Z51.81 THERAPEUTIC DRUG MONITORING: ICD-10-CM

## 2025-08-11 DIAGNOSIS — E21.0 PRIMARY HYPERPARATHYROIDISM: Chronic | ICD-10-CM

## 2025-08-11 DIAGNOSIS — E03.8 SUBCLINICAL HYPOTHYROIDISM: Chronic | ICD-10-CM

## 2025-08-11 DIAGNOSIS — R60.0 BILATERAL LOWER EXTREMITY EDEMA: Chronic | ICD-10-CM

## 2025-08-11 DIAGNOSIS — M81.0 AGE-RELATED OSTEOPOROSIS WITHOUT CURRENT PATHOLOGICAL FRACTURE: Chronic | ICD-10-CM

## 2025-08-11 DIAGNOSIS — E83.52 HYPERCALCEMIA: Chronic | ICD-10-CM

## 2025-08-11 DIAGNOSIS — I10 BENIGN ESSENTIAL HYPERTENSION: Chronic | ICD-10-CM

## 2025-08-11 DIAGNOSIS — R73.01 IMPAIRED FASTING GLUCOSE: ICD-10-CM

## 2025-08-11 DIAGNOSIS — Z85.820 HISTORY OF MALIGNANT MELANOMA: Chronic | ICD-10-CM

## 2025-08-11 DIAGNOSIS — E78.2 MIXED HYPERLIPIDEMIA: Chronic | ICD-10-CM

## 2025-08-11 DIAGNOSIS — Z78.9 INTOLERANCE OF ORAL BISPHOSPHONATE THERAPY: Chronic | ICD-10-CM

## 2025-08-11 DIAGNOSIS — Z00.00 ENCOUNTER FOR SUBSEQUENT ANNUAL WELLNESS VISIT (AWV) IN MEDICARE PATIENT: Primary | ICD-10-CM

## 2025-08-11 DIAGNOSIS — K21.9 GASTROESOPHAGEAL REFLUX DISEASE WITHOUT ESOPHAGITIS: Chronic | ICD-10-CM

## 2025-08-11 DIAGNOSIS — Z85.828 HISTORY OF BASAL CELL CARCINOMA OF SKIN: Chronic | ICD-10-CM

## 2025-08-11 DIAGNOSIS — R31.29 MICROSCOPIC HEMATURIA: Chronic | ICD-10-CM

## 2025-08-11 DIAGNOSIS — M51.9 LUMBAR DISC DISEASE: Chronic | ICD-10-CM

## 2025-08-11 DIAGNOSIS — E55.9 VITAMIN D DEFICIENCY: Chronic | ICD-10-CM

## 2025-08-11 DIAGNOSIS — Z78.0 POSTMENOPAUSAL STATE: Chronic | ICD-10-CM

## 2025-08-11 DIAGNOSIS — I65.23 ATHEROSCLEROSIS OF BOTH CAROTID ARTERIES: Chronic | ICD-10-CM

## 2025-08-11 DIAGNOSIS — Z23 NEED FOR PNEUMOCOCCAL VACCINATION: ICD-10-CM

## 2025-08-11 PROBLEM — R35.1 NOCTURIA: Status: RESOLVED | Noted: 2025-06-16 | Resolved: 2025-08-11

## 2025-08-11 PROBLEM — R53.82 CHRONIC FATIGUE: Status: RESOLVED | Noted: 2025-06-16 | Resolved: 2025-08-11

## 2025-08-11 PROBLEM — R10.31 CHRONIC PAIN OF RIGHT INGUINAL REGION: Status: RESOLVED | Noted: 2025-04-04 | Resolved: 2025-08-11

## 2025-08-11 PROBLEM — R68.83 CHILLS (WITHOUT FEVER): Status: RESOLVED | Noted: 2025-06-16 | Resolved: 2025-08-11

## 2025-08-11 PROBLEM — G89.29 CHRONIC PAIN OF RIGHT INGUINAL REGION: Status: RESOLVED | Noted: 2025-04-04 | Resolved: 2025-08-11

## 2025-08-11 PROBLEM — Z86.16 HISTORY OF COVID-19: Chronic | Status: RESOLVED | Noted: 2021-12-02 | Resolved: 2025-08-11

## 2025-08-11 LAB
25(OH)D3 SERPL-MCNC: 29 NG/ML (ref 30–100)
ALBUMIN SERPL-MCNC: 4.3 G/DL (ref 3.5–5.2)
ALBUMIN/GLOB SERPL: 1.7 G/DL
ALP SERPL-CCNC: 56 U/L (ref 39–117)
ALT SERPL W P-5'-P-CCNC: 40 U/L (ref 1–33)
ANION GAP SERPL CALCULATED.3IONS-SCNC: 7.4 MMOL/L (ref 5–15)
AST SERPL-CCNC: 21 U/L (ref 1–32)
BACTERIA UR QL AUTO: ABNORMAL /HPF
BILIRUB SERPL-MCNC: 0.5 MG/DL (ref 0–1.2)
BILIRUB UR QL STRIP: NEGATIVE
BUN SERPL-MCNC: 13 MG/DL (ref 8–23)
BUN/CREAT SERPL: 20.3 (ref 7–25)
CA-I SERPL ISE-MCNC: 1.6 MMOL/L (ref 1.15–1.35)
CALCIUM SPEC-SCNC: 12.1 MG/DL (ref 8.6–10.5)
CHLORIDE SERPL-SCNC: 104 MMOL/L (ref 98–107)
CK SERPL-CCNC: 30 U/L (ref 20–180)
CLARITY UR: CLEAR
CO2 SERPL-SCNC: 28.6 MMOL/L (ref 22–29)
COLOR UR: YELLOW
CREAT SERPL-MCNC: 0.64 MG/DL (ref 0.57–1)
DEPRECATED RDW RBC AUTO: 42.8 FL (ref 37–54)
EGFRCR SERPLBLD CKD-EPI 2021: 87.8 ML/MIN/1.73
ERYTHROCYTE [DISTWIDTH] IN BLOOD BY AUTOMATED COUNT: 13 % (ref 12.3–15.4)
GLOBULIN UR ELPH-MCNC: 2.5 GM/DL
GLUCOSE SERPL-MCNC: 94 MG/DL (ref 65–99)
GLUCOSE UR STRIP-MCNC: NEGATIVE MG/DL
HBA1C MFR BLD: 5.3 % (ref 4.8–5.6)
HCT VFR BLD AUTO: 43.9 % (ref 34–46.6)
HGB BLD-MCNC: 13.9 G/DL (ref 12–15.9)
HGB UR QL STRIP.AUTO: NEGATIVE
HYALINE CASTS UR QL AUTO: ABNORMAL /LPF
KETONES UR QL STRIP: NEGATIVE
LEUKOCYTE ESTERASE UR QL STRIP.AUTO: ABNORMAL
MCH RBC QN AUTO: 29.1 PG (ref 26.6–33)
MCHC RBC AUTO-ENTMCNC: 31.7 G/DL (ref 31.5–35.7)
MCV RBC AUTO: 92 FL (ref 79–97)
NITRITE UR QL STRIP: NEGATIVE
PH UR STRIP.AUTO: 6.5 [PH] (ref 5–8)
PLATELET # BLD AUTO: 211 10*3/MM3 (ref 140–450)
PMV BLD AUTO: 9.5 FL (ref 6–12)
POTASSIUM SERPL-SCNC: 4.7 MMOL/L (ref 3.5–5.2)
PROT SERPL-MCNC: 6.8 G/DL (ref 6–8.5)
PROT UR QL STRIP: NEGATIVE
PTH-INTACT SERPL-MCNC: 50.2 PG/ML (ref 15–65)
RBC # BLD AUTO: 4.77 10*6/MM3 (ref 3.77–5.28)
RBC # UR STRIP: ABNORMAL /HPF
REF LAB TEST METHOD: ABNORMAL
SODIUM SERPL-SCNC: 140 MMOL/L (ref 136–145)
SP GR UR STRIP: 1.02 (ref 1–1.03)
SQUAMOUS #/AREA URNS HPF: ABNORMAL /HPF
T3FREE SERPL-MCNC: 2.96 PG/ML (ref 2–4.4)
T4 FREE SERPL-MCNC: 1.25 NG/DL (ref 0.92–1.68)
TSH SERPL DL<=0.05 MIU/L-ACNC: 1.61 UIU/ML (ref 0.27–4.2)
UROBILINOGEN UR QL STRIP: ABNORMAL
WBC # UR STRIP: ABNORMAL /HPF
WBC NRBC COR # BLD AUTO: 8.19 10*3/MM3 (ref 3.4–10.8)

## 2025-08-11 PROCEDURE — G0439 PPPS, SUBSEQ VISIT: HCPCS | Performed by: INTERNAL MEDICINE

## 2025-08-11 PROCEDURE — 84439 ASSAY OF FREE THYROXINE: CPT | Performed by: INTERNAL MEDICINE

## 2025-08-11 PROCEDURE — 85027 COMPLETE CBC AUTOMATED: CPT | Performed by: INTERNAL MEDICINE

## 2025-08-11 PROCEDURE — 83036 HEMOGLOBIN GLYCOSYLATED A1C: CPT | Performed by: INTERNAL MEDICINE

## 2025-08-11 PROCEDURE — 82330 ASSAY OF CALCIUM: CPT | Performed by: INTERNAL MEDICINE

## 2025-08-11 PROCEDURE — 84481 FREE ASSAY (FT-3): CPT | Performed by: INTERNAL MEDICINE

## 2025-08-11 PROCEDURE — 83704 LIPOPROTEIN BLD QUAN PART: CPT | Performed by: INTERNAL MEDICINE

## 2025-08-11 PROCEDURE — 81001 URINALYSIS AUTO W/SCOPE: CPT | Performed by: INTERNAL MEDICINE

## 2025-08-11 PROCEDURE — 36415 COLL VENOUS BLD VENIPUNCTURE: CPT | Performed by: INTERNAL MEDICINE

## 2025-08-11 PROCEDURE — 82550 ASSAY OF CK (CPK): CPT | Performed by: INTERNAL MEDICINE

## 2025-08-11 PROCEDURE — 1159F MED LIST DOCD IN RCRD: CPT | Performed by: INTERNAL MEDICINE

## 2025-08-11 PROCEDURE — 83970 ASSAY OF PARATHORMONE: CPT | Performed by: INTERNAL MEDICINE

## 2025-08-11 PROCEDURE — 1160F RVW MEDS BY RX/DR IN RCRD: CPT | Performed by: INTERNAL MEDICINE

## 2025-08-11 PROCEDURE — 3074F SYST BP LT 130 MM HG: CPT | Performed by: INTERNAL MEDICINE

## 2025-08-11 PROCEDURE — 82306 VITAMIN D 25 HYDROXY: CPT | Performed by: INTERNAL MEDICINE

## 2025-08-11 PROCEDURE — 1170F FXNL STATUS ASSESSED: CPT | Performed by: INTERNAL MEDICINE

## 2025-08-11 PROCEDURE — 96160 PT-FOCUSED HLTH RISK ASSMT: CPT | Performed by: INTERNAL MEDICINE

## 2025-08-11 PROCEDURE — 84443 ASSAY THYROID STIM HORMONE: CPT | Performed by: INTERNAL MEDICINE

## 2025-08-11 PROCEDURE — 1126F AMNT PAIN NOTED NONE PRSNT: CPT | Performed by: INTERNAL MEDICINE

## 2025-08-11 PROCEDURE — 3078F DIAST BP <80 MM HG: CPT | Performed by: INTERNAL MEDICINE

## 2025-08-11 PROCEDURE — 80061 LIPID PANEL: CPT | Performed by: INTERNAL MEDICINE

## 2025-08-11 PROCEDURE — 80053 COMPREHEN METABOLIC PANEL: CPT | Performed by: INTERNAL MEDICINE

## 2025-08-12 ENCOUNTER — INFUSION (OUTPATIENT)
Dept: ONCOLOGY | Facility: HOSPITAL | Age: 85
End: 2025-08-12
Payer: MEDICARE

## 2025-08-12 ENCOUNTER — OFFICE (OUTPATIENT)
Dept: URBAN - METROPOLITAN AREA CLINIC 76 | Facility: CLINIC | Age: 85
End: 2025-08-12

## 2025-08-12 VITALS
WEIGHT: 156 LBS | SYSTOLIC BLOOD PRESSURE: 128 MMHG | HEART RATE: 63 BPM | OXYGEN SATURATION: 97 % | HEIGHT: 64 IN | DIASTOLIC BLOOD PRESSURE: 74 MMHG

## 2025-08-12 DIAGNOSIS — R13.10 DYSPHAGIA, UNSPECIFIED: ICD-10-CM

## 2025-08-12 DIAGNOSIS — Z86.0101 PERSONAL HISTORY OF ADENOMATOUS AND SERRATED COLON POLYPS: ICD-10-CM

## 2025-08-12 DIAGNOSIS — M81.0 AGE-RELATED OSTEOPOROSIS WITHOUT CURRENT PATHOLOGICAL FRACTURE: Primary | ICD-10-CM

## 2025-08-12 LAB
CHOLEST SERPL-MCNC: 146 MG/DL (ref 100–199)
HDL SERPL-SCNC: 36.1 UMOL/L
HDLC SERPL-MCNC: 54 MG/DL
LDL SERPL QN: 21.2 NM
LDL SERPL-SCNC: 992 NMOL/L
LDL SMALL SERPL-SCNC: 343 NMOL/L
LDLC SERPL CALC-MCNC: 76 MG/DL (ref 0–99)
TRIGL SERPL-MCNC: 83 MG/DL (ref 0–149)

## 2025-08-12 PROCEDURE — 25010000002 DENOSUMAB 60 MG/ML SOLUTION PREFILLED SYRINGE: Performed by: INTERNAL MEDICINE

## 2025-08-12 PROCEDURE — 96372 THER/PROPH/DIAG INJ SC/IM: CPT

## 2025-08-12 PROCEDURE — 99213 OFFICE O/P EST LOW 20 MIN: CPT

## 2025-08-12 RX ADMIN — DENOSUMAB 60 MG: 60 INJECTION SUBCUTANEOUS at 15:17

## 2025-08-18 ENCOUNTER — OFFICE VISIT (OUTPATIENT)
Dept: INTERNAL MEDICINE | Facility: CLINIC | Age: 85
End: 2025-08-18
Payer: MEDICARE

## 2025-08-18 VITALS
OXYGEN SATURATION: 97 % | WEIGHT: 157 LBS | RESPIRATION RATE: 16 BRPM | HEART RATE: 70 BPM | SYSTOLIC BLOOD PRESSURE: 126 MMHG | DIASTOLIC BLOOD PRESSURE: 70 MMHG | HEIGHT: 63 IN | TEMPERATURE: 98.6 F | BODY MASS INDEX: 27.82 KG/M2

## 2025-08-18 DIAGNOSIS — Z85.820 HISTORY OF MALIGNANT MELANOMA: Chronic | ICD-10-CM

## 2025-08-18 DIAGNOSIS — Z78.0 POSTMENOPAUSAL STATE: Chronic | ICD-10-CM

## 2025-08-18 DIAGNOSIS — Z78.9 INTOLERANCE OF ORAL BISPHOSPHONATE THERAPY: Chronic | ICD-10-CM

## 2025-08-18 DIAGNOSIS — Z51.81 THERAPEUTIC DRUG MONITORING: ICD-10-CM

## 2025-08-18 DIAGNOSIS — E03.8 SUBCLINICAL HYPOTHYROIDISM: Chronic | ICD-10-CM

## 2025-08-18 DIAGNOSIS — M81.0 AGE-RELATED OSTEOPOROSIS WITHOUT CURRENT PATHOLOGICAL FRACTURE: Chronic | ICD-10-CM

## 2025-08-18 DIAGNOSIS — K21.9 GASTROESOPHAGEAL REFLUX DISEASE WITHOUT ESOPHAGITIS: Chronic | ICD-10-CM

## 2025-08-18 DIAGNOSIS — I87.2 CHRONIC VENOUS INSUFFICIENCY OF LOWER EXTREMITY: Chronic | ICD-10-CM

## 2025-08-18 DIAGNOSIS — R31.29 MICROSCOPIC HEMATURIA: Chronic | ICD-10-CM

## 2025-08-18 DIAGNOSIS — E83.52 HYPERCALCEMIA: Chronic | ICD-10-CM

## 2025-08-18 DIAGNOSIS — R73.01 IMPAIRED FASTING GLUCOSE: Primary | Chronic | ICD-10-CM

## 2025-08-18 DIAGNOSIS — I10 BENIGN ESSENTIAL HYPERTENSION: Chronic | ICD-10-CM

## 2025-08-18 DIAGNOSIS — E55.9 VITAMIN D DEFICIENCY: Chronic | ICD-10-CM

## 2025-08-18 DIAGNOSIS — R60.0 BILATERAL LOWER EXTREMITY EDEMA: Chronic | ICD-10-CM

## 2025-08-18 DIAGNOSIS — Z85.828 HISTORY OF BASAL CELL CARCINOMA OF SKIN: Chronic | ICD-10-CM

## 2025-08-18 DIAGNOSIS — E21.0 PRIMARY HYPERPARATHYROIDISM: Chronic | ICD-10-CM

## 2025-08-18 DIAGNOSIS — I65.23 ATHEROSCLEROSIS OF BOTH CAROTID ARTERIES: Chronic | ICD-10-CM

## 2025-08-18 DIAGNOSIS — Z86.0100 HISTORY OF COLON POLYPS: Chronic | ICD-10-CM

## 2025-08-18 DIAGNOSIS — E78.2 MIXED HYPERLIPIDEMIA: Chronic | ICD-10-CM

## 2025-08-18 PROCEDURE — 99214 OFFICE O/P EST MOD 30 MIN: CPT | Performed by: INTERNAL MEDICINE

## 2025-08-18 PROCEDURE — G2211 COMPLEX E/M VISIT ADD ON: HCPCS | Performed by: INTERNAL MEDICINE

## 2025-08-18 PROCEDURE — 3078F DIAST BP <80 MM HG: CPT | Performed by: INTERNAL MEDICINE

## 2025-08-18 PROCEDURE — 3074F SYST BP LT 130 MM HG: CPT | Performed by: INTERNAL MEDICINE

## 2025-08-18 PROCEDURE — 1160F RVW MEDS BY RX/DR IN RCRD: CPT | Performed by: INTERNAL MEDICINE

## 2025-08-18 PROCEDURE — 1159F MED LIST DOCD IN RCRD: CPT | Performed by: INTERNAL MEDICINE

## 2025-08-18 PROCEDURE — 1126F AMNT PAIN NOTED NONE PRSNT: CPT | Performed by: INTERNAL MEDICINE

## 2025-08-18 RX ORDER — VALSARTAN 160 MG/1
TABLET ORAL
Qty: 30 TABLET | Refills: 1 | Status: SHIPPED | OUTPATIENT
Start: 2025-08-18

## 2025-08-18 RX ORDER — DENOSUMAB 60 MG/ML
INJECTION SUBCUTANEOUS
COMMUNITY

## 2025-08-18 RX ORDER — FUROSEMIDE 20 MG/1
TABLET ORAL
Qty: 30 TABLET | Refills: 1 | Status: SHIPPED | OUTPATIENT
Start: 2025-08-18

## (undated) DEVICE — OPTIFOAM GENTLE SA, POSTOP, 4X8: Brand: MEDLINE

## (undated) DEVICE — GLV SURG PREMIERPRO ORTHO LTX PF SZ8 BRN

## (undated) DEVICE — ERBE NESSY®PLATE 170 SPLIT; 168CM²; CABLE 3M: Brand: ERBE

## (undated) DEVICE — SYR CONTRL LUERLOK 10CC

## (undated) DEVICE — ADAPT CLN BIOGUARD AIR/H2O DISP

## (undated) DEVICE — SOL ISO/ALC 70PCT 4OZ

## (undated) DEVICE — PATIENT RETURN ELECTRODE, SINGLE-USE, CONTACT QUALITY MONITORING, ADULT, WITH 9FT CORD, FOR PATIENTS WEIGING OVER 33LBS. (15KG): Brand: MEGADYNE

## (undated) DEVICE — SENSR O2 OXIMAX FNGR A/ 18IN NONSTR

## (undated) DEVICE — CANN O2 ETCO2 FITS ALL CONN CO2 SMPL A/ 7IN DISP LF

## (undated) DEVICE — LN SMPL CO2 SHTRM SD STREAM W/M LUER

## (undated) DEVICE — SINGLE-USE BIOPSY FORCEPS: Brand: RADIAL JAW 4

## (undated) DEVICE — KT ORCA ORCAPOD DISP STRL

## (undated) DEVICE — SHEET, DRAPE, SPLIT, STERILE: Brand: MEDLINE

## (undated) DEVICE — PENCL SMOKE/EVAC MEGADYNE TELESCP 10FT

## (undated) DEVICE — APPL CHLORAPREP HI/LITE 26ML ORNG

## (undated) DEVICE — GLV SURG BIOGEL LTX PF 8

## (undated) DEVICE — THE SINGLE USE ETRAP – POLYP TRAP IS USED FOR SUCTION RETRIEVAL OF ENDOSCOPICALLY REMOVED POLYPS.: Brand: ETRAP

## (undated) DEVICE — SUT VIC 3/0 CTI 36IN J944H

## (undated) DEVICE — SYS CLS SKIN PREMIERPRO EXOFINFUSION 22CM

## (undated) DEVICE — MAT FLR ABSORBENT LG 4FT 10 2.5FT

## (undated) DEVICE — PK ANT HIP 40

## (undated) DEVICE — DRAPE,REIN 53X77,STERILE: Brand: MEDLINE

## (undated) DEVICE — TUBING, SUCTION, 1/4" X 10', STRAIGHT: Brand: MEDLINE

## (undated) DEVICE — NEEDLE, QUINCKE, 20GX3.5": Brand: MEDLINE

## (undated) DEVICE — SNAR POLYP SENSATION STDOVL 27 240 BX40

## (undated) DEVICE — GLV SURG SIGNATURE ESSENTIAL PF LTX SZ8

## (undated) DEVICE — TRAP FLD MINIVAC MEGADYNE 100ML

## (undated) DEVICE — DECANTER BAG 9": Brand: MEDLINE INDUSTRIES, INC.

## (undated) DEVICE — SKIN PREP TRAY W/CHG: Brand: MEDLINE INDUSTRIES, INC.